# Patient Record
Sex: FEMALE | Race: WHITE | Employment: OTHER | URBAN - METROPOLITAN AREA
[De-identification: names, ages, dates, MRNs, and addresses within clinical notes are randomized per-mention and may not be internally consistent; named-entity substitution may affect disease eponyms.]

---

## 2017-03-18 ENCOUNTER — HOSPITAL ENCOUNTER (OUTPATIENT)
Age: 72
Setting detail: OBSERVATION
Discharge: HOME | End: 2017-03-20
Attending: EMERGENCY MEDICINE | Admitting: INTERNAL MEDICINE
Payer: MEDICARE

## 2017-03-18 ENCOUNTER — APPOINTMENT (OUTPATIENT)
Dept: CT IMAGING | Age: 72
End: 2017-03-18
Payer: MEDICARE

## 2017-03-18 DIAGNOSIS — I95.9 HYPOTENSION, UNSPECIFIED HYPOTENSION TYPE: Primary | ICD-10-CM

## 2017-03-18 LAB
BASOPHILS ABSOLUTE: 0.1 K/UL (ref 0–0.2)
BASOPHILS RELATIVE PERCENT: 0.6 %
EOSINOPHILS ABSOLUTE: 0.4 K/UL (ref 0–0.7)
EOSINOPHILS RELATIVE PERCENT: 3.1 %
HCT VFR BLD CALC: 42.5 % (ref 37–47)
HEMOGLOBIN: 14 G/DL (ref 12–16)
LYMPHOCYTES ABSOLUTE: 3.2 K/UL (ref 1–4.8)
LYMPHOCYTES RELATIVE PERCENT: 27.1 %
MCH RBC QN AUTO: 30.8 PG (ref 27–31.3)
MCHC RBC AUTO-ENTMCNC: 33 % (ref 33–37)
MCV RBC AUTO: 93.5 FL (ref 82–100)
MONOCYTES ABSOLUTE: 1 K/UL (ref 0.2–0.8)
MONOCYTES RELATIVE PERCENT: 8.4 %
NEUTROPHILS ABSOLUTE: 7.1 K/UL (ref 1.4–6.5)
NEUTROPHILS RELATIVE PERCENT: 60.8 %
PDW BLD-RTO: 14.6 % (ref 11.5–14.5)
PLATELET # BLD: 221 K/UL (ref 130–400)
RBC # BLD: 4.55 M/UL (ref 4.2–5.4)
WBC # BLD: 11.7 K/UL (ref 4.8–10.8)

## 2017-03-18 PROCEDURE — 93005 ELECTROCARDIOGRAM TRACING: CPT

## 2017-03-18 PROCEDURE — 80053 COMPREHEN METABOLIC PANEL: CPT

## 2017-03-18 PROCEDURE — 82550 ASSAY OF CK (CPK): CPT

## 2017-03-18 PROCEDURE — 70450 CT HEAD/BRAIN W/O DYE: CPT

## 2017-03-18 PROCEDURE — 99285 EMERGENCY DEPT VISIT HI MDM: CPT

## 2017-03-18 PROCEDURE — 87086 URINE CULTURE/COLONY COUNT: CPT

## 2017-03-18 PROCEDURE — 83735 ASSAY OF MAGNESIUM: CPT

## 2017-03-18 PROCEDURE — 84484 ASSAY OF TROPONIN QUANT: CPT

## 2017-03-18 PROCEDURE — 85025 COMPLETE CBC W/AUTO DIFF WBC: CPT

## 2017-03-18 PROCEDURE — 36415 COLL VENOUS BLD VENIPUNCTURE: CPT

## 2017-03-18 RX ORDER — 0.9 % SODIUM CHLORIDE 0.9 %
500 INTRAVENOUS SOLUTION INTRAVENOUS ONCE
Status: COMPLETED | OUTPATIENT
Start: 2017-03-18 | End: 2017-03-19

## 2017-03-18 ASSESSMENT — PAIN SCALES - GENERAL: PAINLEVEL_OUTOF10: 7

## 2017-03-18 ASSESSMENT — PAIN DESCRIPTION - PAIN TYPE: TYPE: CHRONIC PAIN

## 2017-03-18 ASSESSMENT — PAIN DESCRIPTION - LOCATION: LOCATION: BACK

## 2017-03-19 ENCOUNTER — APPOINTMENT (OUTPATIENT)
Dept: GENERAL RADIOLOGY | Age: 72
End: 2017-03-19
Payer: MEDICARE

## 2017-03-19 PROBLEM — I25.10 CORONARY ARTERY DISEASE INVOLVING NATIVE CORONARY ARTERY OF NATIVE HEART WITHOUT ANGINA PECTORIS: Status: ACTIVE | Noted: 2017-03-19

## 2017-03-19 LAB
ALBUMIN SERPL-MCNC: 3.9 G/DL (ref 3.9–4.9)
ALP BLD-CCNC: 110 U/L (ref 40–130)
ALT SERPL-CCNC: 6 U/L (ref 0–33)
AMORPHOUS: ABNORMAL
ANION GAP SERPL CALCULATED.3IONS-SCNC: 12 MEQ/L (ref 7–13)
ANION GAP SERPL CALCULATED.3IONS-SCNC: 16 MEQ/L (ref 7–13)
AST SERPL-CCNC: 19 U/L (ref 0–35)
BACTERIA: ABNORMAL /HPF
BASOPHILS ABSOLUTE: 0.1 K/UL (ref 0–0.2)
BASOPHILS RELATIVE PERCENT: 0.6 %
BILIRUB SERPL-MCNC: 0.3 MG/DL (ref 0–1.2)
BILIRUBIN URINE: NEGATIVE
BLOOD, URINE: NEGATIVE
BUN BLDV-MCNC: 21 MG/DL (ref 8–23)
BUN BLDV-MCNC: 22 MG/DL (ref 8–23)
CALCIUM SERPL-MCNC: 8.3 MG/DL (ref 8.6–10.2)
CALCIUM SERPL-MCNC: 9.2 MG/DL (ref 8.6–10.2)
CASTS: ABNORMAL /LPF
CHLORIDE BLD-SCNC: 101 MEQ/L (ref 98–107)
CHLORIDE BLD-SCNC: 94 MEQ/L (ref 98–107)
CLARITY: CLEAR
CO2: 23 MEQ/L (ref 22–29)
CO2: 24 MEQ/L (ref 22–29)
COLOR: YELLOW
CREAT SERPL-MCNC: 1.4 MG/DL (ref 0.5–0.9)
CREAT SERPL-MCNC: 1.81 MG/DL (ref 0.5–0.9)
EOSINOPHILS ABSOLUTE: 0.4 K/UL (ref 0–0.7)
EOSINOPHILS RELATIVE PERCENT: 4.2 %
EPITHELIAL CELLS, UA: ABNORMAL /HPF
GFR AFRICAN AMERICAN: 33.3
GFR AFRICAN AMERICAN: 44.7
GFR NON-AFRICAN AMERICAN: 27.5
GFR NON-AFRICAN AMERICAN: 37
GLOBULIN: 2.7 G/DL (ref 2.3–3.5)
GLUCOSE BLD-MCNC: 100 MG/DL (ref 74–109)
GLUCOSE BLD-MCNC: 127 MG/DL (ref 74–109)
GLUCOSE URINE: NEGATIVE MG/DL
HCT VFR BLD CALC: 39.2 % (ref 37–47)
HEMOGLOBIN: 13.1 G/DL (ref 12–16)
KETONES, URINE: NEGATIVE MG/DL
LEUKOCYTE ESTERASE, URINE: ABNORMAL
LYMPHOCYTES ABSOLUTE: 2.6 K/UL (ref 1–4.8)
LYMPHOCYTES RELATIVE PERCENT: 30.6 %
MAGNESIUM: 1.9 MG/DL (ref 1.7–2.3)
MCH RBC QN AUTO: 31.4 PG (ref 27–31.3)
MCHC RBC AUTO-ENTMCNC: 33.4 % (ref 33–37)
MCV RBC AUTO: 93.9 FL (ref 82–100)
MONOCYTES ABSOLUTE: 0.7 K/UL (ref 0.2–0.8)
MONOCYTES RELATIVE PERCENT: 7.7 %
NEUTROPHILS ABSOLUTE: 4.9 K/UL (ref 1.4–6.5)
NEUTROPHILS RELATIVE PERCENT: 56.9 %
NITRITE, URINE: NEGATIVE
PDW BLD-RTO: 14.6 % (ref 11.5–14.5)
PH UA: 7.5 (ref 5–9)
PLATELET # BLD: 188 K/UL (ref 130–400)
POTASSIUM SERPL-SCNC: 4.1 MEQ/L (ref 3.5–5.1)
POTASSIUM SERPL-SCNC: 4.4 MEQ/L (ref 3.5–5.1)
PROTEIN UA: NEGATIVE MG/DL
RBC # BLD: 4.17 M/UL (ref 4.2–5.4)
RBC UA: ABNORMAL /HPF (ref 0–2)
SODIUM BLD-SCNC: 134 MEQ/L (ref 132–144)
SODIUM BLD-SCNC: 136 MEQ/L (ref 132–144)
SPECIFIC GRAVITY UA: 1.01 (ref 1–1.03)
TOTAL CK: 33 U/L (ref 0–170)
TOTAL PROTEIN: 6.6 G/DL (ref 6.4–8.1)
TROPONIN: <0.01 NG/ML (ref 0–0.01)
TROPONIN: <0.01 NG/ML (ref 0–0.01)
URINE REFLEX TO CULTURE: YES
UROBILINOGEN, URINE: 1 E.U./DL
WBC # BLD: 8.5 K/UL (ref 4.8–10.8)
WBC UA: ABNORMAL /HPF (ref 0–5)

## 2017-03-19 PROCEDURE — 2580000003 HC RX 258: Performed by: INTERNAL MEDICINE

## 2017-03-19 PROCEDURE — 84484 ASSAY OF TROPONIN QUANT: CPT

## 2017-03-19 PROCEDURE — 81001 URINALYSIS AUTO W/SCOPE: CPT

## 2017-03-19 PROCEDURE — 71010 XR CHEST PORTABLE: CPT

## 2017-03-19 PROCEDURE — 36415 COLL VENOUS BLD VENIPUNCTURE: CPT

## 2017-03-19 PROCEDURE — G0378 HOSPITAL OBSERVATION PER HR: HCPCS

## 2017-03-19 PROCEDURE — 85025 COMPLETE CBC W/AUTO DIFF WBC: CPT

## 2017-03-19 PROCEDURE — 99222 1ST HOSP IP/OBS MODERATE 55: CPT | Performed by: INTERNAL MEDICINE

## 2017-03-19 PROCEDURE — 6370000000 HC RX 637 (ALT 250 FOR IP): Performed by: INTERNAL MEDICINE

## 2017-03-19 PROCEDURE — 80048 BASIC METABOLIC PNL TOTAL CA: CPT

## 2017-03-19 PROCEDURE — 2580000003 HC RX 258: Performed by: PHYSICIAN ASSISTANT

## 2017-03-19 RX ORDER — SODIUM CHLORIDE 9 MG/ML
INJECTION, SOLUTION INTRAVENOUS CONTINUOUS
Status: DISCONTINUED | OUTPATIENT
Start: 2017-03-19 | End: 2017-03-20

## 2017-03-19 RX ORDER — ASPIRIN 81 MG/1
81 TABLET, CHEWABLE ORAL DAILY
Status: DISCONTINUED | OUTPATIENT
Start: 2017-03-19 | End: 2017-03-20 | Stop reason: HOSPADM

## 2017-03-19 RX ORDER — PRAVASTATIN SODIUM 40 MG
40 TABLET ORAL DAILY
Status: DISCONTINUED | OUTPATIENT
Start: 2017-03-19 | End: 2017-03-20 | Stop reason: HOSPADM

## 2017-03-19 RX ADMIN — PRAVASTATIN SODIUM 40 MG: 40 TABLET ORAL at 20:42

## 2017-03-19 RX ADMIN — ASPIRIN 81 MG: 81 TABLET, CHEWABLE ORAL at 10:04

## 2017-03-19 RX ADMIN — SODIUM CHLORIDE 500 ML: 9 INJECTION, SOLUTION INTRAVENOUS at 00:00

## 2017-03-19 RX ADMIN — RIVAROXABAN 20 MG: 20 TABLET, FILM COATED ORAL at 10:03

## 2017-03-19 RX ADMIN — SODIUM CHLORIDE: 9 INJECTION, SOLUTION INTRAVENOUS at 05:45

## 2017-03-19 RX ADMIN — SODIUM CHLORIDE: 9 INJECTION, SOLUTION INTRAVENOUS at 15:25

## 2017-03-19 ASSESSMENT — ENCOUNTER SYMPTOMS
CONSTIPATION: 0
EYE DISCHARGE: 0
ORTHOPNEA: 0
BACK PAIN: 0
HEMOPTYSIS: 0
WHEEZING: 0
SHORTNESS OF BREATH: 0
DIARRHEA: 0
GASTROINTESTINAL NEGATIVE: 1
APNEA: 0
NAUSEA: 0
ABDOMINAL DISTENTION: 0
VOICE CHANGE: 0
NAUSEA: 1
VOMITING: 1
EYES NEGATIVE: 1
ANAL BLEEDING: 0
RESPIRATORY NEGATIVE: 1
VOMITING: 0
BLOOD IN STOOL: 0
PHOTOPHOBIA: 0
COUGH: 0
ABDOMINAL PAIN: 0

## 2017-03-20 VITALS
HEIGHT: 59 IN | BODY MASS INDEX: 26.81 KG/M2 | DIASTOLIC BLOOD PRESSURE: 77 MMHG | OXYGEN SATURATION: 95 % | HEART RATE: 77 BPM | TEMPERATURE: 97.7 F | SYSTOLIC BLOOD PRESSURE: 140 MMHG | WEIGHT: 133 LBS | RESPIRATION RATE: 18 BRPM

## 2017-03-20 LAB
LV EF: 60 %
LVEF MODALITY: NORMAL
URINE CULTURE, ROUTINE: NORMAL

## 2017-03-20 PROCEDURE — 6370000000 HC RX 637 (ALT 250 FOR IP): Performed by: INTERNAL MEDICINE

## 2017-03-20 PROCEDURE — 2580000003 HC RX 258: Performed by: INTERNAL MEDICINE

## 2017-03-20 PROCEDURE — 93306 TTE W/DOPPLER COMPLETE: CPT

## 2017-03-20 PROCEDURE — 6370000000 HC RX 637 (ALT 250 FOR IP): Performed by: NURSE PRACTITIONER

## 2017-03-20 PROCEDURE — 99217 PR OBSERVATION CARE DISCHARGE MANAGEMENT: CPT | Performed by: NURSE PRACTITIONER

## 2017-03-20 PROCEDURE — G0378 HOSPITAL OBSERVATION PER HR: HCPCS

## 2017-03-20 RX ORDER — BENZONATATE 200 MG/1
200 CAPSULE ORAL 3 TIMES DAILY
Qty: 21 CAPSULE | Refills: 0 | Status: SHIPPED | OUTPATIENT
Start: 2017-03-20 | End: 2017-03-27

## 2017-03-20 RX ORDER — BENZONATATE 100 MG/1
200 CAPSULE ORAL 3 TIMES DAILY
Status: DISCONTINUED | OUTPATIENT
Start: 2017-03-20 | End: 2017-03-20 | Stop reason: HOSPADM

## 2017-03-20 RX ORDER — CARVEDILOL 25 MG/1
12.5 TABLET ORAL 2 TIMES DAILY WITH MEALS
Qty: 60 TABLET | Refills: 3 | Status: ON HOLD | OUTPATIENT
Start: 2017-03-20 | End: 2019-07-30

## 2017-03-20 RX ORDER — ACETAMINOPHEN 325 MG/1
650 TABLET ORAL EVERY 6 HOURS PRN
Status: DISCONTINUED | OUTPATIENT
Start: 2017-03-20 | End: 2017-03-20 | Stop reason: HOSPADM

## 2017-03-20 RX ADMIN — SODIUM CHLORIDE: 9 INJECTION, SOLUTION INTRAVENOUS at 01:02

## 2017-03-20 RX ADMIN — RIVAROXABAN 20 MG: 20 TABLET, FILM COATED ORAL at 08:26

## 2017-03-20 RX ADMIN — ASPIRIN 81 MG: 81 TABLET, CHEWABLE ORAL at 08:26

## 2017-03-20 RX ADMIN — ACETAMINOPHEN 650 MG: 325 TABLET ORAL at 02:00

## 2017-03-20 RX ADMIN — BENZONATATE 200 MG: 100 CAPSULE ORAL at 11:59

## 2017-03-20 ASSESSMENT — PAIN SCALES - GENERAL: PAINLEVEL_OUTOF10: 8

## 2017-03-21 LAB
EKG ATRIAL RATE: 71 BPM
EKG P AXIS: 76 DEGREES
EKG P-R INTERVAL: 146 MS
EKG Q-T INTERVAL: 408 MS
EKG QRS DURATION: 70 MS
EKG QTC CALCULATION (BAZETT): 443 MS
EKG R AXIS: 56 DEGREES
EKG T AXIS: 62 DEGREES
EKG VENTRICULAR RATE: 71 BPM

## 2018-02-22 ENCOUNTER — HOSPITAL ENCOUNTER (OUTPATIENT)
Dept: GENERAL RADIOLOGY | Age: 73
Discharge: HOME | End: 2018-02-24
Payer: MEDICARE

## 2018-02-22 ENCOUNTER — HOSPITAL ENCOUNTER (OUTPATIENT)
Dept: WOMENS IMAGING | Age: 73
Discharge: HOME | End: 2018-02-24
Payer: MEDICARE

## 2018-02-22 DIAGNOSIS — M54.6 ACUTE BILATERAL THORACIC BACK PAIN: ICD-10-CM

## 2018-02-22 DIAGNOSIS — M54.6 PAIN IN THORACIC SPINE: ICD-10-CM

## 2018-02-22 DIAGNOSIS — M17.9 GONARTHROSIS: ICD-10-CM

## 2018-02-22 PROCEDURE — 72110 X-RAY EXAM L-2 SPINE 4/>VWS: CPT

## 2018-02-22 PROCEDURE — 77080 DXA BONE DENSITY AXIAL: CPT

## 2018-02-22 PROCEDURE — 72072 X-RAY EXAM THORAC SPINE 3VWS: CPT

## 2018-03-06 ENCOUNTER — TELEPHONE (OUTPATIENT)
Dept: CASE MANAGEMENT | Age: 73
End: 2018-03-06

## 2018-03-06 NOTE — LETTER
3/6/2018      [facility]    Dear Colleen Opitz:       Your healthcare provider has referred you to the [facility].  Your appointment is Tuesday, March 13 at 1:00pm at [facility].    I am enclosing some information that you may find helpful. Please keep in mind that lung cancer screening does not take the place of quitting smoking. The best way to not get lung cancer is to not smoke. If you would like help with this, please feel free to contact me. Also find enclosed a Statement of Understanding. Please review this document and call me with any questions. You can bring this with you to your appointment as will be asked to sign a copy of this document at the time of your scan. Your provider will contact you when the results of your CT lung screening are available. If you have any questions before then, please do not hesitate to contact me. Kindest regards,        Vida Moise RN, BSN  Lung Nodule Navigator  [facility]  [phone]  [email or web address]

## 2018-03-13 ENCOUNTER — HOSPITAL ENCOUNTER (OUTPATIENT)
Dept: CT IMAGING | Age: 73
Discharge: HOME | End: 2018-03-15
Payer: MEDICARE

## 2018-03-13 DIAGNOSIS — Z72.0 TOBACCO ABUSE DISORDER: ICD-10-CM

## 2018-03-13 DIAGNOSIS — F17.200 TOBACCO DEPENDENCE SYNDROME: ICD-10-CM

## 2018-03-13 PROCEDURE — G0297 LDCT FOR LUNG CA SCREEN: HCPCS

## 2018-04-23 ENCOUNTER — HOSPITAL ENCOUNTER (OUTPATIENT)
Dept: CT IMAGING | Age: 73
Discharge: HOME | End: 2018-04-25
Payer: MEDICARE

## 2018-04-23 VITALS
HEIGHT: 59 IN | WEIGHT: 139 LBS | DIASTOLIC BLOOD PRESSURE: 79 MMHG | BODY MASS INDEX: 28.02 KG/M2 | HEART RATE: 63 BPM | SYSTOLIC BLOOD PRESSURE: 143 MMHG | RESPIRATION RATE: 16 BRPM

## 2018-04-23 DIAGNOSIS — Q45.3 PANCREATIC DUCTAL ABNORMALITY: ICD-10-CM

## 2018-04-23 PROCEDURE — 74150 CT ABDOMEN W/O CONTRAST: CPT

## 2018-05-11 ENCOUNTER — OFFICE VISIT (OUTPATIENT)
Dept: GASTROENTEROLOGY | Age: 73
End: 2018-05-11
Payer: MEDICARE

## 2018-05-11 VITALS
BODY MASS INDEX: 30.64 KG/M2 | TEMPERATURE: 98.7 F | WEIGHT: 146 LBS | SYSTOLIC BLOOD PRESSURE: 140 MMHG | HEIGHT: 58 IN | OXYGEN SATURATION: 94 % | DIASTOLIC BLOOD PRESSURE: 62 MMHG | HEART RATE: 63 BPM

## 2018-05-11 DIAGNOSIS — K86.1 IDIOPATHIC CHRONIC PANCREATITIS (HCC): Primary | ICD-10-CM

## 2018-05-11 DIAGNOSIS — R19.5 POSITIVE FIT (FECAL IMMUNOCHEMICAL TEST): ICD-10-CM

## 2018-05-11 DIAGNOSIS — K21.9 GASTROESOPHAGEAL REFLUX DISEASE WITHOUT ESOPHAGITIS: ICD-10-CM

## 2018-05-11 PROCEDURE — 1123F ACP DISCUSS/DSCN MKR DOCD: CPT | Performed by: INTERNAL MEDICINE

## 2018-05-11 PROCEDURE — 1090F PRES/ABSN URINE INCON ASSESS: CPT | Performed by: INTERNAL MEDICINE

## 2018-05-11 PROCEDURE — 4004F PT TOBACCO SCREEN RCVD TLK: CPT | Performed by: INTERNAL MEDICINE

## 2018-05-11 PROCEDURE — G8419 CALC BMI OUT NRM PARAM NOF/U: HCPCS | Performed by: INTERNAL MEDICINE

## 2018-05-11 PROCEDURE — 4040F PNEUMOC VAC/ADMIN/RCVD: CPT | Performed by: INTERNAL MEDICINE

## 2018-05-11 PROCEDURE — 3017F COLORECTAL CA SCREEN DOC REV: CPT | Performed by: INTERNAL MEDICINE

## 2018-05-11 PROCEDURE — G8399 PT W/DXA RESULTS DOCUMENT: HCPCS | Performed by: INTERNAL MEDICINE

## 2018-05-11 PROCEDURE — 99204 OFFICE O/P NEW MOD 45 MIN: CPT | Performed by: INTERNAL MEDICINE

## 2018-05-11 PROCEDURE — G8598 ASA/ANTIPLAT THER USED: HCPCS | Performed by: INTERNAL MEDICINE

## 2018-05-11 PROCEDURE — G8427 DOCREV CUR MEDS BY ELIG CLIN: HCPCS | Performed by: INTERNAL MEDICINE

## 2018-05-11 RX ORDER — GABAPENTIN 100 MG/1
100 CAPSULE ORAL 3 TIMES DAILY
Status: ON HOLD | COMMUNITY
End: 2019-08-21 | Stop reason: HOSPADM

## 2018-05-11 RX ORDER — AMLODIPINE BESYLATE 2.5 MG/1
2.5 TABLET ORAL DAILY
Refills: 0 | Status: ON HOLD | COMMUNITY
Start: 2018-03-15 | End: 2019-07-30

## 2018-05-11 RX ORDER — SODIUM, POTASSIUM,MAG SULFATES 17.5-3.13G
SOLUTION, RECONSTITUTED, ORAL ORAL
Qty: 1 BOTTLE | Refills: 0 | Status: SHIPPED | OUTPATIENT
Start: 2018-05-11 | End: 2019-09-11

## 2018-05-11 RX ORDER — METHOCARBAMOL 500 MG/1
100 TABLET, FILM COATED ORAL 4 TIMES DAILY
Status: ON HOLD | COMMUNITY
End: 2019-07-30

## 2018-05-11 RX ORDER — ALBUTEROL SULFATE 90 UG/1
2 AEROSOL, METERED RESPIRATORY (INHALATION) EVERY 6 HOURS PRN
COMMUNITY
End: 2020-05-27

## 2018-05-14 ENCOUNTER — OFFICE VISIT (OUTPATIENT)
Dept: PULMONOLOGY | Age: 73
End: 2018-05-14
Payer: MEDICARE

## 2018-05-14 VITALS
HEART RATE: 61 BPM | BODY MASS INDEX: 30.02 KG/M2 | OXYGEN SATURATION: 93 % | WEIGHT: 143 LBS | SYSTOLIC BLOOD PRESSURE: 126 MMHG | DIASTOLIC BLOOD PRESSURE: 64 MMHG | HEIGHT: 58 IN | RESPIRATION RATE: 16 BRPM | TEMPERATURE: 98 F

## 2018-05-14 DIAGNOSIS — F17.200 SMOKING: ICD-10-CM

## 2018-05-14 DIAGNOSIS — R06.02 SOB (SHORTNESS OF BREATH) ON EXERTION: ICD-10-CM

## 2018-05-14 DIAGNOSIS — J43.2 CENTRILOBULAR EMPHYSEMA (HCC): ICD-10-CM

## 2018-05-14 DIAGNOSIS — K86.1 IDIOPATHIC CHRONIC PANCREATITIS (HCC): ICD-10-CM

## 2018-05-14 DIAGNOSIS — R91.8 MULTIPLE LUNG NODULES: Primary | ICD-10-CM

## 2018-05-14 PROBLEM — R91.1 LUNG NODULE: Status: ACTIVE | Noted: 2018-05-14

## 2018-05-14 PROCEDURE — 1090F PRES/ABSN URINE INCON ASSESS: CPT | Performed by: INTERNAL MEDICINE

## 2018-05-14 PROCEDURE — 3017F COLORECTAL CA SCREEN DOC REV: CPT | Performed by: INTERNAL MEDICINE

## 2018-05-14 PROCEDURE — 4040F PNEUMOC VAC/ADMIN/RCVD: CPT | Performed by: INTERNAL MEDICINE

## 2018-05-14 PROCEDURE — G8598 ASA/ANTIPLAT THER USED: HCPCS | Performed by: INTERNAL MEDICINE

## 2018-05-14 PROCEDURE — 1123F ACP DISCUSS/DSCN MKR DOCD: CPT | Performed by: INTERNAL MEDICINE

## 2018-05-14 PROCEDURE — G8926 SPIRO NO PERF OR DOC: HCPCS | Performed by: INTERNAL MEDICINE

## 2018-05-14 PROCEDURE — 99204 OFFICE O/P NEW MOD 45 MIN: CPT | Performed by: INTERNAL MEDICINE

## 2018-05-14 PROCEDURE — G8427 DOCREV CUR MEDS BY ELIG CLIN: HCPCS | Performed by: INTERNAL MEDICINE

## 2018-05-14 PROCEDURE — 4004F PT TOBACCO SCREEN RCVD TLK: CPT | Performed by: INTERNAL MEDICINE

## 2018-05-14 PROCEDURE — G8399 PT W/DXA RESULTS DOCUMENT: HCPCS | Performed by: INTERNAL MEDICINE

## 2018-05-14 PROCEDURE — 3023F SPIROM DOC REV: CPT | Performed by: INTERNAL MEDICINE

## 2018-05-14 PROCEDURE — G8417 CALC BMI ABV UP PARAM F/U: HCPCS | Performed by: INTERNAL MEDICINE

## 2018-05-14 RX ORDER — CALCIUM CARBONATE/VITAMIN D3 500-3.125
TABLET ORAL
Refills: 5 | Status: ON HOLD | COMMUNITY
Start: 2018-03-15 | End: 2019-07-30

## 2018-05-17 LAB — PANCREATIC ELASTASE, FECAL: 470 UG/G

## 2018-06-12 ENCOUNTER — APPOINTMENT (OUTPATIENT)
Dept: CT IMAGING | Age: 73
End: 2018-06-12
Payer: MEDICARE

## 2018-06-12 ENCOUNTER — HOSPITAL ENCOUNTER (EMERGENCY)
Age: 73
Discharge: HOME | End: 2018-06-12
Attending: EMERGENCY MEDICINE
Payer: MEDICARE

## 2018-06-12 ENCOUNTER — APPOINTMENT (OUTPATIENT)
Dept: GENERAL RADIOLOGY | Age: 73
End: 2018-06-12
Payer: MEDICARE

## 2018-06-12 ENCOUNTER — TELEPHONE (OUTPATIENT)
Dept: GASTROENTEROLOGY | Age: 73
End: 2018-06-12

## 2018-06-12 VITALS
HEIGHT: 58 IN | HEART RATE: 61 BPM | DIASTOLIC BLOOD PRESSURE: 67 MMHG | TEMPERATURE: 98.4 F | SYSTOLIC BLOOD PRESSURE: 147 MMHG | RESPIRATION RATE: 18 BRPM | OXYGEN SATURATION: 95 % | WEIGHT: 143 LBS | BODY MASS INDEX: 30.02 KG/M2

## 2018-06-12 DIAGNOSIS — R42 DIZZINESS: ICD-10-CM

## 2018-06-12 DIAGNOSIS — S09.90XA MINOR HEAD INJURY, INITIAL ENCOUNTER: Primary | ICD-10-CM

## 2018-06-12 LAB
ALBUMIN SERPL-MCNC: 4.3 G/DL (ref 3.9–4.9)
ALP BLD-CCNC: 127 U/L (ref 40–130)
ALT SERPL-CCNC: 9 U/L (ref 0–33)
ANION GAP SERPL CALCULATED.3IONS-SCNC: 17 MEQ/L (ref 7–13)
AST SERPL-CCNC: 18 U/L (ref 0–35)
BACTERIA: NORMAL /HPF
BASOPHILS ABSOLUTE: 0 K/UL (ref 0–0.2)
BASOPHILS RELATIVE PERCENT: 0.4 %
BILIRUB SERPL-MCNC: 0.8 MG/DL (ref 0–1.2)
BILIRUBIN URINE: ABNORMAL
BLOOD, URINE: NEGATIVE
BUN BLDV-MCNC: 18 MG/DL (ref 8–23)
CALCIUM SERPL-MCNC: 9.8 MG/DL (ref 8.6–10.2)
CHLORIDE BLD-SCNC: 100 MEQ/L (ref 98–107)
CLARITY: CLEAR
CO2: 24 MEQ/L (ref 22–29)
COLOR: YELLOW
CREAT SERPL-MCNC: 1.14 MG/DL (ref 0.5–0.9)
EKG ATRIAL RATE: 58 BPM
EKG P AXIS: 60 DEGREES
EKG P-R INTERVAL: 132 MS
EKG Q-T INTERVAL: 406 MS
EKG QRS DURATION: 66 MS
EKG QTC CALCULATION (BAZETT): 398 MS
EKG R AXIS: 38 DEGREES
EKG T AXIS: 54 DEGREES
EKG VENTRICULAR RATE: 58 BPM
EOSINOPHILS ABSOLUTE: 0.1 K/UL (ref 0–0.7)
EOSINOPHILS RELATIVE PERCENT: 1.2 %
EPITHELIAL CELLS, UA: NORMAL /HPF
GFR AFRICAN AMERICAN: 56.5
GFR NON-AFRICAN AMERICAN: 46.7
GLOBULIN: 3.7 G/DL (ref 2.3–3.5)
GLUCOSE BLD-MCNC: 117 MG/DL (ref 74–109)
GLUCOSE URINE: NEGATIVE MG/DL
HCT VFR BLD CALC: 44.4 % (ref 37–47)
HEMOGLOBIN: 15.1 G/DL (ref 12–16)
INR BLD: 1
KETONES, URINE: NEGATIVE MG/DL
LEUKOCYTE ESTERASE, URINE: NEGATIVE
LYMPHOCYTES ABSOLUTE: 1.1 K/UL (ref 1–4.8)
LYMPHOCYTES RELATIVE PERCENT: 20.3 %
MCH RBC QN AUTO: 31.1 PG (ref 27–31.3)
MCHC RBC AUTO-ENTMCNC: 34 % (ref 33–37)
MCV RBC AUTO: 91.8 FL (ref 82–100)
MONOCYTES ABSOLUTE: 0.4 K/UL (ref 0.2–0.8)
MONOCYTES RELATIVE PERCENT: 7.3 %
NEUTROPHILS ABSOLUTE: 3.7 K/UL (ref 1.4–6.5)
NEUTROPHILS RELATIVE PERCENT: 70.8 %
NITRITE, URINE: NEGATIVE
PDW BLD-RTO: 14.6 % (ref 11.5–14.5)
PH UA: 6 (ref 5–9)
PLATELET # BLD: 226 K/UL (ref 130–400)
POTASSIUM SERPL-SCNC: 4.1 MEQ/L (ref 3.5–5.1)
PROTEIN UA: 30 MG/DL
PROTHROMBIN TIME: 10.2 SEC (ref 9.6–12.3)
RBC # BLD: 4.84 M/UL (ref 4.2–5.4)
RBC UA: NORMAL /HPF (ref 0–2)
SODIUM BLD-SCNC: 141 MEQ/L (ref 132–144)
SPECIFIC GRAVITY UA: 1.02 (ref 1–1.03)
TOTAL PROTEIN: 8 G/DL (ref 6.4–8.1)
TROPONIN: <0.01 NG/ML (ref 0–0.01)
URINE REFLEX TO CULTURE: YES
UROBILINOGEN, URINE: 0.2 E.U./DL
WBC # BLD: 5.2 K/UL (ref 4.8–10.8)
WBC UA: NORMAL /HPF (ref 0–5)

## 2018-06-12 PROCEDURE — 85610 PROTHROMBIN TIME: CPT

## 2018-06-12 PROCEDURE — 73562 X-RAY EXAM OF KNEE 3: CPT

## 2018-06-12 PROCEDURE — 81001 URINALYSIS AUTO W/SCOPE: CPT

## 2018-06-12 PROCEDURE — 87086 URINE CULTURE/COLONY COUNT: CPT

## 2018-06-12 PROCEDURE — 80053 COMPREHEN METABOLIC PANEL: CPT

## 2018-06-12 PROCEDURE — 70450 CT HEAD/BRAIN W/O DYE: CPT

## 2018-06-12 PROCEDURE — 93005 ELECTROCARDIOGRAM TRACING: CPT

## 2018-06-12 PROCEDURE — 36415 COLL VENOUS BLD VENIPUNCTURE: CPT

## 2018-06-12 PROCEDURE — 99284 EMERGENCY DEPT VISIT MOD MDM: CPT

## 2018-06-12 PROCEDURE — 85025 COMPLETE CBC W/AUTO DIFF WBC: CPT

## 2018-06-12 PROCEDURE — 84484 ASSAY OF TROPONIN QUANT: CPT

## 2018-06-12 RX ORDER — TIZANIDINE 4 MG/1
4 TABLET ORAL EVERY 8 HOURS PRN
COMMUNITY

## 2018-06-12 RX ORDER — HYDROCODONE BITARTRATE AND ACETAMINOPHEN 7.5; 325 MG/1; MG/1
1 TABLET ORAL 2 TIMES DAILY
Status: ON HOLD | COMMUNITY
End: 2019-07-30

## 2018-06-12 ASSESSMENT — ENCOUNTER SYMPTOMS
VOICE CHANGE: 0
ANAL BLEEDING: 0
COUGH: 0
SHORTNESS OF BREATH: 0
SORE THROAT: 0
ABDOMINAL DISTENTION: 0
PHOTOPHOBIA: 0
CHOKING: 0
CONSTIPATION: 0
WHEEZING: 0
BACK PAIN: 0
FACIAL SWELLING: 0
NAUSEA: 0
EYE REDNESS: 0
BLOOD IN STOOL: 0
TROUBLE SWALLOWING: 0
SINUS PRESSURE: 0
RHINORRHEA: 0
EYE DISCHARGE: 0
EYE ITCHING: 0
VOMITING: 0
DIARRHEA: 0
ABDOMINAL PAIN: 0
CHEST TIGHTNESS: 0
COLOR CHANGE: 0
EYE PAIN: 0
STRIDOR: 0

## 2018-06-12 ASSESSMENT — PAIN SCALES - GENERAL
PAINLEVEL_OUTOF10: 5

## 2018-06-12 ASSESSMENT — PAIN DESCRIPTION - LOCATION
LOCATION: BACK;KNEE
LOCATION: BACK;KNEE
LOCATION: BACK

## 2018-06-12 ASSESSMENT — PAIN DESCRIPTION - PAIN TYPE
TYPE: CHRONIC PAIN
TYPE: CHRONIC PAIN;ACUTE PAIN
TYPE: ACUTE PAIN;CHRONIC PAIN

## 2018-06-12 ASSESSMENT — PAIN DESCRIPTION - PROGRESSION
CLINICAL_PROGRESSION: NOT CHANGED
CLINICAL_PROGRESSION: NOT CHANGED

## 2018-06-12 ASSESSMENT — PAIN DESCRIPTION - ORIENTATION
ORIENTATION: RIGHT
ORIENTATION: RIGHT

## 2018-06-12 ASSESSMENT — PAIN DESCRIPTION - ONSET: ONSET: SUDDEN

## 2018-06-12 ASSESSMENT — PAIN DESCRIPTION - DESCRIPTORS: DESCRIPTORS: SORE

## 2018-06-14 ENCOUNTER — HOSPITAL ENCOUNTER (OUTPATIENT)
Dept: PULMONOLOGY | Age: 73
Discharge: HOME | End: 2018-06-14
Payer: MEDICARE

## 2018-06-14 DIAGNOSIS — R06.02 SOB (SHORTNESS OF BREATH) ON EXERTION: ICD-10-CM

## 2018-06-14 LAB — URINE CULTURE, ROUTINE: NORMAL

## 2018-06-14 PROCEDURE — 94729 DIFFUSING CAPACITY: CPT

## 2018-06-14 PROCEDURE — 6360000002 HC RX W HCPCS: Performed by: INTERNAL MEDICINE

## 2018-06-14 PROCEDURE — 94726 PLETHYSMOGRAPHY LUNG VOLUMES: CPT | Performed by: INTERNAL MEDICINE

## 2018-06-14 PROCEDURE — 94060 EVALUATION OF WHEEZING: CPT

## 2018-06-14 PROCEDURE — 94729 DIFFUSING CAPACITY: CPT | Performed by: INTERNAL MEDICINE

## 2018-06-14 PROCEDURE — 94726 PLETHYSMOGRAPHY LUNG VOLUMES: CPT

## 2018-06-14 PROCEDURE — 94060 EVALUATION OF WHEEZING: CPT | Performed by: INTERNAL MEDICINE

## 2018-06-14 RX ORDER — ALBUTEROL SULFATE 2.5 MG/3ML
2.5 SOLUTION RESPIRATORY (INHALATION) ONCE
Status: COMPLETED | OUTPATIENT
Start: 2018-06-14 | End: 2018-06-14

## 2018-06-14 RX ADMIN — ALBUTEROL SULFATE 2.5 MG: 2.5 SOLUTION RESPIRATORY (INHALATION) at 13:55

## 2018-07-10 ENCOUNTER — HOSPITAL ENCOUNTER (OUTPATIENT)
Dept: GENERAL RADIOLOGY | Age: 73
Discharge: HOME | End: 2018-07-12
Payer: MEDICARE

## 2018-07-10 ENCOUNTER — HOSPITAL ENCOUNTER (OUTPATIENT)
Age: 73
Discharge: HOME | End: 2018-07-12
Payer: MEDICARE

## 2018-07-10 DIAGNOSIS — G89.29 CHRONIC THORACIC BACK PAIN, UNSPECIFIED BACK PAIN LATERALITY: ICD-10-CM

## 2018-07-10 DIAGNOSIS — M54.5 CHRONIC LOW BACK PAIN, UNSPECIFIED BACK PAIN LATERALITY, WITH SCIATICA PRESENCE UNSPECIFIED: ICD-10-CM

## 2018-07-10 DIAGNOSIS — G89.29 CHRONIC LOW BACK PAIN, UNSPECIFIED BACK PAIN LATERALITY, WITH SCIATICA PRESENCE UNSPECIFIED: ICD-10-CM

## 2018-07-10 DIAGNOSIS — M54.6 CHRONIC THORACIC BACK PAIN, UNSPECIFIED BACK PAIN LATERALITY: ICD-10-CM

## 2018-07-10 PROCEDURE — 72070 X-RAY EXAM THORAC SPINE 2VWS: CPT

## 2018-07-10 PROCEDURE — 72110 X-RAY EXAM L-2 SPINE 4/>VWS: CPT

## 2018-10-25 DIAGNOSIS — R06.02 SOB (SHORTNESS OF BREATH) ON EXERTION: ICD-10-CM

## 2018-10-25 DIAGNOSIS — J43.2 CENTRILOBULAR EMPHYSEMA (HCC): ICD-10-CM

## 2018-10-30 RX ORDER — UMECLIDINIUM BROMIDE AND VILANTEROL TRIFENATATE 62.5; 25 UG/1; UG/1
1 POWDER RESPIRATORY (INHALATION) DAILY
Qty: 1 EACH | Refills: 2 | Status: SHIPPED | OUTPATIENT
Start: 2018-10-30 | End: 2019-04-24 | Stop reason: ALTCHOICE

## 2018-11-23 ENCOUNTER — HOSPITAL ENCOUNTER (OUTPATIENT)
Dept: MRI IMAGING | Age: 73
Discharge: HOME | End: 2018-11-25
Payer: MEDICARE

## 2018-11-23 DIAGNOSIS — S22.000S CLOSED COMPRESSION FRACTURE OF THORACIC VERTEBRA, SEQUELA: ICD-10-CM

## 2018-11-23 DIAGNOSIS — R91.8 MULTIPLE LUNG NODULES: ICD-10-CM

## 2018-11-23 DIAGNOSIS — S32.010S CLOSED COMPRESSION FRACTURE OF FIRST LUMBAR VERTEBRA, SEQUELA: ICD-10-CM

## 2018-11-29 ENCOUNTER — HOSPITAL ENCOUNTER (OUTPATIENT)
Dept: MRI IMAGING | Age: 73
Discharge: HOME | End: 2018-12-01
Payer: MEDICARE

## 2018-11-29 VITALS
HEART RATE: 88 BPM | DIASTOLIC BLOOD PRESSURE: 70 MMHG | OXYGEN SATURATION: 91 % | SYSTOLIC BLOOD PRESSURE: 136 MMHG | RESPIRATION RATE: 16 BRPM

## 2018-11-29 PROCEDURE — 72146 MRI CHEST SPINE W/O DYE: CPT

## 2018-11-29 PROCEDURE — 72148 MRI LUMBAR SPINE W/O DYE: CPT

## 2018-12-03 ENCOUNTER — HOSPITAL ENCOUNTER (OUTPATIENT)
Dept: MRI IMAGING | Age: 73
Discharge: HOME | End: 2018-12-05
Payer: MEDICARE

## 2018-12-03 VITALS
OXYGEN SATURATION: 96 % | RESPIRATION RATE: 18 BRPM | HEART RATE: 81 BPM | SYSTOLIC BLOOD PRESSURE: 120 MMHG | DIASTOLIC BLOOD PRESSURE: 75 MMHG

## 2018-12-03 DIAGNOSIS — R52 PAIN: ICD-10-CM

## 2018-12-03 DIAGNOSIS — R79.89 ABNORMAL LIVER FUNCTION TESTS: ICD-10-CM

## 2018-12-03 LAB
GFR AFRICAN AMERICAN: 53
GFR NON-AFRICAN AMERICAN: 44
PERFORMED ON: ABNORMAL
POC CREATININE: 1.2 MG/DL (ref 0.6–1.2)
POC SAMPLE TYPE: ABNORMAL

## 2018-12-03 PROCEDURE — A9577 INJ MULTIHANCE: HCPCS | Performed by: NURSE PRACTITIONER

## 2018-12-03 PROCEDURE — 74183 MRI ABD W/O CNTR FLWD CNTR: CPT

## 2018-12-03 PROCEDURE — 6360000004 HC RX CONTRAST MEDICATION: Performed by: NURSE PRACTITIONER

## 2018-12-03 RX ORDER — 0.9 % SODIUM CHLORIDE 0.9 %
40 VIAL (ML) INJECTION ONCE
Status: DISCONTINUED | OUTPATIENT
Start: 2018-12-03 | End: 2018-12-06 | Stop reason: HOSPADM

## 2018-12-03 RX ADMIN — GADOBENATE DIMEGLUMINE 20 ML: 529 INJECTION, SOLUTION INTRAVENOUS at 09:48

## 2018-12-05 ENCOUNTER — HOSPITAL ENCOUNTER (OUTPATIENT)
Dept: ULTRASOUND IMAGING | Age: 73
Discharge: HOME | End: 2018-12-07
Payer: MEDICARE

## 2018-12-05 DIAGNOSIS — N18.30 CHRONIC KIDNEY DISEASE, STAGE 3 (HCC): ICD-10-CM

## 2018-12-05 DIAGNOSIS — I73.9 PAD (PERIPHERAL ARTERY DISEASE) (HCC): ICD-10-CM

## 2018-12-05 DIAGNOSIS — I65.23 BILATERAL CAROTID ARTERY STENOSIS: ICD-10-CM

## 2018-12-05 PROCEDURE — 93924 LWR XTR VASC STDY BILAT: CPT | Performed by: INTERNAL MEDICINE

## 2018-12-05 PROCEDURE — 93924 LWR XTR VASC STDY BILAT: CPT

## 2018-12-05 PROCEDURE — 93880 EXTRACRANIAL BILAT STUDY: CPT

## 2019-01-18 ENCOUNTER — HOSPITAL ENCOUNTER (OUTPATIENT)
Dept: ULTRASOUND IMAGING | Age: 74
Discharge: HOME | End: 2019-01-20
Payer: MEDICARE

## 2019-01-18 DIAGNOSIS — I10 HYPERTENSION, UNSPECIFIED TYPE: ICD-10-CM

## 2019-01-18 PROCEDURE — 93975 VASCULAR STUDY: CPT

## 2019-01-18 PROCEDURE — 76775 US EXAM ABDO BACK WALL LIM: CPT

## 2019-04-24 ENCOUNTER — OFFICE VISIT (OUTPATIENT)
Dept: PULMONOLOGY | Age: 74
End: 2019-04-24
Payer: MEDICARE

## 2019-04-24 VITALS
TEMPERATURE: 98 F | RESPIRATION RATE: 16 BRPM | HEIGHT: 58 IN | WEIGHT: 146 LBS | OXYGEN SATURATION: 95 % | BODY MASS INDEX: 30.64 KG/M2 | SYSTOLIC BLOOD PRESSURE: 118 MMHG | HEART RATE: 70 BPM | DIASTOLIC BLOOD PRESSURE: 64 MMHG

## 2019-04-24 DIAGNOSIS — R91.8 MULTIPLE LUNG NODULES: ICD-10-CM

## 2019-04-24 DIAGNOSIS — F17.200 SMOKING: ICD-10-CM

## 2019-04-24 DIAGNOSIS — E66.09 CLASS 1 OBESITY DUE TO EXCESS CALORIES WITHOUT SERIOUS COMORBIDITY WITH BODY MASS INDEX (BMI) OF 30.0 TO 30.9 IN ADULT: ICD-10-CM

## 2019-04-24 DIAGNOSIS — J44.9 CHRONIC OBSTRUCTIVE PULMONARY DISEASE, UNSPECIFIED COPD TYPE (HCC): Primary | ICD-10-CM

## 2019-04-24 PROCEDURE — 4040F PNEUMOC VAC/ADMIN/RCVD: CPT | Performed by: INTERNAL MEDICINE

## 2019-04-24 PROCEDURE — G8926 SPIRO NO PERF OR DOC: HCPCS | Performed by: INTERNAL MEDICINE

## 2019-04-24 PROCEDURE — G8417 CALC BMI ABV UP PARAM F/U: HCPCS | Performed by: INTERNAL MEDICINE

## 2019-04-24 PROCEDURE — 4004F PT TOBACCO SCREEN RCVD TLK: CPT | Performed by: INTERNAL MEDICINE

## 2019-04-24 PROCEDURE — 3023F SPIROM DOC REV: CPT | Performed by: INTERNAL MEDICINE

## 2019-04-24 PROCEDURE — G8399 PT W/DXA RESULTS DOCUMENT: HCPCS | Performed by: INTERNAL MEDICINE

## 2019-04-24 PROCEDURE — 1090F PRES/ABSN URINE INCON ASSESS: CPT | Performed by: INTERNAL MEDICINE

## 2019-04-24 PROCEDURE — 1123F ACP DISCUSS/DSCN MKR DOCD: CPT | Performed by: INTERNAL MEDICINE

## 2019-04-24 PROCEDURE — G8598 ASA/ANTIPLAT THER USED: HCPCS | Performed by: INTERNAL MEDICINE

## 2019-04-24 PROCEDURE — 99214 OFFICE O/P EST MOD 30 MIN: CPT | Performed by: INTERNAL MEDICINE

## 2019-04-24 PROCEDURE — 3017F COLORECTAL CA SCREEN DOC REV: CPT | Performed by: INTERNAL MEDICINE

## 2019-04-24 PROCEDURE — G8427 DOCREV CUR MEDS BY ELIG CLIN: HCPCS | Performed by: INTERNAL MEDICINE

## 2019-04-24 RX ORDER — ZOLEDRONIC ACID 5 MG/100ML
5 INJECTION, SOLUTION INTRAVENOUS
COMMUNITY
Start: 2018-11-15 | End: 2020-05-27

## 2019-04-24 RX ORDER — ALBUTEROL SULFATE 90 UG/1
2 AEROSOL, METERED RESPIRATORY (INHALATION)
COMMUNITY
Start: 2019-02-07 | End: 2019-12-12

## 2019-04-24 RX ORDER — OXYCODONE HYDROCHLORIDE AND ACETAMINOPHEN 5; 325 MG/1; MG/1
1 TABLET ORAL
COMMUNITY
End: 2020-05-27

## 2019-04-24 RX ORDER — OMEPRAZOLE 20 MG/1
20 CAPSULE, DELAYED RELEASE ORAL
COMMUNITY
Start: 2019-02-07

## 2019-04-24 RX ORDER — ROSUVASTATIN CALCIUM 20 MG/1
20 TABLET, COATED ORAL
Status: ON HOLD | COMMUNITY
Start: 2019-02-07 | End: 2019-07-30

## 2019-04-24 NOTE — PROGRESS NOTES
Subjective:     Rica Stone is a 76 y.o. female whocomplains today of:     Chief Complaint   Patient presents with    Follow-up     Follow up       HPI  Patient presents for  COPD follow up     Patient reports SOB on exertion, no chest pain, able to walk 1/2 a block but then have to stop , however also has   back pain issues that limits her activity  Mild cough mainly in the morning , no phlegm   No fever   No GERD   No nasal congestion or post nasal drip   + mild snoring , no witnessed apnea     Cut down on smoking , now smokes 1/2 PPD   On Anoro and uses daily   Uses rescue inhaler 3 times a week   Declined pulm rehab     Allergies:  Patient has no known allergies. Past Medical History:   Diagnosis Date    Back pain, chronic     Pt  is with pain management    Chronic kidney disease     COPD [facility] obstructive pulmonary disease) [facility] [address]     Coronary artery disease involving native coronary artery of native heart without angina pectoris 3/19/2017    Dyslipidemia 11/20/2016    Essential hypertension 11/20/2016    Factor V deficiency ([facility] [address]     H/O blood clots     Headache     Hypertension     Syncope 11/20/2016    Tobacco abuse 11/20/2016     Past Surgical History:   Procedure Laterality Date    BREAST BIOPSY Right     CORONARY ANGIOPLASTY WITH STENT PLACEMENT       History reviewed. No pertinent family history. Social History     Socioeconomic History    Marital status:       Spouse name: Not on file    Number of children: Not on file    Years of education: Not on file    Highest education level: Not on file   Occupational History    Not on file   Social Needs    Financial resource strain: Not on file    Food insecurity:     Worry: Not on file     Inability: Not on file    Transportation needs:     Medical: Not on file     Non-medical: Not on file   Tobacco Use    Smoking status: Heavy Tobacco Smoker     Packs/day: 0.50     Years: 57.00     Pack years: 28.50     Types: Cigarettes     Start date: 1/1/1960    Smokeless tobacco: Never Used    Tobacco comment: 3/6/18 has cut back   Substance and Sexual Activity    Alcohol use: Yes     Comment: occ    Drug use: No    Sexual activity: Never   Lifestyle    Physical activity:     Days per week: Not on file     Minutes per session: Not on file    Stress: Not on file   Relationships    Social connections:     Talks on phone: Not on file     Gets together: Not on file     Attends Christianity service: Not on file     Active member of club or organization: Not on file     Attends meetings of clubs or organizations: Not on file     Relationship status: Not on file    Intimate partner violence:     Fear of current or ex partner: Not on file     Emotionally abused: Not on file     Physically abused: Not on file     Forced sexual activity: Not on file   Other Topics Concern    Not on file   Social History Narrative    Not on file         Review of Systems      ROS: 10 organs review of system is done including general, psychological, ENT, hematological, endocrine, respiratory, cardiovascular, gastrointestinal,musculoskeletal, neurological,  allergy and Immunology is done and is otherwise negative.     Current Outpatient Medications   Medication Sig Dispense Refill    vitamin D (CHOLECALCIFEROL) 5000 units CAPS capsule Take 5,000 Units by mouth      omeprazole (PRILOSEC) 20 MG delayed release capsule Take 20 mg by mouth      rosuvastatin (CRESTOR) 20 MG tablet Take 20 mg by mouth      zoledronic acid (RECLAST) 5 MG/100ML SOLN Infuse 5 mg intravenously      albuterol sulfate HFA (PROVENTIL HFA) 108 (90 Base) MCG/ACT inhaler Inhale 2 puffs into the lungs      ANORO ELLIPTA 62.5-25 MCG/INH AEPB inhaler Inhale 1 puff into the lungs daily 1 each 2    tiZANidine (ZANAFLEX) 4 MG tablet Take 4 mg by mouth every 8 hours as needed      OS-BALAJI CALCIUM + D3 500-200 MG-UNIT per tablet TAKE 1 TABLET BY MOUTH TWICE DAILY  5    amLODIPine (NORVASC) 2.5 MG tablet Take 2.5 mg by mouth daily  0    gabapentin (NEURONTIN) 100 MG capsule Take 100 mg by mouth 3 times daily. Orville Arnold rivaroxaban (XARELTO) 20 MG TABS tablet Take 20 mg by mouth      albuterol sulfate  (90 Base) MCG/ACT inhaler Inhale 2 puffs into the lungs every 6 hours as needed for Wheezing      aspirin 81 MG chewable tablet Take 1 tablet by mouth daily 30 tablet 3    isosorbide mononitrate (IMDUR) 30 MG extended release tablet Take 1 tablet by mouth daily 30 tablet 3    oxyCODONE-acetaminophen (PERCOCET) 5-325 MG per tablet Take 1 tablet by mouth.  HYDROcodone-acetaminophen (NORCO) 7.5-325 MG per tablet Take 1 tablet by mouth 2 times daily. Orville Arnold methocarbamol (ROBAXIN) 500 MG tablet Take 100 mg by mouth 4 times daily      Na Sulfate-K Sulfate-Mg Sulf 17.5-3.13-1.6 GM/180ML SOLN As directed 1 Bottle 0    carvedilol (COREG) 25 MG tablet Take 0.5 tablets by mouth 2 times daily (with meals) 60 tablet 3    pravastatin (PRAVACHOL) 40 MG tablet Take 40 mg by mouth daily       No current facility-administered medications for this visit. Objective:     Vitals:    04/24/19 1436   BP: 118/64   Site: Left Upper Arm   Position: Sitting   Cuff Size: Medium Adult   Pulse: 70   Resp: 16   Temp: 98 °F (36.7 °C)   TempSrc: Tympanic   SpO2: 95%   Weight: 146 lb (66.2 kg)   Height: 4' 10\" (1.473 m)         Physical Exam   Constitutional: She is oriented to person, place, and time. She appears well-developed and well-nourished. No distress. HENT:   Head: Normocephalic and atraumatic. Eyes: Pupils are equal, round, and reactive to light. Conjunctivae are normal.   Neck: Normal range of motion. Neck supple. Cardiovascular: Normal rate and regular rhythm. Exam reveals no gallop and no friction rub. No murmur heard. Pulmonary/Chest: Effort normal and breath sounds normal. No respiratory distress. She has no wheezes. She has no rales. She exhibits no tenderness. Abdominal: Soft.  She exhibits no distension. There is no tenderness. There is no rebound. Musculoskeletal: She exhibits edema (trace). She exhibits no tenderness. Lymphadenopathy:     She has no cervical adenopathy. Neurological: She is alert and oriented to person, place, and time. Skin: Skin is warm and dry. She is not diaphoretic. No erythema. Psychiatric: She has a normal mood and affect. Judgment normal.     Imaging studies reviewed by me  multiple small pulmonary nodules   Lab results reviewed in chart  PFT FEV1 71%  ECHO: EF 60%         Assessment and Plan       Diagnosis Orders   1. Chronic obstructive pulmonary disease, unspecified COPD type ([facility] [address]  Fluticasone-Umeclidin-Vilant 100-62.5-25 MCG/INH AEPB    Pulse oximetry, overnight   2. Smoking     3. Multiple lung nodules  CT CHEST LOW DOSE   4. Class 1 obesity due to excess calories without serious comorbidity with body mass index (BMI) of 30.0 to 30.9 in adult       · Moderate COPD, symptoms are not controlled, will change inhalers to trelegy , will obtain nocturnal pulse oximetry, yearly flu shots, patient declined pulmonary rehabilitation.   · Smoking cessation counseling 5-10 minutes done  · CT chest for follow-up disorder, initial CT scan in November was not done for unclear reason  · Weight loss is recommended    Orders Placed This Encounter   Procedures    CT CHEST LOW DOSE     Standing Status:   Future     Standing Expiration Date:   10/24/2020     Order Specific Question:   Reason for exam:     Answer:   lung nodule    Pulse oximetry, overnight     Standing Status:   Future     Standing Expiration Date:   4/24/2020     Orders Placed This Encounter   Medications    Fluticasone-Umeclidin-Vilant 100-62.5-25 MCG/INH AEPB     Sig: Inhale 1 puff into the lungs daily     Dispense:  1 each     Refill:  5       Discussed with patient the importance of exercise and weight control and  overall health and well-being.     Reviewed with the patient: current clinical status, medications, activities and diet.      Side effects, adverse effects of the medication prescribed today, as well as treatment plan and result expectations have been discussed with the patient who expresses understanding and desires to proceed.     Patient is advised to go to swish and spit after each use of inhaled corticosteroid    Return in about 6 weeks (around 6/4/2019).       Raeann Hair MD

## 2019-05-02 ENCOUNTER — TELEPHONE (OUTPATIENT)
Dept: CASE MANAGEMENT | Age: 74
End: 2019-05-02

## 2019-05-02 NOTE — TELEPHONE ENCOUNTER
Patient is a current smoker with a 57 pack year history per physician documentation. She has recently gone from 1ppd down to 1/2 ppd. She had a CT Lung Screening exam on 3/13/2018 which was read as a Lung Rad 3 and recommended a 6mo follow-up CT scan which was never done. Mariel will be having a CT Chest Diagnostic Low Dose to evaluate the lung nodule previously found in 2018.       Jennifer Vu left a voicemail requesting a return call if Mariel had any questions regarding her up coming exam.

## 2019-05-06 ENCOUNTER — HOSPITAL ENCOUNTER (OUTPATIENT)
Dept: CT IMAGING | Age: 74
Discharge: HOME | End: 2019-05-08
Payer: MEDICARE

## 2019-05-06 DIAGNOSIS — R91.8 MULTIPLE LUNG NODULES: ICD-10-CM

## 2019-05-06 PROCEDURE — 71250 CT THORAX DX C-: CPT

## 2019-05-22 DIAGNOSIS — J44.9 CHRONIC OBSTRUCTIVE PULMONARY DISEASE, UNSPECIFIED COPD TYPE (HCC): Primary | ICD-10-CM

## 2019-07-17 ENCOUNTER — OFFICE VISIT (OUTPATIENT)
Dept: PULMONOLOGY | Age: 74
End: 2019-07-17
Payer: MEDICARE

## 2019-07-17 VITALS
DIASTOLIC BLOOD PRESSURE: 68 MMHG | BODY MASS INDEX: 30.14 KG/M2 | HEART RATE: 82 BPM | OXYGEN SATURATION: 96 % | WEIGHT: 143.6 LBS | TEMPERATURE: 98.3 F | SYSTOLIC BLOOD PRESSURE: 110 MMHG | HEIGHT: 58 IN

## 2019-07-17 DIAGNOSIS — I27.20 PULMONARY HYPERTENSION (HCC): ICD-10-CM

## 2019-07-17 DIAGNOSIS — E66.09 CLASS 1 OBESITY DUE TO EXCESS CALORIES WITHOUT SERIOUS COMORBIDITY WITH BODY MASS INDEX (BMI) OF 30.0 TO 30.9 IN ADULT: ICD-10-CM

## 2019-07-17 DIAGNOSIS — J43.2 CENTRILOBULAR EMPHYSEMA (HCC): ICD-10-CM

## 2019-07-17 DIAGNOSIS — F17.200 SMOKING: ICD-10-CM

## 2019-07-17 DIAGNOSIS — J44.9 CHRONIC OBSTRUCTIVE PULMONARY DISEASE, UNSPECIFIED COPD TYPE (HCC): Primary | ICD-10-CM

## 2019-07-17 PROCEDURE — 4040F PNEUMOC VAC/ADMIN/RCVD: CPT | Performed by: INTERNAL MEDICINE

## 2019-07-17 PROCEDURE — G8598 ASA/ANTIPLAT THER USED: HCPCS | Performed by: INTERNAL MEDICINE

## 2019-07-17 PROCEDURE — G8399 PT W/DXA RESULTS DOCUMENT: HCPCS | Performed by: INTERNAL MEDICINE

## 2019-07-17 PROCEDURE — 99214 OFFICE O/P EST MOD 30 MIN: CPT | Performed by: INTERNAL MEDICINE

## 2019-07-17 PROCEDURE — G8926 SPIRO NO PERF OR DOC: HCPCS | Performed by: INTERNAL MEDICINE

## 2019-07-17 PROCEDURE — 1090F PRES/ABSN URINE INCON ASSESS: CPT | Performed by: INTERNAL MEDICINE

## 2019-07-17 PROCEDURE — 4004F PT TOBACCO SCREEN RCVD TLK: CPT | Performed by: INTERNAL MEDICINE

## 2019-07-17 PROCEDURE — 3017F COLORECTAL CA SCREEN DOC REV: CPT | Performed by: INTERNAL MEDICINE

## 2019-07-17 PROCEDURE — G8417 CALC BMI ABV UP PARAM F/U: HCPCS | Performed by: INTERNAL MEDICINE

## 2019-07-17 PROCEDURE — G8427 DOCREV CUR MEDS BY ELIG CLIN: HCPCS | Performed by: INTERNAL MEDICINE

## 2019-07-17 PROCEDURE — 1123F ACP DISCUSS/DSCN MKR DOCD: CPT | Performed by: INTERNAL MEDICINE

## 2019-07-17 PROCEDURE — 3023F SPIROM DOC REV: CPT | Performed by: INTERNAL MEDICINE

## 2019-07-17 NOTE — PROGRESS NOTES
Years: 57.00     Pack years: 57.00     Types: Cigarettes     Start date: 1/1/1960    Smokeless tobacco: Never Used    Tobacco comment: 4/24/19 now smoking 1/2ppd   Substance and Sexual Activity    Alcohol use: Yes     Comment: occ    Drug use: No    Sexual activity: Never   Lifestyle    Physical activity:     Days per week: Not on file     Minutes per session: Not on file    Stress: Not on file   Relationships    Social connections:     Talks on phone: Not on file     Gets together: Not on file     Attends Voodoo service: Not on file     Active member of club or organization: Not on file     Attends meetings of clubs or organizations: Not on file     Relationship status: Not on file    Intimate partner violence:     Fear of current or ex partner: Not on file     Emotionally abused: Not on file     Physically abused: Not on file     Forced sexual activity: Not on file   Other Topics Concern    Not on file   Social History Narrative    Not on file         Review of Systems      ROS: 10 organs review of system is done including general, psychological, ENT, hematological, endocrine, respiratory, cardiovascular, gastrointestinal,musculoskeletal, neurological,  allergy and Immunology is done and is otherwise negative. Current Outpatient Medications   Medication Sig Dispense Refill    OXYGEN Nocturnal Oxygen on 2L 1 Units 0    vitamin D (CHOLECALCIFEROL) 5000 units CAPS capsule Take 5,000 Units by mouth      omeprazole (PRILOSEC) 20 MG delayed release capsule Take 20 mg by mouth      oxyCODONE-acetaminophen (PERCOCET) 5-325 MG per tablet Take 1 tablet by mouth.       rosuvastatin (CRESTOR) 20 MG tablet Take 20 mg by mouth      zoledronic acid (RECLAST) 5 MG/100ML SOLN Infuse 5 mg intravenously      albuterol sulfate HFA (PROVENTIL HFA) 108 (90 Base) MCG/ACT inhaler Inhale 2 puffs into the lungs      Fluticasone-Umeclidin-Vilant 100-62.5-25 MCG/INH AEPB Inhale 1 puff into the lungs daily 1 each 5    wheezes. She has no rales. She exhibits no tenderness. Abdominal: Soft. She exhibits no distension. There is no tenderness. There is no rebound. Musculoskeletal: She exhibits edema (1+ edema more in RLE ). She exhibits no tenderness. Lymphadenopathy:     She has no cervical adenopathy. Neurological: She is alert and oriented to person, place, and time. Skin: Skin is warm and dry. She is not diaphoretic. No erythema. Psychiatric: She has a normal mood and affect. Judgment normal.       Imaging studies reviewed by me CT chest lung cancer screening, shows no significant nodule, no mass, has centrilobular emphysema  Lab results reviewed in chart  PFT FEV1 71%, FEV1 to FVC ratio 63%, DLCO 47%  ECHO: EF 60%, RVSP 36    Assessment and Plan       Diagnosis Orders   1. Chronic obstructive pulmonary disease, unspecified COPD type (HCC)  umeclidinium-vilanterol (ANORO ELLIPTA) 62.5-25 MCG/INH AEPB inhaler   2. Smoking     3. Class 1 obesity due to excess calories without serious comorbidity with body mass index (BMI) of 30.0 to 30.9 in adult     4. Centrilobular emphysema ([address]     5. Pulmonary hypertension ([address]       · Patient declined trilogy she does not want to use inhaled corticosteroid  · Will restart Anoro  · She declined pulmonary rehab  · She is not using oxygen while asleep, concentrators to no wheezing, we did walk test in the clinic she did not qualify for portable oxygen however she does need oxygen while asleep and I did discuss that with patient and her daughter  · Smoking cessation counseling discussed again however she is not considering that  · Lose weight  · Yearly flu shot  · Pulmonary hypertension likely group 3 owing to COPD and treatment is mainly to treat underlying disease  · Will need CT lung cancer screening next year  · Continue Xarelto      No orders of the defined types were placed in this encounter.     Orders Placed This Encounter   Medications    umeclidinium-vilanterol [facility]

## 2019-07-30 ENCOUNTER — APPOINTMENT (OUTPATIENT)
Dept: GENERAL RADIOLOGY | Age: 74
End: 2019-07-30
Payer: MEDICARE

## 2019-07-30 ENCOUNTER — HOSPITAL ENCOUNTER (INPATIENT)
Age: 74
LOS: 12 days | Discharge: NURSING, REHAB OR LONG-TERM CARE | DRG: 025 | End: 2019-08-11
Attending: INTERNAL MEDICINE | Admitting: INTERNAL MEDICINE
Payer: MEDICARE

## 2019-07-30 ENCOUNTER — APPOINTMENT (OUTPATIENT)
Dept: CT IMAGING | Age: 74
End: 2019-07-30
Payer: MEDICARE

## 2019-07-30 ENCOUNTER — HOSPITAL ENCOUNTER (EMERGENCY)
Age: 74
Discharge: ANOTHER HOSPITAL | End: 2019-07-30
Attending: EMERGENCY MEDICINE
Payer: MEDICARE

## 2019-07-30 VITALS
WEIGHT: 140 LBS | SYSTOLIC BLOOD PRESSURE: 149 MMHG | OXYGEN SATURATION: 95 % | HEART RATE: 86 BPM | HEIGHT: 59 IN | BODY MASS INDEX: 28.22 KG/M2 | DIASTOLIC BLOOD PRESSURE: 69 MMHG | RESPIRATION RATE: 20 BRPM | TEMPERATURE: 98.4 F

## 2019-07-30 DIAGNOSIS — R26.81 GAIT INSTABILITY: Primary | ICD-10-CM

## 2019-07-30 DIAGNOSIS — G93.89 BRAIN MASS: ICD-10-CM

## 2019-07-30 LAB
ALBUMIN SERPL-MCNC: 4.1 G/DL (ref 3.5–4.6)
ALP BLD-CCNC: 141 U/L (ref 40–130)
ALT SERPL-CCNC: 7 U/L (ref 0–33)
ANION GAP SERPL CALCULATED.3IONS-SCNC: 18 MEQ/L (ref 9–15)
APTT: 33.3 SEC (ref 24.4–36.8)
AST SERPL-CCNC: 22 U/L (ref 0–35)
BACTERIA: NORMAL /HPF
BASOPHILS ABSOLUTE: 0.1 K/UL (ref 0–0.2)
BASOPHILS RELATIVE PERCENT: 1.1 %
BILIRUB SERPL-MCNC: 0.4 MG/DL (ref 0.2–0.7)
BILIRUBIN URINE: ABNORMAL
BLOOD, URINE: NEGATIVE
BUN BLDV-MCNC: 17 MG/DL (ref 8–23)
CALCIUM SERPL-MCNC: 9.9 MG/DL (ref 8.5–9.9)
CHLORIDE BLD-SCNC: 101 MEQ/L (ref 95–107)
CLARITY: CLEAR
CO2: 22 MEQ/L (ref 20–31)
COLOR: YELLOW
CREAT SERPL-MCNC: 1.2 MG/DL (ref 0.5–0.9)
EKG ATRIAL RATE: 80 BPM
EKG P AXIS: 74 DEGREES
EKG P-R INTERVAL: 150 MS
EKG Q-T INTERVAL: 380 MS
EKG QRS DURATION: 60 MS
EKG QTC CALCULATION (BAZETT): 438 MS
EKG R AXIS: 39 DEGREES
EKG T AXIS: 58 DEGREES
EKG VENTRICULAR RATE: 80 BPM
EOSINOPHILS ABSOLUTE: 0.1 K/UL (ref 0–0.7)
EOSINOPHILS RELATIVE PERCENT: 1.6 %
EPITHELIAL CELLS, UA: NORMAL /HPF
GFR AFRICAN AMERICAN: 53.1
GFR NON-AFRICAN AMERICAN: 43.9
GLOBULIN: 4.2 G/DL (ref 2.3–3.5)
GLUCOSE BLD-MCNC: 99 MG/DL (ref 70–99)
GLUCOSE URINE: NEGATIVE MG/DL
HCT VFR BLD CALC: 45.1 % (ref 37–47)
HEMOGLOBIN: 15 G/DL (ref 12–16)
INR BLD: 1.2
KETONES, URINE: NEGATIVE MG/DL
LEUKOCYTE ESTERASE, URINE: NEGATIVE
LYMPHOCYTES ABSOLUTE: 1.1 K/UL (ref 1–4.8)
LYMPHOCYTES RELATIVE PERCENT: 16.9 %
MCH RBC QN AUTO: 30.3 PG (ref 27–31.3)
MCHC RBC AUTO-ENTMCNC: 33.2 % (ref 33–37)
MCV RBC AUTO: 91.3 FL (ref 82–100)
MONOCYTES ABSOLUTE: 0.5 K/UL (ref 0.2–0.8)
MONOCYTES RELATIVE PERCENT: 7.5 %
NEUTROPHILS ABSOLUTE: 4.7 K/UL (ref 1.4–6.5)
NEUTROPHILS RELATIVE PERCENT: 72.9 %
NITRITE, URINE: NEGATIVE
PDW BLD-RTO: 16.5 % (ref 11.5–14.5)
PH UA: 7 (ref 5–9)
PLATELET # BLD: 214 K/UL (ref 130–400)
POTASSIUM SERPL-SCNC: 4.3 MEQ/L (ref 3.4–4.9)
PROTEIN UA: 100 MG/DL
PROTHROMBIN TIME: 15.4 SEC (ref 12.3–14.9)
RBC # BLD: 4.94 M/UL (ref 4.2–5.4)
RBC UA: NORMAL /HPF (ref 0–2)
SODIUM BLD-SCNC: 141 MEQ/L (ref 135–144)
SPECIFIC GRAVITY UA: 1.02 (ref 1–1.03)
TOTAL PROTEIN: 8.3 G/DL (ref 6.3–8)
TROPONIN: <0.01 NG/ML (ref 0–0.01)
URINE REFLEX TO CULTURE: YES
UROBILINOGEN, URINE: 1 E.U./DL
WBC # BLD: 6.4 K/UL (ref 4.8–10.8)
WBC UA: NORMAL /HPF (ref 0–5)

## 2019-07-30 PROCEDURE — 85730 THROMBOPLASTIN TIME PARTIAL: CPT

## 2019-07-30 PROCEDURE — 36415 COLL VENOUS BLD VENIPUNCTURE: CPT

## 2019-07-30 PROCEDURE — 6360000004 HC RX CONTRAST MEDICATION: Performed by: EMERGENCY MEDICINE

## 2019-07-30 PROCEDURE — 96374 THER/PROPH/DIAG INJ IV PUSH: CPT

## 2019-07-30 PROCEDURE — 2580000003 HC RX 258: Performed by: NURSE PRACTITIONER

## 2019-07-30 PROCEDURE — 93005 ELECTROCARDIOGRAM TRACING: CPT

## 2019-07-30 PROCEDURE — 85025 COMPLETE CBC W/AUTO DIFF WBC: CPT

## 2019-07-30 PROCEDURE — 99285 EMERGENCY DEPT VISIT HI MDM: CPT

## 2019-07-30 PROCEDURE — 94664 DEMO&/EVAL PT USE INHALER: CPT

## 2019-07-30 PROCEDURE — 70498 CT ANGIOGRAPHY NECK: CPT

## 2019-07-30 PROCEDURE — 2580000003 HC RX 258: Performed by: EMERGENCY MEDICINE

## 2019-07-30 PROCEDURE — 85610 PROTHROMBIN TIME: CPT

## 2019-07-30 PROCEDURE — 6360000002 HC RX W HCPCS: Performed by: NURSE PRACTITIONER

## 2019-07-30 PROCEDURE — 80053 COMPREHEN METABOLIC PANEL: CPT

## 2019-07-30 PROCEDURE — 81001 URINALYSIS AUTO W/SCOPE: CPT

## 2019-07-30 PROCEDURE — 6370000000 HC RX 637 (ALT 250 FOR IP): Performed by: INTERNAL MEDICINE

## 2019-07-30 PROCEDURE — 84484 ASSAY OF TROPONIN QUANT: CPT

## 2019-07-30 PROCEDURE — 6360000002 HC RX W HCPCS: Performed by: EMERGENCY MEDICINE

## 2019-07-30 PROCEDURE — 87086 URINE CULTURE/COLONY COUNT: CPT

## 2019-07-30 PROCEDURE — 70496 CT ANGIOGRAPHY HEAD: CPT

## 2019-07-30 PROCEDURE — 1210000000 HC MED SURG R&B

## 2019-07-30 PROCEDURE — 71045 X-RAY EXAM CHEST 1 VIEW: CPT

## 2019-07-30 RX ORDER — TIZANIDINE 4 MG/1
4 TABLET ORAL EVERY 8 HOURS PRN
Status: DISCONTINUED | OUTPATIENT
Start: 2019-07-30 | End: 2019-08-11 | Stop reason: HOSPADM

## 2019-07-30 RX ORDER — ACETAMINOPHEN 325 MG/1
650 TABLET ORAL EVERY 4 HOURS PRN
Status: DISCONTINUED | OUTPATIENT
Start: 2019-07-30 | End: 2019-08-11 | Stop reason: HOSPADM

## 2019-07-30 RX ORDER — ALBUTEROL SULFATE 90 UG/1
2 AEROSOL, METERED RESPIRATORY (INHALATION) EVERY 4 HOURS PRN
Status: DISCONTINUED | OUTPATIENT
Start: 2019-07-30 | End: 2019-08-11 | Stop reason: HOSPADM

## 2019-07-30 RX ORDER — SODIUM CHLORIDE 0.9 % (FLUSH) 0.9 %
10 SYRINGE (ML) INJECTION PRN
Status: DISCONTINUED | OUTPATIENT
Start: 2019-07-30 | End: 2019-08-11 | Stop reason: HOSPADM

## 2019-07-30 RX ORDER — 0.9 % SODIUM CHLORIDE 0.9 %
500 INTRAVENOUS SOLUTION INTRAVENOUS ONCE
Status: COMPLETED | OUTPATIENT
Start: 2019-07-30 | End: 2019-07-30

## 2019-07-30 RX ORDER — GABAPENTIN 100 MG/1
100 CAPSULE ORAL 3 TIMES DAILY
Status: DISCONTINUED | OUTPATIENT
Start: 2019-07-30 | End: 2019-08-01

## 2019-07-30 RX ORDER — SODIUM CHLORIDE 0.9 % (FLUSH) 0.9 %
10 SYRINGE (ML) INJECTION EVERY 12 HOURS SCHEDULED
Status: DISCONTINUED | OUTPATIENT
Start: 2019-07-30 | End: 2019-08-11 | Stop reason: HOSPADM

## 2019-07-30 RX ORDER — NICOTINE 21 MG/24HR
1 PATCH, TRANSDERMAL 24 HOURS TRANSDERMAL DAILY
Status: DISCONTINUED | OUTPATIENT
Start: 2019-07-31 | End: 2019-08-11 | Stop reason: HOSPADM

## 2019-07-30 RX ORDER — ONDANSETRON 2 MG/ML
4 INJECTION INTRAMUSCULAR; INTRAVENOUS EVERY 6 HOURS PRN
Status: DISCONTINUED | OUTPATIENT
Start: 2019-07-30 | End: 2019-08-11 | Stop reason: HOSPADM

## 2019-07-30 RX ORDER — OXYCODONE HYDROCHLORIDE AND ACETAMINOPHEN 5; 325 MG/1; MG/1
1 TABLET ORAL EVERY 4 HOURS PRN
Status: DISCONTINUED | OUTPATIENT
Start: 2019-07-30 | End: 2019-08-11 | Stop reason: HOSPADM

## 2019-07-30 RX ORDER — DEXAMETHASONE SODIUM PHOSPHATE 4 MG/ML
4 INJECTION, SOLUTION INTRA-ARTICULAR; INTRALESIONAL; INTRAMUSCULAR; INTRAVENOUS; SOFT TISSUE EVERY 6 HOURS
Status: DISCONTINUED | OUTPATIENT
Start: 2019-07-30 | End: 2019-07-31

## 2019-07-30 RX ORDER — ONDANSETRON 2 MG/ML
4 INJECTION INTRAMUSCULAR; INTRAVENOUS ONCE
Status: COMPLETED | OUTPATIENT
Start: 2019-07-30 | End: 2019-07-30

## 2019-07-30 RX ORDER — ALBUTEROL SULFATE 90 UG/1
2 AEROSOL, METERED RESPIRATORY (INHALATION) EVERY 6 HOURS PRN
Status: DISCONTINUED | OUTPATIENT
Start: 2019-07-30 | End: 2019-07-30

## 2019-07-30 RX ADMIN — SODIUM CHLORIDE 500 ML: 9 INJECTION, SOLUTION INTRAVENOUS at 14:58

## 2019-07-30 RX ADMIN — SODIUM CHLORIDE 500 ML: 9 INJECTION, SOLUTION INTRAVENOUS at 16:07

## 2019-07-30 RX ADMIN — IOPAMIDOL 100 ML: 755 INJECTION, SOLUTION INTRAVENOUS at 15:41

## 2019-07-30 RX ADMIN — ENOXAPARIN SODIUM 40 MG: 40 INJECTION SUBCUTANEOUS at 20:15

## 2019-07-30 RX ADMIN — GABAPENTIN 100 MG: 100 CAPSULE ORAL at 21:33

## 2019-07-30 RX ADMIN — Medication 10 ML: at 20:19

## 2019-07-30 RX ADMIN — OXYCODONE HYDROCHLORIDE AND ACETAMINOPHEN 1 TABLET: 5; 325 TABLET ORAL at 21:33

## 2019-07-30 RX ADMIN — ONDANSETRON 4 MG: 2 INJECTION INTRAMUSCULAR; INTRAVENOUS at 14:58

## 2019-07-30 RX ADMIN — DEXAMETHASONE SODIUM PHOSPHATE 4 MG: 4 INJECTION, SOLUTION INTRAMUSCULAR; INTRAVENOUS at 20:14

## 2019-07-30 ASSESSMENT — ENCOUNTER SYMPTOMS
ALLERGIC/IMMUNOLOGIC NEGATIVE: 1
RHINORRHEA: 0
RESPIRATORY NEGATIVE: 1
VOMITING: 1
NAUSEA: 1
EYES NEGATIVE: 1
SHORTNESS OF BREATH: 0
WHEEZING: 0
EYES NEGATIVE: 1
TROUBLE SWALLOWING: 0
ABDOMINAL PAIN: 0
ALLERGIC/IMMUNOLOGIC NEGATIVE: 1
NAUSEA: 1

## 2019-07-30 ASSESSMENT — PAIN DESCRIPTION - PAIN TYPE
TYPE: ACUTE PAIN
TYPE: CHRONIC PAIN

## 2019-07-30 ASSESSMENT — PAIN DESCRIPTION - DESCRIPTORS: DESCRIPTORS: ACHING

## 2019-07-30 ASSESSMENT — PAIN DESCRIPTION - LOCATION
LOCATION: SHOULDER
LOCATION: EAR

## 2019-07-30 ASSESSMENT — PAIN DESCRIPTION - ORIENTATION
ORIENTATION: RIGHT;LEFT
ORIENTATION: LEFT

## 2019-07-30 ASSESSMENT — PAIN SCALES - GENERAL
PAINLEVEL_OUTOF10: 2
PAINLEVEL_OUTOF10: 5

## 2019-07-30 ASSESSMENT — PAIN DESCRIPTION - FREQUENCY: FREQUENCY: CONTINUOUS

## 2019-07-30 NOTE — ED PROVIDER NOTES
2000 \[facility]\"" ED  eMERGENCY dEPARTMENT eNCOUnter      Pt Name: Hanna Chavez  MRN: [id number]  [place] 1945  Date of evaluation: 7/30/2019  Provider: Marsha Jean MD     [address] [place] [address]       Chief Complaint   Patient presents with    Nausea     started 3 days ago    Dizziness         HISTORY OF PRESENT ILLNESS   (Location/Symptom, Timing/Onset,Context/Setting, Quality, Duration, Modifying Factors, Severity)  Note limiting factors. Hanna Chavez is a 76 y.o. female who presents to the emergency department plaint of gait instability and nausea. She notes that the gait instability feels as though when she is up and trying to walk she feels off balance and will yani easily from side to side or even want to fall. Patient denies a sense that the room is spinning. Patient admits history of coronary disease and circulatory problems. Patient denies fevers or chills. Patient admits she did have a mild cephalgia previous days it is subsequent resolved. Patient also admits a feeling of fullness in her left ear but thinks it may be that her ear just needs to be irrigated. Patient denies change in stool, or urine. Patient denies fevers or chills. Patient denies chest pain or shortness of breath. Patient admits active vomiting prior to arrival.  Patient also complains that when she sneezes or coughs she feels a sharp tingling electric feeling throughout her body. HPI    NursingNotes were reviewed. REVIEW OF SYSTEMS    (2-9 systems for level 4, 10 or more for level 5)     Review of Systems   Constitutional: Positive for activity change and appetite change. Negative for chills and fever. HENT: Negative for congestion, ear pain, rhinorrhea and trouble swallowing. Eyes: Negative. Respiratory: Negative for shortness of breath and wheezing. Cardiovascular: Negative for chest pain and leg swelling. Gastrointestinal: Positive for nausea and vomiting. Negative for abdominal pain. RELEASE TABLET    Take 1 tablet by mouth daily    METHOCARBAMOL (ROBAXIN) 500 MG TABLET    Take 100 mg by mouth 4 times daily    NA SULFATE-K SULFATE-MG SULF 17.5-3.13-1.6 GM/180ML SOLN    As directed    OMEPRAZOLE (PRILOSEC) 20 MG DELAYED RELEASE CAPSULE    Take 20 mg by mouth    OS-BALAJI CALCIUM + D3 500-200 MG-UNIT PER TABLET    TAKE 1 TABLET BY MOUTH TWICE DAILY    OXYCODONE-ACETAMINOPHEN (PERCOCET) 5-325 MG PER TABLET    Take 1 tablet by mouth. OXYGEN    Nocturnal Oxygen on 2L    PRAVASTATIN (PRAVACHOL) 40 MG TABLET    Take 40 mg by mouth daily    RIVAROXABAN (XARELTO) 20 MG TABS TABLET    Take 20 mg by mouth    ROSUVASTATIN (CRESTOR) 20 MG TABLET    Take 20 mg by mouth    TIZANIDINE (ZANAFLEX) 4 MG TABLET    Take 4 mg by mouth every 8 hours as needed    UMECLIDINIUM-VILANTEROL (ANORO ELLIPTA) 62.5-25 MCG/INH AEPB INHALER    Inhale 1 puff into the lungs daily    VITAMIN D (CHOLECALCIFEROL) 5000 UNITS CAPS CAPSULE    Take 5,000 Units by mouth    ZOLEDRONIC ACID (RECLAST) 5 MG/100ML SOLN    Infuse 5 mg intravenously       ALLERGIES     Patient has no known allergies. FAMILY HISTORY     History reviewed. No pertinent family history. SOCIAL HISTORY       Social History     Socioeconomic History    Marital status:       Spouse name: None    Number of children: None    Years of education: None    Highest education level: None   Occupational History    None   Social Needs    Financial resource strain: None    Food insecurity:     Worry: None     Inability: None    Transportation needs:     Medical: None     Non-medical: None   Tobacco Use    Smoking status: Heavy Tobacco Smoker     Packs/day: 1.00     Years: 57.00     Pack years: 57.00     Types: Cigarettes     Start date: 1/1/1960    Smokeless tobacco: Never Used    Tobacco comment: 4/24/19 now smoking 1/2ppd   Substance and Sexual Activity    Alcohol use: Yes     Comment: occ    Drug use: No    Sexual activity: Never   Lifestyle    Physical activity:     Days per week: None     Minutes per session: None    Stress: None   Relationships    Social connections:     Talks on phone: None     Gets together: None     Attends Zoroastrianism service: None     Active member of club or organization: None     Attends meetings of clubs or organizations: None     Relationship status: None    Intimate partner violence:     Fear of current or ex partner: None     Emotionally abused: None     Physically abused: None     Forced sexual activity: None   Other Topics Concern    None   Social History Narrative    None       SCREENINGS   NIH Stroke Scale  Interval: Baseline  Level of Consciousness (1a. ): Alert  LOC Questions (1b. ): Answers both correctly  LOC Commands (1c. ): Obeys both correctly  Best Gaze (2. ): Normal  Visual (3. ): No visual loss  Facial Palsy (4. ): Normal  Motor Arm, Left (5a. ): No drift  Motor Arm, Right (5b. ): No drift  Motor Leg, Left (6a. ): No drift(weak)  Motor Leg, Right (6b. ): No drift(weak)  Limb Ataxia (7. ): Absent  Sensory (8. ): Normal  Best Language (9. ): No aphasia  Dysarthria (10. ): Normal  Extinction and Inattention (11): No neglect  Total: 0         PHYSICAL EXAM    (up to 7 for level 4, 8 or more for level 5)     ED Triage Vitals [07/30/19 1324]   BP Temp Temp Source Pulse Resp SpO2 Height Weight   132/73 98.4 °F (36.9 °C) Oral 97 22 96 % 4' 10.75\" (1.492 m) 140 lb (63.5 kg)       Physical Exam   Constitutional: She is oriented to person, place, and time. She appears well-developed and well-nourished. No distress. HENT:   Head: Normocephalic and atraumatic. Right Ear: External ear normal.   Left Ear: External ear normal.   Eyes: Pupils are equal, round, and reactive to light. Conjunctivae and EOM are normal. Right eye exhibits no discharge. Left eye exhibits no discharge. Neck: Trachea normal, normal range of motion and full passive range of motion without pain. Neck supple. No thyromegaly present.

## 2019-07-30 NOTE — H&P
Not on file   Relationships    Social connections:     Talks on phone: Not on file     Gets together: Not on file     Attends Yazidism service: Not on file     Active member of club or organization: Not on file     Attends meetings of clubs or organizations: Not on file     Relationship status: Not on file    Intimate partner violence:     Fear of current or ex partner: Not on file     Emotionally abused: Not on file     Physically abused: Not on file     Forced sexual activity: Not on file   Other Topics Concern    Not on file   Social History Narrative    Not on file        Infusion Medications:   Scheduled Medications:    sodium chloride flush  10 mL Intravenous 2 times per day    enoxaparin  40 mg Subcutaneous Daily    dexamethasone  4 mg Intravenous Q6H     PRN Meds: sodium chloride flush, magnesium hydroxide, ondansetron, acetaminophen    Labs:   Recent Labs     07/30/19  1504   WBC 6.4   HGB 15.0   HCT 45.1        Recent Labs     07/30/19  1504      K 4.3      CO2 22   BUN 17   CREATININE 1.20*   CALCIUM 9.9     Recent Labs     07/30/19  1504   AST 22   ALT 7   BILITOT 0.4   ALKPHOS 141*     Recent Labs     07/30/19  1504   INR 1.2     Recent Labs     07/30/19  1504   TROPONINI <0.010       Urinalysis:   Lab Results   Component Value Date    NITRU Negative 07/30/2019    WBCUA 0-2 07/30/2019    BACTERIA Rare 07/30/2019    RBCUA 0-2 07/30/2019    BLOODU Negative 07/30/2019    SPECGRAV 1.020 07/30/2019    GLUCOSEU Negative 07/30/2019       Radiology:   Most recent    Chest CT      WITH CONTRAST:No results found for this or any previous visit. WITHOUT CONTRAST: No results found for this or any previous visit. CXR      2-view: No results found for this or any previous visit.      Portable:   Results for orders placed during the hospital encounter of 07/30/19   XR CHEST PORTABLE    Narrative EXAMINATION: CHEST PORTABLE VIEW     CLINICAL HISTORY: Gait instability    COMPARISONS: agree with assessment and plan that we come up together  Electronically signed by Yunier Warner MD on 7/30/19 at 8:46 PM      Electronically signed by JULIANNA Shah CNP on 7/30/2019 at 7:40 PM

## 2019-07-30 NOTE — ED NOTES
16:44 - Dr. Dick James accepted this patient; TC will call back with a room assignment and to set up transportation.      Yasemin Barajas  07/30/19 [address] [place] [address]  07/30/19 1099

## 2019-07-30 NOTE — ED NOTES
Patient and family updated to plan and report called to ARIANNA GILLESPIE RN at [facility]  07/30/19 6402

## 2019-07-31 ENCOUNTER — APPOINTMENT (OUTPATIENT)
Dept: CT IMAGING | Age: 74
DRG: 025 | End: 2019-07-31
Attending: INTERNAL MEDICINE
Payer: MEDICARE

## 2019-07-31 ENCOUNTER — APPOINTMENT (OUTPATIENT)
Dept: MRI IMAGING | Age: 74
DRG: 025 | End: 2019-07-31
Attending: INTERNAL MEDICINE
Payer: MEDICARE

## 2019-07-31 PROBLEM — J44.9 COPD (CHRONIC OBSTRUCTIVE PULMONARY DISEASE) (HCC): Status: ACTIVE | Noted: 2019-07-31

## 2019-07-31 LAB
ANION GAP SERPL CALCULATED.3IONS-SCNC: 11 MEQ/L (ref 9–15)
BASOPHILS ABSOLUTE: 0 K/UL (ref 0–0.2)
BASOPHILS RELATIVE PERCENT: 0.6 %
BUN BLDV-MCNC: 17 MG/DL (ref 8–23)
CALCIUM SERPL-MCNC: 8.8 MG/DL (ref 8.5–9.9)
CHLORIDE BLD-SCNC: 109 MEQ/L (ref 95–107)
CO2: 22 MEQ/L (ref 20–31)
CREAT SERPL-MCNC: 1.29 MG/DL (ref 0.5–0.9)
EOSINOPHILS ABSOLUTE: 0 K/UL (ref 0–0.7)
EOSINOPHILS RELATIVE PERCENT: 0 %
GFR AFRICAN AMERICAN: 48.8
GFR NON-AFRICAN AMERICAN: 40.4
GLUCOSE BLD-MCNC: 141 MG/DL (ref 70–99)
HCT VFR BLD CALC: 39.6 % (ref 37–47)
HEMOGLOBIN: 13.6 G/DL (ref 12–16)
LYMPHOCYTES ABSOLUTE: 0.6 K/UL (ref 1–4.8)
LYMPHOCYTES RELATIVE PERCENT: 11.7 %
MCH RBC QN AUTO: 31.4 PG (ref 27–31.3)
MCHC RBC AUTO-ENTMCNC: 34.3 % (ref 33–37)
MCV RBC AUTO: 91.4 FL (ref 82–100)
MONOCYTES ABSOLUTE: 0 K/UL (ref 0.2–0.8)
MONOCYTES RELATIVE PERCENT: 0.8 %
NEUTROPHILS ABSOLUTE: 4.3 K/UL (ref 1.4–6.5)
NEUTROPHILS RELATIVE PERCENT: 86.9 %
PDW BLD-RTO: 16.6 % (ref 11.5–14.5)
PLATELET # BLD: 173 K/UL (ref 130–400)
POTASSIUM REFLEX MAGNESIUM: 4.6 MEQ/L (ref 3.4–4.9)
RBC # BLD: 4.33 M/UL (ref 4.2–5.4)
SODIUM BLD-SCNC: 142 MEQ/L (ref 135–144)
WBC # BLD: 5 K/UL (ref 4.8–10.8)

## 2019-07-31 PROCEDURE — 97161 PT EVAL LOW COMPLEX 20 MIN: CPT

## 2019-07-31 PROCEDURE — 2580000003 HC RX 258: Performed by: NEUROLOGICAL SURGERY

## 2019-07-31 PROCEDURE — 80048 BASIC METABOLIC PNL TOTAL CA: CPT

## 2019-07-31 PROCEDURE — 6370000000 HC RX 637 (ALT 250 FOR IP): Performed by: INTERNAL MEDICINE

## 2019-07-31 PROCEDURE — 36415 COLL VENOUS BLD VENIPUNCTURE: CPT

## 2019-07-31 PROCEDURE — 6360000004 HC RX CONTRAST MEDICATION: Performed by: NEUROLOGICAL SURGERY

## 2019-07-31 PROCEDURE — 85025 COMPLETE CBC W/AUTO DIFF WBC: CPT

## 2019-07-31 PROCEDURE — 2580000003 HC RX 258: Performed by: NURSE PRACTITIONER

## 2019-07-31 PROCEDURE — 1210000000 HC MED SURG R&B

## 2019-07-31 PROCEDURE — 2700000000 HC OXYGEN THERAPY PER DAY

## 2019-07-31 PROCEDURE — 6360000002 HC RX W HCPCS: Performed by: NURSE PRACTITIONER

## 2019-07-31 PROCEDURE — A9577 INJ MULTIHANCE: HCPCS | Performed by: NEUROLOGICAL SURGERY

## 2019-07-31 PROCEDURE — 6370000000 HC RX 637 (ALT 250 FOR IP): Performed by: NURSE PRACTITIONER

## 2019-07-31 PROCEDURE — 97166 OT EVAL MOD COMPLEX 45 MIN: CPT

## 2019-07-31 PROCEDURE — 6370000000 HC RX 637 (ALT 250 FOR IP): Performed by: FAMILY MEDICINE

## 2019-07-31 PROCEDURE — 74177 CT ABD & PELVIS W/CONTRAST: CPT

## 2019-07-31 PROCEDURE — 70553 MRI BRAIN STEM W/O & W/DYE: CPT

## 2019-07-31 PROCEDURE — 6360000002 HC RX W HCPCS: Performed by: NEUROLOGICAL SURGERY

## 2019-07-31 PROCEDURE — 71250 CT THORAX DX C-: CPT

## 2019-07-31 RX ORDER — ISOSORBIDE MONONITRATE 30 MG/1
30 TABLET, EXTENDED RELEASE ORAL DAILY
Status: DISCONTINUED | OUTPATIENT
Start: 2019-07-31 | End: 2019-08-08

## 2019-07-31 RX ORDER — DEXAMETHASONE SODIUM PHOSPHATE 10 MG/ML
10 INJECTION INTRAMUSCULAR; INTRAVENOUS EVERY 6 HOURS
Status: DISCONTINUED | OUTPATIENT
Start: 2019-07-31 | End: 2019-08-07

## 2019-07-31 RX ORDER — 0.9 % SODIUM CHLORIDE 0.9 %
10 VIAL (ML) INJECTION ONCE
Status: COMPLETED | OUTPATIENT
Start: 2019-07-31 | End: 2019-07-31

## 2019-07-31 RX ORDER — PANTOPRAZOLE SODIUM 40 MG/1
40 TABLET, DELAYED RELEASE ORAL
Status: DISCONTINUED | OUTPATIENT
Start: 2019-08-01 | End: 2019-08-11 | Stop reason: HOSPADM

## 2019-07-31 RX ORDER — PRAVASTATIN SODIUM 40 MG
40 TABLET ORAL DAILY
Status: DISCONTINUED | OUTPATIENT
Start: 2019-07-31 | End: 2019-08-11 | Stop reason: HOSPADM

## 2019-07-31 RX ADMIN — ISOSORBIDE MONONITRATE 30 MG: 30 TABLET, EXTENDED RELEASE ORAL at 16:27

## 2019-07-31 RX ADMIN — GADOBENATE DIMEGLUMINE 15 ML: 529 INJECTION, SOLUTION INTRAVENOUS at 14:10

## 2019-07-31 RX ADMIN — Medication 10 ML: at 08:46

## 2019-07-31 RX ADMIN — GABAPENTIN 100 MG: 100 CAPSULE ORAL at 20:28

## 2019-07-31 RX ADMIN — IOPAMIDOL 100 ML: 612 INJECTION, SOLUTION INTRAVENOUS at 11:49

## 2019-07-31 RX ADMIN — ACETAMINOPHEN 650 MG: 325 TABLET ORAL at 20:32

## 2019-07-31 RX ADMIN — Medication 10 ML: at 00:40

## 2019-07-31 RX ADMIN — GABAPENTIN 100 MG: 100 CAPSULE ORAL at 08:44

## 2019-07-31 RX ADMIN — DEXAMETHASONE SODIUM PHOSPHATE 4 MG: 4 INJECTION, SOLUTION INTRAMUSCULAR; INTRAVENOUS at 00:39

## 2019-07-31 RX ADMIN — DEXAMETHASONE SODIUM PHOSPHATE 4 MG: 4 INJECTION, SOLUTION INTRAMUSCULAR; INTRAVENOUS at 06:34

## 2019-07-31 RX ADMIN — GABAPENTIN 100 MG: 100 CAPSULE ORAL at 16:27

## 2019-07-31 RX ADMIN — Medication 10 ML: at 16:38

## 2019-07-31 RX ADMIN — Medication 10 ML: at 20:29

## 2019-07-31 RX ADMIN — DEXAMETHASONE SODIUM PHOSPHATE 10 MG: 10 INJECTION INTRAMUSCULAR; INTRAVENOUS at 20:28

## 2019-07-31 RX ADMIN — DEXAMETHASONE SODIUM PHOSPHATE 4 MG: 4 INJECTION, SOLUTION INTRAMUSCULAR; INTRAVENOUS at 16:27

## 2019-07-31 RX ADMIN — OXYCODONE HYDROCHLORIDE AND ACETAMINOPHEN 1 TABLET: 5; 325 TABLET ORAL at 22:40

## 2019-07-31 RX ADMIN — PRAVASTATIN SODIUM 40 MG: 40 TABLET ORAL at 20:28

## 2019-07-31 ASSESSMENT — PAIN SCALES - GENERAL
PAINLEVEL_OUTOF10: 0
PAINLEVEL_OUTOF10: 4
PAINLEVEL_OUTOF10: 5
PAINLEVEL_OUTOF10: 6

## 2019-07-31 ASSESSMENT — PAIN DESCRIPTION - ORIENTATION: ORIENTATION: RIGHT;LEFT

## 2019-07-31 ASSESSMENT — PAIN DESCRIPTION - DESCRIPTORS: DESCRIPTORS: HEADACHE

## 2019-07-31 ASSESSMENT — PAIN DESCRIPTION - PAIN TYPE
TYPE: CHRONIC PAIN
TYPE: ACUTE PAIN

## 2019-07-31 ASSESSMENT — PAIN DESCRIPTION - LOCATION
LOCATION: SHOULDER
LOCATION: HEAD

## 2019-07-31 NOTE — CONSULTS
Consults     Patient Name: Dandre Jaquez  Admit Date: 2019  6:15 PM  MR #: [id number]  : 1945    Attending Physician: Christy Cotter MD  Reason for consult: Brain tumor  History of Presenting Illness and Review of Murel Hearing is a 76 y.o. female on hospital day 1 . History Obtained From:  patient  Patient is a 27-year-old right-handed female who I was asked to see with brain tumor. She was transferred from [facility] after CAT scan finding of brain lesion. She reportedly had about a month to 2 months history of headache dull ache in the forehead without any nausea or vomiting up until few days ago. There is associated unsteadiness feel like falling to the point where she has been relying on a cane. She denies any weakness numbness of both upper lower extremities no bowel bladder incontinence or seizure-like activities. No visual problems no hearing difficulties no memory loss. Last 4 to 5 days has been significantly worse to the point where she came to the ER and again after CAT scan findings patient is transferred here for further  management plan. Patient denies any history of cancer but does smoke with hypertension COPD CKD and CAD. Has been on Xarelto. History:      Past Medical History:   Diagnosis Date    Back pain, chronic     Pt  is with pain management    Chronic kidney disease     COPD (chronic obstructive pulmonary disease) ([address]     Coronary artery disease involving native coronary artery of native heart without angina pectoris 3/19/2017    Dyslipidemia 2016    Essential hypertension 2016    [facility] deficiency [facility]     H/O blood clots     Headache     Hypertension     Syncope 2016    Tobacco abuse 2016     Past Surgical History:   Procedure Laterality Date    BREAST BIOPSY Right     CORONARY ANGIOPLASTY WITH STENT PLACEMENT         Family History  No family history on file.   [] Unable to obtain due to ventilated and/ albuterol sulfate HFA, oxyCODONE-acetaminophen, tiZANidine  . Allergies:     No Known Allergies     Physical Exam     Clinically appears to be somewhat older than her stated age. In mild discomfort. No nausea or vomiting at this point. Some complaints of mild dull headache forehead. Normocephalic atraumatic. Awake alert oriented x3. Cranial nerves II through XII nonfocal pupils 3 mm equal reactive to light EOMIs intact no nystagmus is noted upgaze is intact. Visual field and acuity grossly intact. Facial muscle symmetric tongue protrusion midline. Neck is supple good range of motion no nuchal rigidity. Stable motor sensory findings in the upper and lower extremities. Positive Babinski's bilaterally. Significant truncal ataxia.   Finger-to-nose is intact  Objective Findings:     Vitals:   Vitals:    07/31/19 1347 07/31/19 1352 07/31/19 1357 07/31/19 1402   BP: (!) 147/55 (!) 141/60 (!) 141/58 (!) 135/59   Pulse: 50 50 50 50   Resp: 16 16 16 16   Temp:       TempSrc:       SpO2: 95% 94% 94% 94%          Laboratory, Microbiology, Pathology, Radiology, Cardiology, Medications and Transcriptions reviewed  Scheduled Meds:   [START ON 8/1/2019] pantoprazole  40 mg Oral QAM AC    pravastatin  40 mg Oral Daily    isosorbide mononitrate  30 mg Oral Daily    sodium chloride flush  10 mL Intravenous 2 times per day    enoxaparin  40 mg Subcutaneous Daily    dexamethasone  4 mg Intravenous Q6H    gabapentin  100 mg Oral TID    nicotine  1 patch Transdermal Daily     Continuous Infusions:    Recent Labs     07/30/19  1504 07/31/19  0514   WBC 6.4 5.0   HGB 15.0 13.6   HCT 45.1 39.6   MCV 91.3 91.4    173     Recent Labs     07/30/19  1504 07/31/19  0515    142   K 4.3 4.6    109*   CO2 22 22   BUN 17 17   CREATININE 1.20* 1.29*     Recent Labs     07/30/19  1504   AST 22   ALT 7   BILITOT 0.4   ALKPHOS 141*     No results for input(s): LIPASE, AMYLASE in the last 72

## 2019-07-31 NOTE — PROGRESS NOTES
Physical Therapy Med Surg Initial Assessment  Facility/Department: [facility]  Room: N223/N223-01       NAME: Williams Mortimer  : 1945 (2 y.o.)  MRN: [id number]  CODE STATUS: Full Code    Date of Service: 2019    Patient Diagnosis(es): Brain mass [G93.9]   No chief complaint on file.     Patient Active Problem List    Diagnosis Date Noted    Coronary artery disease involving native coronary artery of native heart without angina pectoris 2017     Priority: High    Syncope 2016     Priority: High    Essential hypertension 2016     Priority: High    Dyslipidemia 2016     Priority: High    Tobacco abuse 2016     Priority: High    COPD (chronic obstructive [facility] disease) [facility] 2019    Brain mass 2019    SOB (shortness of breath) on exertion     Lung nodule 2018    Hypotension         Past Medical History:   Diagnosis Date    Back pain, chronic     Pt  is with pain management    Chronic kidney disease     COPD (chronic obstructive pulmonary disease) [facility]     Coronary artery disease involving native coronary artery of native heart without angina pectoris 3/19/2017    Dyslipidemia 2016    Essential hypertension 2016    Factor V deficiency ([facility]     H/O blood clots     Headache     Hypertension     Syncope 2016    Tobacco abuse 2016     Past Surgical History:   Procedure Laterality Date    BREAST BIOPSY Right     CORONARY ANGIOPLASTY WITH STENT PLACEMENT         Chart Reviewed: Yes  Patient assessed for rehabilitation services?: Yes  Family / Caregiver Present: No  General Comment  Comments: Pt awake in bed, agreeable to PT eval    Restrictions:  Restrictions/Precautions: Fall Risk     SUBJECTIVE: Subjective: Pt reporting her balance has been declining over past 2-3 months  Pre Treatment Pain Screening  Pain at present: 0    Post Treatment Pain Screening:   Pain Screening  Patient Currently in Pain: Denies  Pain

## 2019-07-31 NOTE — PROGRESS NOTES
[facility] Respiratory Therapy Evaluation   Current Order:  Albuterol MDI Q4 PRN      Home Regimen: MDI PRN      Ordering Physician: Ludy Smith  Re-evaluation Date:  N/A     Diagnosis: Brain mass      Patient Status: Stable / Unstable + Physician notified    The following MDI Criteria must be met in order to convert aerosol to MDI with spacer. If unable to meet, MDI will be converted to aerosol:  []  Patient able to demonstrate the ability to use MDI effectively  []  Patient alert and cooperative  []  Patient able to take deep breath with 5-10 second hold  []  Medication(s) available in this delivery method   []  Peak flow greater than or equal to 200 ml/min            Current Order Substituted To  (same drug, same frequency)   Aerosol to MDI [] Albuterol Sulfate 0.083% unit dose by aerosol Albuterol Sulfate MDI 2 puffs by inhalation with spacer    [] Levalbuterol 1.25 mg unit dose by aerosol Levalbuterol MDI 2 puffs by inhalation with spacer    [] Levalbuterol 0.63 mg unit dose by aerosol Levalbuterol MDI 2 puffs by inhalation with spacer    [] Ipratropium Bromide 0.02% unit dose by aerosol Ipratropium Bromide MDI 2 puffs by inhalation with spacer    [] Duoneb (Ipratropium + Albuterol) unit dose by aerosol Ipratropium MDI + Albuterol MDI 2 puffs by inhalation w/spacer   MDI to Aerosol [] Albuterol Sulfate MDI Albuterol Sulfate 0.083% unit dose by aerosol    [] Levalbuterol MDI 2 puffs by inhalation Levalbuterol 1.25 mg unit dose by aerosol    [] Ipratropium Bromide MDI by inhalation Ipratropium Bromide 0.02% unit dose by aerosol    [] Combivent (Ipratropium + Albuterol) MDI by inhalation Duoneb (Ipratropium + Albuterol) unit dose by aerosol   Treatment Assessment [Frequency/Schedule]:  Change frequency to: _____No change_____________________________________________per Protocol, P&T, MEC      Points 0 1 2 3 4   Pulmonary Status  Non-Smoker  []   Smoking history   < 20 pack years  []   Smoking history  ?  20 pack

## 2019-07-31 NOTE — PROGRESS NOTES
[facility] OCCUPATIONAL THERAPY EVALUATION - ACUTE     Date: 2019  Patient Name: Fernando Siemens        MRN: [id number]  Account: [de-identified]   : 1945  (76 y.o.)  Room: [place] [address]    Chart Review:  Diagnosis:  There were no encounter diagnoses. Past Medical History:   Diagnosis Date    Back pain, chronic     Pt  is with pain management    Chronic kidney disease     [facility]) [facility]     Coronary artery disease involving native coronary artery of native heart without angina pectoris 3/19/2017    Dyslipidemia 2016    Essential hypertension 2016    [facility]     H/O blood clots     Headache     Hypertension     Syncope 2016    Tobacco abuse 2016     Past Surgical History:   Procedure Laterality Date    BREAST BIOPSY Right     CORONARY ANGIOPLASTY WITH STENT PLACEMENT         Restrictions  Restrictions/Precautions: Fall Risk     Safety Devices: Safety Devices  Safety Devices in place: Yes  Type of devices:  All fall risk precautions in place    Subjective       Pain Reassessment:   Pain Assessment  Patient Currently in Pain: No       Prior Level of Function:  Social/Functional History  Lives With: Family(2 dtrs)  Type of Home: House  Home Layout: One level  Home Access: Stairs to enter without rails  Entrance Stairs - Number of Steps: 4  Bathroom Shower/Tub: Walk-in shower  Bathroom Equipment: Shower chair  Home Equipment: Rolling walker, 4 wheeled walker, Quad cane  Receives Help From: Family  ADL Assistance: Independent  Ambulation Assistance: Independent(quad cane most of the time)  Transfer Assistance: Independent  Active : No  Occupation: Retired    OBJECTIVE:     Orientation Status:  Orientation  Overall Orientation Status: Within Functional Limits    Observation:  Observation/Palpation  Observation: alert, cooperative    Cognition Status:  Cognition  Overall Cognitive Status: Exceptions  Arousal/Alertness: assistance    Bed Mobility  Bed mobility  Supine to Sit: Modified independent  Sit to Supine: Modified independent    Seated and Standing Balance:  Balance  Sitting Balance: Independent  Standing Balance: Contact guard assistance    Functional Endurance:  Activity Tolerance  Activity Tolerance: Patient Tolerated treatment well    D/C Recommendations:  therapy    Equipment Recommendations: TBD      OT Follow Up:  OT D/C RECOMMENDATIONS  REQUIRES OT FOLLOW UP: Yes       Assessment/Discharge Disposition:  Assessment: Pt is a 75 y/o F admitted for weakness and decreased balance. pt was Ind PTA and used a cane for amb. Pt has above defciits ad would benefit from OT  Performance deficits / Impairments: Decreased functional mobility , Decreased safe awareness, Decreased endurance, Decreased balance, Decreased high-level IADLs, Decreased ADL status  Prognosis: Good  Discharge Recommendations: Continue to assess pending progress  Decision Making: Medium Complexity  History: multi comorb  Exam: 6 defcits  Assistance / Modification:  Mod A    Six Click Score   How much help for putting on and taking off regular lower body clothing?: A Lot  How much help for Bathing?: A Lot  How much help for Toileting?: A Little  How much help for putting on and taking off regular upper body clothing?: A Little  How much help for taking care of personal grooming?: A Little  How much help for eating meals?: A Little  AM-[facility] Inpatient Daily Activity Raw Score: 16  AM-PAC Inpatient ADL T-Scale Score : 35.96  ADL Inpatient CMS 0-100% Score: 53.32    Plan:  Plan  Times per week: 1-3x  Plan weeks: acute LOS  Current Treatment Recommendations: Strengthening, Balance Training, Endurance Training, Neuromuscular Re-education, Patient/Caregiver Education & Training, Safety Education & Training, Equipment Evaluation, Education, & procurement, Self-Care / ADL    Goals:   Patient will:    - Improve functional endurance to tolerate/complete 30 mins of

## 2019-08-01 LAB
SEDIMENTATION RATE, ERYTHROCYTE: 39 MM (ref 0–30)
URINE CULTURE, ROUTINE: NORMAL

## 2019-08-01 PROCEDURE — 85652 RBC SED RATE AUTOMATED: CPT

## 2019-08-01 PROCEDURE — 36415 COLL VENOUS BLD VENIPUNCTURE: CPT

## 2019-08-01 PROCEDURE — 6370000000 HC RX 637 (ALT 250 FOR IP): Performed by: INTERNAL MEDICINE

## 2019-08-01 PROCEDURE — 97535 SELF CARE MNGMENT TRAINING: CPT

## 2019-08-01 PROCEDURE — 97530 THERAPEUTIC ACTIVITIES: CPT

## 2019-08-01 PROCEDURE — 99222 1ST HOSP IP/OBS MODERATE 55: CPT | Performed by: INTERNAL MEDICINE

## 2019-08-01 PROCEDURE — 2700000000 HC OXYGEN THERAPY PER DAY

## 2019-08-01 PROCEDURE — 6360000002 HC RX W HCPCS: Performed by: NEUROLOGICAL SURGERY

## 2019-08-01 PROCEDURE — 2580000003 HC RX 258: Performed by: NURSE PRACTITIONER

## 2019-08-01 PROCEDURE — 1210000000 HC MED SURG R&B

## 2019-08-01 PROCEDURE — 97116 GAIT TRAINING THERAPY: CPT

## 2019-08-01 RX ORDER — GABAPENTIN 300 MG/1
600 CAPSULE ORAL 3 TIMES DAILY
Status: DISCONTINUED | OUTPATIENT
Start: 2019-08-01 | End: 2019-08-01

## 2019-08-01 RX ORDER — GABAPENTIN 300 MG/1
300 CAPSULE ORAL 3 TIMES DAILY
Status: DISCONTINUED | OUTPATIENT
Start: 2019-08-01 | End: 2019-08-11 | Stop reason: HOSPADM

## 2019-08-01 RX ADMIN — DEXAMETHASONE SODIUM PHOSPHATE 10 MG: 10 INJECTION INTRAMUSCULAR; INTRAVENOUS at 08:21

## 2019-08-01 RX ADMIN — ISOSORBIDE MONONITRATE 30 MG: 30 TABLET, EXTENDED RELEASE ORAL at 08:21

## 2019-08-01 RX ADMIN — Medication 10 ML: at 20:52

## 2019-08-01 RX ADMIN — DEXAMETHASONE SODIUM PHOSPHATE 10 MG: 10 INJECTION INTRAMUSCULAR; INTRAVENOUS at 14:25

## 2019-08-01 RX ADMIN — DEXAMETHASONE SODIUM PHOSPHATE 10 MG: 10 INJECTION INTRAMUSCULAR; INTRAVENOUS at 20:52

## 2019-08-01 RX ADMIN — METOPROLOL TARTRATE 25 MG: 25 TABLET ORAL at 20:52

## 2019-08-01 RX ADMIN — GABAPENTIN 100 MG: 100 CAPSULE ORAL at 08:21

## 2019-08-01 RX ADMIN — TIZANIDINE 4 MG: 4 TABLET ORAL at 08:21

## 2019-08-01 RX ADMIN — DEXAMETHASONE SODIUM PHOSPHATE 10 MG: 10 INJECTION INTRAMUSCULAR; INTRAVENOUS at 02:55

## 2019-08-01 RX ADMIN — OXYCODONE HYDROCHLORIDE AND ACETAMINOPHEN 1 TABLET: 5; 325 TABLET ORAL at 14:25

## 2019-08-01 RX ADMIN — METOPROLOL TARTRATE 25 MG: 25 TABLET ORAL at 10:18

## 2019-08-01 RX ADMIN — PANTOPRAZOLE SODIUM 40 MG: 40 TABLET, DELAYED RELEASE ORAL at 06:47

## 2019-08-01 RX ADMIN — GABAPENTIN 300 MG: 300 CAPSULE ORAL at 20:51

## 2019-08-01 RX ADMIN — Medication 10 ML: at 08:22

## 2019-08-01 RX ADMIN — PRAVASTATIN SODIUM 40 MG: 40 TABLET ORAL at 20:52

## 2019-08-01 ASSESSMENT — ENCOUNTER SYMPTOMS
SHORTNESS OF BREATH: 1
CHEST TIGHTNESS: 0
COUGH: 0
NAUSEA: 0
EYES NEGATIVE: 1
WHEEZING: 0
BLOOD IN STOOL: 0
GASTROINTESTINAL NEGATIVE: 1
STRIDOR: 0

## 2019-08-01 ASSESSMENT — PAIN DESCRIPTION - PAIN TYPE: TYPE: CHRONIC PAIN

## 2019-08-01 ASSESSMENT — PAIN SCALES - GENERAL
PAINLEVEL_OUTOF10: 3
PAINLEVEL_OUTOF10: 7
PAINLEVEL_OUTOF10: 2
PAINLEVEL_OUTOF10: 0
PAINLEVEL_OUTOF10: 3

## 2019-08-01 ASSESSMENT — PAIN DESCRIPTION - ORIENTATION: ORIENTATION: LOWER

## 2019-08-01 ASSESSMENT — PAIN DESCRIPTION - LOCATION: LOCATION: BACK

## 2019-08-01 NOTE — CONSULTS
Consults    Patient Name: August Kennedy  Admit Date: 2019  6:15 PM  MR #: [id number]  : 1945    Attending Physician: Diogenes Grant MD  Reason for consult: preop evaluation    History of Presenting Illness:      August Kennedy is a 76 y.o. female on hospital day 2 with a history of . History Obtained From:  patient, electronic medical record    Pt admitted with Brain mass. Symptoms are ataxia , nausea and headaches for several weeks. She has been active. Lives independently. Able to take stairs to her home. Denies CP. She sibley sget short winded and continues to smoke. She has PPM ( MR compatible) for SSS  CAD w RCA stent ~ 10 years ago. ECG SR no imjury    Most recent LVEF 60% with no significant valve pathology    She has Factor V Leiden def w recurrent DVT. She has been on Xarelto but since stopped. ASA also stopped due to findings of possible hematoma on Brain MR     History:      EKG:  Past Medical History:   Diagnosis Date    Back pain, chronic     Pt  is with pain management    Chronic kidney disease     [facility]     Coronary artery disease involving native coronary artery of native heart without angina pectoris 3/19/2017    Dyslipidemia 2016    Essential hypertension 2016    [facility] deficiency [facility]     H/O blood clots     Headache     Hypertension     Syncope 2016    Tobacco abuse 2016     Past Surgical History:   Procedure Laterality Date    BREAST BIOPSY Right     CORONARY ANGIOPLASTY WITH STENT PLACEMENT         Family History  No family history on file. [] Unable to obtain due to ventilated and/ or neurologic status    Social History     Socioeconomic History    Marital status:       Spouse name: Not on file    Number of children: Not on file    Years of education: Not on file    Highest education level: Not on file   Occupational History    Not on file   Social Needs    Financial 39.6   MCV 91.3 91.4    173     Recent Labs     07/30/19  1504 07/31/19  0515    142   K 4.3 4.6    109*   CO2 22 22   BUN 17 17   CREATININE 1.20* 1.29*     Recent Labs     07/30/19  1504   AST 22   ALT 7   BILITOT 0.4   ALKPHOS 141*     No results for input(s): LIPASE, AMYLASE in the last 72 hours. Recent Labs     07/30/19  1504   PROT 8.3*   INR 1.2     Cta Head W Wo Contrast    Result Date: 7/30/2019  CTA HEAD AND CTA NECK 1. There is no large vessel occlusion or significant stenosis of the bilateral middle cerebral arteries, terminal internal carotid arteries, basilar artery, or cervical internal carotid arteries. 2.  2.7 cm ring-enhancing lesion in the posterior fossa abutting the vermis and extending to the left. There is some central lower density that is not clearly cystic on these images. There is some surrounding low density consistent with edema and probable mass effect. The fourth ventricle is normal in appearance. There is no hydrocephalus. On these images there are no additional parenchymal brain lesions. Primary differential would include solitary metastatic lesion most commonly breast or lung. The possibility of an hemangioblastoma is also mentioned. These findings were discussed with Dr. Mar Schaumann in the emergency room. EXAMINATION: CTA HEAD W WO CONTRAST, CTA NECK W WO CONTRAST CTA HEAD DATE AND TIME:7/30/2019 2:15 PM CLINICAL HISTORY: Stroke  gait instability  COMPARISON: None TECHNIQUE:Helical CTA of the head and neck was performed from the vertex to the aortic arch following the uneventful intravenous administration of 100 cc of nonionic contrast without incident. 2-D images were reconstructed in the sagittal and coronal planes. Three Dimensional Maximum Intensity Projection (3D-MIP) images were created. All images were reviewed and primarily archived to PACS workstation.  All CT scans at this facility use dose modulation, iterative reconstruction, and/or weight based dosing

## 2019-08-01 NOTE — PROGRESS NOTES
Physical Therapy Med Surg Daily Treatment Note  Facility/Department: [facility]  Room: N223/N223-01       NAME: August Kennedy  : 1945 (89 y.o.)  MRN: [id number]  CODE STATUS: Full Code    Date of Service: 2019    Patient Diagnosis(es): Brain mass [G93.9]   No chief complaint on file.     Patient Active Problem List    Diagnosis Date Noted    Coronary artery disease involving native coronary artery of native heart without angina pectoris 2017     Priority: High    Syncope 2016     Priority: High    Essential hypertension 2016     Priority: High    Dyslipidemia 2016     Priority: High    Tobacco abuse 2016     Priority: High    COPD (chronic [facility] disease) [facility] 2019    Brain mass 2019    SOB (shortness of breath) on exertion     Lung nodule 2018    Hypotension         Past Medical History:   Diagnosis Date    Back pain, chronic     Pt  is with pain management    Chronic kidney disease     COPD (chronic obstructive pulmonary disease) ([facility]     Coronary artery disease involving native coronary artery of native heart without angina pectoris 3/19/2017    Dyslipidemia 2016    Essential hypertension 2016    Factor V deficiency ([facility]     H/O blood clots     Headache     Hypertension     Syncope 2016    Tobacco abuse 2016     Past Surgical History:   Procedure Laterality Date    BREAST BIOPSY Right     CORONARY ANGIOPLASTY WITH STENT PLACEMENT       Restrictions  Restrictions/Precautions: Fall Risk    Subjective   General  Chart Reviewed: Yes  Family / Caregiver Present: No  Pain Screening  Patient Currently in Pain: Yes  Pain Reassessment: 2/10  Pain Assessment  Pain Assessment: 0-10  Pain Level: 2  Pain Type: Chronic pain  Pain Location: Back  Pain Orientation: Lower    Objective   Bed mobility  Supine to Sit: Modified independent  Sit to Supine: Modified independent    Transfers  Sit to Stand: Supervision  Stand to sit: Supervision  Bed to Chair: Supervision    Ambulation  Ambulation?: Yes  Ambulation 1  Surface: level tile  Device: Rolling Walker  Assistance: Stand by assistance  Quality of Gait: mild postural  sway, no LOB   Distance: 150'x2     Assessment   Body structures, Functions, Activity limitations: Decreased balance;Decreased functional mobility   Assessment: patient states she feels her balance has improved since hospital admission. Continues to utilize ww for safety during ambulation. Patient states she has ww and rollator at home. Also [facility]/C for her.    Prognosis: Good  REQUIRES PT FOLLOW UP: Yes     Discharge Recommendations:  Continue to assess pending progress, Patient would benefit from continued therapy after discharge    Goals  Long term goals  Long term goal 1: indep with transfers  Long term goal 2: ambulate >150 ft LRAD modified indep  Long term goal 3: pt to navigate 4 steps with supervision assist Orpha Spry term goal 4: pt to demo >good standing balance  Patient Goals   Patient goals : to go [facility] Friday    Plan    Plan  Times per week: 3-6  Current Treatment Recommendations: Strengthening, Functional Mobility Training, Neuromuscular Re-education, Home Exercise Program, Equipment Evaluation, Education, & procurement, Transfer Training, Gait Training, Safety Education & Training, Balance Training, Endurance Training, Stair training, Patient/Caregiver Education & Training  Safety Devices  Type of devices: Call light within reach, Bed alarm in place     [facility] (6 CLICK) [address] Mobility Raw Score : 21     Therapy Time   Individual   Time In 0948   Time Out 1005   Minutes 17     Timed Code Treatment Minutes: 17 Minutes    gt 10  Tr 7   Alexandria Vega PTA, 08/01/19 at 10:29 AM

## 2019-08-01 NOTE — PROGRESS NOTES
patch Transdermal Daily Oscar Tyson MD   Stopped at 08/01/19 8016         OBJECTIVE:  Vital Signs:  Vitals:    08/01/19 0819   BP: (!) 133/59   Pulse: 81   Resp:    Temp: 97.2 °F (36.2 °C)   SpO2: 93%       Focal exam:      General Exam (except as mentioned above):  CONSTITUTIONAL: Awake, alert, no apparent distress  EYES:  PERRL, conjunctiva normal  ENT:  Normocephalic, atraumatic  NECK:  Supple  BACK:  Symmetric  LUNGS:  CTAB except bilateral basilar crackles. CARDIOVASCULAR:  S1S2 present  ABDOMEN:  soft, non-distended, non-tender  MUSCULOSKELETAL:  There is no redness, warmth, or swelling of the joints. NEUROLOGIC:  Alert, awake, oriented x 3. No FND  EXTREMITIES: Warm and well perfused. LABS  Recent Labs     07/30/19  1504 07/31/19  0514   WBC 6.4 5.0   RBC 4.94 4.33   HGB 15.0 13.6   HCT 45.1 39.6   MCV 91.3 91.4   MCH 30.3 31.4*   MCHC 33.2 34.3   RDW 16.5* 16.6*    173       Recent Labs     07/30/19  1504 07/31/19  0515    142   K 4.3 4.6    109*   CO2 22 22   BUN 17 17   CREATININE 1.20* 1.29*   GLUCOSE 99 141*   CALCIUM 9.9 8.8       No results for input(s): MG in the last 72 hours. ASSESSMENT AND PLAN    Active Hospital Problems    Diagnosis Date Noted    Coronary artery disease involving native coronary artery of native heart without angina pectoris [I25.10] 03/19/2017     Priority: High    Essential hypertension [I10] 11/20/2016     Priority: High    Tobacco abuse [Z72.0] 11/20/2016     Priority: High    [facility] [J44.9] 07/31/2019    Brain mass [G93.9] 07/30/2019     Dr Gina Cali had extensive discussion with patient and her 3 daughters regarding the diagnosis , risks of not having surgery and and potential complications.  Patient and her family has decided that she would not be attending her granddaughters wedding tomorrow and instead would stay in hospital. Cardiac clearance for surgery done    Cerebellar cystic lesion with

## 2019-08-02 LAB
ANION GAP SERPL CALCULATED.3IONS-SCNC: 14 MEQ/L (ref 9–15)
BUN BLDV-MCNC: 30 MG/DL (ref 8–23)
CALCIUM SERPL-MCNC: 9.4 MG/DL (ref 8.5–9.9)
CHLORIDE BLD-SCNC: 108 MEQ/L (ref 95–107)
CO2: 22 MEQ/L (ref 20–31)
CREAT SERPL-MCNC: 1.17 MG/DL (ref 0.5–0.9)
GFR AFRICAN AMERICAN: 54.7
GFR NON-AFRICAN AMERICAN: 45.2
GLUCOSE BLD-MCNC: 127 MG/DL (ref 70–99)
POTASSIUM SERPL-SCNC: 4.4 MEQ/L (ref 3.4–4.9)
SODIUM BLD-SCNC: 144 MEQ/L (ref 135–144)

## 2019-08-02 PROCEDURE — 6360000002 HC RX W HCPCS: Performed by: NEUROLOGICAL SURGERY

## 2019-08-02 PROCEDURE — 6370000000 HC RX 637 (ALT 250 FOR IP): Performed by: INTERNAL MEDICINE

## 2019-08-02 PROCEDURE — 97116 GAIT TRAINING THERAPY: CPT

## 2019-08-02 PROCEDURE — 36415 COLL VENOUS BLD VENIPUNCTURE: CPT

## 2019-08-02 PROCEDURE — 80048 BASIC METABOLIC PNL TOTAL CA: CPT

## 2019-08-02 PROCEDURE — 97110 THERAPEUTIC EXERCISES: CPT

## 2019-08-02 PROCEDURE — APPNB30 APP NON BILLABLE TIME 0-30 MINS: Performed by: PHYSICIAN ASSISTANT

## 2019-08-02 PROCEDURE — 97530 THERAPEUTIC ACTIVITIES: CPT

## 2019-08-02 PROCEDURE — 6370000000 HC RX 637 (ALT 250 FOR IP): Performed by: FAMILY MEDICINE

## 2019-08-02 PROCEDURE — 2580000003 HC RX 258: Performed by: NURSE PRACTITIONER

## 2019-08-02 PROCEDURE — 1210000000 HC MED SURG R&B

## 2019-08-02 PROCEDURE — 99232 SBSQ HOSP IP/OBS MODERATE 35: CPT | Performed by: INTERNAL MEDICINE

## 2019-08-02 RX ADMIN — GABAPENTIN 300 MG: 300 CAPSULE ORAL at 21:09

## 2019-08-02 RX ADMIN — DEXAMETHASONE SODIUM PHOSPHATE 10 MG: 10 INJECTION INTRAMUSCULAR; INTRAVENOUS at 21:09

## 2019-08-02 RX ADMIN — DEXAMETHASONE SODIUM PHOSPHATE 10 MG: 10 INJECTION INTRAMUSCULAR; INTRAVENOUS at 15:04

## 2019-08-02 RX ADMIN — METOPROLOL TARTRATE 25 MG: 25 TABLET ORAL at 09:58

## 2019-08-02 RX ADMIN — GABAPENTIN 300 MG: 300 CAPSULE ORAL at 15:04

## 2019-08-02 RX ADMIN — Medication 10 ML: at 21:09

## 2019-08-02 RX ADMIN — PANTOPRAZOLE SODIUM 40 MG: 40 TABLET, DELAYED RELEASE ORAL at 06:32

## 2019-08-02 RX ADMIN — OXYCODONE HYDROCHLORIDE AND ACETAMINOPHEN 1 TABLET: 5; 325 TABLET ORAL at 15:11

## 2019-08-02 RX ADMIN — Medication 10 ML: at 09:58

## 2019-08-02 RX ADMIN — GABAPENTIN 300 MG: 300 CAPSULE ORAL at 09:58

## 2019-08-02 RX ADMIN — PRAVASTATIN SODIUM 40 MG: 40 TABLET ORAL at 21:09

## 2019-08-02 RX ADMIN — DEXAMETHASONE SODIUM PHOSPHATE 10 MG: 10 INJECTION INTRAMUSCULAR; INTRAVENOUS at 02:54

## 2019-08-02 RX ADMIN — DEXAMETHASONE SODIUM PHOSPHATE 10 MG: 10 INJECTION INTRAMUSCULAR; INTRAVENOUS at 09:58

## 2019-08-02 RX ADMIN — ISOSORBIDE MONONITRATE 30 MG: 30 TABLET, EXTENDED RELEASE ORAL at 09:58

## 2019-08-02 ASSESSMENT — ENCOUNTER SYMPTOMS
WHEEZING: 0
VOMITING: 0
TROUBLE SWALLOWING: 0
COLOR CHANGE: 0
CHEST TIGHTNESS: 0
SHORTNESS OF BREATH: 0
NAUSEA: 0
COUGH: 0

## 2019-08-02 ASSESSMENT — PAIN SCALES - GENERAL: PAINLEVEL_OUTOF10: 6

## 2019-08-02 NOTE — PROGRESS NOTES
Neurology Daily Progress Note  Name: Robel Guardian  Age: 76 y.o. Gender: female  CodeStatus: Full Code  Allergies: No Known Allergies    Chief Complaint:No chief complaint on file. Primary Care Provider: JULIANNA Hernandez CNP  InpatientTreatment Team: Treatment Team: Attending Provider: Taty Kirkpatrick MD; Consulting Physician: Bard Mika MD; Consulting Physician: Missy Platt DO; Consulting Physician: Vern Guy MD; Consulting Physician: Juan A Leahy MD; : Miesha Haley RN; Registered Nurse: Emma Wells RN; Patient Care Tech: Max Matute  Admission Date: 7/30/2019      HPI   Pt seen and examined for neuro follow up for brain tumor with mass effect. A&O x3, NAD, pleasant and cooperative. Dizziness and n/v resolved. Gait improved. Pt on IV decadron. No focal neuro deficits currently. No seizure activity. Denies Headache, double vision, blurry vision, difficulty with speech, difficulty with swallowing, weakness, numbness, pain, nausea, vomiting, choking, neck pain, dizziness. Vitals:    08/02/19 0716   BP: 138/65   Pulse: 59   Resp: 17   Temp: 98.1 °F (36.7 °C)   SpO2: 98%      Review of Systems   Constitutional: Negative for appetite change, chills, fatigue and fever. HENT: Negative for hearing loss and trouble swallowing. Eyes: Negative for visual disturbance. Respiratory: Negative for cough, chest tightness, shortness of breath and wheezing. Cardiovascular: Negative for chest pain, palpitations and leg swelling. Gastrointestinal: Negative for nausea and vomiting. Genitourinary: Negative for difficulty urinating. Musculoskeletal: Positive for gait problem. Skin: Negative for color change and rash. Neurological: Negative for dizziness, tremors, seizures, syncope, facial asymmetry, speech difficulty, weakness, light-headedness, numbness and headaches. Psychiatric/Behavioral: Negative for agitation, confusion and hallucinations.  The patient is not downgoing          Medications:  Reviewed    Infusion Medications:   Scheduled Medications:    metoprolol tartrate  25 mg Oral BID    gabapentin  300 mg Oral TID    pantoprazole  40 mg Oral QAM AC    pravastatin  40 mg Oral Daily    isosorbide mononitrate  30 mg Oral Daily    dexamethasone  10 mg Intravenous Q6H    sodium chloride flush  10 mL Intravenous 2 times per day    nicotine  1 patch Transdermal Daily     PRN Meds: sodium chloride flush, magnesium hydroxide, ondansetron, acetaminophen, albuterol sulfate HFA, oxyCODONE-acetaminophen, tiZANidine    Labs:   Recent Labs     07/30/19  1504 07/31/19  0514   WBC 6.4 5.0   HGB 15.0 13.6   HCT 45.1 39.6    173     Recent Labs     07/30/19  1504 07/31/19  0515 08/02/19  0552    142 144   K 4.3 4.6 4.4    109* 108*   CO2 22 22 22   BUN 17 17 30*   CREATININE 1.20* 1.29* 1.17*   CALCIUM 9.9 8.8 9.4     Recent Labs     07/30/19  1504   AST 22   ALT 7   BILITOT 0.4   ALKPHOS 141*     Recent Labs     07/30/19  1504   INR 1.2     Recent Labs     07/30/19  1504   TROPONINI <0.010       Urinalysis:   Lab Results   Component Value Date    NITRU Negative 07/30/2019    WBCUA 0-2 07/30/2019    BACTERIA Rare 07/30/2019    RBCUA 0-2 07/30/2019    BLOODU Negative 07/30/2019    SPECGRAV 1.020 07/30/2019    GLUCOSEU Negative 07/30/2019       Radiology:   Most recent    EEG No procedure found. MRI of Brain   Results for orders placed during the hospital encounter of 07/30/19   MRI BRAIN W WO CONTRAST    Narrative EXAMINATION:  MRI BRAIN WITHOUT AND WITH IV CONTRAST    CLINICAL HISTORY:  HEADACHE, DIZZINESS, NAUSEA, DISEQUILIBRIUM WITH UNSTEADY GAIT AND WEAKNESS IN BOTH LOWER EXTREMITIES, RECENT ABNORMAL CT BRAIN    COMPARISON:  CTA HEAD FROM 7/30/2019    TECHNIQUE:  Multisequence/multiplane MRI brain obtained without and with IV injection of 15 mL of MultiHance.      FINDINGS:  The MRI brain without and with IV contrast demonstrates the Neck: No results found for this or any previous visit. No results found for this or any previous visit. No results found for this or any previous visit. CT of the Head:   Results for orders placed during the hospital encounter of 06/12/18   CT Head WO Contrast    Narrative HISTORY: Estefani Foster is a Female of 68 years age with history of head injury and blurred vision. COMPARISON: November 20, 2016; March 19, 2017    TECHNIQUE:  Spiral CT examination of the brain was performed without intravenous contrast.  Images were obtained from the base of the skull up to the convexities in axial slices, and then examined in the blood, brain parenchymal and bony windows. Sagittal and coronal reformatted images were also obtained. FINDINGS:  No acute changes are noted. There is no evidence for mass, mass effect or midline shift. No abnormal extra-axial fluid collections are appreciated. Age-appropriate cortical volume loss is seen. There there are areas of mild periventricular   white matter changes most consistent with micro-ischemic changes. .  There are no acute parenchymal alterations, blood byproducts or abnormal extracerebral fluid. The calvarium is grossly intact. The visualized paranasal sinuses                                      show mild mucoperiosteal thickening in the ethmoid air cells and mild opacification of the posterior left mastoid sinus which is unchanged. Impression 1. Age-appropriate cortical atrophy and periventricular microangiopathy. 2.  No acute hemorrhage or extra-axial fluid collection is seen                    . All CT scans at this facility use dose modulation, iterative reconstruction, and/or weight based dosing when appropriate to reduce radiation dose to as low as reasonably achievable. No results found for this or any previous visit. No results found for this or any previous visit.     Carotid duplex: No results found for this or any previous visit. No results found for this or any previous visit. Results for orders placed during the hospital encounter of 12/05/18   US CAROTID ARTERY BILATERAL    Narrative EXAMINATION: CAROTID ULTRASOUND    CLINICAL HISTORY: 60-year-old with hypertension and hyperlipidemia. Positive smoking history. FINDINGS: Duplex and color Doppler ultrasound were performed of the bilateral extracranial carotid systems. Velocity criteria and internal carotid artery stenoses are extrapolated from data as defined by the Society of Radiologist in [place] [address] Radiology 2003; 779;104-054. Comparison made with a previous carotid ultrasound from 11/20/2016     RIGHT CAROTID SYSTEM: There is a small amount of heterogeneous plaque in the bulb. There are no elevations of peak systolic velocities or ICA/CCA velocity ratios. Velocities in the ICA range from 74 cm/s proximal aspect, 102 cm/s mid aspect to 83 cm/s   distal aspect. ICA/CCA velocity ratio is 1.4. By ultrasound criteria there is less than 50 percent diameter reduction of the right ICA. Peak systolic velocities in the proximal ECA and mid CCA are 125 and 70cm/s. There is antegrade flow in the right   vertebral artery. LEFT CAROTID SYSTEM: There is a small amount of heterogeneous plaque in the mid common carotid artery and bulb extending into the proximal ICA. There are no elevations of peak systolic velocities or ICA/CCA velocity ratios. Velocities in the ICA range   from 79 cm/s proximal aspect, 96 cm/s mid aspect to 79 cm/s distal aspect. ICA/CCA velocity ratio is 1.1. By ultrasound criteria there is less than 50 percent diameter reduction of the left ICA. Peak systolic velocities in the proximal ECA and mid CCA   are 138 and 85cm/s. There is antegrade flow in the left vertebral artery. Impression NO HEMODYNAMICALLY SIGNIFICANT INTERNAL CAROTID ARTERY STENOSES. ANTEGRADE VERTEBRAL FLOW.        Echo No results found for this or any previous

## 2019-08-02 NOTE — PROGRESS NOTES
115 07/12/2019    PROT 8.3 07/30/2019    LABALBU 4.1 07/30/2019    LABALBU 23.6 07/12/2019    CALCIUM 9.4 08/02/2019    BILITOT 0.4 07/30/2019    ALKPHOS 141 07/30/2019    AST 22 07/30/2019    ALT 7 07/30/2019     BMP:    Lab Results   Component Value Date     08/02/2019    K 4.4 08/02/2019    K 4.6 07/31/2019     08/02/2019    CO2 22 08/02/2019    BUN 30 08/02/2019    LABALBU 4.1 07/30/2019    LABALBU 23.6 07/12/2019    CREATININE 1.17 08/02/2019    CALCIUM 9.4 08/02/2019    GFRAA 54.7 08/02/2019    LABGLOM 45.2 08/02/2019    GLUCOSE 127 08/02/2019    GLUCOSE 115 07/12/2019     Magnesium:    Lab Results   Component Value Date    MG 1.9 03/18/2017     Troponin:    Lab Results   Component Value Date    TROPONINI <0.010 07/30/2019       Radiology:  Ct Chest Wo Contrast    Result Date: 7/31/2019  EXAMINATION:  CT SCAN CHEST CLINICAL HISTORY:  Recent diagnosis brain mass. COMPARISON:  May 6, 2019. TECHNIQUE:  Multiple serial axial images from the base neck through the upper abdomen with both sagittal coronal reconstruction was performed without intravenous or oral administration of contrast. The lack of IV contrast limits full evaluation. FINDINGS:    There are biapical areas of scarring fibrosis. There is moderate emphysematous disease in the upper one third of the lung parenchyma. There is a pleural-based soft tissue density at the base medial aspect of the right lower lobe. It measures 2.7 x 2.9 x 1.0 cm in the transverse and AP and cephalocaudad dimension. Best seen on the coronal reconstructions. No other focal parenchymal abnormalities. No pleural effusions. No pneumothoraces. No significant periarticular. There is pretracheal adenopathy. No significant perihilar or subcarinal adenopathy. PLEURAL-BASED SOFT TISSUE MASS OVERLYING THE MEDIAL ASPECT OF THE RIGHT LOWER LOBE, RIGHT HEMIDIAPHRAGM. THIS IS NEW SINCE THE PRIOR STUDY OF MAY 6, 2019. THE LACK OF IV CONTRAST LIMITS FULL EVALUATION.  THIS IS atherosclerotic disease without aneurysm with mild ectasia of the infrarenal abdominal aorta measuring 2.5 cm. . Pelvis: No mass, ascites or fluid collection. Uterus unremarkable. Decompressed bladder with trabeculated wall. Bones/Soft Tissues: No acute osseous findings. No destructive osseous lesions. Osteopenia. Scoliosis and degenerative changes throughout the spine with superior endplate sclerosis at F5-U8 and L3-L4 . Small amount of heterogeneous gas right anterior abdominal wall, likely from injection. Lower thorax: Soft tissue nodule along the right hemidiaphragm, appearing pleural-based, measures 3.2 x 3.1 x 1.9 cm (series 2 image 7). Right basilar atelectasis. Emphysematous changes in the lungs. No suspicious lung nodules. Small hiatal hernia with an apparent area of outpouching. Partially visualized pacemaker leads. No evidence for metastatic disease within the abdomen/pelvis. Soft tissue nodule right hemidiaphragm, likely metastatic. Recommend dedicated CT of the lungs for further evaluation of underlying malignancy. Small hiatal hernia with possible area of esophageal outpouching. ==========     Mri Brain W Wo Contrast    Result Date: 7/31/2019  EXAMINATION:  MRI BRAIN WITHOUT AND WITH IV CONTRAST CLINICAL HISTORY:  HEADACHE, DIZZINESS, NAUSEA, DISEQUILIBRIUM WITH UNSTEADY GAIT AND WEAKNESS IN BOTH LOWER EXTREMITIES, RECENT ABNORMAL CT BRAIN COMPARISON:  CTA HEAD FROM 7/30/2019 TECHNIQUE:  Multisequence/multiplane MRI brain obtained without and with IV injection of 15 mL of MultiHance. FINDINGS:  The MRI brain without and with IV contrast demonstrates the following: Posterior fossa: There is an approximately 2.5 x 2 x 2 cm inhomogeneously enhancing midline cerebellar mass found posterior to the fourth ventricle with associated cerebral edema. There appears to be a cystic component to this cerebellar tumor.  Also, there is some paramagnetic signal within the tumor suggesting a hemorrhagic component to for surgery, ok to hold antiplatelets, will follow with you. Yoni Aponte MD [facility] Director of Cardiology Services and Cardiac Catheterization Laboratory  [facility], [place]

## 2019-08-02 NOTE — PROGRESS NOTES
MG/24HR 1 patch  1 patch Transdermal Daily Shannan Kennedy MD   Stopped at 08/01/19 0045         OBJECTIVE:  Vital Signs:  Vitals:    08/02/19 0716   BP: 138/65   Pulse: 59   Resp: 17   Temp: 98.1 °F (36.7 °C)   SpO2: 98%       Focal exam:      General Exam (except as mentioned above):  CONSTITUTIONAL: Awake, alert, no apparent distress  EYES:  PERRL, conjunctiva normal  ENT:  Normocephalic, atraumatic  NECK:  Supple  BACK:  Symmetric  LUNGS:  CTAB except bilateral basilar crackles. CARDIOVASCULAR:  S1S2 present  ABDOMEN:  soft, non-distended, non-tender  MUSCULOSKELETAL:  There is no redness, warmth, or swelling of the joints. NEUROLOGIC:  Alert, awake, oriented x 3. No FND  EXTREMITIES: Warm and well perfused. LABS  Recent Labs     07/31/19  0514   WBC 5.0   RBC 4.33   HGB 13.6   HCT 39.6   MCV 91.4   MCH 31.4*   MCHC 34.3   RDW 16.6*          Recent Labs     07/31/19  0515 08/02/19  0552    144   K 4.6 4.4   * 108*   CO2 22 22   BUN 17 30*   CREATININE 1.29* 1.17*   GLUCOSE 141* 127*   CALCIUM 8.8 9.4       No results for input(s): MG in the last 72 hours. ASSESSMENT AND PLAN    Active Hospital Problems    Diagnosis Date Noted    Coronary artery disease involving native coronary artery of native heart without angina pectoris [I25.10] 03/19/2017     Priority: High    Essential hypertension [I10] 11/20/2016     Priority: High    Tobacco abuse [Z72.0] 11/20/2016     Priority: High    COPD (chronic obstructive pulmonary disease) ([facility] [address] [J44.9] 07/31/2019    Brain mass [G93.9] 07/30/2019       Cerebellar cystic lesion with hemorrhagic component: For surgery next Tuesday. Continue IV decadron 10 g every 6 hours    Cerebral edema: due to due to posterior fossa mass. On decadron     CT chest shows small lung nodule.  Pulmonary consulted     Hypertension: Controlled on current regimen        DVT prophylaxis: SCD     NO ASpirin or any other blood thinners  Flaco Rodriguez MD  Pager :

## 2019-08-02 NOTE — PROGRESS NOTES
Patient: Thierno Atkins  Unit/Bed: D934/V244-64  YOB: 1945  MRN: [id number] Acct: [de-identified]   Admitting Diagnosis: Brain mass [G93.9]  Admit Date:  7/30/2019  Hospital Day: 2    Current Medications:    Scheduled Meds:   metoprolol tartrate  25 mg Oral BID    gabapentin  300 mg Oral TID    pantoprazole  40 mg Oral QAM AC    pravastatin  40 mg Oral Daily    isosorbide mononitrate  30 mg Oral Daily    dexamethasone  10 mg Intravenous Q6H    sodium chloride flush  10 mL Intravenous 2 times per day    nicotine  1 patch Transdermal Daily     Continuous Infusions:  PRN Meds:.sodium chloride flush, magnesium hydroxide, ondansetron, acetaminophen, albuterol sulfate HFA, oxyCODONE-acetaminophen, tiZANidine  . Recent Labs     07/30/19  1504 07/31/19  0514   WBC 6.4 5.0   HGB 15.0 13.6   HCT 45.1 39.6   MCV 91.3 91.4    173     Recent Labs     07/30/19  1504 07/31/19  0515    142   K 4.3 4.6    109*   CO2 22 22   BUN 17 17   CREATININE 1.20* 1.29*     Recent Labs     07/30/19  1504   AST 22   ALT 7   BILITOT 0.4   ALKPHOS 141*     No results for input(s): LIPASE, AMYLASE in the last 72 hours. Recent Labs     07/30/19  1504   PROT 8.3*   INR 1.2       Imaging Results:    Cta Head W Wo Contrast    Result Date: 7/30/2019  CTA HEAD AND CTA NECK 1. There is no large vessel occlusion or significant stenosis of the bilateral middle cerebral arteries, terminal internal carotid arteries, basilar artery, or cervical internal carotid arteries. 2.  2.7 cm ring-enhancing lesion in the posterior fossa abutting the vermis and extending to the left. There is some central lower density that is not clearly cystic on these images. There is some surrounding low density consistent with edema and probable mass effect. The fourth ventricle is normal in appearance. There is no hydrocephalus. On these images there are no additional parenchymal brain lesions.  Primary differential would include solitary focal stenosis aneurysm or dissection. Vertebral arteries are codominant. Cta Neck W Wo Contrast    Result Date: 7/30/2019  CTA HEAD AND CTA NECK 1. There is no large vessel occlusion or significant stenosis of the bilateral middle cerebral arteries, terminal internal carotid arteries, basilar artery, or cervical internal carotid arteries. 2.  2.7 cm ring-enhancing lesion in the posterior fossa abutting the vermis and extending to the left. There is some central lower density that is not clearly cystic on these images. There is some surrounding low density consistent with edema and probable mass effect. The fourth ventricle is normal in appearance. There is no hydrocephalus. On these images there are no additional parenchymal brain lesions. Primary differential would include solitary metastatic lesion most commonly breast or lung. The possibility of an hemangioblastoma is also mentioned. These findings were discussed with Dr. Malika March in the emergency room. EXAMINATION: CTA HEAD W WO CONTRAST, CTA NECK W WO CONTRAST CTA HEAD DATE AND TIME:7/30/2019 2:15 PM CLINICAL HISTORY: Stroke  gait instability  COMPARISON: None TECHNIQUE:Helical CTA of the head and neck was performed from the vertex to the aortic arch following the uneventful intravenous administration of 100 cc of nonionic contrast without incident. 2-D images were reconstructed in the sagittal and coronal planes. Three Dimensional Maximum Intensity Projection (3D-MIP) images were created. All images were reviewed and primarily archived to PACS workstation. All CT scans at this facility use dose modulation, iterative reconstruction, and/or weight based dosing when appropriate to reduce radiation dose to as low as reasonably achievable.  NASCET Criteria were utilized FINDINGS CTA Head     Intracranial ICAs: Flow is visualized within the precavernous, cavernous, clinoid and supraclinoid segments of the internal carotid arteries bilaterally     Anterior

## 2019-08-02 NOTE — PROGRESS NOTES
[facility] SURG TREATMENT NOTE     Date: 2019  Patient Name: Joshua Zhu        MRN: [id number]  Account: [de-identified]   : 1945  (76 y.o.)  Room: [place] [address]    Chart Review:  Diagnosis:  There were no encounter diagnoses. Restrictions:  Fall   Activity Orders: up with assist     Subjective:  Patient states:  \"I have to finish paying these bills before noon. \"   Pain: 0/10  Description: n/a   Location:  N/a     Objective:    ZECHARIAH Hose ordered/donned:  []  Yes  [x]  No    If no - why    N/A    Pt supervised with HOB elevated with increased time supine to sit. Pt seated EOB to complete exercise with no LOB. Pt afterward stood supervised with w/w and ambulated to chair. No LOB. Exercises: addended 1 lb weight Electronically signed by SHELL Blood on 2019 at 2:25 PM    10 reps each shoulder/elbow/wrist flexion extension, horizontal ab/adduction, andsupination/prontaion. Treatment consisted of:   [] ADL Training  [x] Strengthening   [] Transfer Training    [] DME Education  [] HEP   [] Manual Therapy   [] Patient Education  [] Other:    Assessment: Pt tolerated treatment well.     Performance deficits / Impairments: Decreased functional mobility , Decreased safe awareness, Decreased endurance, Decreased balance, Decreased high-level IADLs, Decreased ADL status  REQUIRES OT FOLLOW UP: Yes  Discharge Recommendations: Continue to assess pending progress    6-Click  How much help for putting on and taking off regular lower body clothing?: A Lot  How much help for Bathing?: A Lot  How much help for Toileting?: A Little  How much help for putting on and taking off regular upper body clothing?: A Little  How much help for taking care of personal grooming?: A Little  How much help for eating meals?: A Little  AM-PAC Inpatient Daily Activity Raw Score: 16  AM-PAC Inpatient ADL T-Scale Score : 35.96  ADL Inpatient CMS 0-100% Score: 53.32    Plan:  Plan  Times per

## 2019-08-03 PROCEDURE — 2580000003 HC RX 258: Performed by: NURSE PRACTITIONER

## 2019-08-03 PROCEDURE — 6360000002 HC RX W HCPCS: Performed by: NEUROLOGICAL SURGERY

## 2019-08-03 PROCEDURE — 99223 1ST HOSP IP/OBS HIGH 75: CPT | Performed by: INTERNAL MEDICINE

## 2019-08-03 PROCEDURE — 6370000000 HC RX 637 (ALT 250 FOR IP): Performed by: INTERNAL MEDICINE

## 2019-08-03 PROCEDURE — 1210000000 HC MED SURG R&B

## 2019-08-03 RX ADMIN — DEXAMETHASONE SODIUM PHOSPHATE 10 MG: 10 INJECTION INTRAMUSCULAR; INTRAVENOUS at 03:13

## 2019-08-03 RX ADMIN — METOPROLOL TARTRATE 25 MG: 25 TABLET ORAL at 09:09

## 2019-08-03 RX ADMIN — GABAPENTIN 300 MG: 300 CAPSULE ORAL at 19:59

## 2019-08-03 RX ADMIN — TIZANIDINE 4 MG: 4 TABLET ORAL at 09:09

## 2019-08-03 RX ADMIN — PANTOPRAZOLE SODIUM 40 MG: 40 TABLET, DELAYED RELEASE ORAL at 06:03

## 2019-08-03 RX ADMIN — METOPROLOL TARTRATE 25 MG: 25 TABLET ORAL at 19:59

## 2019-08-03 RX ADMIN — GABAPENTIN 300 MG: 300 CAPSULE ORAL at 15:04

## 2019-08-03 RX ADMIN — ISOSORBIDE MONONITRATE 30 MG: 30 TABLET, EXTENDED RELEASE ORAL at 09:09

## 2019-08-03 RX ADMIN — DEXAMETHASONE SODIUM PHOSPHATE 10 MG: 10 INJECTION INTRAMUSCULAR; INTRAVENOUS at 20:00

## 2019-08-03 RX ADMIN — PRAVASTATIN SODIUM 40 MG: 40 TABLET ORAL at 19:59

## 2019-08-03 RX ADMIN — OXYCODONE HYDROCHLORIDE AND ACETAMINOPHEN 1 TABLET: 5; 325 TABLET ORAL at 11:38

## 2019-08-03 RX ADMIN — GABAPENTIN 300 MG: 300 CAPSULE ORAL at 09:09

## 2019-08-03 RX ADMIN — DEXAMETHASONE SODIUM PHOSPHATE 10 MG: 10 INJECTION INTRAMUSCULAR; INTRAVENOUS at 15:04

## 2019-08-03 RX ADMIN — DEXAMETHASONE SODIUM PHOSPHATE 10 MG: 10 INJECTION INTRAMUSCULAR; INTRAVENOUS at 09:10

## 2019-08-03 RX ADMIN — Medication 10 ML: at 09:10

## 2019-08-03 RX ADMIN — Medication 10 ML: at 20:00

## 2019-08-03 ASSESSMENT — PAIN SCALES - GENERAL
PAINLEVEL_OUTOF10: 3
PAINLEVEL_OUTOF10: 0
PAINLEVEL_OUTOF10: 0

## 2019-08-03 NOTE — CONSULTS
Inpatient consult to Pulmonology  Consult performed by: Liane Yanes MD  Consult ordered by: Norma Ramon MD        Pulmonary Medicine  Consult Note      Reason for consultation: Abnormal CT chest      HISTORY OF PRESENT ILLNESS:    Patient was seen yesterday, dictation done today    This is 40-year-old female , who  was recently seen by Dr. Socrates Rose regarding COPD on 7/17/2019. Isamar Loose She has a history of smoking 1 pack/day. She had a CT done on May 2019 showing benign appearance or behavior continue annual screening in 12 months. She is admitted with gait instability, nausea, headache. She had a CT brain shows enhancing lesion in posterior fossa and edema noted. She is seen by neurosurgeon and going for surgery Tuesday. She had CT chest done report of pleural-based soft tissue mass overlying medial aspect of the right lower lobe, right hemidiaphragm this is new from previous study in May 6, 2019. Moderate emphysematous disease. Past Medical History:        Diagnosis Date    Back pain, chronic     Pt  is with pain management    Chronic kidney disease     [facility] [facility]     Coronary artery disease involving native coronary artery of native heart without angina pectoris 3/19/2017    Dyslipidemia 11/20/2016    Essential hypertension 11/20/2016    Factor V deficiency ([address]     H/O blood clots     Headache     Hypertension     Syncope 11/20/2016    Tobacco abuse 11/20/2016       Past Surgical History:        Procedure Laterality Date    BREAST BIOPSY Right     CORONARY ANGIOPLASTY WITH STENT PLACEMENT         Social History:     reports that she has been smoking cigarettes. She started smoking about 59 years ago. She has a 57.00 pack-year smoking history. She has never used smokeless tobacco. She reports that she drinks alcohol. She reports that she does not use drugs. Family History:   No family history on file.     Allergies:  Patient has no known allergies. MEDICATIONS during current hospitalization:    Continuous Infusions:    Scheduled Meds:   metoprolol tartrate  25 mg Oral BID    gabapentin  300 mg Oral TID    pantoprazole  40 mg Oral QAM AC    pravastatin  40 mg Oral Daily    isosorbide mononitrate  30 mg Oral Daily    dexamethasone  10 mg Intravenous Q6H    sodium chloride flush  10 mL Intravenous 2 times per day    nicotine  1 patch Transdermal Daily       PRN Meds:sodium chloride flush, magnesium hydroxide, ondansetron, acetaminophen, albuterol sulfate HFA, oxyCODONE-acetaminophen, tiZANidine    REVIEW OF SYSTEMS:  As in history of present illness  Other 14 point review of system is negative. PHYSICAL EXAM:    Vitals:  BP (!) 145/64   Pulse 59   Temp 97.9 °F (36.6 °C)   Resp 18   LMP  (LMP Unknown)   SpO2 94%   General: Alert, awake, Oriented x3  .comfortable in bed, No distress. Head: Atraumatic , Normocephalic   Eyes: PERRL. No sclera icterus. No conjunctival injection. No discharge   ENT: No nasal  discharge. Pharynx clear. Neck:  Trachea midline. No thyromegaly, no JVD, No cervical adenopathy. Chest : Bilaterally symmetrical ,Normal effort,  No accessory muscle use  Lung : Diminished BS bilateraly. No Rales. No wheezing. No rhonchi. Heart[de-identified] Normal  rate. Regular rhythm. No mumur ,  Rub or gallop  ABD: Non-tender. Non-distended. No masses. No organmegaly. Normal bowel sounds. No hernia. Musculoskeleton: normal range of motion in all extremites, strength and tone   Extremities: No pitting in both lower leg , No Cyanosis ,No clubbing  Neuro: no cranial nerve abnormality, normal reflex and sensation, no focal weakness   Skin: Warm and dry. No erythema rash on exposed extremities. Data Review  No results for input(s): WBC, HGB, HCT, PLT in the last 72 hours. Recent Labs     08/02/19  0552      K 4.4   *   CO2 22   BUN 30*   CREATININE 1.17*   GLUCOSE 127*       MV Settings:           ABGs: No results precavernous, cavernous, clinoid and supraclinoid segments of the internal carotid arteries bilaterally     Anterior cerebral arteries:Flow is visualized within the bilateral anterior cerebral arteries with no large vessel occlusion or significant stenosis. The left A1 segment is relatively hypoplastic. Middle cerebral arteries:Flow visualized throughout the middle cerebral arteries bilaterally with no large vessel occlusion or significant stenosis. Posterior Circulation: Bilateral posterior cerebral arteries are patent. Basilar artery:Flow is visualized in the basilar artery without significant stenosis. Aneurysm No aneurysm or dissection in the anterior or posterior circulations. .     Neurocranium there is a 2.7 cm ring-enhancing posterior fossa lesion abutting the vermis and extending to the left. There is a central lower density that is not clearly cystic on these images. There is some surrounding low density consistent with edema. The fourth ventricle is normal in appearance. There is no hydrocephalus. On these images there are no additional parenchymal brain lesions. Primary differential would include solitary metastatic lesion most commonly breast or lung. The possibility  of hemangioblastoma is also mentioned. Dural sinus: As visualized the opacified dural venous sinuses are unremarkable. [address] [place] [address]. CTA NECK DATE AND TIME:7/30/2019 2:15 PM CLINICAL HISTORY: Stroke symptoms   gait instability  TECHNIQUE: TECHNIQUE:Helical CTA of the head and neck was performed from the vertex to the aortic arch following the uneventful intravenous administration of 100 cc of nonionic contrast without incident. 2-D images were reconstructed in the sagittal and  coronal planes. Three Dimensional Maximum Intensity Projection (3D-MIP) images were created. All images were reviewed and primarily archived to PACS workstation.  All CT scans at this facility use dose modulation, iterative COMPARISON: CT 4/23/2018; MRI 12/3/2018. RESULT: Liver: Subcentimeter low-attenuation lesions throughout the liver, including 0.8 x 0.9 cm low-attenuation lesion in the right inferior hepatic lobe (series 2 image 27). These are similar to MRI 12/3/2018 and appear to be benign cysts. Biliary: No bile duct dilation. Gallbladder is collapsed. Pancreas: Diffuse pancreatic calcifications. No discrete pancreatic mass or pancreatic ductal dilation. Spleen: No mass. No splenomegaly. Adrenals: No mass. 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 No hydronephrosis or nephrolithiasis. Subcentimeter lesion(s) that are too small to characterize but likely benign. GI tract: No dilation or wall thickening. Mild diverticulosis without diverticulitis. Lymph nodes: No abdominal or pelvic lymphadenopathy. Mesentery/Peritoneum: No ascites or mass. Retroperitoneum: No mass. Vasculature: The celiac axis and SMA are patent. The portal vein and branches, splenic vein, SMV, and hepatic veins are patent. Severe arterial atherosclerotic disease without aneurysm with mild ectasia of the infrarenal abdominal aorta measuring 2.5 cm. . Pelvis: No mass, ascites or fluid collection. Uterus unremarkable. Decompressed bladder with trabeculated wall. Bones/Soft Tissues: No acute osseous findings. No destructive osseous lesions. Osteopenia. Scoliosis and degenerative changes throughout the spine with superior endplate sclerosis at Q7-N0 and L3-L4 . Small amount of heterogeneous gas right anterior abdominal wall, likely from injection. Lower thorax: Soft tissue nodule along the right hemidiaphragm, appearing pleural-based, measures 3.2 x 3.1 x 1.9 cm (series 2 image 7). Right basilar atelectasis. Emphysematous changes in the lungs. No suspicious lung nodules. Small hiatal hernia with an apparent area of outpouching. Partially visualized pacemaker leads. No evidence for metastatic disease within the abdomen/pelvis.  Soft tissue nodule Jerad Ricardo and I will discuss with him . Thank you for this consult.       Electronically signed by Essie Pride MD, FCCP on 8/3/2019 at 11:18 AM

## 2019-08-03 NOTE — PROGRESS NOTES
Department of Internal Medicine  Progress Note      SUBJECTIVE: Improved nausea, vomiting . Decreased unsteadiness in gait. No acute events overnight.        ROS:  All 12 systems reviewed and negative except mentioned in HPI and Assessment and plan    MEDICATIONS:  Current Facility-Administered Medications   Medication Dose Route Frequency Provider Last Rate Last Dose    metoprolol tartrate (LOPRESSOR) tablet 25 mg  25 mg Oral BID Phillips Osgood, MD   25 mg at 08/03/19 0521    gabapentin (NEURONTIN) capsule 300 mg  300 mg Oral TID Delisa Nunez MD   300 mg at 08/03/19 0909    pantoprazole (PROTONIX) tablet 40 mg  40 mg Oral QAM AC Delisa Nunez MD   40 mg at 08/03/19 0603    pravastatin (PRAVACHOL) tablet 40 mg  40 mg Oral Daily Delisa Nunez MD   40 mg at 08/02/19 2109    isosorbide mononitrate (IMDUR) extended release tablet 30 mg  30 mg Oral Daily Delisa Nunez MD   30 mg at 08/03/19 0909    dexamethasone (DECADRON) injection 10 mg  10 mg Intravenous Q6H Anselmo Morelos MD   10 mg at 08/03/19 0910    sodium chloride flush 0.9 % injection 10 mL  10 mL Intravenous 2 times per day Earlis Sharad, APRN - CNP   10 mL at 08/03/19 0910    sodium chloride flush 0.9 % injection 10 mL  10 mL Intravenous PRN Earlis Sharad, APRN - CNP   10 mL at 07/31/19 0040    magnesium hydroxide (MILK OF MAGNESIA) 400 MG/5ML suspension 30 mL  30 mL Oral Daily PRN Earlis Sharad, APRN - CNP        ondansetron (ZOFRAN) injection 4 mg  4 mg Intravenous Q6H PRN Earlis Sharad, APRN - CNP        acetaminophen (TYLENOL) tablet 650 mg  650 mg Oral Q4H PRN Earlis Sharad, APRN - CNP   650 mg at 07/31/19 2032    albuterol sulfate  (90 Base) MCG/ACT inhaler 2 puff  2 puff Inhalation Q4H PRN Diana Richmond MD        oxyCODONE-acetaminophen (PERCOCET) 5-325 MG per tablet 1 tablet  1 tablet Oral Q4H PRN Diana Richmond MD   1 tablet at 08/03/19 1138    tiZANidine (ZANAFLEX) tablet 4 mg  4 mg Oral Q8H PRN Diana Richmond MD   4 mg at 08/03/19 9617

## 2019-08-04 PROCEDURE — 1210000000 HC MED SURG R&B

## 2019-08-04 PROCEDURE — 6370000000 HC RX 637 (ALT 250 FOR IP): Performed by: INTERNAL MEDICINE

## 2019-08-04 PROCEDURE — 2580000003 HC RX 258: Performed by: NURSE PRACTITIONER

## 2019-08-04 PROCEDURE — 6360000002 HC RX W HCPCS: Performed by: NEUROLOGICAL SURGERY

## 2019-08-04 RX ADMIN — OXYCODONE HYDROCHLORIDE AND ACETAMINOPHEN 1 TABLET: 5; 325 TABLET ORAL at 20:50

## 2019-08-04 RX ADMIN — DEXAMETHASONE SODIUM PHOSPHATE 10 MG: 10 INJECTION INTRAMUSCULAR; INTRAVENOUS at 10:38

## 2019-08-04 RX ADMIN — TIZANIDINE 4 MG: 4 TABLET ORAL at 20:50

## 2019-08-04 RX ADMIN — Medication 10 ML: at 20:51

## 2019-08-04 RX ADMIN — GABAPENTIN 300 MG: 300 CAPSULE ORAL at 20:50

## 2019-08-04 RX ADMIN — METOPROLOL TARTRATE 25 MG: 25 TABLET ORAL at 20:50

## 2019-08-04 RX ADMIN — DEXAMETHASONE SODIUM PHOSPHATE 10 MG: 10 INJECTION INTRAMUSCULAR; INTRAVENOUS at 15:45

## 2019-08-04 RX ADMIN — PRAVASTATIN SODIUM 40 MG: 40 TABLET ORAL at 20:53

## 2019-08-04 RX ADMIN — DEXAMETHASONE SODIUM PHOSPHATE 10 MG: 10 INJECTION INTRAMUSCULAR; INTRAVENOUS at 03:51

## 2019-08-04 RX ADMIN — ISOSORBIDE MONONITRATE 30 MG: 30 TABLET, EXTENDED RELEASE ORAL at 10:35

## 2019-08-04 RX ADMIN — GABAPENTIN 300 MG: 300 CAPSULE ORAL at 10:35

## 2019-08-04 RX ADMIN — PANTOPRAZOLE SODIUM 40 MG: 40 TABLET, DELAYED RELEASE ORAL at 05:37

## 2019-08-04 RX ADMIN — Medication 10 ML: at 10:42

## 2019-08-04 RX ADMIN — TIZANIDINE 4 MG: 4 TABLET ORAL at 11:58

## 2019-08-04 RX ADMIN — OXYCODONE HYDROCHLORIDE AND ACETAMINOPHEN 1 TABLET: 5; 325 TABLET ORAL at 10:37

## 2019-08-04 RX ADMIN — METOPROLOL TARTRATE 25 MG: 25 TABLET ORAL at 10:34

## 2019-08-04 RX ADMIN — GABAPENTIN 300 MG: 300 CAPSULE ORAL at 15:45

## 2019-08-04 RX ADMIN — DEXAMETHASONE SODIUM PHOSPHATE 10 MG: 10 INJECTION INTRAMUSCULAR; INTRAVENOUS at 20:50

## 2019-08-04 ASSESSMENT — PAIN SCALES - GENERAL
PAINLEVEL_OUTOF10: 5
PAINLEVEL_OUTOF10: 0

## 2019-08-04 NOTE — PROGRESS NOTES
MG/24HR 1 patch  1 patch Transdermal Daily Julián Longoria MD   Stopped at 08/01/19 2068         OBJECTIVE:  Vital Signs:  Vitals:    08/04/19 0714   BP: (!) 152/86   Pulse: 60   Resp:    Temp: 98.1 °F (36.7 °C)   SpO2: 97%       Focal exam:      General Exam (except as mentioned above):  CONSTITUTIONAL: Awake, alert, no apparent distress  EYES:  PERRL, conjunctiva normal  ENT:  Normocephalic, atraumatic  NECK:  Supple  BACK:  Symmetric  LUNGS:  CTAB except bilateral basilar crackles. CARDIOVASCULAR:  S1S2 present  ABDOMEN:  soft, non-distended, non-tender  MUSCULOSKELETAL:  There is no redness, warmth, or swelling of the joints. NEUROLOGIC:  Alert, awake, oriented x 3. No FND  EXTREMITIES: Warm and well perfused. LABS  No results for input(s): WBC, RBC, HGB, HCT, MCV, MCH, MCHC, RDW, PLT, MPV in the last 72 hours. Recent Labs     08/02/19  0552      K 4.4   *   CO2 22   BUN 30*   CREATININE 1.17*   GLUCOSE 127*   CALCIUM 9.4       No results for input(s): MG in the last 72 hours. ASSESSMENT AND PLAN    Active Hospital Problems    Diagnosis Date Noted    Coronary artery disease involving native coronary artery of native heart without angina pectoris [I25.10] 03/19/2017     Priority: High    Essential hypertension [I10] 11/20/2016     Priority: High    Tobacco abuse [Z72.0] 11/20/2016     Priority: High    [facility] [J44.9] 07/31/2019    Brain mass [G93.9] 07/30/2019     Cerebellar cystic lesion with hemorrhagic component: For surgery next Tuesday. Continue IV decadron 10 g every 6 hours     Cerebral edema: due to due to posterior fossa mass. On decadron     CT chest shows small lung nodule.  Pulmonary consulted     Hypertension: Controlled on current regimen     DVT prophylaxis: SCD     NO ASpirin or any other blood thinners    DVT prophylaxis: scd  Claudia Brownlee MD  Pager : [phone]

## 2019-08-05 ENCOUNTER — ANESTHESIA EVENT (OUTPATIENT)
Dept: OPERATING ROOM | Age: 74
DRG: 025 | End: 2019-08-05
Payer: MEDICARE

## 2019-08-05 PROCEDURE — 97112 NEUROMUSCULAR REEDUCATION: CPT

## 2019-08-05 PROCEDURE — 6370000000 HC RX 637 (ALT 250 FOR IP): Performed by: INTERNAL MEDICINE

## 2019-08-05 PROCEDURE — 97116 GAIT TRAINING THERAPY: CPT

## 2019-08-05 PROCEDURE — 2580000003 HC RX 258: Performed by: NURSE PRACTITIONER

## 2019-08-05 PROCEDURE — 99232 SBSQ HOSP IP/OBS MODERATE 35: CPT | Performed by: INTERNAL MEDICINE

## 2019-08-05 PROCEDURE — 6360000002 HC RX W HCPCS: Performed by: NEUROLOGICAL SURGERY

## 2019-08-05 PROCEDURE — 1210000000 HC MED SURG R&B

## 2019-08-05 RX ADMIN — Medication 10 ML: at 21:18

## 2019-08-05 RX ADMIN — METOPROLOL TARTRATE 25 MG: 25 TABLET ORAL at 07:45

## 2019-08-05 RX ADMIN — METOPROLOL TARTRATE 25 MG: 25 TABLET ORAL at 21:18

## 2019-08-05 RX ADMIN — OXYCODONE HYDROCHLORIDE AND ACETAMINOPHEN 1 TABLET: 5; 325 TABLET ORAL at 03:45

## 2019-08-05 RX ADMIN — DEXAMETHASONE SODIUM PHOSPHATE 10 MG: 10 INJECTION INTRAMUSCULAR; INTRAVENOUS at 09:50

## 2019-08-05 RX ADMIN — PANTOPRAZOLE SODIUM 40 MG: 40 TABLET, DELAYED RELEASE ORAL at 06:35

## 2019-08-05 RX ADMIN — PRAVASTATIN SODIUM 40 MG: 40 TABLET ORAL at 21:18

## 2019-08-05 RX ADMIN — DEXAMETHASONE SODIUM PHOSPHATE 10 MG: 10 INJECTION INTRAMUSCULAR; INTRAVENOUS at 03:36

## 2019-08-05 RX ADMIN — DEXAMETHASONE SODIUM PHOSPHATE 10 MG: 10 INJECTION INTRAMUSCULAR; INTRAVENOUS at 21:18

## 2019-08-05 RX ADMIN — ISOSORBIDE MONONITRATE 30 MG: 30 TABLET, EXTENDED RELEASE ORAL at 07:45

## 2019-08-05 RX ADMIN — TIZANIDINE 4 MG: 4 TABLET ORAL at 07:45

## 2019-08-05 RX ADMIN — Medication 10 ML: at 09:50

## 2019-08-05 RX ADMIN — GABAPENTIN 300 MG: 300 CAPSULE ORAL at 21:18

## 2019-08-05 RX ADMIN — GABAPENTIN 300 MG: 300 CAPSULE ORAL at 07:45

## 2019-08-05 RX ADMIN — GABAPENTIN 300 MG: 300 CAPSULE ORAL at 15:03

## 2019-08-05 RX ADMIN — DEXAMETHASONE SODIUM PHOSPHATE 10 MG: 10 INJECTION INTRAMUSCULAR; INTRAVENOUS at 15:03

## 2019-08-05 ASSESSMENT — ENCOUNTER SYMPTOMS
VOMITING: 0
SHORTNESS OF BREATH: 0
TROUBLE SWALLOWING: 0
COUGH: 0
WHEEZING: 0
CONSTIPATION: 0
COLOR CHANGE: 0
NAUSEA: 0
CHEST TIGHTNESS: 0
ABDOMINAL PAIN: 0
ABDOMINAL DISTENTION: 0
DIARRHEA: 0

## 2019-08-05 ASSESSMENT — PAIN SCALES - GENERAL
PAINLEVEL_OUTOF10: 3
PAINLEVEL_OUTOF10: 0

## 2019-08-05 NOTE — FLOWSHEET NOTE
Spoke with Dr Remy Mccormick this morning, he plans to do surgery tomorrow on this patient. Telemetry monitor ordered and placed on patient.

## 2019-08-05 NOTE — PROGRESS NOTES
CC; Pre op clearance. Hx of CAD    SUBJECTIVE:  Pt denies chest pain, dyspnea, dyspnea on exertion, change in exercise capacity, fatigue,  nausea, vomiting, diarrhea, constipation, motor weakness, insomnia, weight loss, syncope, dizziness, lightheadedness, palpitations, PND, orthopnea, or claudication. Going for brain surgery tomorrow. Hx of remote PCI.      Past Medical History:   Diagnosis Date    Back pain, chronic     Pt  is with pain management    Chronic kidney disease     [facility]     Coronary artery disease involving native coronary artery of native heart without angina pectoris 3/19/2017    Dyslipidemia 11/20/2016    Essential hypertension 11/20/2016    [facility] deficiency ([facility]     H/O blood clots     Headache     Hypertension     Syncope 11/20/2016    Tobacco abuse 11/20/2016     Patient Active Problem List   Diagnosis    Syncope    Essential hypertension    Dyslipidemia    Tobacco abuse    Coronary artery disease involving native coronary artery of native heart without angina pectoris    Hypotension    Lung nodule    SOB (shortness of breath) on exertion    Brain mass    COPD (chronic obstructive pulmonary disease) ([facility])       Current Facility-Administered Medications   Medication Dose Route Frequency Provider Last Rate Last Dose    metoprolol tartrate (LOPRESSOR) tablet 25 mg  25 mg Oral BID Latasha Paez MD   25 mg at 08/05/19 0745    gabapentin (NEURONTIN) capsule 300 mg  300 mg Oral TID Delisa Ramos MD   300 mg at 08/05/19 0745    pantoprazole (PROTONIX) tablet 40 mg  40 mg Oral QAM AC Delisa Goss MD   40 mg at 08/05/19 0635    pravastatin (PRAVACHOL) tablet 40 mg  40 mg Oral Daily Delisa Goss MD   40 mg at 08/04/19 2053    isosorbide mononitrate (IMDUR) extended release tablet 30 mg  30 mg Oral Daily Delisa Ramos MD   30 mg at 08/05/19 0745    dexamethasone (DECADRON) injection 10 mg  10 mg Intravenous Q6H Bryan Yao MD   10

## 2019-08-05 NOTE — PROGRESS NOTES
Physical Therapy Med Surg Daily Treatment Note  [facility]Department[facility]  Room: N223/N223-01       NAME: Brenda Tim  : 1945 (87 y.o.)  MRN: [id number]  CODE STATUS: Full Code    Date of Service: 2019    Patient Diagnosis(es): Brain mass [G93.9]   No chief complaint on file. Patient Active Problem List    Diagnosis Date Noted    Coronary artery disease involving native coronary artery of native heart without angina pectoris 2017     Priority: High    Syncope 2016     Priority: High    Essential hypertension 2016     Priority: High    Dyslipidemia 2016     Priority: High    Tobacco abuse 2016     Priority: High    COPD (chronic obstructive pulmonary disease) ([facility] [address] 2019    Brain mass 2019    SOB (shortness of breath) on exertion     Lung nodule 2018    Hypotension         Past Medical History:   Diagnosis Date    Back pain, chronic     Pt  is with pain management    Chronic kidney disease     COPD (chronic obstructive pulmonary disease) ([facility] [facility]     Coronary artery disease involving native coronary artery of native heart without angina pectoris 3/19/2017    Dyslipidemia 2016    Essential hypertension 2016    Factor V deficiency ([facility] [address] [facility]     H/O blood clots     Headache     Hypertension     Syncope 2016    Tobacco abuse 2016     Past Surgical History:   Procedure Laterality Date    BREAST BIOPSY Right     CORONARY ANGIOPLASTY WITH STENT PLACEMENT            Restrictions:  Restrictions/Precautions: Fall Risk    SUBJECTIVE:  Subjective  Subjective: I've a good night sleep and I'm quite comfortable right now.      Pre Pain Assessment:  Pre Treatment Pain Screening  Pain at present: 0  Intervention List: Patient able to continue with treatment          Post Pain Assessment:   Pain Assessment  Pain Level: 0       OBJECTIVE:  Orientation  Overall Orientation Status: Within Functional Limits      Bed

## 2019-08-05 NOTE — PROGRESS NOTES
Neurology Daily Progress Note  Name: Radha Erickson  Age: 76 y.o. Gender: female  CodeStatus: Full Code  Allergies: No Known Allergies    Chief Complaint:No chief complaint on file. Primary Care Provider: JULIANNA Oliveira CNP  InpatientTreatment Team: Treatment Team: Attending Provider: Christopher Wren MD; Consulting Physician: Niki Wynn MD; Consulting Physician: Froilan Rico MD; Consulting Physician: Concepcion Oakley MD; Consulting Physician: Marguerite Medrano MD; LPN: Iain Sharp LPN; Registered Nurse: Ray Maldonado RN; : Laila Steven RN  Admission Date: 7/30/2019      HPI Pt seen and examined for neuro follow up for brain tumor with mass effect. A&O x3, NAD, pleasant and cooperative. Dizziness and n/v resolved. Gait improved. Pt on IV decadron. No focal neuro deficits currently. No seizure activity. Denies Headache, double vision, blurry vision, difficulty with speech, difficulty with swallowing, weakness, numbness, pain, nausea, vomiting, choking, neck pain, dizziness. Vitals:    08/05/19 0732   BP: (!) 165/80   Pulse: 59   Resp: 16   Temp: 97.5 °F (36.4 °C)   SpO2: 99%      Review of Systems   Constitutional: Negative for appetite change, chills, fatigue and fever. HENT: Negative for hearing loss and trouble swallowing. Eyes: Negative for visual disturbance. Respiratory: Negative for cough, chest tightness, shortness of breath and wheezing. Cardiovascular: Negative for chest pain, palpitations and leg swelling. Gastrointestinal: Negative for abdominal distention, abdominal pain, constipation, diarrhea, nausea and vomiting. Genitourinary: Negative for difficulty urinating. Skin: Negative for color change and rash. Neurological: Negative for dizziness, tremors, seizures, syncope, facial asymmetry, speech difficulty, weakness, light-headedness, numbness and headaches. Psychiatric/Behavioral: Negative for agitation, confusion and hallucinations. Plantar response:                Right:  downgoing               Left:  downgoing           Medications:  Reviewed    Infusion Medications:   Scheduled Medications:    metoprolol tartrate  25 mg Oral BID    gabapentin  300 mg Oral TID    pantoprazole  40 mg Oral QAM AC    pravastatin  40 mg Oral Daily    isosorbide mononitrate  30 mg Oral Daily    dexamethasone  10 mg Intravenous Q6H    sodium chloride flush  10 mL Intravenous 2 times per day    nicotine  1 patch Transdermal Daily     PRN Meds: sodium chloride flush, magnesium hydroxide, ondansetron, acetaminophen, albuterol sulfate HFA, oxyCODONE-acetaminophen, tiZANidine    Labs:   No results for input(s): WBC, HGB, HCT, PLT in the last 72 hours. No results for input(s): NA, K, CL, CO2, BUN, CREATININE, CALCIUM, PHOS in the last 72 hours. Invalid input(s): MAGNES  No results for input(s): AST, ALT, BILIDIR, BILITOT, ALKPHOS in the last 72 hours. No results for input(s): INR in the last 72 hours. No results for input(s): Miller Jest in the last 72 hours. Urinalysis:   Lab Results   Component Value Date    NITRU Negative 07/30/2019    WBCUA 0-2 07/30/2019    BACTERIA Rare 07/30/2019    RBCUA 0-2 07/30/2019    BLOODU Negative 07/30/2019    SPECGRAV 1.020 07/30/2019    GLUCOSEU Negative 07/30/2019       Radiology:   Most recent    EEG No procedure found. MRI of Brain   Results for orders placed during the hospital encounter of 07/30/19   MRI BRAIN W WO CONTRAST    Narrative EXAMINATION:  MRI BRAIN WITHOUT AND WITH IV CONTRAST    CLINICAL HISTORY:  HEADACHE, DIZZINESS, NAUSEA, DISEQUILIBRIUM WITH UNSTEADY GAIT AND WEAKNESS IN BOTH LOWER EXTREMITIES, RECENT ABNORMAL CT BRAIN    COMPARISON:  CTA HEAD FROM 7/30/2019    TECHNIQUE:  Multisequence/multiplane MRI brain obtained without and with IV injection of 15 mL of MultiHance. FINDINGS:  The MRI brain without and with IV contrast demonstrates the following:    Posterior fossa:  There is an approximately 2.5 x 2 x 2 cm inhomogeneously enhancing midline cerebellar mass found posterior to the fourth ventricle with associated cerebral edema. There appears to be a cystic component to this cerebellar tumor. Also,   there is some paramagnetic signal within the tumor suggesting a hemorrhagic component to the tumor mass. The MRI findings are compatible with an intra-axial posterior fossa tumor and the differential considerations are primarily between a metastasis or   less likely a hemangioblastoma. The tumor is not causing significant extrinsic \"mass effect \" on the fourth ventricle or aqueductal stenosis and there is no hydrocephalus. There is no other enhancing lesion or tumor in the posterior fossa or in the   supratentorial brain. Supratentorial brain: There are punctate and patchy T2 hyperintense foci in the white matter of both cerebral hemispheres which are likely a manifestation of small vessel disease. There is no restricted diffusion or MRI evidence of an acute or subacute   ischemic focus in the brain. There is a mild degree of cerebral atrophy which is appropriate for the patient's age. The remainder of the supratentorial brain appears unremarkable. Impression 1. APPROXIMATELY 2.5 X 2 X 2 CM INHOMOGENEOUSLY ENHANCING MIDLINE CEREBELLAR MASS WHICH APPEARS TO HAVE A CYSTIC COMPONENT AND A HEMORRHAGIC COMPONENT. MRI FINDINGS ARE COMPATIBLE WITH AN INTRA-AXIAL POSTERIOR FOSSA TUMOR AND DIFFERENTIAL CONSIDERATIONS   ARE PRIMARILY BETWEEN A METASTASIS OR LESS LIKELY A HEMANGIOBLASTOMA. 2. NO OTHER ENHANCING LESION IN THE POSTERIOR FOSSA OR IN THE SUPRATENTORIAL BRAIN. THERE IS A MILD DEGREE OF CEREBRAL ATROPHY AND WHITE MATTER T2 HYPERINTENSE FOCI WHICH ARE LIKELY REPRESENT A MANIFESTATION OF SMALL VESSEL DISEASE. NO ACUTE ISCHEMIC   FOCUS IN THE BRAIN. No results found for this or any previous visit.                           MRA of the Head and Neck: No results found for this or any previous visit. No results found for this or any previous visit. No results found for this or any previous visit. CT of the Head:   Results for orders placed during the hospital encounter of 06/12/18   CT Head WO Contrast    Narrative HISTORY: Liane Briscoe is a Female of 68 years age with history of head injury and blurred vision. COMPARISON: November 20, 2016; March 19, 2017    TECHNIQUE:  Spiral CT examination of the brain was performed without intravenous contrast.  Images were obtained from the base of the skull up to the convexities in axial slices, and then examined in the blood, brain parenchymal and bony windows. Sagittal and coronal reformatted images were also obtained. FINDINGS:  No acute changes are noted. There is no evidence for mass, mass effect or midline shift. No abnormal extra-axial fluid collections are appreciated. Age-appropriate cortical volume loss is seen. There there are areas of mild periventricular   white matter changes most consistent with micro-ischemic changes. .  There are no acute parenchymal alterations, blood byproducts or abnormal extracerebral fluid. The calvarium is grossly intact. The visualized paranasal sinuses                                      show mild mucoperiosteal thickening in the ethmoid air cells and mild opacification of the posterior left mastoid sinus which is unchanged. Impression 1. Age-appropriate cortical atrophy and periventricular microangiopathy. 2.  No acute hemorrhage or extra-axial fluid collection is seen                    . All CT scans at this facility use dose modulation, iterative reconstruction, and/or weight based dosing when appropriate to reduce radiation dose to as low as reasonably achievable. No results found for this or any previous visit. No results found for this or any previous visit. Carotid duplex: No results found for this or any previous visit. No results found for this or

## 2019-08-06 ENCOUNTER — APPOINTMENT (OUTPATIENT)
Dept: CT IMAGING | Age: 74
DRG: 025 | End: 2019-08-06
Attending: INTERNAL MEDICINE
Payer: MEDICARE

## 2019-08-06 ENCOUNTER — ANESTHESIA (OUTPATIENT)
Dept: OPERATING ROOM | Age: 74
DRG: 025 | End: 2019-08-06
Payer: MEDICARE

## 2019-08-06 VITALS — SYSTOLIC BLOOD PRESSURE: 137 MMHG | TEMPERATURE: 96.6 F | OXYGEN SATURATION: 100 % | DIASTOLIC BLOOD PRESSURE: 65 MMHG

## 2019-08-06 LAB
ABO/RH: NORMAL
ANION GAP SERPL CALCULATED.3IONS-SCNC: 12 MEQ/L (ref 9–15)
ANISOCYTOSIS: ABNORMAL
ANTIBODY IDENTIFICATION: NORMAL
ANTIBODY IDENTIFICATION: NORMAL
ANTIBODY SCREEN: NORMAL
BANDED NEUTROPHILS RELATIVE PERCENT: 1 % (ref 5–11)
BASOPHILS ABSOLUTE: 0 K/UL (ref 0–0.2)
BASOPHILS RELATIVE PERCENT: 0.1 %
BUN BLDV-MCNC: 41 MG/DL (ref 8–23)
CALCIUM SERPL-MCNC: 8.5 MG/DL (ref 8.5–9.9)
CHLORIDE BLD-SCNC: 108 MEQ/L (ref 95–107)
CO2: 22 MEQ/L (ref 20–31)
CREAT SERPL-MCNC: 1.13 MG/DL (ref 0.5–0.9)
EOSINOPHILS ABSOLUTE: 0 K/UL (ref 0–0.7)
EOSINOPHILS RELATIVE PERCENT: 0 %
GFR AFRICAN AMERICAN: 56.9
GFR NON-AFRICAN AMERICAN: 47
GLUCOSE BLD-MCNC: 121 MG/DL (ref 70–99)
HCT VFR BLD CALC: 44.7 % (ref 37–47)
HEMOGLOBIN: 15.5 G/DL (ref 12–16)
INR BLD: 1.1
LYMPHOCYTES ABSOLUTE: 0.2 K/UL (ref 1–4.8)
LYMPHOCYTES RELATIVE PERCENT: 2 %
MCH RBC QN AUTO: 31.1 PG (ref 27–31.3)
MCHC RBC AUTO-ENTMCNC: 34.6 % (ref 33–37)
MCV RBC AUTO: 89.8 FL (ref 82–100)
MONOCYTES ABSOLUTE: 0.1 K/UL (ref 0.2–0.8)
MONOCYTES RELATIVE PERCENT: 1 %
NEUTROPHILS ABSOLUTE: 10.9 K/UL (ref 1.4–6.5)
NEUTROPHILS RELATIVE PERCENT: 96 %
PDW BLD-RTO: 16.2 % (ref 11.5–14.5)
PLATELET # BLD: 207 K/UL (ref 130–400)
PLATELET SLIDE REVIEW: NORMAL
POTASSIUM SERPL-SCNC: 4.9 MEQ/L (ref 3.4–4.9)
PROTHROMBIN TIME: 14.3 SEC (ref 12.3–14.9)
RBC # BLD: 4.98 M/UL (ref 4.2–5.4)
SODIUM BLD-SCNC: 142 MEQ/L (ref 135–144)
WBC # BLD: 11.2 K/UL (ref 4.8–10.8)

## 2019-08-06 PROCEDURE — 85025 COMPLETE CBC W/AUTO DIFF WBC: CPT

## 2019-08-06 PROCEDURE — 86900 BLOOD TYPING SEROLOGIC ABO: CPT

## 2019-08-06 PROCEDURE — 6370000000 HC RX 637 (ALT 250 FOR IP): Performed by: NURSE PRACTITIONER

## 2019-08-06 PROCEDURE — 2780000010 HC IMPLANT OTHER: Performed by: NEUROLOGICAL SURGERY

## 2019-08-06 PROCEDURE — 80048 BASIC METABOLIC PNL TOTAL CA: CPT

## 2019-08-06 PROCEDURE — 36415 COLL VENOUS BLD VENIPUNCTURE: CPT

## 2019-08-06 PROCEDURE — 70470 CT HEAD/BRAIN W/O & W/DYE: CPT

## 2019-08-06 PROCEDURE — 6370000000 HC RX 637 (ALT 250 FOR IP): Performed by: NEUROLOGICAL SURGERY

## 2019-08-06 PROCEDURE — 6360000002 HC RX W HCPCS: Performed by: NEUROLOGICAL SURGERY

## 2019-08-06 PROCEDURE — 88112 CYTOPATH CELL ENHANCE TECH: CPT

## 2019-08-06 PROCEDURE — 3700000001 HC ADD 15 MINUTES (ANESTHESIA): Performed by: NEUROLOGICAL SURGERY

## 2019-08-06 PROCEDURE — 86922 COMPATIBILITY TEST ANTIGLOB: CPT

## 2019-08-06 PROCEDURE — 88313 SPECIAL STAINS GROUP 2: CPT

## 2019-08-06 PROCEDURE — 88305 TISSUE EXAM BY PATHOLOGIST: CPT

## 2019-08-06 PROCEDURE — 88329 PATH CONSLTJ DRG SURG: CPT

## 2019-08-06 PROCEDURE — C1729 CATH, DRAINAGE: HCPCS | Performed by: NEUROLOGICAL SURGERY

## 2019-08-06 PROCEDURE — 70450 CT HEAD/BRAIN W/O DYE: CPT

## 2019-08-06 PROCEDURE — 86870 RBC ANTIBODY IDENTIFICATION: CPT

## 2019-08-06 PROCEDURE — C1713 ANCHOR/SCREW BN/BN,TIS/BN: HCPCS | Performed by: NEUROLOGICAL SURGERY

## 2019-08-06 PROCEDURE — 00BC0ZZ EXCISION OF CEREBELLUM, OPEN APPROACH: ICD-10-PCS | Performed by: NEUROLOGICAL SURGERY

## 2019-08-06 PROCEDURE — 009600Z DRAINAGE OF CEREBRAL VENTRICLE WITH DRAINAGE DEVICE, OPEN APPROACH: ICD-10-PCS | Performed by: NEUROLOGICAL SURGERY

## 2019-08-06 PROCEDURE — 2709999900 HC NON-CHARGEABLE SUPPLY: Performed by: NEUROLOGICAL SURGERY

## 2019-08-06 PROCEDURE — 2580000003 HC RX 258: Performed by: NURSE PRACTITIONER

## 2019-08-06 PROCEDURE — 88341 IMHCHEM/IMCYTCHM EA ADD ANTB: CPT

## 2019-08-06 PROCEDURE — 6370000000 HC RX 637 (ALT 250 FOR IP): Performed by: INTERNAL MEDICINE

## 2019-08-06 PROCEDURE — 85610 PROTHROMBIN TIME: CPT

## 2019-08-06 PROCEDURE — 3700000000 HC ANESTHESIA ATTENDED CARE: Performed by: NEUROLOGICAL SURGERY

## 2019-08-06 PROCEDURE — 6360000002 HC RX W HCPCS: Performed by: NURSE ANESTHETIST, CERTIFIED REGISTERED

## 2019-08-06 PROCEDURE — 2500000003 HC RX 250 WO HCPCS: Performed by: NEUROLOGICAL SURGERY

## 2019-08-06 PROCEDURE — 6360000004 HC RX CONTRAST MEDICATION: Performed by: NEUROLOGICAL SURGERY

## 2019-08-06 PROCEDURE — 3600000014 HC SURGERY LEVEL 4 ADDTL 15MIN: Performed by: NEUROLOGICAL SURGERY

## 2019-08-06 PROCEDURE — 88307 TISSUE EXAM BY PATHOLOGIST: CPT

## 2019-08-06 PROCEDURE — 97116 GAIT TRAINING THERAPY: CPT

## 2019-08-06 PROCEDURE — 2000000000 HC ICU R&B

## 2019-08-06 PROCEDURE — 88342 IMHCHEM/IMCYTCHM 1ST ANTB: CPT

## 2019-08-06 PROCEDURE — 2580000003 HC RX 258: Performed by: NEUROLOGICAL SURGERY

## 2019-08-06 PROCEDURE — 86901 BLOOD TYPING SEROLOGIC RH(D): CPT

## 2019-08-06 PROCEDURE — 6360000002 HC RX W HCPCS

## 2019-08-06 PROCEDURE — 2500000003 HC RX 250 WO HCPCS: Performed by: NURSE ANESTHETIST, CERTIFIED REGISTERED

## 2019-08-06 PROCEDURE — 88331 PATH CONSLTJ SURG 1 BLK 1SPC: CPT

## 2019-08-06 PROCEDURE — 2720000010 HC SURG SUPPLY STERILE: Performed by: NEUROLOGICAL SURGERY

## 2019-08-06 PROCEDURE — 3600000004 HC SURGERY LEVEL 4 BASE: Performed by: NEUROLOGICAL SURGERY

## 2019-08-06 PROCEDURE — 2580000003 HC RX 258

## 2019-08-06 PROCEDURE — 6370000000 HC RX 637 (ALT 250 FOR IP)

## 2019-08-06 PROCEDURE — 86850 RBC ANTIBODY SCREEN: CPT

## 2019-08-06 DEVICE — SEALANT SURG 13 YR DURA AUTOSPRAY ADHERUS NUS109] SURGICAL ONE]: Type: IMPLANTABLE DEVICE | Site: CRANIAL | Status: FUNCTIONAL

## 2019-08-06 RX ORDER — LABETALOL 20 MG/4 ML (5 MG/ML) INTRAVENOUS SYRINGE
PRN
Status: DISCONTINUED | OUTPATIENT
Start: 2019-08-06 | End: 2019-08-06 | Stop reason: SDUPTHER

## 2019-08-06 RX ORDER — FENTANYL CITRATE 50 UG/ML
INJECTION, SOLUTION INTRAMUSCULAR; INTRAVENOUS PRN
Status: DISCONTINUED | OUTPATIENT
Start: 2019-08-06 | End: 2019-08-06 | Stop reason: SDUPTHER

## 2019-08-06 RX ORDER — ONDANSETRON 2 MG/ML
INJECTION INTRAMUSCULAR; INTRAVENOUS PRN
Status: DISCONTINUED | OUTPATIENT
Start: 2019-08-06 | End: 2019-08-06 | Stop reason: SDUPTHER

## 2019-08-06 RX ORDER — HYDRALAZINE HYDROCHLORIDE 20 MG/ML
10 INJECTION INTRAMUSCULAR; INTRAVENOUS
Status: DISCONTINUED | OUTPATIENT
Start: 2019-08-06 | End: 2019-08-11 | Stop reason: HOSPADM

## 2019-08-06 RX ORDER — DEXAMETHASONE SODIUM PHOSPHATE 10 MG/ML
INJECTION INTRAMUSCULAR; INTRAVENOUS PRN
Status: DISCONTINUED | OUTPATIENT
Start: 2019-08-06 | End: 2019-08-06 | Stop reason: SDUPTHER

## 2019-08-06 RX ORDER — HYDRALAZINE HYDROCHLORIDE 20 MG/ML
INJECTION INTRAMUSCULAR; INTRAVENOUS
Status: COMPLETED
Start: 2019-08-06 | End: 2019-08-06

## 2019-08-06 RX ORDER — ROCURONIUM BROMIDE 10 MG/ML
INJECTION, SOLUTION INTRAVENOUS PRN
Status: DISCONTINUED | OUTPATIENT
Start: 2019-08-06 | End: 2019-08-06 | Stop reason: SDUPTHER

## 2019-08-06 RX ORDER — MAGNESIUM HYDROXIDE 1200 MG/15ML
LIQUID ORAL CONTINUOUS PRN
Status: COMPLETED | OUTPATIENT
Start: 2019-08-06 | End: 2019-08-06

## 2019-08-06 RX ORDER — LABETALOL 20 MG/4 ML (5 MG/ML) INTRAVENOUS SYRINGE
10
Status: DISCONTINUED | OUTPATIENT
Start: 2019-08-06 | End: 2019-08-10

## 2019-08-06 RX ORDER — GINSENG 100 MG
CAPSULE ORAL PRN
Status: DISCONTINUED | OUTPATIENT
Start: 2019-08-06 | End: 2019-08-06 | Stop reason: HOSPADM

## 2019-08-06 RX ORDER — SODIUM CHLORIDE, SODIUM LACTATE, POTASSIUM CHLORIDE, CALCIUM CHLORIDE 600; 310; 30; 20 MG/100ML; MG/100ML; MG/100ML; MG/100ML
INJECTION, SOLUTION INTRAVENOUS
Status: COMPLETED
Start: 2019-08-06 | End: 2019-08-06

## 2019-08-06 RX ORDER — CEFAZOLIN SODIUM 1 G/3ML
INJECTION, POWDER, FOR SOLUTION INTRAMUSCULAR; INTRAVENOUS PRN
Status: DISCONTINUED | OUTPATIENT
Start: 2019-08-06 | End: 2019-08-06 | Stop reason: SDUPTHER

## 2019-08-06 RX ORDER — SODIUM CHLORIDE 9 MG/ML
500 INJECTION, SOLUTION INTRAVENOUS CONTINUOUS
Status: DISCONTINUED | OUTPATIENT
Start: 2019-08-06 | End: 2019-08-06

## 2019-08-06 RX ORDER — SODIUM CHLORIDE, SODIUM LACTATE, POTASSIUM CHLORIDE, CALCIUM CHLORIDE 600; 310; 30; 20 MG/100ML; MG/100ML; MG/100ML; MG/100ML
INJECTION, SOLUTION INTRAVENOUS CONTINUOUS
Status: DISCONTINUED | OUTPATIENT
Start: 2019-08-06 | End: 2019-08-06

## 2019-08-06 RX ORDER — LIDOCAINE HYDROCHLORIDE 20 MG/ML
INJECTION, SOLUTION INTRAVENOUS PRN
Status: DISCONTINUED | OUTPATIENT
Start: 2019-08-06 | End: 2019-08-06 | Stop reason: SDUPTHER

## 2019-08-06 RX ORDER — MIDAZOLAM HYDROCHLORIDE 1 MG/ML
INJECTION INTRAMUSCULAR; INTRAVENOUS PRN
Status: DISCONTINUED | OUTPATIENT
Start: 2019-08-06 | End: 2019-08-06 | Stop reason: SDUPTHER

## 2019-08-06 RX ORDER — EPHEDRINE SULFATE/0.9% NACL/PF 50 MG/5 ML
SYRINGE (ML) INTRAVENOUS PRN
Status: DISCONTINUED | OUTPATIENT
Start: 2019-08-06 | End: 2019-08-06 | Stop reason: SDUPTHER

## 2019-08-06 RX ORDER — SODIUM CHLORIDE 9 MG/ML
INJECTION, SOLUTION INTRAVENOUS
Status: COMPLETED
Start: 2019-08-06 | End: 2019-08-06

## 2019-08-06 RX ORDER — MORPHINE SULFATE 4 MG/ML
4 INJECTION, SOLUTION INTRAMUSCULAR; INTRAVENOUS EVERY 4 HOURS PRN
Status: COMPLETED | OUTPATIENT
Start: 2019-08-06 | End: 2019-08-07

## 2019-08-06 RX ORDER — PROPOFOL 10 MG/ML
INJECTION, EMULSION INTRAVENOUS PRN
Status: DISCONTINUED | OUTPATIENT
Start: 2019-08-06 | End: 2019-08-06 | Stop reason: SDUPTHER

## 2019-08-06 RX ADMIN — FENTANYL CITRATE 25 MCG: 50 INJECTION, SOLUTION INTRAMUSCULAR; INTRAVENOUS at 17:10

## 2019-08-06 RX ADMIN — FENTANYL CITRATE 25 MCG: 50 INJECTION, SOLUTION INTRAMUSCULAR; INTRAVENOUS at 16:23

## 2019-08-06 RX ADMIN — IOPAMIDOL 50 ML: 612 INJECTION, SOLUTION INTRAVENOUS at 08:29

## 2019-08-06 RX ADMIN — FENTANYL CITRATE 25 MCG: 50 INJECTION, SOLUTION INTRAMUSCULAR; INTRAVENOUS at 13:10

## 2019-08-06 RX ADMIN — SODIUM CHLORIDE, POTASSIUM CHLORIDE, SODIUM LACTATE AND CALCIUM CHLORIDE 1000 ML: 600; 310; 30; 20 INJECTION, SOLUTION INTRAVENOUS at 12:41

## 2019-08-06 RX ADMIN — PROPOFOL 20 MG: 10 INJECTION, EMULSION INTRAVENOUS at 13:31

## 2019-08-06 RX ADMIN — METOPROLOL TARTRATE 25 MG: 25 TABLET ORAL at 10:22

## 2019-08-06 RX ADMIN — SODIUM CHLORIDE, POTASSIUM CHLORIDE, SODIUM LACTATE AND CALCIUM CHLORIDE: 600; 310; 30; 20 INJECTION, SOLUTION INTRAVENOUS at 14:23

## 2019-08-06 RX ADMIN — Medication 5 MG: at 14:25

## 2019-08-06 RX ADMIN — DEXAMETHASONE SODIUM PHOSPHATE 10 MG: 10 INJECTION INTRAMUSCULAR; INTRAVENOUS at 10:23

## 2019-08-06 RX ADMIN — ONDANSETRON 4 MG: 2 INJECTION INTRAMUSCULAR; INTRAVENOUS at 16:14

## 2019-08-06 RX ADMIN — FENTANYL CITRATE 25 MCG: 50 INJECTION, SOLUTION INTRAMUSCULAR; INTRAVENOUS at 14:18

## 2019-08-06 RX ADMIN — SODIUM CHLORIDE, POTASSIUM CHLORIDE, SODIUM LACTATE AND CALCIUM CHLORIDE: 600; 310; 30; 20 INJECTION, SOLUTION INTRAVENOUS at 13:06

## 2019-08-06 RX ADMIN — PHENYLEPHRINE HYDROCHLORIDE 50 MCG: 10 INJECTION INTRAVENOUS at 14:27

## 2019-08-06 RX ADMIN — DEXAMETHASONE SODIUM PHOSPHATE 10 MG: 10 INJECTION INTRAMUSCULAR; INTRAVENOUS at 02:19

## 2019-08-06 RX ADMIN — FENTANYL CITRATE 25 MCG: 50 INJECTION, SOLUTION INTRAMUSCULAR; INTRAVENOUS at 13:59

## 2019-08-06 RX ADMIN — PROPOFOL 30 MG: 10 INJECTION, EMULSION INTRAVENOUS at 13:22

## 2019-08-06 RX ADMIN — LABETALOL 20 MG/4 ML (5 MG/ML) INTRAVENOUS SYRINGE 5 MG: at 16:46

## 2019-08-06 RX ADMIN — TIZANIDINE 4 MG: 4 TABLET ORAL at 20:41

## 2019-08-06 RX ADMIN — DEXAMETHASONE SODIUM PHOSPHATE 10 MG: 10 INJECTION INTRAMUSCULAR; INTRAVENOUS at 20:41

## 2019-08-06 RX ADMIN — ROCURONIUM BROMIDE 50 MG: 10 INJECTION INTRAVENOUS at 13:10

## 2019-08-06 RX ADMIN — FENTANYL CITRATE 25 MCG: 50 INJECTION, SOLUTION INTRAMUSCULAR; INTRAVENOUS at 17:05

## 2019-08-06 RX ADMIN — PHENYLEPHRINE HYDROCHLORIDE 50 MCG: 10 INJECTION INTRAVENOUS at 14:34

## 2019-08-06 RX ADMIN — MORPHINE SULFATE 4 MG: 4 INJECTION INTRAVENOUS at 22:07

## 2019-08-06 RX ADMIN — LABETALOL 20 MG/4 ML (5 MG/ML) INTRAVENOUS SYRINGE 10 MG: at 17:20

## 2019-08-06 RX ADMIN — FENTANYL CITRATE 25 MCG: 50 INJECTION, SOLUTION INTRAMUSCULAR; INTRAVENOUS at 17:00

## 2019-08-06 RX ADMIN — DEXAMETHASONE SODIUM PHOSPHATE 10 MG: 10 INJECTION INTRAMUSCULAR; INTRAVENOUS at 15:15

## 2019-08-06 RX ADMIN — Medication 5 MG: at 14:09

## 2019-08-06 RX ADMIN — LABETALOL 20 MG/4 ML (5 MG/ML) INTRAVENOUS SYRINGE 5 MG: at 17:03

## 2019-08-06 RX ADMIN — FENTANYL CITRATE 25 MCG: 50 INJECTION, SOLUTION INTRAMUSCULAR; INTRAVENOUS at 13:52

## 2019-08-06 RX ADMIN — CEFAZOLIN 2000 MG: 330 INJECTION, POWDER, FOR SOLUTION INTRAMUSCULAR; INTRAVENOUS at 13:17

## 2019-08-06 RX ADMIN — PROPOFOL 30 MG: 10 INJECTION, EMULSION INTRAVENOUS at 13:59

## 2019-08-06 RX ADMIN — ROCURONIUM BROMIDE 10 MG: 10 INJECTION INTRAVENOUS at 14:48

## 2019-08-06 RX ADMIN — Medication 10 ML: at 10:23

## 2019-08-06 RX ADMIN — ROCURONIUM BROMIDE 10 MG: 10 INJECTION INTRAVENOUS at 14:21

## 2019-08-06 RX ADMIN — FENTANYL CITRATE 25 MCG: 50 INJECTION, SOLUTION INTRAMUSCULAR; INTRAVENOUS at 17:15

## 2019-08-06 RX ADMIN — HYDRALAZINE HYDROCHLORIDE 10 MG: 20 INJECTION INTRAMUSCULAR; INTRAVENOUS at 18:46

## 2019-08-06 RX ADMIN — FENTANYL CITRATE 25 MCG: 50 INJECTION, SOLUTION INTRAMUSCULAR; INTRAVENOUS at 13:19

## 2019-08-06 RX ADMIN — MIDAZOLAM HYDROCHLORIDE 2 MG: 1 INJECTION, SOLUTION INTRAMUSCULAR; INTRAVENOUS at 13:06

## 2019-08-06 RX ADMIN — SUGAMMADEX 200 MG: 100 INJECTION, SOLUTION INTRAVENOUS at 16:53

## 2019-08-06 RX ADMIN — DEXAMETHASONE SODIUM PHOSPHATE 10 MG: 10 INJECTION INTRAMUSCULAR; INTRAVENOUS at 13:52

## 2019-08-06 RX ADMIN — HYDRALAZINE HYDROCHLORIDE 10 MG: 20 INJECTION INTRAMUSCULAR; INTRAVENOUS at 17:30

## 2019-08-06 RX ADMIN — ROCURONIUM BROMIDE 10 MG: 10 INJECTION INTRAVENOUS at 15:17

## 2019-08-06 RX ADMIN — Medication 5 MG: at 14:06

## 2019-08-06 RX ADMIN — PHENYLEPHRINE HYDROCHLORIDE 50 MCG: 10 INJECTION INTRAVENOUS at 14:43

## 2019-08-06 RX ADMIN — PROPOFOL 20 MG: 10 INJECTION, EMULSION INTRAVENOUS at 14:18

## 2019-08-06 RX ADMIN — LABETALOL 20 MG/4 ML (5 MG/ML) INTRAVENOUS SYRINGE 10 MG: at 16:54

## 2019-08-06 RX ADMIN — HYDRALAZINE HYDROCHLORIDE 10 MG: 20 INJECTION INTRAMUSCULAR; INTRAVENOUS at 17:43

## 2019-08-06 RX ADMIN — DEXTROSE MONOHYDRATE 1 G: 5 INJECTION INTRAVENOUS at 19:57

## 2019-08-06 RX ADMIN — ROCURONIUM BROMIDE 10 MG: 10 INJECTION INTRAVENOUS at 15:56

## 2019-08-06 RX ADMIN — PROPOFOL 40 MG: 10 INJECTION, EMULSION INTRAVENOUS at 16:46

## 2019-08-06 RX ADMIN — MORPHINE SULFATE 4 MG: 4 INJECTION INTRAVENOUS at 17:41

## 2019-08-06 RX ADMIN — PROPOFOL 40 MG: 10 INJECTION, EMULSION INTRAVENOUS at 16:28

## 2019-08-06 RX ADMIN — PHENYLEPHRINE HYDROCHLORIDE 50 MCG: 10 INJECTION INTRAVENOUS at 15:26

## 2019-08-06 RX ADMIN — ROCURONIUM BROMIDE 20 MG: 10 INJECTION INTRAVENOUS at 14:01

## 2019-08-06 RX ADMIN — SODIUM CHLORIDE: 9 INJECTION, SOLUTION INTRAVENOUS at 13:17

## 2019-08-06 RX ADMIN — FENTANYL CITRATE 25 MCG: 50 INJECTION, SOLUTION INTRAMUSCULAR; INTRAVENOUS at 15:17

## 2019-08-06 RX ADMIN — SODIUM CHLORIDE, POTASSIUM CHLORIDE, SODIUM LACTATE AND CALCIUM CHLORIDE: 600; 310; 30; 20 INJECTION, SOLUTION INTRAVENOUS at 17:28

## 2019-08-06 RX ADMIN — PROPOFOL 100 MG: 10 INJECTION, EMULSION INTRAVENOUS at 13:10

## 2019-08-06 RX ADMIN — ACETAMINOPHEN 650 MG: 325 TABLET ORAL at 10:23

## 2019-08-06 RX ADMIN — FENTANYL CITRATE 25 MCG: 50 INJECTION, SOLUTION INTRAMUSCULAR; INTRAVENOUS at 13:22

## 2019-08-06 RX ADMIN — GABAPENTIN 300 MG: 300 CAPSULE ORAL at 20:41

## 2019-08-06 RX ADMIN — LIDOCAINE HYDROCHLORIDE 50 MG: 20 INJECTION, SOLUTION INTRAVENOUS at 13:10

## 2019-08-06 ASSESSMENT — PULMONARY FUNCTION TESTS
PIF_VALUE: 24
PIF_VALUE: 21
PIF_VALUE: 20
PIF_VALUE: 23
PIF_VALUE: 20
PIF_VALUE: 26
PIF_VALUE: 24
PIF_VALUE: 19
PIF_VALUE: 11
PIF_VALUE: 0
PIF_VALUE: 19
PIF_VALUE: 24
PIF_VALUE: 19
PIF_VALUE: 19
PIF_VALUE: 23
PIF_VALUE: 19
PIF_VALUE: 21
PIF_VALUE: 24
PIF_VALUE: 19
PIF_VALUE: 20
PIF_VALUE: 18
PIF_VALUE: 18
PIF_VALUE: 20
PIF_VALUE: 20
PIF_VALUE: 0
PIF_VALUE: 0
PIF_VALUE: 19
PIF_VALUE: 0
PIF_VALUE: 21
PIF_VALUE: 21
PIF_VALUE: 19
PIF_VALUE: 19
PIF_VALUE: 0
PIF_VALUE: 19
PIF_VALUE: 19
PIF_VALUE: 20
PIF_VALUE: 18
PIF_VALUE: 12
PIF_VALUE: 0
PIF_VALUE: 18
PIF_VALUE: 19
PIF_VALUE: 21
PIF_VALUE: 19
PIF_VALUE: 19
PIF_VALUE: 23
PIF_VALUE: 18
PIF_VALUE: 21
PIF_VALUE: 19
PIF_VALUE: 20
PIF_VALUE: 23
PIF_VALUE: 19
PIF_VALUE: 0
PIF_VALUE: 12
PIF_VALUE: 19
PIF_VALUE: 23
PIF_VALUE: 0
PIF_VALUE: 19
PIF_VALUE: 13
PIF_VALUE: 2
PIF_VALUE: 20
PIF_VALUE: 19
PIF_VALUE: 21
PIF_VALUE: 0
PIF_VALUE: 1
PIF_VALUE: 19
PIF_VALUE: 12
PIF_VALUE: 19
PIF_VALUE: 0
PIF_VALUE: 21
PIF_VALUE: 19
PIF_VALUE: 20
PIF_VALUE: 20
PIF_VALUE: 15
PIF_VALUE: 0
PIF_VALUE: 20
PIF_VALUE: 12
PIF_VALUE: 18
PIF_VALUE: 2
PIF_VALUE: 23
PIF_VALUE: 19
PIF_VALUE: 23
PIF_VALUE: 22
PIF_VALUE: 19
PIF_VALUE: 19
PIF_VALUE: 11
PIF_VALUE: 20
PIF_VALUE: 19
PIF_VALUE: 19
PIF_VALUE: 17
PIF_VALUE: 0
PIF_VALUE: 0
PIF_VALUE: 23
PIF_VALUE: 0
PIF_VALUE: 23
PIF_VALUE: 21
PIF_VALUE: 23
PIF_VALUE: 23
PIF_VALUE: 20
PIF_VALUE: 18
PIF_VALUE: 19
PIF_VALUE: 25
PIF_VALUE: 19
PIF_VALUE: 21
PIF_VALUE: 20
PIF_VALUE: 18
PIF_VALUE: 19
PIF_VALUE: 20
PIF_VALUE: 0
PIF_VALUE: 20
PIF_VALUE: 0
PIF_VALUE: 19
PIF_VALUE: 19
PIF_VALUE: 22
PIF_VALUE: 18
PIF_VALUE: 11
PIF_VALUE: 19
PIF_VALUE: 18
PIF_VALUE: 2
PIF_VALUE: 19
PIF_VALUE: 1
PIF_VALUE: 20
PIF_VALUE: 23
PIF_VALUE: 0
PIF_VALUE: 18
PIF_VALUE: 19
PIF_VALUE: 20
PIF_VALUE: 19
PIF_VALUE: 17
PIF_VALUE: 19
PIF_VALUE: 0
PIF_VALUE: 23
PIF_VALUE: 20
PIF_VALUE: 16
PIF_VALUE: 19
PIF_VALUE: 20
PIF_VALUE: 20
PIF_VALUE: 15
PIF_VALUE: 19
PIF_VALUE: 18
PIF_VALUE: 14
PIF_VALUE: 20
PIF_VALUE: 20
PIF_VALUE: 1
PIF_VALUE: 32
PIF_VALUE: 24
PIF_VALUE: 23
PIF_VALUE: 20
PIF_VALUE: 21
PIF_VALUE: 5
PIF_VALUE: 19
PIF_VALUE: 20
PIF_VALUE: 19
PIF_VALUE: 21
PIF_VALUE: 20
PIF_VALUE: 24
PIF_VALUE: 22
PIF_VALUE: 22
PIF_VALUE: 13
PIF_VALUE: 20
PIF_VALUE: 11
PIF_VALUE: 19
PIF_VALUE: 19
PIF_VALUE: 1
PIF_VALUE: 2
PIF_VALUE: 19
PIF_VALUE: 20
PIF_VALUE: 20
PIF_VALUE: 15
PIF_VALUE: 19
PIF_VALUE: 18
PIF_VALUE: 19
PIF_VALUE: 21
PIF_VALUE: 20
PIF_VALUE: 23
PIF_VALUE: 24
PIF_VALUE: 19
PIF_VALUE: 19
PIF_VALUE: 23
PIF_VALUE: 18
PIF_VALUE: 20
PIF_VALUE: 17
PIF_VALUE: 21
PIF_VALUE: 19
PIF_VALUE: 19
PIF_VALUE: 12
PIF_VALUE: 24
PIF_VALUE: 19
PIF_VALUE: 11
PIF_VALUE: 23
PIF_VALUE: 20
PIF_VALUE: 19
PIF_VALUE: 13
PIF_VALUE: 17
PIF_VALUE: 0
PIF_VALUE: 19
PIF_VALUE: 19
PIF_VALUE: 18
PIF_VALUE: 19
PIF_VALUE: 19
PIF_VALUE: 20
PIF_VALUE: 21
PIF_VALUE: 19
PIF_VALUE: 2
PIF_VALUE: 20
PIF_VALUE: 23
PIF_VALUE: 20
PIF_VALUE: 21
PIF_VALUE: 23
PIF_VALUE: 19
PIF_VALUE: 20
PIF_VALUE: 22
PIF_VALUE: 23
PIF_VALUE: 12
PIF_VALUE: 20
PIF_VALUE: 11
PIF_VALUE: 21
PIF_VALUE: 24
PIF_VALUE: 18
PIF_VALUE: 19
PIF_VALUE: 17
PIF_VALUE: 22
PIF_VALUE: 23
PIF_VALUE: 19
PIF_VALUE: 21
PIF_VALUE: 19
PIF_VALUE: 19
PIF_VALUE: 20
PIF_VALUE: 24
PIF_VALUE: 22
PIF_VALUE: 21
PIF_VALUE: 0
PIF_VALUE: 21
PIF_VALUE: 23
PIF_VALUE: 19
PIF_VALUE: 12
PIF_VALUE: 0
PIF_VALUE: 7
PIF_VALUE: 19
PIF_VALUE: 1
PIF_VALUE: 19
PIF_VALUE: 20
PIF_VALUE: 32
PIF_VALUE: 20
PIF_VALUE: 19
PIF_VALUE: 12
PIF_VALUE: 19
PIF_VALUE: 13
PIF_VALUE: 2
PIF_VALUE: 23

## 2019-08-06 ASSESSMENT — PAIN SCALES - GENERAL
PAINLEVEL_OUTOF10: 5
PAINLEVEL_OUTOF10: 5
PAINLEVEL_OUTOF10: 2
PAINLEVEL_OUTOF10: 0
PAINLEVEL_OUTOF10: 0
PAINLEVEL_OUTOF10: 5
PAINLEVEL_OUTOF10: 9
PAINLEVEL_OUTOF10: 0

## 2019-08-06 ASSESSMENT — PAIN DESCRIPTION - PAIN TYPE
TYPE: SURGICAL PAIN
TYPE: SURGICAL PAIN
TYPE: CHRONIC PAIN

## 2019-08-06 ASSESSMENT — ENCOUNTER SYMPTOMS
WHEEZING: 0
COLOR CHANGE: 0
SHORTNESS OF BREATH: 0
COUGH: 0
NAUSEA: 0
SHORTNESS OF BREATH: 1
CHEST TIGHTNESS: 0
TROUBLE SWALLOWING: 0
VOMITING: 0

## 2019-08-06 ASSESSMENT — PAIN DESCRIPTION - LOCATION
LOCATION: NECK
LOCATION: NECK
LOCATION: BACK

## 2019-08-06 ASSESSMENT — PAIN DESCRIPTION - ORIENTATION
ORIENTATION: LOWER

## 2019-08-06 ASSESSMENT — PAIN DESCRIPTION - DESCRIPTORS
DESCRIPTORS: ACHING

## 2019-08-06 ASSESSMENT — PAIN DESCRIPTION - FREQUENCY: FREQUENCY: CONTINUOUS

## 2019-08-06 NOTE — PROGRESS NOTES
mobility  Rolling to Left: Modified independent  Rolling to Right: Modified independent  Supine to Sit: Modified independent  Sit to Supine: Modified independent  Comment: bed flat with use of hand rails    Transfers  Sit to Stand: Supervision  Stand to sit: Supervision  Comment: vc's for technique; good follow through    Ambulation 1  Surface: level tile  Device: Rolling Walker  Assistance: Supervision  Quality of Gait: Reciprocal gait with downward gaze  Distance: 100'  Comments: SAO2 - 95% throughtout tx. on RA. Ambulation 2  Surface - 2: level tile  Device 2: No device  Assistance 2: Stand by assistance  Quality of Gait 2: WBOS, increased lateral sway, slow alex, slightly antalgic toward end of ambulation, downward gaze  Gait Deviations: Deviated path; Increased MARY ANN; Slow Alex;Decreased step length  Distance: 250'                                ASSESSMENT:  Body structures, Functions, Activity limitations: Decreased balance;Decreased functional mobility   Prognosis: Good  REQUIRES PT FOLLOW UP: Yes    Assessment: Pt able to maintain O2 levels on RA with activity. Ambulated without WW with mild balance issues.      Activity Tolerance  Activity Tolerance: Patient Tolerated treatment well       Discharge Recommendations:  Continue to assess pending progress, Patient would benefit from continued therapy after discharge    Goals:  Long term goals  Long term goal 1: indep with transfers  Long term goal 2: ambulate >150 ft LRAD modified indep  Long term goal 3: pt to navigate 4 steps with supervision assist Sinyd Meng term goal 4: pt to demo >good standing balance  Patient Goals   Patient goals : to go to my [facility]'s wedding Friday    PLAN:   Plan  Times per week: 3-6  Current Treatment Recommendations: Strengthening, Functional Mobility Training, Neuromuscular Re-education, Home Exercise Program, Equipment Evaluation, Education, & procurement, Transfer Training, Gait Training, Safety Education & Training, Balance Training, Endurance Training, Stair training, Patient/Caregiver Education & Training  Plan Comment: Cont. POC  Safety Devices  Type of devices:  All fall risk precautions in place, Call light within reach, Bed alarm in place, Left in bed     AMPA (6 CLICK) BASIC MOBILITY  AM-PAC Inpatient Mobility Raw Score : 20      Therapy Time   Individual   Time In 0915   Time Out 0935   Minutes 20      gait - 15 mins  Bm/Trsf - 5 mins       Ovidio Liu PTA, 08/06/19 at 9:41 AM

## 2019-08-06 NOTE — PROGRESS NOTES
OTHER ENHANCING LESION IN THE POSTERIOR FOSSA OR IN THE SUPRATENTORIAL BRAIN. THERE IS A MILD DEGREE OF CEREBRAL ATROPHY AND WHITE MATTER T2 HYPERINTENSE FOCI WHICH ARE LIKELY REPRESENT A MANIFESTATION OF SMALL VESSEL DISEASE. NO ACUTE ISCHEMIC    FOCUS IN THE BRAIN. CT ABDOMEN PELVIS W IV CONTRAST Additional Contrast? Radiologist Recommendation   Final Result      No evidence for metastatic disease within the abdomen/pelvis. Soft tissue nodule right hemidiaphragm, likely metastatic. Recommend dedicated CT of the lungs for further evaluation of underlying malignancy. Small hiatal hernia with possible area of esophageal outpouching.         ==========           Assessment/Plan: For cerebellar mass resection tomorrow. Hpl: on statin    Htn: monitor BP, adjust meds as appropriate. Anticoag: on xarelto for vte hx, this has been held by neurosurgery for pending surgery today. 35 minutes total care time, >1/2 in unit/floor time and care coordination   Late entry reflects 8/5 encounter.      Electronically signed by Hazel Johnston MD

## 2019-08-06 NOTE — PROGRESS NOTES
Hospitalist Progress Note      Date of Admission: 7/30/2019  Chief Complaint:    No chief complaint on file. Subjective:  No new complaints. No nausea, vomiting, chest pain, or headache      Medications:    Infusion Medications    sodium chloride      lactated ringers 1,000 mL (08/06/19 1241)    sodium chloride       Scheduled Medications    metoprolol tartrate  25 mg Oral BID    gabapentin  300 mg Oral TID    pantoprazole  40 mg Oral QAM AC    pravastatin  40 mg Oral Daily    isosorbide mononitrate  30 mg Oral Daily    dexamethasone  10 mg Intravenous Q6H    sodium chloride flush  10 mL Intravenous 2 times per day    nicotine  1 patch Transdermal Daily     PRN Meds: sodium chloride flush, magnesium hydroxide, ondansetron, acetaminophen, albuterol sulfate HFA, oxyCODONE-acetaminophen, tiZANidine    Intake/Output Summary (Last 24 hours) at 8/6/2019 1304  Last data filed at 8/6/2019 0003  Gross per 24 hour   Intake 480 ml   Output --   Net 480 ml     Exam:  BP (!) 142/73   Pulse 60   Temp 97.9 °F (36.6 °C) (Oral)   Resp 20   Ht 4' 10.75\" (1.492 m) Comment: from another encounter  Wt 140 lb (63.5 kg) Comment: stated 7-30  LMP  (LMP Unknown)   SpO2 96%   BMI 28.52 kg/m²   Head: Normocephalic, atraumatic  Sclera clear  Neck supple, nontender  Lungs: scattered crackles    Labs:   Recent Labs     08/06/19  0837   WBC 11.2*   HGB 15.5   HCT 44.7        Recent Labs     08/06/19  0837      K 4.9   *   CO2 22   BUN 41*   CREATININE 1.13*   CALCIUM 8.5     Recent Labs     08/06/19  0837   INR 1.1     No results for input(s): CKTOTAL, TROPONINI in the last 72 hours. Radiology:  CT HEAD W WO CONTRAST   Final Result      THERE IS A MASS WITH HETEROGENEOUS ENHANCEMENT IN THE LEFT CEREBELLAR HEMISPHERE. THERE IS ALSO INCREASED PERIPHERAL CIRCUMFERENTIAL ATTENUATION WHICH MAY REPRESENT EFFECTS OF RESIDUAL PRIOR CONTRAST FROM CT AND/OR MRI      NO ACUTE EXTRA-AXIAL FINDINGS.       All CT scans

## 2019-08-06 NOTE — FLOWSHEET NOTE
3321-- assessment complete, pt alert and oriented x 4, denies pain, denies n/v, denies sob & cp, call light in reach, bed in lowest position, alarm engaged, will monitor.    1201--Patient left unit for surgery  1715-- report given to Jacquie from ICU

## 2019-08-06 NOTE — ANESTHESIA PRE PROCEDURE
0745    nicotine (NICODERM CQ) 21 MG/24HR 1 patch  1 patch Transdermal Daily Claire Fry MD   Stopped at 08/01/19 4085       Allergies:  No Known Allergies    Problem List:    Patient Active Problem List   Diagnosis Code    Syncope R55    Essential hypertension I10    Dyslipidemia E78.5    Tobacco abuse Z72.0    Coronary artery disease involving native coronary artery of native heart without angina pectoris I25.10    Hypotension I95.9    Lung nodule R91.1    SOB (shortness of breath) on exertion R06.02    Brain mass G93.9    COPD (chronic obstructive pulmonary disease) ([facility] [address] J44.9       Past Medical History:        Diagnosis Date    Back pain, chronic     Pt  is with pain management    Chronic kidney disease     COPD (chronic obstructive pulmonary disease) ([facility] [address]     Coronary artery disease involving native coronary artery of native heart without angina pectoris 3/19/2017    Dyslipidemia 11/20/2016    Essential hypertension 11/20/2016    Factor V deficiency ([facility] [address]     H/O blood clots     Headache     Hypertension     Syncope 11/20/2016    Tobacco abuse 11/20/2016       Past Surgical History:        Procedure Laterality Date    BREAST BIOPSY Right     CORONARY ANGIOPLASTY WITH STENT PLACEMENT         Social History:    Social History     Tobacco Use    Smoking status: Heavy Tobacco Smoker     Packs/day: 1.00     Years: 57.00     Pack years: 57.00     Types: Cigarettes     Start date: 1/1/1960    Smokeless tobacco: Never Used    Tobacco comment: 4/24/19 now smoking 1/2ppd   Substance Use Topics    Alcohol use: Yes     Comment: occ                                Ready to quit: Not Answered  Counseling given: Not Answered  Comment: 4/24/19 now smoking 1/2ppd      Vital Signs (Current):   Vitals:    08/05/19 0732 08/05/19 1453 08/05/19 2118 08/06/19 0044   BP: (!) 165/80 (!) 125/98 (!) 144/71 (!) 146/87   Pulse: 59 65 59 59   Resp: 16 18 18 18   Temp: 97.5 °F (36.4 °C) 98.1 °F (36.7 °C) 97.9 °F (36.6 °C) 97.9 °F (36.6 °C)   TempSrc: Oral Oral     SpO2: 99% 95% 100% 98%                                              BP Readings from Last 3 Encounters:   08/06/19 (!) 146/87   07/30/19 (!) 149/69   07/17/19 110/68       NPO Status: Time of last liquid consumption: 0000                        Time of last solid consumption: 0000                        Date of last liquid consumption: 08/06/19                        Date of last solid food consumption: 08/06/19    BMI:   Wt Readings from Last 3 Encounters:   07/30/19 140 lb (63.5 kg)   07/17/19 143 lb 9.6 oz (65.1 kg)   04/24/19 146 lb (66.2 kg)     There is no height or weight on file to calculate BMI.    CBC:   Lab Results   Component Value Date    WBC 5.0 07/31/2019    RBC 4.33 07/31/2019    HGB 13.6 07/31/2019    HCT 39.6 07/31/2019    MCV 91.4 07/31/2019    RDW 16.6 07/31/2019     07/31/2019       CMP:   Lab Results   Component Value Date     08/02/2019    K 4.4 08/02/2019    K 4.6 07/31/2019     08/02/2019    CO2 22 08/02/2019    BUN 30 08/02/2019    CREATININE 1.17 08/02/2019    GFRAA 54.7 08/02/2019    LABGLOM 45.2 08/02/2019    GLUCOSE 127 08/02/2019    GLUCOSE 115 07/12/2019    PROT 8.3 07/30/2019    CALCIUM 9.4 08/02/2019    BILITOT 0.4 07/30/2019    ALKPHOS 141 07/30/2019    AST 22 07/30/2019    ALT 7 07/30/2019       POC Tests: No results for input(s): POCGLU, POCNA, POCK, POCCL, POCBUN, POCHEMO, POCHCT in the last 72 hours.     Coags:   Lab Results   Component Value Date    PROTIME 15.4 07/30/2019    INR 1.2 07/30/2019    APTT 33.3 07/30/2019       HCG (If Applicable): No results found for: PREGTESTUR, PREGSERUM, HCG, HCGQUANT     ABGs: No results found for: PHART, PO2ART, MMW8MAJ, LLV1XLG, BEART, [id number]     Type & Screen (If Applicable):  No results found for: AVIS [facility]    Anesthesia Evaluation  Patient summary reviewed and Nursing notes reviewed  Airway: Mallampati: II  TM distance: >3 FB   Neck ROM: full  Mouth

## 2019-08-06 NOTE — PROGRESS NOTES
Neurology Daily Progress Note  Name: Chris Farooq  Age: 76 y.o. Gender: female  CodeStatus: Full Code  Allergies: No Known Allergies    Chief Complaint:No chief complaint on file. Primary Care Provider: JULIANNA Bauer CNP  InpatientTreatment Team: Treatment Team: Attending Provider: Emilee Ji MD; Consulting Physician: Gill Kidd MD; Consulting Physician: Nimo Solares MD; Consulting Physician: Luke Hernandes MD; Surgeon: Gill Kidd MD; Registered Nurse: Alex Fuchs RN; Consulting Physician: Red Jimenez DO; : Марина Martinez RN  Admission Date: 7/30/2019      HPI Pt seen and examined for neuro follow up for brain tumor with mass effect. A&O x3, NAD, pleasant and cooperative. Dizziness and n/v resolved. Gait improved. Pt on IV decadron. No focal neuro deficits currently. No seizure activity. Denies Headache, double vision, blurry vision, difficulty with speech, difficulty with swallowing, weakness, numbness, pain, nausea, vomiting, choking, neck pain, dizziness.   Craniotomy scheduled for today  Vitals:    08/06/19 0808   BP: (!) 151/72   Pulse: 59   Resp: 18   Temp: 97.9 °F (36.6 °C)   SpO2: 98%      Review of Systems   Constitutional: Negative for appetite change, chills, fatigue and fever. HENT: Negative for hearing loss and trouble swallowing. Eyes: Negative for visual disturbance. Respiratory: Negative for cough, chest tightness, shortness of breath and wheezing. Cardiovascular: Negative for chest pain, palpitations and leg swelling. Gastrointestinal: Negative for nausea and vomiting. Genitourinary: Negative for difficulty urinating. Musculoskeletal: Negative for gait problem. Skin: Negative for color change and rash. Neurological: Negative for dizziness, tremors, seizures, syncope, facial asymmetry, speech difficulty, weakness, light-headedness, numbness and headaches.    Psychiatric/Behavioral: Negative for agitation, confusion and hallucinations. The patient is not nervous/anxious. Physical Exam   Constitutional: She is oriented to person, place, and time. She appears well-nourished. No distress. HENT:   Head: Normocephalic and atraumatic. Eyes: Pupils are equal, round, and reactive to light. EOM are normal.   Neck: Neck supple. No JVD present. Cardiovascular: Normal rate and regular rhythm. Pulmonary/Chest: Effort normal and breath sounds normal. No respiratory distress. Abdominal: Soft. Bowel sounds are normal. She exhibits no distension. There is no tenderness. Musculoskeletal: She exhibits no edema. Neurological: She is alert and oriented to person, place, and time. Skin: Skin is warm and dry. She is not diaphoretic. Nursing note and vitals reviewed.      Mental Status Exam:             Level of Alertness:   awake            Orientation:   person, place, time            Memory:   normal            Attention/Concentration:  normal            Language:  normal     Cranial Nerves          Cranial nerve III           Pupils:  equal, round, reactive to light      Cranial nerves III, IV, VI           Extraocular Movements: intact      Cranial nerve V           Facial sensation:  intact      Cranial nerve VII           Facial strength: intact      Cranial nerve VIII           Hearing:  intact      Cranial nerve IX           Palate:  intact      Cranial nerve XI         Shoulder shrug:  intact      Cranial nerve XII          Tongue movement:  normal     Motor:    Drift:  absent  Motor exam is symmetrical 5 out of 5 all extremities bilaterally  Tone:  normal  Abnormal Movements:  absent    Medications:  Reviewed    Infusion Medications:   Scheduled Medications:    metoprolol tartrate  25 mg Oral BID    gabapentin  300 mg Oral TID    pantoprazole  40 mg Oral QAM AC    pravastatin  40 mg Oral Daily    isosorbide mononitrate  30 mg Oral Daily    dexamethasone  10 mg Intravenous Q6H    sodium chloride flush  10 mL by JULIANNA Bentley CNP on 8/6/2019 at 8:34 AM

## 2019-08-06 NOTE — OP NOTE
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 Item Serial No.  Lot No. LRB No. Used   ZACHERY-GRAFT DURAGEN SUTURABLE 2X2IN Bone/Graft/Tissue/Human/Synth ZACHERY-GRAFT DURAGEN SUTURABLE 2X2IN  [employer] [id number] N/A 1   pericardium 4cm x 40mm    [id number] MUSCULOSKELETAL TRANSPLANT FND  N/A 1   SEALANT DURAL ADHERUS ET AUTOSPRAY Spine SEALANT DURAL ADHERUS ET [address] [facility] [id number] N/A 1         Drains:   Ventriculostomy (Active)       Urethral Catheter Temperature probe 16 fr (Active)       Procedure:  Patient is a 29-year-old female with recent history of significant headache with nausea and truncal ataxia with a history of smoking and newly found a diaphragmatic lesion for which patient was taken to the OR for biopsy resection of large cerebellar mass consistent of hemorrhagic cyst and solid mass. Patient was preoperatively observed while in the hospital due to again significant brain edema and mass-effect along the fourth ventricle due to her intake of Xarelto along with steroid.     She was given preoperative antibiotics and Decadron intraoperatively

## 2019-08-07 ENCOUNTER — APPOINTMENT (OUTPATIENT)
Dept: CT IMAGING | Age: 74
DRG: 025 | End: 2019-08-07
Attending: INTERNAL MEDICINE
Payer: MEDICARE

## 2019-08-07 LAB
ANION GAP SERPL CALCULATED.3IONS-SCNC: 16 MEQ/L (ref 9–15)
BASOPHILS ABSOLUTE: 0.1 K/UL (ref 0–0.2)
BASOPHILS RELATIVE PERCENT: 0.3 %
BUN BLDV-MCNC: 41 MG/DL (ref 8–23)
CALCIUM SERPL-MCNC: 7.9 MG/DL (ref 8.5–9.9)
CHLORIDE BLD-SCNC: 107 MEQ/L (ref 95–107)
CO2: 19 MEQ/L (ref 20–31)
CREAT SERPL-MCNC: 1.14 MG/DL (ref 0.5–0.9)
EOSINOPHILS ABSOLUTE: 0 K/UL (ref 0–0.7)
EOSINOPHILS RELATIVE PERCENT: 0 %
GFR AFRICAN AMERICAN: 56.3
GFR NON-AFRICAN AMERICAN: 46.5
GLUCOSE BLD-MCNC: 146 MG/DL (ref 70–99)
HCT VFR BLD CALC: 44.1 % (ref 37–47)
HEMOGLOBIN: 14.9 G/DL (ref 12–16)
LYMPHOCYTES ABSOLUTE: 0.2 K/UL (ref 1–4.8)
LYMPHOCYTES RELATIVE PERCENT: 1 %
MCH RBC QN AUTO: 30.8 PG (ref 27–31.3)
MCHC RBC AUTO-ENTMCNC: 33.9 % (ref 33–37)
MCV RBC AUTO: 91.1 FL (ref 82–100)
MONOCYTES ABSOLUTE: 0.8 K/UL (ref 0.2–0.8)
MONOCYTES RELATIVE PERCENT: 4.1 %
NEUTROPHILS ABSOLUTE: 19.4 K/UL (ref 1.4–6.5)
NEUTROPHILS RELATIVE PERCENT: 94.6 %
PDW BLD-RTO: 16.1 % (ref 11.5–14.5)
PLATELET # BLD: 170 K/UL (ref 130–400)
POTASSIUM REFLEX MAGNESIUM: 4.6 MEQ/L (ref 3.4–4.9)
RBC # BLD: 4.84 M/UL (ref 4.2–5.4)
SODIUM BLD-SCNC: 142 MEQ/L (ref 135–144)
WBC # BLD: 20.5 K/UL (ref 4.8–10.8)

## 2019-08-07 PROCEDURE — 6370000000 HC RX 637 (ALT 250 FOR IP): Performed by: NEUROLOGICAL SURGERY

## 2019-08-07 PROCEDURE — 97166 OT EVAL MOD COMPLEX 45 MIN: CPT

## 2019-08-07 PROCEDURE — 36415 COLL VENOUS BLD VENIPUNCTURE: CPT

## 2019-08-07 PROCEDURE — 2580000003 HC RX 258: Performed by: NEUROLOGICAL SURGERY

## 2019-08-07 PROCEDURE — 2000000000 HC ICU R&B

## 2019-08-07 PROCEDURE — 80048 BASIC METABOLIC PNL TOTAL CA: CPT

## 2019-08-07 PROCEDURE — 99232 SBSQ HOSP IP/OBS MODERATE 35: CPT | Performed by: INTERNAL MEDICINE

## 2019-08-07 PROCEDURE — 6360000002 HC RX W HCPCS: Performed by: NEUROLOGICAL SURGERY

## 2019-08-07 PROCEDURE — 70450 CT HEAD/BRAIN W/O DYE: CPT

## 2019-08-07 PROCEDURE — 85025 COMPLETE CBC W/AUTO DIFF WBC: CPT

## 2019-08-07 RX ORDER — SODIUM CHLORIDE 9 MG/ML
INJECTION, SOLUTION INTRAVENOUS CONTINUOUS
Status: DISCONTINUED | OUTPATIENT
Start: 2019-08-07 | End: 2019-08-10

## 2019-08-07 RX ORDER — DEXAMETHASONE SODIUM PHOSPHATE 4 MG/ML
4 INJECTION, SOLUTION INTRA-ARTICULAR; INTRALESIONAL; INTRAMUSCULAR; INTRAVENOUS; SOFT TISSUE EVERY 6 HOURS
Status: DISCONTINUED | OUTPATIENT
Start: 2019-08-07 | End: 2019-08-09

## 2019-08-07 RX ADMIN — SODIUM CHLORIDE: 9 INJECTION, SOLUTION INTRAVENOUS at 09:15

## 2019-08-07 RX ADMIN — ISOSORBIDE MONONITRATE 30 MG: 30 TABLET, EXTENDED RELEASE ORAL at 08:12

## 2019-08-07 RX ADMIN — DEXTROSE MONOHYDRATE 1 G: 5 INJECTION INTRAVENOUS at 10:10

## 2019-08-07 RX ADMIN — OXYCODONE HYDROCHLORIDE AND ACETAMINOPHEN 1 TABLET: 5; 325 TABLET ORAL at 22:44

## 2019-08-07 RX ADMIN — GABAPENTIN 300 MG: 300 CAPSULE ORAL at 08:12

## 2019-08-07 RX ADMIN — PANTOPRAZOLE SODIUM 40 MG: 40 TABLET, DELAYED RELEASE ORAL at 08:12

## 2019-08-07 RX ADMIN — OXYCODONE HYDROCHLORIDE AND ACETAMINOPHEN 1 TABLET: 5; 325 TABLET ORAL at 13:56

## 2019-08-07 RX ADMIN — DEXAMETHASONE SODIUM PHOSPHATE 10 MG: 10 INJECTION INTRAMUSCULAR; INTRAVENOUS at 02:33

## 2019-08-07 RX ADMIN — MORPHINE SULFATE 4 MG: 4 INJECTION INTRAVENOUS at 02:40

## 2019-08-07 RX ADMIN — METOPROLOL TARTRATE 25 MG: 25 TABLET ORAL at 21:11

## 2019-08-07 RX ADMIN — MORPHINE SULFATE 4 MG: 4 INJECTION INTRAVENOUS at 10:09

## 2019-08-07 RX ADMIN — SODIUM CHLORIDE: 9 INJECTION, SOLUTION INTRAVENOUS at 23:41

## 2019-08-07 RX ADMIN — Medication 10 ML: at 21:15

## 2019-08-07 RX ADMIN — TIZANIDINE 4 MG: 4 TABLET ORAL at 21:26

## 2019-08-07 RX ADMIN — PRAVASTATIN SODIUM 40 MG: 40 TABLET ORAL at 21:11

## 2019-08-07 RX ADMIN — OXYCODONE HYDROCHLORIDE AND ACETAMINOPHEN 1 TABLET: 5; 325 TABLET ORAL at 17:59

## 2019-08-07 RX ADMIN — DEXAMETHASONE SODIUM PHOSPHATE 4 MG: 4 INJECTION, SOLUTION INTRAMUSCULAR; INTRAVENOUS at 21:11

## 2019-08-07 RX ADMIN — DEXTROSE MONOHYDRATE 1 G: 5 INJECTION INTRAVENOUS at 17:40

## 2019-08-07 RX ADMIN — METOPROLOL TARTRATE 25 MG: 25 TABLET ORAL at 08:11

## 2019-08-07 RX ADMIN — DEXTROSE MONOHYDRATE 1 G: 5 INJECTION INTRAVENOUS at 02:30

## 2019-08-07 RX ADMIN — GABAPENTIN 300 MG: 300 CAPSULE ORAL at 21:11

## 2019-08-07 RX ADMIN — OXYCODONE HYDROCHLORIDE AND ACETAMINOPHEN 1 TABLET: 5; 325 TABLET ORAL at 08:10

## 2019-08-07 RX ADMIN — DEXAMETHASONE SODIUM PHOSPHATE 4 MG: 4 INJECTION, SOLUTION INTRAMUSCULAR; INTRAVENOUS at 14:14

## 2019-08-07 RX ADMIN — TIZANIDINE 4 MG: 4 TABLET ORAL at 10:09

## 2019-08-07 RX ADMIN — GABAPENTIN 300 MG: 300 CAPSULE ORAL at 14:14

## 2019-08-07 RX ADMIN — DEXAMETHASONE SODIUM PHOSPHATE 4 MG: 4 INJECTION, SOLUTION INTRAMUSCULAR; INTRAVENOUS at 08:13

## 2019-08-07 ASSESSMENT — PAIN SCALES - GENERAL
PAINLEVEL_OUTOF10: 5
PAINLEVEL_OUTOF10: 4
PAINLEVEL_OUTOF10: 7
PAINLEVEL_OUTOF10: 5
PAINLEVEL_OUTOF10: 0
PAINLEVEL_OUTOF10: 4
PAINLEVEL_OUTOF10: 0

## 2019-08-07 ASSESSMENT — PAIN DESCRIPTION - DESCRIPTORS: DESCRIPTORS: DISCOMFORT

## 2019-08-07 ASSESSMENT — PAIN DESCRIPTION - PAIN TYPE
TYPE: ACUTE PAIN
TYPE: SURGICAL PAIN

## 2019-08-07 ASSESSMENT — PAIN DESCRIPTION - LOCATION
LOCATION: NECK
LOCATION: BACK

## 2019-08-07 NOTE — CONSULTS
arteries, terminal internal carotid arteries, basilar artery, or cervical internal carotid arteries. 2.  2.7 cm ring-enhancing lesion in the posterior fossa abutting the vermis and extending to the left. There is some central lower density that is not clearly cystic on these images. There is some surrounding low density consistent with edema and probable mass effect. The fourth ventricle is normal in appearance. There is no hydrocephalus. On these images there are no additional parenchymal brain lesions. Primary differential would include solitary metastatic lesion most commonly breast or lung. The possibility of an hemangioblastoma is also mentioned. These findings were discussed with Dr. Gina Joy in the emergency room. EXAMINATION: CTA HEAD W WO CONTRAST, CTA NECK W WO CONTRAST CTA HEAD DATE AND TIME:7/30/2019 2:15 PM CLINICAL HISTORY: Stroke  gait instability  COMPARISON: None TECHNIQUE:Helical CTA of the head and neck was performed from the vertex to the aortic arch following the uneventful intravenous administration of 100 cc of nonionic contrast without incident. 2-D images were reconstructed in the sagittal and coronal planes. Three Dimensional Maximum Intensity Projection (3D-MIP) images were created. All images were reviewed and primarily archived to PACS workstation. All CT scans at this facility use dose modulation, iterative reconstruction, and/or weight based dosing when appropriate to reduce radiation dose to as low as reasonably achievable. NASCET Criteria were utilized FINDINGS CTA Head     Intracranial ICAs: Flow is visualized within the precavernous, cavernous, clinoid and supraclinoid segments of the internal carotid arteries bilaterally     Anterior cerebral arteries:Flow is visualized within the bilateral anterior cerebral arteries with no large vessel occlusion or significant stenosis. The left A1 segment is relatively hypoplastic.      Middle cerebral arteries:Flow visualized throughout the middle cerebral arteries bilaterally with no large vessel occlusion or significant stenosis. Posterior Circulation: Bilateral posterior cerebral arteries are patent. Basilar artery:Flow is visualized in the basilar artery without significant stenosis. Aneurysm No aneurysm or dissection in the anterior or posterior circulations. .     Neurocranium there is a 2.7 cm ring-enhancing posterior fossa lesion abutting the vermis and extending to the left. There is a central lower density that is not clearly cystic on these images. There is some surrounding low density consistent with edema. The fourth ventricle is normal in appearance. There is no hydrocephalus. On these images there are no additional parenchymal brain lesions. Primary differential would include solitary metastatic lesion most commonly breast or lung. The possibility  of hemangioblastoma is also mentioned. Dural sinus: As visualized the opacified dural venous sinuses are unremarkable. [address] [place] [address]. CTA NECK DATE AND TIME:7/30/2019 2:15 PM CLINICAL HISTORY: Stroke symptoms   gait instability  TECHNIQUE: TECHNIQUE:Helical CTA of the head and neck was performed from the vertex to the aortic arch following the uneventful intravenous administration of 100 cc of nonionic contrast without incident. 2-D images were reconstructed in the sagittal and  coronal planes. Three Dimensional Maximum Intensity Projection (3D-MIP) images were created. All images were reviewed and primarily archived to PACS workstation. All CT scans at this facility use dose modulation, iterative reconstruction, and/or weight  based dosing when appropriate to reduce radiation dose to as low as reasonably achievable. NASCET Criteria were utilized Result / Findings: CTA NECK: Aortic Arch: The visualized portions of the aortic arch and proximal arch vessels demonstrates no focal stenosis aneurysm or dissection.   Carotid:   Right  Carotid Stenosis (% by NASCET Craniotomy. Tumor resection Comparison:  August 6, 2019, 0826 hours. Findings: In the interval, a right frontal craniotomy has been performed with insertion of an intraventricular drainage catheter, the tip found near the base at the level of the lateral ventricles, at or just proximal to the cephalad portion of the third ventricle. An additional, midline craniotomy at the skull base is also evident. The previously visualized mass found centrally within the cerebellum is no longer evident. A focal 9 x 9 mm area of increased attenuation is found in the region of the midline cerebellar resection. No extra-axial fluid collection, midline shift, or mass effect. There is identified along the anterior margin of the ambient cistern bilaterally as well as within the suprasellar cistern. Ventricles normal in size. Facial sinuses patent. Mastoid air cells well pneumatized. Impression: Interval craniotomy, posterior midline skull base or resection of cerebellar mass and interval insertion of an intraventricular shunt catheter via right frontal craniotomy. Postsurgical changes discussed. All CT scans at this facility use dose modulation, iterative reconstruction, and/or weight based dosing when appropriate to reduce radiation dose to as low as reasonably achievable. Ct Head W Wo Contrast    Result Date: 8/6/2019  CT HEAD W WO CONTRAST CLINICAL HISTORY:  MASS COMPARISON: MRI July 31, 2019. TECHNIQUE: Multiple unenhanced and enhanced serial axial images of the brain from the vertex of the skull to the base of the skull were performed following the intravenous administration of 50 mL of Isovue-300. FINDINGS: The ventricles are dilated. This is compensatory to the surrounding generalized parenchymal volume loss. Unchanged. Carla Fannie No midline shift. The cisterns are patent. There are white matter and periventricular changes most likely consistent with chronic small vessel disease.  There is a mass within the posterior inferior additional parenchymal brain lesions. Primary differential would include solitary metastatic lesion most commonly breast or lung. The possibility  of hemangioblastoma is also mentioned. Dural sinus: As visualized the opacified dural venous sinuses are unremarkable. [address] [place] [address]. CTA NECK DATE AND TIME:7/30/2019 2:15 PM CLINICAL HISTORY: Stroke symptoms   gait instability  TECHNIQUE: TECHNIQUE:Helical CTA of the head and neck was performed from the vertex to the aortic arch following the uneventful intravenous administration of 100 cc of nonionic contrast without incident. 2-D images were reconstructed in the sagittal and  coronal planes. Three Dimensional Maximum Intensity Projection (3D-MIP) images were created. All images were reviewed and primarily archived to PACS workstation. All CT scans at this facility use dose modulation, iterative reconstruction, and/or weight  based dosing when appropriate to reduce radiation dose to as low as reasonably achievable. NASCET Criteria were utilized Result / Findings: CTA NECK: Aortic Arch: The visualized portions of the aortic arch and proximal arch vessels demonstrates no focal stenosis aneurysm or dissection. Carotid:   Right  Carotid Stenosis (% by NASCET Criteria): There is no (than 20%) hemodynamically significant stenosis, vascular dissection or aneurysm of the right common or internal carotid arteries. Left  Carotid Stenosis (% by NASCET Criteria): There is some calcified plaque in the left carotid bulb. There is no (less than 20%) hemodynamically significant stenosis, vascular dissection or aneurysm in the left common or internal carotid arteries. Cervical Vertebral Arteries:    Patency: The vertebral arteries are well visualized to the level of the basilar artery. There is no focal stenosis aneurysm or dissection. Vertebral arteries are codominant.      Ct Chest Wo Contrast    Result Date: 7/31/2019  EXAMINATION:  CT SCAN CHEST CLINICAL HISTORY:  Recent diagnosis brain mass. COMPARISON:  May 6, 2019. TECHNIQUE:  Multiple serial axial images from the base neck through the upper abdomen with both sagittal coronal reconstruction was performed without intravenous or oral administration of contrast. The lack of IV contrast limits full evaluation. FINDINGS:    There are biapical areas of scarring fibrosis. There is moderate emphysematous disease in the upper one third of the lung parenchyma. There is a pleural-based soft tissue density at the base medial aspect of the right lower lobe. It measures 2.7 x 2.9 x 1.0 cm in the transverse and AP and cephalocaudad dimension. Best seen on the coronal reconstructions. No other focal parenchymal abnormalities. No pleural effusions. No pneumothoraces. No significant periarticular. There is pretracheal adenopathy. No significant perihilar or subcarinal adenopathy. PLEURAL-BASED SOFT TISSUE MASS OVERLYING THE MEDIAL ASPECT OF THE RIGHT LOWER LOBE, RIGHT HEMIDIAPHRAGM. THIS IS NEW SINCE THE PRIOR STUDY OF MAY 6, 2019. THE LACK OF IV CONTRAST LIMITS FULL EVALUATION. THIS IS SUPERIMPOSED UPON MODERATE EMPHYSEMATOUS DISEASE ONE PARENCHYMA AS DESCRIBED ABOVE. All CT scans at this facility use dose modulation, iterative reconstruction, and/or weight based dosing when appropriate to reduce radiation dose to as low as reasonably achievable. Ct Abdomen Pelvis W Iv Contrast Additional Contrast? Radiologist Recommendation    Result Date: 7/31/2019  CT ABDOMEN PELVIS W IV CONTRAST HISTORY:   met smith . Brain lesion, possibly metastatic. Concern for malignancy. TECHNIQUE: CT of the abdomen and pelvis was performed using standard technique, scanning from just above the dome of the diaphragm to the symphysis pubis. Including delayed images through the kidneys. Contrast: IV: 100 ml Isovue 300 Oral:  None.  All CT scans at this facility use dose modulation, iterative reconstruction, and/or weight based dosing when appropriate to reduce radiation dose to as low as reasonably achievable. COMPARISON: CT 4/23/2018; MRI 12/3/2018. RESULT: Liver: Subcentimeter low-attenuation lesions throughout the liver, including 0.8 x 0.9 cm low-attenuation lesion in the right inferior hepatic lobe (series 2 image 27). These are similar to MRI 12/3/2018 and appear to be benign cysts. Biliary: No bile duct dilation. Gallbladder is collapsed. Pancreas: Diffuse pancreatic calcifications. No discrete pancreatic mass or pancreatic ductal dilation. Spleen: No mass. No splenomegaly. Adrenals: No mass. 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 No hydronephrosis or nephrolithiasis. Subcentimeter lesion(s) that are too small to characterize but likely benign. GI tract: No dilation or wall thickening. Mild diverticulosis without diverticulitis. Lymph nodes: No abdominal or pelvic lymphadenopathy. Mesentery/Peritoneum: No ascites or mass. Retroperitoneum: No mass. Vasculature: The celiac axis and SMA are patent. The portal vein and branches, splenic vein, SMV, and hepatic veins are patent. Severe arterial atherosclerotic disease without aneurysm with mild ectasia of the infrarenal abdominal aorta measuring 2.5 cm. . Pelvis: No mass, ascites or fluid collection. Uterus unremarkable. Decompressed bladder with trabeculated wall. Bones/Soft Tissues: No acute osseous findings. No destructive osseous lesions. Osteopenia. Scoliosis and degenerative changes throughout the spine with superior endplate sclerosis at Q9-C9 and L3-L4 . Small amount of heterogeneous gas right anterior abdominal wall, likely from injection. Lower thorax: Soft tissue nodule along the right hemidiaphragm, appearing pleural-based, measures 3.2 x 3.1 x 1.9 cm (series 2 image 7). Right basilar atelectasis. Emphysematous changes in the lungs. No suspicious lung nodules. Small hiatal hernia with an apparent area of outpouching. Partially visualized pacemaker leads.      No significant extrinsic \"mass effect \" on the fourth ventricle or aqueductal stenosis and there is no hydrocephalus. There is no other enhancing lesion or tumor in the posterior fossa or in the supratentorial brain. Supratentorial brain: There are punctate and patchy T2 hyperintense foci in the white matter of both cerebral hemispheres which are likely a manifestation of small vessel disease. There is no restricted diffusion or MRI evidence of an acute or subacute ischemic focus in the brain. There is a mild degree of cerebral atrophy which is appropriate for the patient's age. The remainder of the supratentorial brain appears unremarkable. 1. APPROXIMATELY 2.5 X 2 X 2 CM INHOMOGENEOUSLY ENHANCING MIDLINE CEREBELLAR MASS WHICH APPEARS TO HAVE A CYSTIC COMPONENT AND A HEMORRHAGIC COMPONENT. MRI FINDINGS ARE COMPATIBLE WITH AN INTRA-AXIAL POSTERIOR FOSSA TUMOR AND DIFFERENTIAL CONSIDERATIONS ARE PRIMARILY BETWEEN A METASTASIS OR LESS LIKELY A HEMANGIOBLASTOMA. 2. NO OTHER ENHANCING LESION IN THE POSTERIOR FOSSA OR IN THE SUPRATENTORIAL BRAIN. THERE IS A MILD DEGREE OF CEREBRAL ATROPHY AND WHITE MATTER T2 HYPERINTENSE FOCI WHICH ARE LIKELY REPRESENT A MANIFESTATION OF SMALL VESSEL DISEASE. NO ACUTE ISCHEMIC FOCUS IN THE BRAIN. Moris Andrews ASSESSMENT AND PLAN  1. Cerebellar mass, consider metastatic versus primary Brain tumor, s/p craniotomy. Await pathology. Recommendation will depend on pathology.     Electronically signed by Anni Potts MD on 8/7/2019 at 11:40 AM

## 2019-08-07 NOTE — DISCHARGE INSTR - COC
with patient):  Wojciech    RN SIGNATURE:  Electronically signed by Jose C López RN on 8/11/19 at 2:28 PM    CASE MANAGEMENT/SOCIAL WORK SECTION    Inpatient Status Date: ***    Readmission Risk Assessment Score:  Readmission Risk              Risk of Unplanned Readmission:        19           Discharging to Facility/ Agency   · Name: [facility]  · Address:  · Phone:  · Fax:    Dialysis Facility (if applicable)   · Name:  · Address:  · Dialysis Schedule:  · Phone:  · Fax:    / signature: {Esignature:[id number]}. Electronically signed by SAV Veronica on 8/7/2019 at 3:29 PM    PHYSICIAN SECTION    Prognosis: {Prognosis:4435267446}    Condition at Discharge: [facility] Edna Howell Patient Condition:[id number]}    Rehab Potential (if transferring to Rehab): {Prognosis:6203629578}    Recommended Labs or Other Treatments After Discharge: ***    Physician Certification: I certify the above information and transfer of Lior Goel  is necessary for the continuing treatment of the diagnosis listed and that she requires {Admit to Appropriate Level of Care:24750} for {GREATER/LESS:088891339} 30 days.      Update Admission H&P: {CHP DME Changes in RTBFR:766033970}    PHYSICIAN SIGNATURE:  Electronically signed by Kurt Hernandez MD on 8/11/19 at 7:52 AM

## 2019-08-07 NOTE — PROGRESS NOTES
 isosorbide mononitrate  30 mg Oral Daily    dexamethasone  10 mg Intravenous Q6H    sodium chloride flush  10 mL Intravenous 2 times per day    nicotine  1 patch Transdermal Daily     PRN Meds: sodium chloride flush, magnesium hydroxide, ondansetron, acetaminophen, albuterol sulfate HFA, oxyCODONE-acetaminophen, tiZANidine    Labs:   No results for input(s): WBC, HGB, HCT, PLT in the last 72 hours. No results for input(s): NA, K, CL, CO2, BUN, CREATININE, CALCIUM, PHOS in the last 72 hours. Invalid input(s): MAGNES  No results for input(s): AST, ALT, BILIDIR, BILITOT, ALKPHOS in the last 72 hours. No results for input(s): INR in the last 72 hours. No results for input(s): Coralyn Honour in the last 72 hours. Urinalysis:   Lab Results   Component Value Date    NITRU Negative 07/30/2019    WBCUA 0-2 07/30/2019    BACTERIA Rare 07/30/2019    RBCUA 0-2 07/30/2019    BLOODU Negative 07/30/2019    SPECGRAV 1.020 07/30/2019    GLUCOSEU Negative 07/30/2019       Radiology:   Most recent    EEG No procedure found. MRI of Brain   Results for orders placed during the hospital encounter of 07/30/19   MRI BRAIN W WO CONTRAST    Narrative EXAMINATION:  MRI BRAIN WITHOUT AND WITH IV CONTRAST    CLINICAL HISTORY:  HEADACHE, DIZZINESS, NAUSEA, DISEQUILIBRIUM WITH UNSTEADY GAIT AND WEAKNESS IN BOTH LOWER EXTREMITIES, RECENT ABNORMAL CT BRAIN    COMPARISON:  CTA HEAD FROM 7/30/2019    TECHNIQUE:  Multisequence/multiplane MRI brain obtained without and with IV injection of 15 mL of MultiHance. FINDINGS:  The MRI brain without and with IV contrast demonstrates the following:    Posterior fossa: There is an approximately 2.5 x 2 x 2 cm inhomogeneously enhancing midline cerebellar mass found posterior to the fourth ventricle with associated cerebral edema. There appears to be a cystic component to this cerebellar tumor.  Also,   there is some paramagnetic signal within the tumor suggesting a hemorrhagic component to the tumor mass. The MRI findings are compatible with an intra-axial posterior fossa tumor and the differential considerations are primarily between a metastasis or   less likely a hemangioblastoma. The tumor is not causing significant extrinsic \"mass effect \" on the fourth ventricle or aqueductal stenosis and there is no hydrocephalus. There is no other enhancing lesion or tumor in the posterior fossa or in the   supratentorial brain. Supratentorial brain: There are punctate and patchy T2 hyperintense foci in the white matter of both cerebral hemispheres which are likely a manifestation of small vessel disease. There is no restricted diffusion or MRI evidence of an acute or subacute   ischemic focus in the brain. There is a mild degree of cerebral atrophy which is appropriate for the patient's age. The remainder of the supratentorial brain appears unremarkable. Impression 1. APPROXIMATELY 2.5 X 2 X 2 CM INHOMOGENEOUSLY ENHANCING MIDLINE CEREBELLAR MASS WHICH APPEARS TO HAVE A CYSTIC COMPONENT AND A HEMORRHAGIC COMPONENT. MRI FINDINGS ARE COMPATIBLE WITH AN INTRA-AXIAL POSTERIOR FOSSA TUMOR AND DIFFERENTIAL CONSIDERATIONS   ARE PRIMARILY BETWEEN A METASTASIS OR LESS LIKELY A HEMANGIOBLASTOMA. 2. NO OTHER ENHANCING LESION IN THE POSTERIOR FOSSA OR IN THE SUPRATENTORIAL BRAIN. THERE IS A MILD DEGREE OF CEREBRAL ATROPHY AND WHITE MATTER T2 HYPERINTENSE FOCI WHICH ARE LIKELY REPRESENT A MANIFESTATION OF SMALL VESSEL DISEASE. NO ACUTE ISCHEMIC   FOCUS IN THE BRAIN. No results found for this or any previous visit. MRA of the Head and Neck: No results found for this or any previous visit. No results found for this or any previous visit. No results found for this or any previous visit.                           CT of the Head:   Results for orders placed during the hospital encounter of 06/12/18   CT Head WO Contrast    Narrative HISTORY: Ita Murphy is a Female of 68 years age were performed of the bilateral extracranial carotid systems. Velocity criteria and internal carotid artery stenoses are extrapolated from data as defined by the Society of Radiologist in [place] [address] Radiology 2003; 600;[id number]. Comparison made with a previous carotid ultrasound from 11/20/2016     RIGHT CAROTID SYSTEM: There is a small amount of heterogeneous plaque in the bulb. There are no elevations of peak systolic velocities or ICA/CCA velocity ratios. Velocities in the ICA range from 74 cm/s proximal aspect, 102 cm/s mid aspect to 83 cm/s   distal aspect. ICA/CCA velocity ratio is 1.4. By ultrasound criteria there is less than 50 percent diameter reduction of the right ICA. Peak systolic velocities in the proximal ECA and mid CCA are 125 and 70cm/s. There is antegrade flow in the right   vertebral artery. LEFT CAROTID SYSTEM: There is a small amount of heterogeneous plaque in the mid common carotid artery and bulb extending into the proximal ICA. There are no elevations of peak systolic velocities or ICA/CCA velocity ratios. Velocities in the ICA range   from 79 cm/s proximal aspect, 96 cm/s mid aspect to 79 cm/s distal aspect. ICA/CCA velocity ratio is 1.1. By ultrasound criteria there is less than 50 percent diameter reduction of the left ICA. Peak systolic velocities in the proximal ECA and mid CCA   are 138 and 85cm/s. There is antegrade flow in the left vertebral artery. Impression NO HEMODYNAMICALLY SIGNIFICANT INTERNAL CAROTID ARTERY STENOSES. ANTEGRADE VERTEBRAL FLOW. Echo No results found for this or any previous visit. Assessment/Plan:  Cerebellar Tumor with mass effect  S/p craniotomy, doing better  No appetite but no dysphagia  RLL lung mass  Gait ataxia, improved  NSGY following  Decadron, sx improved  Resection biopsy     Gabriele Kerr MD, Caleb Meek, American Board of Psychiatry & Neurology  Board Certified in Vascular Neurology  Board

## 2019-08-07 NOTE — PROGRESS NOTES
COMPARISON: None TECHNIQUE:Helical CTA of the head and neck was performed from the vertex to the aortic arch following the uneventful intravenous administration of 100 cc of nonionic contrast without incident. 2-D images were reconstructed in the sagittal and coronal planes. Three Dimensional Maximum Intensity Projection (3D-MIP) images were created. All images were reviewed and primarily archived to PACS workstation. All CT scans at this facility use dose modulation, iterative reconstruction, and/or weight based dosing when appropriate to reduce radiation dose to as low as reasonably achievable. NASCET Criteria were utilized FINDINGS CTA Head     Intracranial ICAs: Flow is visualized within the precavernous, cavernous, clinoid and supraclinoid segments of the internal carotid arteries bilaterally     Anterior cerebral arteries:Flow is visualized within the bilateral anterior cerebral arteries with no large vessel occlusion or significant stenosis. The left A1 segment is relatively hypoplastic. Middle cerebral arteries:Flow visualized throughout the middle cerebral arteries bilaterally with no large vessel occlusion or significant stenosis. Posterior Circulation: Bilateral posterior cerebral arteries are patent. Basilar artery:Flow is visualized in the basilar artery without significant stenosis. Aneurysm No aneurysm or dissection in the anterior or posterior circulations. .     Neurocranium there is a 2.7 cm ring-enhancing posterior fossa lesion abutting the vermis and extending to the left. There is a central lower density that is not clearly cystic on these images. There is some surrounding low density consistent with edema. The fourth ventricle is normal in appearance. There is no hydrocephalus. On these images there are no additional parenchymal brain lesions. Primary differential would include solitary metastatic lesion most commonly breast or lung.  The possibility  of hemangioblastoma is also mentioned. Dural sinus: As visualized the opacified dural venous sinuses are unremarkable. [address] CTA NECK DATE AND TIME:7/30/2019 2:15 PM CLINICAL HISTORY: Stroke symptoms   gait instability  TECHNIQUE: TECHNIQUE:Helical CTA of the head and neck was performed from the vertex to the aortic arch following the uneventful intravenous administration of 100 cc of nonionic contrast without incident. 2-D images were reconstructed in the sagittal and  coronal planes. Three Dimensional Maximum Intensity Projection (3D-MIP) images were created. All images were reviewed and primarily archived to PACS workstation. All CT scans at this facility use dose modulation, iterative reconstruction, and/or weight  based dosing when appropriate to reduce radiation dose to as low as reasonably achievable. NASCET Criteria were utilized Result / Findings: CTA NECK: Aortic Arch: The visualized portions of the aortic arch and proximal arch vessels demonstrates no focal stenosis aneurysm or dissection. Carotid:   Right  Carotid Stenosis (% by NASCET Criteria): There is no (than 20%) hemodynamically significant stenosis, vascular dissection or aneurysm of the right common or internal carotid arteries. Left  Carotid Stenosis (% by NASCET Criteria): There is some calcified plaque in the left carotid bulb. There is no (less than 20%) hemodynamically significant stenosis, vascular dissection or aneurysm in the left common or internal carotid arteries. Cervical Vertebral Arteries:    Patency: The vertebral arteries are well visualized to the level of the basilar artery. There is no focal stenosis aneurysm or dissection. Vertebral arteries are codominant. Ct Head Wo Contrast    Result Date: 8/7/2019  EXAMINATION:  CT HEAD WO CONTRAST CLINICAL HISTORY:   Postop COMPARISONS:  August 6, 2019 TECHNIQUE:  Spiral axial images of the brain were obtained without contrast enhancement.  Multiplanar iterative reconstruction, and/or weight  based dosing when appropriate to reduce radiation dose to as low as reasonably achievable. NASCET Criteria were utilized Result / Findings: CTA NECK: Aortic Arch: The visualized portions of the aortic arch and proximal arch vessels demonstrates no focal stenosis aneurysm or dissection. Carotid:   Right  Carotid Stenosis (% by NASCET Criteria): There is no (than 20%) hemodynamically significant stenosis, vascular dissection or aneurysm of the right common or internal carotid arteries. Left  Carotid Stenosis (% by NASCET Criteria): There is some calcified plaque in the left carotid bulb. There is no (less than 20%) hemodynamically significant stenosis, vascular dissection or aneurysm in the left common or internal carotid arteries. Cervical Vertebral Arteries:    Patency: The vertebral arteries are well visualized to the level of the basilar artery. There is no focal stenosis aneurysm or dissection. Vertebral arteries are codominant. Ct Chest Wo Contrast    Result Date: 7/31/2019  EXAMINATION:  CT SCAN CHEST CLINICAL HISTORY:  Recent diagnosis brain mass. COMPARISON:  May 6, 2019. TECHNIQUE:  Multiple serial axial images from the base neck through the upper abdomen with both sagittal coronal reconstruction was performed without intravenous or oral administration of contrast. The lack of IV contrast limits full evaluation. FINDINGS:    There are biapical areas of scarring fibrosis. There is moderate emphysematous disease in the upper one third of the lung parenchyma. There is a pleural-based soft tissue density at the base medial aspect of the right lower lobe. It measures 2.7 x 2.9 x 1.0 cm in the transverse and AP and cephalocaudad dimension. Best seen on the coronal reconstructions. No other focal parenchymal abnormalities. No pleural effusions. No pneumothoraces. No significant periarticular. There is pretracheal adenopathy.  No significant perihilar or subcarinal adenopathy. PLEURAL-BASED SOFT TISSUE MASS OVERLYING THE MEDIAL ASPECT OF THE RIGHT LOWER LOBE, RIGHT HEMIDIAPHRAGM. THIS IS NEW SINCE THE PRIOR STUDY OF MAY 6, 2019. THE LACK OF IV CONTRAST LIMITS FULL EVALUATION. THIS IS SUPERIMPOSED UPON MODERATE EMPHYSEMATOUS DISEASE ONE PARENCHYMA AS DESCRIBED ABOVE. All CT scans at this facility use dose modulation, iterative reconstruction, and/or weight based dosing when appropriate to reduce radiation dose to as low as reasonably achievable. Ct Abdomen Pelvis W Iv Contrast Additional Contrast? Radiologist Recommendation    Result Date: 7/31/2019  CT ABDOMEN PELVIS W IV CONTRAST HISTORY:   met smith . Brain lesion, possibly metastatic. Concern for malignancy. TECHNIQUE: CT of the abdomen and pelvis was performed using standard technique, scanning from just above the dome of the diaphragm to the symphysis pubis. Including delayed images through the kidneys. Contrast: IV: 100 ml Isovue 300 Oral:  None. All CT scans at this facility use dose modulation, iterative reconstruction, and/or weight based dosing when appropriate to reduce radiation dose to as low as reasonably achievable. COMPARISON: CT 4/23/2018; MRI 12/3/2018. RESULT: Liver: Subcentimeter low-attenuation lesions throughout the liver, including 0.8 x 0.9 cm low-attenuation lesion in the right inferior hepatic lobe (series 2 image 27). These are similar to MRI 12/3/2018 and appear to be benign cysts. Biliary: No bile duct dilation. Gallbladder is collapsed. Pancreas: Diffuse pancreatic calcifications. No discrete pancreatic mass or pancreatic ductal dilation. Spleen: No mass. No splenomegaly. Adrenals: No mass. 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 No hydronephrosis or nephrolithiasis. Subcentimeter lesion(s) that are too small to characterize but likely benign. GI tract: No dilation or wall thickening. Mild diverticulosis without diverticulitis.  Lymph nodes: No abdominal or pelvic

## 2019-08-08 PROCEDURE — 2580000003 HC RX 258: Performed by: NEUROLOGICAL SURGERY

## 2019-08-08 PROCEDURE — 97535 SELF CARE MNGMENT TRAINING: CPT

## 2019-08-08 PROCEDURE — 6370000000 HC RX 637 (ALT 250 FOR IP): Performed by: NEUROLOGICAL SURGERY

## 2019-08-08 PROCEDURE — 99232 SBSQ HOSP IP/OBS MODERATE 35: CPT | Performed by: INTERNAL MEDICINE

## 2019-08-08 PROCEDURE — 97163 PT EVAL HIGH COMPLEX 45 MIN: CPT

## 2019-08-08 PROCEDURE — 6360000002 HC RX W HCPCS: Performed by: NEUROLOGICAL SURGERY

## 2019-08-08 PROCEDURE — 2000000000 HC ICU R&B

## 2019-08-08 RX ADMIN — Medication 10 ML: at 20:40

## 2019-08-08 RX ADMIN — DEXAMETHASONE SODIUM PHOSPHATE 4 MG: 4 INJECTION, SOLUTION INTRAMUSCULAR; INTRAVENOUS at 08:01

## 2019-08-08 RX ADMIN — METOPROLOL TARTRATE 25 MG: 25 TABLET ORAL at 20:40

## 2019-08-08 RX ADMIN — PRAVASTATIN SODIUM 40 MG: 40 TABLET ORAL at 20:40

## 2019-08-08 RX ADMIN — OXYCODONE HYDROCHLORIDE AND ACETAMINOPHEN 1 TABLET: 5; 325 TABLET ORAL at 09:19

## 2019-08-08 RX ADMIN — HYDRALAZINE HYDROCHLORIDE 10 MG: 20 INJECTION INTRAMUSCULAR; INTRAVENOUS at 23:12

## 2019-08-08 RX ADMIN — OXYCODONE HYDROCHLORIDE AND ACETAMINOPHEN 1 TABLET: 5; 325 TABLET ORAL at 05:03

## 2019-08-08 RX ADMIN — OXYCODONE HYDROCHLORIDE AND ACETAMINOPHEN 1 TABLET: 5; 325 TABLET ORAL at 13:49

## 2019-08-08 RX ADMIN — GABAPENTIN 300 MG: 300 CAPSULE ORAL at 20:40

## 2019-08-08 RX ADMIN — DEXTROSE MONOHYDRATE 1 G: 5 INJECTION INTRAVENOUS at 01:58

## 2019-08-08 RX ADMIN — METOPROLOL TARTRATE 25 MG: 25 TABLET ORAL at 08:01

## 2019-08-08 RX ADMIN — DEXTROSE MONOHYDRATE 1 G: 5 INJECTION INTRAVENOUS at 19:01

## 2019-08-08 RX ADMIN — DEXAMETHASONE SODIUM PHOSPHATE 4 MG: 4 INJECTION, SOLUTION INTRAMUSCULAR; INTRAVENOUS at 13:49

## 2019-08-08 RX ADMIN — GABAPENTIN 300 MG: 300 CAPSULE ORAL at 08:02

## 2019-08-08 RX ADMIN — PANTOPRAZOLE SODIUM 40 MG: 40 TABLET, DELAYED RELEASE ORAL at 05:03

## 2019-08-08 RX ADMIN — DEXAMETHASONE SODIUM PHOSPHATE 4 MG: 4 INJECTION, SOLUTION INTRAMUSCULAR; INTRAVENOUS at 20:40

## 2019-08-08 RX ADMIN — HYDRALAZINE HYDROCHLORIDE 10 MG: 20 INJECTION INTRAMUSCULAR; INTRAVENOUS at 05:03

## 2019-08-08 RX ADMIN — DEXTROSE MONOHYDRATE 1 G: 5 INJECTION INTRAVENOUS at 09:11

## 2019-08-08 RX ADMIN — GABAPENTIN 300 MG: 300 CAPSULE ORAL at 13:49

## 2019-08-08 RX ADMIN — OXYCODONE HYDROCHLORIDE AND ACETAMINOPHEN 1 TABLET: 5; 325 TABLET ORAL at 20:40

## 2019-08-08 RX ADMIN — Medication 10 ML: at 08:02

## 2019-08-08 RX ADMIN — SODIUM CHLORIDE: 9 INJECTION, SOLUTION INTRAVENOUS at 13:50

## 2019-08-08 RX ADMIN — DEXAMETHASONE SODIUM PHOSPHATE 4 MG: 4 INJECTION, SOLUTION INTRAMUSCULAR; INTRAVENOUS at 03:46

## 2019-08-08 RX ADMIN — ISOSORBIDE MONONITRATE 30 MG: 30 TABLET, EXTENDED RELEASE ORAL at 08:02

## 2019-08-08 ASSESSMENT — PAIN SCALES - GENERAL
PAINLEVEL_OUTOF10: 4
PAINLEVEL_OUTOF10: 3
PAINLEVEL_OUTOF10: 3
PAINLEVEL_OUTOF10: 7
PAINLEVEL_OUTOF10: 8
PAINLEVEL_OUTOF10: 5

## 2019-08-08 ASSESSMENT — PAIN DESCRIPTION - LOCATION
LOCATION: HEAD
LOCATION: BACK

## 2019-08-08 ASSESSMENT — PAIN DESCRIPTION - DESCRIPTORS: DESCRIPTORS: ACHING

## 2019-08-08 ASSESSMENT — PAIN DESCRIPTION - PAIN TYPE
TYPE: ACUTE PAIN
TYPE: CHRONIC PAIN

## 2019-08-08 NOTE — FLOWSHEET NOTE
1930 Report received from [facility].   Ventriculostomy Drain intact at 0 leveled to tragus clear serous fluid repositioned on left side for comfort  Dressing dry and intact to incision and drain alert and oriented follows all commands   2126 medicated with zanaflex for C/O  Neck stiffness  2245 med with percocet 1 tablet for pain  0400 sleeping well   Ventriculostomy drain  Intact  0505 BP elevated med with Apresoline 10 mg and medicated with percocet 1 tablet for neck discomfort

## 2019-08-08 NOTE — PROGRESS NOTES
were performed of the bilateral extracranial carotid systems. Velocity criteria and internal carotid artery stenoses are extrapolated from data as defined by the Society of Radiologist in [place] [address] Radiology 2003; 435;968-782. Comparison made with a previous carotid ultrasound from 11/20/2016     RIGHT CAROTID SYSTEM: There is a small amount of heterogeneous plaque in the bulb. There are no elevations of peak systolic velocities or ICA/CCA velocity ratios. Velocities in the ICA range from 74 cm/s proximal aspect, 102 cm/s mid aspect to 83 cm/s   distal aspect. ICA/CCA velocity ratio is 1.4. By ultrasound criteria there is less than 50 percent diameter reduction of the right ICA. Peak systolic velocities in the proximal ECA and mid CCA are 125 and 70cm/s. There is antegrade flow in the right   vertebral artery. LEFT CAROTID SYSTEM: There is a small amount of heterogeneous plaque in the mid common carotid artery and bulb extending into the proximal ICA. There are no elevations of peak systolic velocities or ICA/CCA velocity ratios. Velocities in the ICA range   from 79 cm/s proximal aspect, 96 cm/s mid aspect to 79 cm/s distal aspect. ICA/CCA velocity ratio is 1.1. By ultrasound criteria there is less than 50 percent diameter reduction of the left ICA. Peak systolic velocities in the proximal ECA and mid CCA   are 138 and 85cm/s. There is antegrade flow in the left vertebral artery. Impression NO HEMODYNAMICALLY SIGNIFICANT INTERNAL CAROTID ARTERY STENOSES. ANTEGRADE VERTEBRAL FLOW. Echo No results found for this or any previous visit. Assessment/Plan:  Cerebellar Tumor with mass effect  S/p craniotomy, doing better  Still ataxia, somewhat more pronounced after surgery as per PT  No appetite but no dysphagia  RLL lung mass/ not diagnostic therefore the brain sugeryGait ataxia, improved  NSGY following  Decadron, sx improved  Resection biopsy  done    Gabriele Armenta,

## 2019-08-08 NOTE — PROGRESS NOTES
CREATININE 1.13* 1.14*   GLUCOSE 121* 146*   CALCIUM 8.5 7.9*   LABGLOM 47.0* 46.5*   GFRAA 56.9* 56.3*       MV Settings:          No results for input(s): PHART, YAN4KZE, PO2ART, ADS6HBA, BEART, A9RZKKNI in the last 72 hours. O2 Device: None (Room air)  O2 Flow Rate (L/min): 2 L/min    DIET GENERAL;     MEDICATIONS during current hospitalization:    Continuous Infusions:   sodium chloride 75 mL/hr at 08/07/19 2341       Scheduled Meds:   dexamethasone  4 mg Intravenous Q6H    ceFAZolin  1 g Intravenous Q8H    metoprolol tartrate  25 mg Oral BID    gabapentin  300 mg Oral TID    pantoprazole  40 mg Oral QAM AC    pravastatin  40 mg Oral Daily    isosorbide mononitrate  30 mg Oral Daily    sodium chloride flush  10 mL Intravenous 2 times per day    nicotine  1 patch Transdermal Daily       PRN Meds:labetalol, hydrALAZINE, sodium chloride flush, magnesium hydroxide, ondansetron, acetaminophen, albuterol sulfate HFA, oxyCODONE-acetaminophen, tiZANidine    Radiology  Cta Head W Wo Contrast    Result Date: 7/30/2019  CTA HEAD AND CTA NECK 1. There is no large vessel occlusion or significant stenosis of the bilateral middle cerebral arteries, terminal internal carotid arteries, basilar artery, or cervical internal carotid arteries. 2.  2.7 cm ring-enhancing lesion in the posterior fossa abutting the vermis and extending to the left. There is some central lower density that is not clearly cystic on these images. There is some surrounding low density consistent with edema and probable mass effect. The fourth ventricle is normal in appearance. There is no hydrocephalus. On these images there are no additional parenchymal brain lesions. Primary differential would include solitary metastatic lesion most commonly breast or lung. The possibility of an hemangioblastoma is also mentioned. These findings were discussed with Dr. Geovanni Mccrary in the emergency room.  EXAMINATION: CTA HEAD W WO CONTRAST, CTA NECK W WO CONTRAST cistern. Ventricles normal in size. Facial sinuses patent. Mastoid air cells well pneumatized. Impression: Interval craniotomy, posterior midline skull base or resection of cerebellar mass and interval insertion of an intraventricular shunt catheter via right frontal craniotomy. Postsurgical changes discussed. All CT scans at this facility use dose modulation, iterative reconstruction, and/or weight based dosing when appropriate to reduce radiation dose to as low as reasonably achievable. Ct Head W Wo Contrast    Result Date: 8/6/2019  CT HEAD W WO CONTRAST CLINICAL HISTORY:  MASS COMPARISON: MRI July 31, 2019. TECHNIQUE: Multiple unenhanced and enhanced serial axial images of the brain from the vertex of the skull to the base of the skull were performed following the intravenous administration of 50 mL of Isovue-300. FINDINGS: The ventricles are dilated. This is compensatory to the surrounding generalized parenchymal volume loss. Unchanged. Betha Lute No midline shift. The cisterns are patent. There are white matter and periventricular changes most likely consistent with chronic small vessel disease. There is a mass within the posterior inferior aspect of the left cerebellar hemisphere. It shows circumferential increased attenuation which may resent persistence of contrast from prior CT/and/or MRI. It measures approximately 1.4 x 1.7 x 1.4 in the transverse AP and cephalocaudad dimension. Following the  intravenous administration of contrast there are no vascular abnormalities. The mass is again identified within the left cerebral hemisphere and shows heterogeneous enhancement. No additional masses are identified. No acute intra-axial or extra-axial findings. The visualized osseous structures are unremarkable. The visualized portion of the paranasal sinuses, and mastoids are unremarkable. THERE IS A MASS WITH HETEROGENEOUS ENHANCEMENT IN THE LEFT CEREBELLAR HEMISPHERE.  THERE IS ALSO INCREASED PERIPHERAL CIRCUMFERENTIAL ATTENUATION WHICH MAY REPRESENT EFFECTS OF RESIDUAL PRIOR CONTRAST FROM CT AND/OR MRI NO ACUTE EXTRA-AXIAL FINDINGS. All CT scans at this facility use dose modulation, iterative reconstruction, and/or weight based dosing when appropriate to reduce radiation dose to as low as reasonably achievable. Cta Neck W Wo Contrast    CTA HEAD AND CTA NECK 1. There is no large vessel occlusion or significant stenosis of the bilateral middle cerebral arteries, terminal internal carotid arteries, basilar artery, or cervical internal carotid arteries. 2.  2.7 cm ring-enhancing lesion in the posterior fossa abutting the vermis and extending to the left. There is some central lower density that is not clearly cystic on these images. There is some surrounding low density consistent with edema and probable mass effect. The fourth ventricle is normal in appearance. There is no hydrocephalus. On these images there are no additional parenchymal brain lesions. Primary differential would include solitary metastatic lesion most commonly breast or lung. The possibility of an hemangioblastoma is also mentioned. These findings were discussed with Dr. Agustín Rascon in the emergency room. EXAMINATION: CTA HEAD W WO CONTRAST, CTA NECK W WO CONTRAST CTA HEAD DATE AND TIME:7/30/2019 2:15 PM CLINICAL HISTORY: Stroke  gait instability  COMPARISON: None TECHNIQUE:Helical CTA of the head and neck was performed from the vertex to the aortic arch following the uneventful intravenous administration of 100 cc of nonionic contrast without incident. 2-D images were reconstructed in the sagittal and coronal planes. Three Dimensional Maximum Intensity Projection (3D-MIP) images were created. All images were reviewed and primarily archived to PACS workstation. All CT scans at this facility use dose modulation, iterative reconstruction, and/or weight based dosing when appropriate to reduce radiation dose to as low as reasonably achievable.  NASCET significant periarticular. There is pretracheal adenopathy. No significant perihilar or subcarinal adenopathy. PLEURAL-BASED SOFT TISSUE MASS OVERLYING THE MEDIAL ASPECT OF THE RIGHT LOWER LOBE, RIGHT HEMIDIAPHRAGM. THIS IS NEW SINCE THE PRIOR STUDY OF MAY 6, 2019. THE LACK OF IV CONTRAST LIMITS FULL EVALUATION. THIS IS SUPERIMPOSED UPON MODERATE EMPHYSEMATOUS DISEASE ONE PARENCHYMA AS DESCRIBED ABOVE. All CT scans at this facility use dose modulation, iterative reconstruction, and/or weight based dosing when appropriate to reduce radiation dose to as low as reasonably achievable. Ct Abdomen Pelvis W Iv Contrast Additional Contrast? Radiologist Recommendation    Result Date: 7/31/2019  CT ABDOMEN PELVIS W IV CONTRAST HISTORY:   met smith . Brain lesion, possibly metastatic. Concern for malignancy. TECHNIQUE: CT of the abdomen and pelvis was performed using standard technique, scanning from just above the dome of the diaphragm to the symphysis pubis. Including delayed images through the kidneys. Contrast: IV: 100 ml Isovue 300 Oral:  None. All CT scans at this facility use dose modulation, iterative reconstruction, and/or weight based dosing when appropriate to reduce radiation dose to as low as reasonably achievable. COMPARISON: CT 4/23/2018; MRI 12/3/2018. RESULT: Liver: Subcentimeter low-attenuation lesions throughout the liver, including 0.8 x 0.9 cm low-attenuation lesion in the right inferior hepatic lobe (series 2 image 27). These are similar to MRI 12/3/2018 and appear to be benign cysts. Biliary: No bile duct dilation. Gallbladder is collapsed. Pancreas: Diffuse pancreatic calcifications. No discrete pancreatic mass or pancreatic ductal dilation. Spleen: No mass. No splenomegaly. Adrenals: No mass. 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 No hydronephrosis or nephrolithiasis. Subcentimeter lesion(s) that are too small to characterize but likely benign.  GI tract: No dilation or wall HEMORRHAGIC COMPONENT. MRI FINDINGS ARE COMPATIBLE WITH AN INTRA-AXIAL POSTERIOR FOSSA TUMOR AND DIFFERENTIAL CONSIDERATIONS ARE PRIMARILY BETWEEN A METASTASIS OR LESS LIKELY A HEMANGIOBLASTOMA. 2. NO OTHER ENHANCING LESION IN THE POSTERIOR FOSSA OR IN THE SUPRATENTORIAL BRAIN. THERE IS A MILD DEGREE OF CEREBRAL ATROPHY AND WHITE MATTER T2 HYPERINTENSE FOCI WHICH ARE LIKELY REPRESENT A MANIFESTATION OF SMALL VESSEL DISEASE. NO ACUTE ISCHEMIC FOCUS IN THE BRAIN. IMPRESSION AND SUGGESTION:  1. Cerebellar mass status post resection, doing well postoperatively  2. Pleural-based right lower lobe supradiaphragmatic new soft tissue mass, likely primary source of malignancy, awaiting final brain mass pathology  3. COPD without significant exacerbation  4.  Tobacco abuse    Electronically signed by Crystal Hensley MD, [facility] on 8/8/2019 at 11:08 AM

## 2019-08-08 NOTE — PROGRESS NOTES
drain, pt must be clamped by RN prior to sitting and standing      SUBJECTIVE: Subjective: \"I do want out of this bed\"  Pre Treatment Pain Screening  Pain at present: 5  Intervention List: Patient able to continue with treatment    Post Treatment Pain Screening:   Pain Screening  Patient Currently in Pain: Yes  Pain Assessment  Pain Level: 5  Pain Type: Chronic pain  Pain Location: Back    Prior Level of Function:  Social/Functional History  Lives With: Family(2 dtrs)  Type of Home: House  Home Layout: One level  Home Access: Stairs to enter without rails  Entrance Stairs - Number of Steps: 4  Bathroom Shower/Tub: Walk-in shower  Bathroom Equipment: Shower chair  Home Equipment: Rolling walker, 4 wheeled walker, Quad cane  Receives Help From: Family  ADL Assistance: Independent  Ambulation Assistance: Independent(quad cane most of the time)  Transfer Assistance: Independent  Active : No  Occupation: Retired    OBJECTIVE:   Vision/Hearing:  Vision: Impaired  Vision Exceptions: Wears glasses for distance  Hearing: Within functional limits    Cognition:  Overall Orientation Status: Within Functional Limits  Follows Commands: Within Functional Limits    Observation/Palpation  Observation: CSF drain, 3L02    ROM:  RLE AROM: WFL  LLE AROM : WFL    Strength:  Strength RLE  Strength RLE: WFL  Strength LLE  Strength LLE: WFL    Neuro:  Balance  Sitting - Static: Good;-  Sitting - Dynamic: +  Standing - Static: Poor;+(assist to maintain static and dynamic standing balance)  Standing - Dynamic: Poor     Motor Control  Gross Motor?: Exceptions  Comments: ataxic in quality  Sensation  Overall Sensation Status: WFL    Bed mobility  Rolling to Right: Contact guard assistance  Supine to Sit: Contact guard assistance  Sit to Supine: Unable to assess(pt up to chair)    Transfers  Sit to Stand: Minimal Assistance  Stand to sit: Minimal Assistance  Bed to Chair: Minimal assistance(assist to obtain stand and for postural control during pivot)  Comment: increased time due to excessive lines/tubes    Ambulation  Ambulation?: Yes  Ambulation 1  Surface: level tile  Device: Hand-Held Assist  Other Apparatus: O2  Assistance: Minimal assistance  Quality of Gait: mod-severe postural sway, ataxic LEs, short steps  Distance: 8 ft     Activity Tolerance  Activity Tolerance: Patient Tolerated treatment well          ASSESSMENT:   Body structures, Functions, Activity limitations: Decreased balance;Decreased functional mobility ; Decreased strength;Decreased coordination;Decreased endurance  Decision Making: High Complexity  History: high  Exam: high  Clinical Presentation: high    Prognosis: Good  Patient Education: PT POC, DC planning, safety awareness    DISCHARGE RECOMMENDATIONS:  Discharge Recommendations: Patient would benefit from continued therapy after discharge    Assessment: Pt with exacerbated impairments from previous PT eval s/p craniotomy and prolonged time in bed. Pt did demo ataxia with gait and increased assist for all mobility as well as SOB with just short durations. Continue PT to progress mobility and return to PLOF. REQUIRES PT FOLLOW UP: Yes      PLAN OF CARE:  Plan  Times per week: 3-6  Current Treatment Recommendations: Strengthening, Functional Mobility Training, Neuromuscular Re-education, Home Exercise Program, Equipment Evaluation, Education, & procurement, Transfer Training, Gait Training, Safety Education & Training, Balance Training, Endurance Training, Stair training, Patient/Caregiver Education & Training  Plan Comment: Cont. POC  Safety Devices  Type of devices:  All fall risk precautions in place, Left in chair, Call light within reach, Nurse notified    Goals:  Patient goals : to eventually get out of here  Long term goals  Long term goal 1: indep with transfers  Long term goal 2: ambulate >150 ft LRAD supervision  Long term goal 3: pt to navigate 4 steps with supervision assist LRAD  Long term goal 4: pt to demo >good standing balance    AMPAC (6 CLICK) BASIC MOBILITY  AM-PAC Inpatient Mobility Raw Score : 12     Therapy Time:   Individual   Time In 1138   Time Out 1202   Minutes 24       Bed mob/transfers: 10 min    Juan Manuel Padron PT, 08/08/19 at 12:30 PM

## 2019-08-09 ENCOUNTER — APPOINTMENT (OUTPATIENT)
Dept: CT IMAGING | Age: 74
DRG: 025 | End: 2019-08-09
Attending: INTERNAL MEDICINE
Payer: MEDICARE

## 2019-08-09 PROBLEM — I73.9 PAD (PERIPHERAL ARTERY DISEASE) (HCC): Status: ACTIVE | Noted: 2018-11-15

## 2019-08-09 PROBLEM — I82.90 VTE (VENOUS THROMBOEMBOLISM): Status: ACTIVE | Noted: 2018-11-08

## 2019-08-09 PROBLEM — Z95.0 PRESENCE OF PERMANENT CARDIAC PACEMAKER: Status: ACTIVE | Noted: 2019-02-11

## 2019-08-09 PROBLEM — R74.8 ELEVATED ALKALINE PHOSPHATASE LEVEL: Status: ACTIVE | Noted: 2019-02-11

## 2019-08-09 PROBLEM — D47.2 MGUS (MONOCLONAL GAMMOPATHY OF UNKNOWN SIGNIFICANCE): Status: ACTIVE | Noted: 2018-11-08

## 2019-08-09 PROBLEM — K44.9 HIATAL HERNIA: Status: ACTIVE | Noted: 2019-01-08

## 2019-08-09 PROBLEM — M80.08XA FRACTURE OF VERTEBRA DUE TO OSTEOPOROSIS (HCC): Status: ACTIVE | Noted: 2019-02-11

## 2019-08-09 PROBLEM — G93.5 NEOPLASM OF BRAIN CAUSING MASS EFFECT ON ADJACENT STRUCTURES (HCC): Status: ACTIVE | Noted: 2019-08-01

## 2019-08-09 PROBLEM — D49.6 NEOPLASM OF BRAIN CAUSING MASS EFFECT ON ADJACENT STRUCTURES (HCC): Status: ACTIVE | Noted: 2019-08-01

## 2019-08-09 PROBLEM — N18.30 CKD (CHRONIC KIDNEY DISEASE), STAGE III (HCC): Status: ACTIVE | Noted: 2018-11-08

## 2019-08-09 PROBLEM — K86.1 IDIOPATHIC CHRONIC PANCREATITIS (HCC): Status: ACTIVE | Noted: 2019-01-08

## 2019-08-09 PROBLEM — M81.0 AGE-RELATED OSTEOPOROSIS WITHOUT CURRENT PATHOLOGICAL FRACTURE: Status: ACTIVE | Noted: 2019-02-11

## 2019-08-09 PROBLEM — I49.5 SSS (SICK SINUS SYNDROME) (HCC): Status: ACTIVE | Noted: 2019-02-11

## 2019-08-09 PROBLEM — I65.23 BILATERAL CAROTID ARTERY STENOSIS: Status: ACTIVE | Noted: 2018-11-15

## 2019-08-09 PROBLEM — Z79.01 CHRONIC ANTICOAGULATION: Status: ACTIVE | Noted: 2018-11-08

## 2019-08-09 LAB
BLOOD BANK DISPENSE STATUS: NORMAL
BLOOD BANK DISPENSE STATUS: NORMAL
BLOOD BANK PRODUCT CODE: NORMAL
BLOOD BANK PRODUCT CODE: NORMAL
BPU ID: NORMAL
BPU ID: NORMAL
CEA: 4.5 NG/ML (ref 0–5.5)
DESCRIPTION BLOOD BANK: NORMAL
DESCRIPTION BLOOD BANK: NORMAL

## 2019-08-09 PROCEDURE — 86300 IMMUNOASSAY TUMOR CA 15-3: CPT

## 2019-08-09 PROCEDURE — 82378 CARCINOEMBRYONIC ANTIGEN: CPT

## 2019-08-09 PROCEDURE — 97535 SELF CARE MNGMENT TRAINING: CPT

## 2019-08-09 PROCEDURE — 70450 CT HEAD/BRAIN W/O DYE: CPT

## 2019-08-09 PROCEDURE — 6370000000 HC RX 637 (ALT 250 FOR IP): Performed by: NEUROLOGICAL SURGERY

## 2019-08-09 PROCEDURE — 36415 COLL VENOUS BLD VENIPUNCTURE: CPT

## 2019-08-09 PROCEDURE — 2580000003 HC RX 258: Performed by: NEUROLOGICAL SURGERY

## 2019-08-09 PROCEDURE — 2580000003 HC RX 258: Performed by: INTERNAL MEDICINE

## 2019-08-09 PROCEDURE — 99232 SBSQ HOSP IP/OBS MODERATE 35: CPT | Performed by: INTERNAL MEDICINE

## 2019-08-09 PROCEDURE — 6360000002 HC RX W HCPCS: Performed by: NEUROLOGICAL SURGERY

## 2019-08-09 PROCEDURE — 86301 IMMUNOASSAY TUMOR CA 19-9: CPT

## 2019-08-09 PROCEDURE — 2000000000 HC ICU R&B

## 2019-08-09 PROCEDURE — 97110 THERAPEUTIC EXERCISES: CPT

## 2019-08-09 PROCEDURE — 2700000000 HC OXYGEN THERAPY PER DAY

## 2019-08-09 RX ORDER — DEXAMETHASONE 4 MG/1
4 TABLET ORAL EVERY 12 HOURS SCHEDULED
Status: DISCONTINUED | OUTPATIENT
Start: 2019-08-09 | End: 2019-08-10

## 2019-08-09 RX ORDER — 0.9 % SODIUM CHLORIDE 0.9 %
500 INTRAVENOUS SOLUTION INTRAVENOUS ONCE
Status: COMPLETED | OUTPATIENT
Start: 2019-08-09 | End: 2019-08-09

## 2019-08-09 RX ADMIN — DEXTROSE MONOHYDRATE 1 G: 5 INJECTION INTRAVENOUS at 17:24

## 2019-08-09 RX ADMIN — DEXTROSE MONOHYDRATE 1 G: 5 INJECTION INTRAVENOUS at 10:00

## 2019-08-09 RX ADMIN — METOPROLOL TARTRATE 25 MG: 25 TABLET ORAL at 21:23

## 2019-08-09 RX ADMIN — GABAPENTIN 300 MG: 300 CAPSULE ORAL at 08:31

## 2019-08-09 RX ADMIN — OXYCODONE HYDROCHLORIDE AND ACETAMINOPHEN 1 TABLET: 5; 325 TABLET ORAL at 02:11

## 2019-08-09 RX ADMIN — TIZANIDINE 4 MG: 4 TABLET ORAL at 21:27

## 2019-08-09 RX ADMIN — HYDRALAZINE HYDROCHLORIDE 10 MG: 20 INJECTION INTRAMUSCULAR; INTRAVENOUS at 06:10

## 2019-08-09 RX ADMIN — DEXTROSE MONOHYDRATE 1 G: 5 INJECTION INTRAVENOUS at 02:11

## 2019-08-09 RX ADMIN — DEXAMETHASONE SODIUM PHOSPHATE 4 MG: 4 INJECTION, SOLUTION INTRAMUSCULAR; INTRAVENOUS at 02:11

## 2019-08-09 RX ADMIN — SODIUM CHLORIDE 500 ML: 9 INJECTION, SOLUTION INTRAVENOUS at 12:13

## 2019-08-09 RX ADMIN — OXYCODONE HYDROCHLORIDE AND ACETAMINOPHEN 1 TABLET: 5; 325 TABLET ORAL at 10:42

## 2019-08-09 RX ADMIN — DEXAMETHASONE 4 MG: 4 TABLET ORAL at 08:31

## 2019-08-09 RX ADMIN — TIZANIDINE 4 MG: 4 TABLET ORAL at 08:36

## 2019-08-09 RX ADMIN — OXYCODONE HYDROCHLORIDE AND ACETAMINOPHEN 1 TABLET: 5; 325 TABLET ORAL at 21:27

## 2019-08-09 RX ADMIN — PANTOPRAZOLE SODIUM 40 MG: 40 TABLET, DELAYED RELEASE ORAL at 06:10

## 2019-08-09 RX ADMIN — METOPROLOL TARTRATE 25 MG: 25 TABLET ORAL at 08:31

## 2019-08-09 RX ADMIN — DEXAMETHASONE 4 MG: 4 TABLET ORAL at 21:23

## 2019-08-09 RX ADMIN — GABAPENTIN 300 MG: 300 CAPSULE ORAL at 14:11

## 2019-08-09 RX ADMIN — Medication 10 ML: at 08:31

## 2019-08-09 RX ADMIN — OXYCODONE HYDROCHLORIDE AND ACETAMINOPHEN 1 TABLET: 5; 325 TABLET ORAL at 06:10

## 2019-08-09 RX ADMIN — Medication 10 ML: at 21:24

## 2019-08-09 RX ADMIN — SODIUM CHLORIDE: 9 INJECTION, SOLUTION INTRAVENOUS at 21:31

## 2019-08-09 RX ADMIN — GABAPENTIN 300 MG: 300 CAPSULE ORAL at 21:23

## 2019-08-09 RX ADMIN — PRAVASTATIN SODIUM 40 MG: 40 TABLET ORAL at 21:23

## 2019-08-09 ASSESSMENT — PAIN SCALES - GENERAL
PAINLEVEL_OUTOF10: 0
PAINLEVEL_OUTOF10: 7
PAINLEVEL_OUTOF10: 0
PAINLEVEL_OUTOF10: 1
PAINLEVEL_OUTOF10: 7
PAINLEVEL_OUTOF10: 0
PAINLEVEL_OUTOF10: 7
PAINLEVEL_OUTOF10: 7
PAINLEVEL_OUTOF10: 0

## 2019-08-09 ASSESSMENT — PAIN DESCRIPTION - PAIN TYPE: TYPE: CHRONIC PAIN

## 2019-08-09 ASSESSMENT — PAIN DESCRIPTION - LOCATION: LOCATION: BACK

## 2019-08-09 ASSESSMENT — PAIN DESCRIPTION - ORIENTATION: ORIENTATION: LOWER

## 2019-08-09 ASSESSMENT — PAIN DESCRIPTION - DESCRIPTORS: DESCRIPTORS: ACHING

## 2019-08-09 ASSESSMENT — PAIN DESCRIPTION - FREQUENCY: FREQUENCY: CONTINUOUS

## 2019-08-09 NOTE — PROGRESS NOTES
CRITICAL CARE PROGRESS NOTES    PATIENT NAME: Dandre Jaquez  MRN: [id number]  SERVICE DATE:  August 9, 2019   SERVICE TIME:  9:55 AM      PRIMARY SERVICE: Critical care medicine    CHIEF COMPLAINTS: Back pain    INTERVAL HPI: Patient seen and examined at bedside, Interval Notes, orders reviewed. Discussed on multidisciplinary rounds  Patient remains stable today mainly complaining of discomfort in her back from having to stay in bed on her back but otherwise doing very well, still with no respiratory complaints or change from baseline      OBJECTIVE    Body mass index is 31.3 kg/m². PHYSICAL EXAM:  Vitals:  /74   Pulse 69   Temp 98.3 °F (36.8 °C)   Resp 16   Ht 4' 10.75\" (1.492 m) Comment: from another encounter  Wt 153 lb 10.6 oz (69.7 kg)   LMP  (LMP Unknown)   SpO2 97%   BMI 31.30 kg/m²   General: Patient is alert, awake . comfortable in bed, No distress. Head: Dressing and ventriculostomy drain remain in place  Eyes: PERRL. No icteric sclera. No conjunctival injection. No discharge   ENT: No nasal  discharge. Pharynx clear. Neck:  Trachea midline. No thyromegaly, no JVD, No cervical adenopathy. Chest : Adequate spontaneous resting effort, symmetric bilateral excursions  Lung : Diminished breath sounds bilaterally, clear otherwise  Heart[de-identified] Regular rhythm and rate. No mumur ,  Rub or gallop  ABD: Benign. Non-tender. Non-distended. No masses or organmegaly. Normal bowel sounds. EXT: No pitting edema both lower extremities , No Cyanosis No clubbing  Neuro: no focal weakness  Skin: Warm and dry. No erythema or rash on exposed extremities.       DATA:   Recent Labs     08/07/19  0830   WBC 20.5*   HGB 14.9   HCT 44.1   MCV 91.1        Recent Labs     08/07/19  0830      K 4.6      CO2 19*   BUN 41*   CREATININE 1.14*   GLUCOSE 146*   CALCIUM 7.9*   LABGLOM 46.5*   GFRAA 56.3*       MV Settings:          No results for input(s): PHART, JKM9DPB, PO2ART, DON4HMV, BEART, C6KGPHSX 2019. THE LACK OF IV CONTRAST LIMITS FULL EVALUATION. THIS IS SUPERIMPOSED UPON MODERATE EMPHYSEMATOUS DISEASE ONE PARENCHYMA AS DESCRIBED ABOVE. All CT scans at this facility use dose modulation, iterative reconstruction, and/or weight based dosing when appropriate to reduce radiation dose to as low as reasonably achievable. Ct Abdomen Pelvis W Iv Contrast Additional Contrast? Radiologist Recommendation    Result Date: 7/31/2019  CT ABDOMEN PELVIS W IV CONTRAST HISTORY:   met smith . Brain lesion, possibly metastatic. Concern for malignancy. TECHNIQUE: CT of the abdomen and pelvis was performed using standard technique, scanning from just above the dome of the diaphragm to the symphysis pubis. Including delayed images through the kidneys. Contrast: IV: 100 ml Isovue 300 Oral:  None. All CT scans at this facility use dose modulation, iterative reconstruction, and/or weight based dosing when appropriate to reduce radiation dose to as low as reasonably achievable. COMPARISON: CT 4/23/2018; MRI 12/3/2018. RESULT: Liver: Subcentimeter low-attenuation lesions throughout the liver, including 0.8 x 0.9 cm low-attenuation lesion in the right inferior hepatic lobe (series 2 image 27). These are similar to MRI 12/3/2018 and appear to be benign cysts. Biliary: No bile duct dilation. Gallbladder is collapsed. Pancreas: Diffuse pancreatic calcifications. No discrete pancreatic mass or pancreatic ductal dilation. Spleen: No mass. No splenomegaly. Adrenals: No mass. 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 No hydronephrosis or nephrolithiasis. Subcentimeter lesion(s) that are too small to characterize but likely benign. GI tract: No dilation or wall thickening. Mild diverticulosis without diverticulitis. Lymph nodes: No abdominal or pelvic lymphadenopathy. Mesentery/Peritoneum: No ascites or mass. Retroperitoneum: No mass. Vasculature: The celiac axis and SMA are patent.  The portal vein and branches, splenic vein,

## 2019-08-09 NOTE — PROGRESS NOTES
pancreatic calcifications. No discrete pancreatic mass or pancreatic ductal dilation. Spleen: No mass. No splenomegaly. Adrenals: No mass. 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 No hydronephrosis or nephrolithiasis. Subcentimeter lesion(s) that are too small to characterize but likely benign. GI tract: No dilation or wall thickening. Mild diverticulosis without diverticulitis. Lymph nodes: No abdominal or pelvic lymphadenopathy. Mesentery/Peritoneum: No ascites or mass. Retroperitoneum: No mass. Vasculature: The celiac axis and SMA are patent. The portal vein and branches, splenic vein, SMV, and hepatic veins are patent. Severe arterial atherosclerotic disease without aneurysm with mild ectasia of the infrarenal abdominal aorta measuring 2.5 cm. . Pelvis: No mass, ascites or fluid collection. Uterus unremarkable. Decompressed bladder with trabeculated wall. Bones/Soft Tissues: No acute osseous findings. No destructive osseous lesions. Osteopenia. Scoliosis and degenerative changes throughout the spine with superior endplate sclerosis at A6-E5 and L3-L4 . Small amount of heterogeneous gas right anterior abdominal wall, likely from injection. Lower thorax: Soft tissue nodule along the right hemidiaphragm, appearing pleural-based, measures 3.2 x 3.1 x 1.9 cm (series 2 image 7). Right basilar atelectasis. Emphysematous changes in the lungs. No suspicious lung nodules. Small hiatal hernia with an apparent area of outpouching. Partially visualized pacemaker leads. No evidence for metastatic disease within the abdomen/pelvis. Soft tissue nodule right hemidiaphragm, likely metastatic. Recommend dedicated CT of the lungs for further evaluation of underlying malignancy.  Small hiatal hernia with possible area of esophageal outpouching. ==========     Xr Chest Portable    Result Date: 7/30/2019  EXAMINATION: CHEST PORTABLE VIEW  CLINICAL HISTORY: Gait instability COMPARISONS: March 19, 2017  FINDINGS: Single acute or subacute ischemic focus in the brain. There is a mild degree of cerebral atrophy which is appropriate for the patient's age. The remainder of the supratentorial brain appears unremarkable. 1. APPROXIMATELY 2.5 X 2 X 2 CM INHOMOGENEOUSLY ENHANCING MIDLINE CEREBELLAR MASS WHICH APPEARS TO HAVE A CYSTIC COMPONENT AND A HEMORRHAGIC COMPONENT. MRI FINDINGS ARE COMPATIBLE WITH AN INTRA-AXIAL POSTERIOR FOSSA TUMOR AND DIFFERENTIAL CONSIDERATIONS ARE PRIMARILY BETWEEN A METASTASIS OR LESS LIKELY A HEMANGIOBLASTOMA. 2. NO OTHER ENHANCING LESION IN THE POSTERIOR FOSSA OR IN THE SUPRATENTORIAL BRAIN. THERE IS A MILD DEGREE OF CEREBRAL ATROPHY AND WHITE MATTER T2 HYPERINTENSE FOCI WHICH ARE LIKELY REPRESENT A MANIFESTATION OF SMALL VESSEL DISEASE. NO ACUTE ISCHEMIC FOCUS IN THE BRAIN.       BP (!) 168/72   Pulse 63   Temp 98.2 °F (36.8 °C) (Core)   Resp 18   Ht 4' 10.75\" (1.492 m) Comment: from another encounter  Wt 153 lb 10.6 oz (69.7 kg)   LMP  (LMP Unknown)   SpO2 97%   BMI 31.30 kg/m²     Postop day 1 status post suboccipital craniectomy for cerebellar tumor resection. Uneventful overnight. Afebrile vital signs stable. Awake alert oriented x3. Cranial nerves through 12 nonfocal upgaze is intact. Small drainage from ventriculostomy. Clear. Labs are within normal.    Status post suboccipital craniectomy for cerebellar tumor resection. Dressing is dry. Patient is to be out of bed sitting chair ambulate. Will continue with the ventriculostomy and drain 10 to 15 cc an hour until tomorrow to help with dural closure and lessen the chance for CSF leak. Continue with the Decadron and antibiotics.     This note was addended to be postop day 1 note dictated on 8 9

## 2019-08-09 NOTE — PROGRESS NOTES
was able to tolerate standing thereex without need for rest break and no c/o pain. Pt declined gait this session. Pt demonstrated good standing balance and no episodes of LOB. Activity Tolerance  Activity Tolerance: Patient Tolerated treatment well       Discharge Recommendations:  Patient would benefit from continued therapy after discharge    Goals:  Long term goals  Long term goal 1: indep with transfers  Long term goal 2: ambulate >150 ft LRAD supervision  Long term goal 3: pt to navigate 4 steps with supervision assist Slim Semen term goal 4: pt to demo >good standing balance  Patient Goals   Patient goals : to eventually get out of here    PLAN:   Plan  Times per week: 3-6  Current Treatment Recommendations: Strengthening, Functional Mobility Training, Neuromuscular Re-education, Home Exercise Program, Equipment Evaluation, Education, & procurement, Transfer Training, Gait Training, Safety Education & Training, Balance Training, Endurance Training, Stair training, Patient/Caregiver Education & Training  Plan Comment: Cont. POC  Safety Devices  Type of devices:  All fall risk precautions in place     AMPAC (6 CLICK) BASIC MOBILITY  AM-PAC Inpatient Mobility Raw Score : 13     Therapy Time   Individual   Time In 1330   Time Out 1347   Minutes 17         Thereex:17    Prince Narciso PTA, 08/09/19 at 1:52 PM

## 2019-08-09 NOTE — PROGRESS NOTES
Arteries:    Patency: The vertebral arteries are well visualized to the level of the basilar artery. There is no focal stenosis aneurysm or dissection. Vertebral arteries are codominant. Ct Head Wo Contrast    Result Date: 8/7/2019  EXAMINATION:  CT HEAD WO CONTRAST CLINICAL HISTORY:   Postop COMPARISONS:  August 6, 2019 TECHNIQUE:  Spiral axial images of the brain were obtained without contrast enhancement. Multiplanar two-dimensional reformatting was completed at CT console. FINDINGS:  Again noted are postoperative findings, including occipital craniotomy extending from the foramen magnum, posteriorly toward the internal occipital protuberance. The operative bed contains a small amount of residual blood, diminished since August 6, 2019. There is less pneumocephalus now. The right frontal ventriculostomy catheter is unchanged. There is no hydrocephalus. There is no midline shift. Extra-axial CSF spaces remain widely patent. There is no acute superimposed abnormality. NO UNEXPECTED POSTOPERATIVE FINDINGS, WITH DIMINISHING POSTOPERATIVE BLOOD AT THE OPERATIVE SITE AND DIMINISHING PNEUMOCEPHALUS. VENTRICULOSTOMY IS UNCHANGED. THERE IS NO VENTRICULOMEGALY. THERE IS NO EFFACEMENT OF EXTRA-AXIAL CSF SPACES All CT scans at this facility use dose modulation, iterative reconstruction, and/or weight based dosing when appropriate to reduce radiation dose to as low as reasonably achievable. Ct Head Wo Contrast    Result Date: 8/6/2019  CT Brain Contrast medium:  Not utilized. History:  Craniotomy. Tumor resection Comparison:  August 6, 2019, 0826 hours. Findings: In the interval, a right frontal craniotomy has been performed with insertion of an intraventricular drainage catheter, the tip found near the base at the level of the lateral ventricles, at or just proximal to the cephalad portion of the third ventricle. An additional, midline craniotomy at the skull base is also evident.  The previously visualized mass views of the chest is submitted. Left-sided ICD device leads overlying the cardiac silhouette. The cardiac silhouette is of normal size configuration. The mediastinum is unremarkable. Pulmonary vascular attenuated. Lungs are inflated. Right sided trachea. No focal infiltrates. No Pneumothoraces. NO ACUTE ACTIVE CARDIOPULMONARY PROCESS. RADIOGRAPHIC FINDINGS OF COPD    Mri Brain W Wo Contrast    Result Date: 7/31/2019  EXAMINATION:  MRI BRAIN WITHOUT AND WITH IV CONTRAST CLINICAL HISTORY:  HEADACHE, DIZZINESS, NAUSEA, DISEQUILIBRIUM WITH UNSTEADY GAIT AND WEAKNESS IN BOTH LOWER EXTREMITIES, RECENT ABNORMAL CT BRAIN COMPARISON:  CTA HEAD FROM 7/30/2019 TECHNIQUE:  Multisequence/multiplane MRI brain obtained without and with IV injection of 15 mL of MultiHance. FINDINGS:  The MRI brain without and with IV contrast demonstrates the following: Posterior fossa: There is an approximately 2.5 x 2 x 2 cm inhomogeneously enhancing midline cerebellar mass found posterior to the fourth ventricle with associated cerebral edema. There appears to be a cystic component to this cerebellar tumor. Also, there is some paramagnetic signal within the tumor suggesting a hemorrhagic component to the tumor mass. The MRI findings are compatible with an intra-axial posterior fossa tumor and the differential considerations are primarily between a metastasis or less likely a hemangioblastoma. The tumor is not causing significant extrinsic \"mass effect \" on the fourth ventricle or aqueductal stenosis and there is no hydrocephalus. There is no other enhancing lesion or tumor in the posterior fossa or in the supratentorial brain. Supratentorial brain: There are punctate and patchy T2 hyperintense foci in the white matter of both cerebral hemispheres which are likely a manifestation of small vessel disease.  There is no restricted diffusion or MRI evidence of an

## 2019-08-09 NOTE — PROGRESS NOTES
No events of note overnight for patient. Assessment per flow sheets and medications per mar. Dr. Mely Hernandez at bedside at 4am and dressing changed.

## 2019-08-09 NOTE — PROGRESS NOTES
coronal planes. Three Dimensional Maximum Intensity Projection (3D-MIP) images were created. All images were reviewed and primarily archived to PACS workstation. All CT scans at this facility use dose modulation, iterative reconstruction, and/or weight based dosing when appropriate to reduce radiation dose to as low as reasonably achievable. NASCET Criteria were utilized FINDINGS CTA Head     Intracranial ICAs: Flow is visualized within the precavernous, cavernous, clinoid and supraclinoid segments of the internal carotid arteries bilaterally     Anterior cerebral arteries:Flow is visualized within the bilateral anterior cerebral arteries with no large vessel occlusion or significant stenosis. The left A1 segment is relatively hypoplastic. Middle cerebral arteries:Flow visualized throughout the middle cerebral arteries bilaterally with no large vessel occlusion or significant stenosis. Posterior Circulation: Bilateral posterior cerebral arteries are patent. Basilar artery:Flow is visualized in the basilar artery without significant stenosis. Aneurysm No aneurysm or dissection in the anterior or posterior circulations. .     Neurocranium there is a 2.7 cm ring-enhancing posterior fossa lesion abutting the vermis and extending to the left. There is a central lower density that is not clearly cystic on these images. There is some surrounding low density consistent with edema. The fourth ventricle is normal in appearance. There is no hydrocephalus. On these images there are no additional parenchymal brain lesions. Primary differential would include solitary metastatic lesion most commonly breast or lung. The possibility  of hemangioblastoma is also mentioned. Dural sinus: As visualized the opacified dural venous sinuses are unremarkable. [address] [place] [address].  CTA NECK DATE AND TIME:7/30/2019 2:15 PM CLINICAL HISTORY: Stroke symptoms   gait instability  TECHNIQUE: TECHNIQUE:Helical CTA of the head and neck was performed from the vertex to the aortic arch following the uneventful intravenous administration of 100 cc of nonionic contrast without incident. 2-D images were reconstructed in the sagittal and  coronal planes. Three Dimensional Maximum Intensity Projection (3D-MIP) images were created. All images were reviewed and primarily archived to PACS workstation. All CT scans at this facility use dose modulation, iterative reconstruction, and/or weight  based dosing when appropriate to reduce radiation dose to as low as reasonably achievable. NASCET Criteria were utilized Result / Findings: CTA NECK: Aortic Arch: The visualized portions of the aortic arch and proximal arch vessels demonstrates no focal stenosis aneurysm or dissection. Carotid:   Right  Carotid Stenosis (% by NASCET Criteria): There is no (than 20%) hemodynamically significant stenosis, vascular dissection or aneurysm of the right common or internal carotid arteries. Left  Carotid Stenosis (% by NASCET Criteria): There is some calcified plaque in the left carotid bulb. There is no (less than 20%) hemodynamically significant stenosis, vascular dissection or aneurysm in the left common or internal carotid arteries. Cervical Vertebral Arteries:    Patency: The vertebral arteries are well visualized to the level of the basilar artery. There is no focal stenosis aneurysm or dissection. Vertebral arteries are codominant. Ct Chest Wo Contrast    Result Date: 7/31/2019  EXAMINATION:  CT SCAN CHEST CLINICAL HISTORY:  Recent diagnosis brain mass. COMPARISON:  May 6, 2019. TECHNIQUE:  Multiple serial axial images from the base neck through the upper abdomen with both sagittal coronal reconstruction was performed without intravenous or oral administration of contrast. The lack of IV contrast limits full evaluation. FINDINGS:    There are biapical areas of scarring fibrosis.  There is moderate emphysematous disease in the upper views of the chest is submitted. Left-sided ICD device leads overlying the cardiac silhouette. The cardiac silhouette is of normal size configuration. The mediastinum is unremarkable. Pulmonary vascular attenuated. Lungs are inflated. Right sided trachea. No focal infiltrates. No Pneumothoraces. NO ACUTE ACTIVE CARDIOPULMONARY PROCESS. RADIOGRAPHIC FINDINGS OF COPD    Mri Brain W Wo Contrast    Result Date: 7/31/2019  EXAMINATION:  MRI BRAIN WITHOUT AND WITH IV CONTRAST CLINICAL HISTORY:  HEADACHE, DIZZINESS, NAUSEA, DISEQUILIBRIUM WITH UNSTEADY GAIT AND WEAKNESS IN BOTH LOWER EXTREMITIES, RECENT ABNORMAL CT BRAIN COMPARISON:  CTA HEAD FROM 7/30/2019 TECHNIQUE:  Multisequence/multiplane MRI brain obtained without and with IV injection of 15 mL of MultiHance. FINDINGS:  The MRI brain without and with IV contrast demonstrates the following: Posterior fossa: There is an approximately 2.5 x 2 x 2 cm inhomogeneously enhancing midline cerebellar mass found posterior to the fourth ventricle with associated cerebral edema. There appears to be a cystic component to this cerebellar tumor. Also, there is some paramagnetic signal within the tumor suggesting a hemorrhagic component to the tumor mass. The MRI findings are compatible with an intra-axial posterior fossa tumor and the differential considerations are primarily between a metastasis or less likely a hemangioblastoma. The tumor is not causing significant extrinsic \"mass effect \" on the fourth ventricle or aqueductal stenosis and there is no hydrocephalus. There is no other enhancing lesion or tumor in the posterior fossa or in the supratentorial brain. Supratentorial brain: There are punctate and patchy T2 hyperintense foci in the white matter of both cerebral hemispheres which are likely a manifestation of small vessel disease.  There is no restricted diffusion or MRI evidence of an acute or subacute ischemic focus in the brain. There is a mild degree of cerebral atrophy which is appropriate for the patient's age. The remainder of the supratentorial brain appears unremarkable. 1. APPROXIMATELY 2.5 X 2 X 2 CM INHOMOGENEOUSLY ENHANCING MIDLINE CEREBELLAR MASS WHICH APPEARS TO HAVE A CYSTIC COMPONENT AND A HEMORRHAGIC COMPONENT. MRI FINDINGS ARE COMPATIBLE WITH AN INTRA-AXIAL POSTERIOR FOSSA TUMOR AND DIFFERENTIAL CONSIDERATIONS ARE PRIMARILY BETWEEN A METASTASIS OR LESS LIKELY A HEMANGIOBLASTOMA. 2. NO OTHER ENHANCING LESION IN THE POSTERIOR FOSSA OR IN THE SUPRATENTORIAL BRAIN. THERE IS A MILD DEGREE OF CEREBRAL ATROPHY AND WHITE MATTER T2 HYPERINTENSE FOCI WHICH ARE LIKELY REPRESENT A MANIFESTATION OF SMALL VESSEL DISEASE. NO ACUTE ISCHEMIC FOCUS IN THE BRAIN.       BP (!) 168/72   Pulse 63   Temp 98.2 °F (36.8 °C) (Core)   Resp 18   Ht 4' 10.75\" (1.492 m) Comment: from another encounter  Wt 153 lb 10.6 oz (69.7 kg)   LMP  (LMP Unknown)   SpO2 97%   BMI 31.30 kg/m²   Postop day 3. Doing well overnight with the ventriculostomy closed. No complaints of headache no nausea vomiting complains of incisional pain. Incision is clean and dry. Awake alert oriented x3. Cranial nerves through 12 nonfocal good strength motor and sensory. 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 Check CAT scan of the head tonight and if no evidence of hydrocephalus, EVD is to be removed. Tapering with Decadron and continue with Ancef until drain is out.     DC Wallace out of bed and ambulate

## 2019-08-09 NOTE — PROGRESS NOTES
CC; Pre op clearance. Hx of CAD    SUBJECTIVE: Status post brain surgery. Noted to have lung mass as well. Denies any chest pain or shortness of breath. Normal sinus rhythm on telemetry. Hx of remote PCI. Hemodynamically stable with blood pressure on the low side.     Past Medical History:   Diagnosis Date    Back pain, chronic     Pt  is with pain management    Chronic kidney disease     COPD (chronic obstructive pulmonary disease) ([address]     Coronary artery disease involving native coronary artery of native heart without angina pectoris 3/19/2017    Dyslipidemia 11/20/2016    Essential hypertension 11/20/2016    Factor V deficiency ([address]     H/O blood clots     Headache     Hypertension     Syncope 11/20/2016    Tobacco abuse 11/20/2016     Patient Active Problem List   Diagnosis    Syncope    Essential hypertension    Dyslipidemia    Tobacco abuse    Coronary artery disease involving native coronary artery of native heart without angina pectoris    Hypotension    Lung nodule    SOB (shortness of breath) on exertion    Brain mass    COPD (chronic obstructive pulmonary disease) (HCC)    Age-related osteoporosis without current pathological fracture    Bilateral carotid artery stenosis    Chronic anticoagulation    CKD (chronic kidney disease), stage III (HCC)    DVT (deep venous thrombosis) (HCC)    VTE (venous thromboembolism)    Elevated alkaline phosphatase level    Esophageal dysphagia    Facet arthropathy, lumbar    Factor V Leiden mutation ([address]    Fracture of vertebra due to osteoporosis ([address]    Hiatal hernia    Hypercoagulable state, primary ([address]    Idiopathic chronic pancreatitis ([address]    Lumbosacral spondylosis without myelopathy    MGUS (monoclonal gammopathy of unknown significance)    Presence of permanent cardiac pacemaker    SSS (sick sinus syndrome) ([address]    Tobacco dependence    Neoplasm of brain causing mass effect on adjacent structures (HCC)    PAD (peripheral artery disease) [facility]       Current Facility-Administered Medications   Medication Dose Route Frequency Provider Last Rate Last Dose    dexamethasone (DECADRON) tablet 4 mg  4 mg Oral 2 times per day Sofia Perea MD   4 mg at 08/09/19 0831    0.9 % sodium chloride infusion   Intravenous Continuous Sofia Perea MD 75 mL/hr at 08/09/19 1442      ceFAZolin (ANCEF) 1 g in dextrose 5 % 50 mL IVPB (mini-bag)  1 g Intravenous Q8H Sofia Perea MD   Stopped at 08/09/19 1030    labetalol (NORMODYNE;TRANDATE) injection syringe 10 mg  10 mg Intravenous Q1H PRN Sofia Perea MD        hydrALAZINE (APRESOLINE) injection 10 mg  10 mg Intravenous Q1H PRN Sofia Perea MD   10 mg at 08/09/19 0610    metoprolol tartrate (LOPRESSOR) tablet 25 mg  25 mg Oral BID Sofia Perea MD   25 mg at 08/09/19 0831    gabapentin (NEURONTIN) capsule 300 mg  300 mg Oral TID Sofia Perea MD   300 mg at 08/09/19 1411    pantoprazole (PROTONIX) tablet 40 mg  40 mg Oral QAM AC Sofia Perea MD   40 mg at 08/09/19 0610    pravastatin (PRAVACHOL) tablet 40 mg  40 mg Oral Daily Sofia Perea MD   40 mg at 08/08/19 2040    sodium chloride flush 0.9 % injection 10 mL  10 mL Intravenous 2 times per day Sofia Perea MD   10 mL at 08/09/19 0831    sodium chloride flush 0.9 % injection 10 mL  10 mL Intravenous PRN Sofia Perea MD   10 mL at 07/31/19 0040    magnesium hydroxide (MILK OF MAGNESIA) 400 MG/5ML suspension 30 mL  30 mL Oral Daily PRN Sofia Perea MD        ondansetron [facility]) injection 4 mg  4 mg Intravenous Q6H PRN Sofia Perea MD        acetaminophen (TYLENOL) tablet 650 mg  650 mg Oral Q4H PRN Sofia Perea MD   650 mg at 08/06/19 1023    albuterol sulfate  (90 Base) MCG/ACT inhaler 2 puff  2 puff Inhalation Q4H PRN Sofia Perea MD        oxyCODONE-acetaminophen (PERCOCET) 5-325 MG per tablet 1 tablet  1 tablet Oral Q4H PRN Sofia Perea MD   1 tablet at 08/09/19 1042    tiZANidine (ZANAFLEX) tablet 4 mg  4 mg Oral Q8H PRN Sofia Perea MD   4 mg at

## 2019-08-10 ENCOUNTER — APPOINTMENT (OUTPATIENT)
Dept: CT IMAGING | Age: 74
DRG: 025 | End: 2019-08-10
Attending: INTERNAL MEDICINE
Payer: MEDICARE

## 2019-08-10 PROBLEM — G11.9: Status: ACTIVE | Noted: 2019-08-10

## 2019-08-10 PROBLEM — R26.9 GAIT ABNORMALITY: Status: ACTIVE | Noted: 2019-08-10

## 2019-08-10 PROCEDURE — 1210000000 HC MED SURG R&B

## 2019-08-10 PROCEDURE — 6360000004 HC RX CONTRAST MEDICATION: Performed by: INTERNAL MEDICINE

## 2019-08-10 PROCEDURE — 6370000000 HC RX 637 (ALT 250 FOR IP): Performed by: NEUROLOGICAL SURGERY

## 2019-08-10 PROCEDURE — 2580000003 HC RX 258: Performed by: NEUROLOGICAL SURGERY

## 2019-08-10 PROCEDURE — 97116 GAIT TRAINING THERAPY: CPT

## 2019-08-10 PROCEDURE — 99221 1ST HOSP IP/OBS SF/LOW 40: CPT | Performed by: PHYSICAL MEDICINE & REHABILITATION

## 2019-08-10 PROCEDURE — 71260 CT THORAX DX C+: CPT

## 2019-08-10 PROCEDURE — 99232 SBSQ HOSP IP/OBS MODERATE 35: CPT | Performed by: INTERNAL MEDICINE

## 2019-08-10 PROCEDURE — 6370000000 HC RX 637 (ALT 250 FOR IP): Performed by: INTERNAL MEDICINE

## 2019-08-10 PROCEDURE — 6360000002 HC RX W HCPCS: Performed by: NEUROLOGICAL SURGERY

## 2019-08-10 PROCEDURE — 2700000000 HC OXYGEN THERAPY PER DAY

## 2019-08-10 RX ORDER — DEXAMETHASONE 4 MG/1
2 TABLET ORAL EVERY 12 HOURS SCHEDULED
Status: DISCONTINUED | OUTPATIENT
Start: 2019-08-10 | End: 2019-08-11 | Stop reason: HOSPADM

## 2019-08-10 RX ORDER — CEPHALEXIN 500 MG/1
500 CAPSULE ORAL EVERY 8 HOURS SCHEDULED
Status: DISCONTINUED | OUTPATIENT
Start: 2019-08-11 | End: 2019-08-11 | Stop reason: HOSPADM

## 2019-08-10 RX ORDER — DOCUSATE SODIUM 100 MG/1
100 CAPSULE, LIQUID FILLED ORAL 2 TIMES DAILY
Status: DISCONTINUED | OUTPATIENT
Start: 2019-08-10 | End: 2019-08-11 | Stop reason: HOSPADM

## 2019-08-10 RX ADMIN — PRAVASTATIN SODIUM 40 MG: 40 TABLET ORAL at 20:51

## 2019-08-10 RX ADMIN — GABAPENTIN 300 MG: 300 CAPSULE ORAL at 20:50

## 2019-08-10 RX ADMIN — DEXAMETHASONE 2 MG: 4 TABLET ORAL at 20:50

## 2019-08-10 RX ADMIN — GABAPENTIN 300 MG: 300 CAPSULE ORAL at 08:38

## 2019-08-10 RX ADMIN — Medication 10 ML: at 08:43

## 2019-08-10 RX ADMIN — MAGNESIUM HYDROXIDE 30 ML: 400 SUSPENSION ORAL at 20:50

## 2019-08-10 RX ADMIN — PANTOPRAZOLE SODIUM 40 MG: 40 TABLET, DELAYED RELEASE ORAL at 06:20

## 2019-08-10 RX ADMIN — HYDRALAZINE HYDROCHLORIDE 10 MG: 20 INJECTION INTRAMUSCULAR; INTRAVENOUS at 00:11

## 2019-08-10 RX ADMIN — DEXTROSE MONOHYDRATE 1 G: 5 INJECTION INTRAVENOUS at 08:39

## 2019-08-10 RX ADMIN — TIZANIDINE 4 MG: 4 TABLET ORAL at 20:51

## 2019-08-10 RX ADMIN — DOCUSATE SODIUM 100 MG: 100 CAPSULE, LIQUID FILLED ORAL at 20:50

## 2019-08-10 RX ADMIN — OXYCODONE HYDROCHLORIDE AND ACETAMINOPHEN 1 TABLET: 5; 325 TABLET ORAL at 01:48

## 2019-08-10 RX ADMIN — DEXTROSE MONOHYDRATE 1 G: 5 INJECTION INTRAVENOUS at 01:44

## 2019-08-10 RX ADMIN — Medication 10 ML: at 20:50

## 2019-08-10 RX ADMIN — OXYCODONE HYDROCHLORIDE AND ACETAMINOPHEN 1 TABLET: 5; 325 TABLET ORAL at 20:51

## 2019-08-10 RX ADMIN — DEXAMETHASONE 4 MG: 4 TABLET ORAL at 08:38

## 2019-08-10 RX ADMIN — GABAPENTIN 300 MG: 300 CAPSULE ORAL at 13:55

## 2019-08-10 RX ADMIN — METOPROLOL TARTRATE 25 MG: 25 TABLET ORAL at 20:50

## 2019-08-10 RX ADMIN — DOCUSATE SODIUM 100 MG: 100 CAPSULE, LIQUID FILLED ORAL at 13:55

## 2019-08-10 RX ADMIN — METOPROLOL TARTRATE 25 MG: 25 TABLET ORAL at 08:38

## 2019-08-10 RX ADMIN — IOPAMIDOL 75 ML: 612 INJECTION, SOLUTION INTRAVENOUS at 12:53

## 2019-08-10 ASSESSMENT — ENCOUNTER SYMPTOMS
EYE REDNESS: 0
SHORTNESS OF BREATH: 1
STRIDOR: 0
ABDOMINAL PAIN: 0
PHOTOPHOBIA: 0
BLOOD IN STOOL: 0
WHEEZING: 0
EYE PAIN: 0
CONSTIPATION: 1
COUGH: 0
NAUSEA: 0
SORE THROAT: 0
BACK PAIN: 1
DIARRHEA: 0
VOMITING: 0

## 2019-08-10 ASSESSMENT — PAIN SCALES - GENERAL
PAINLEVEL_OUTOF10: 2
PAINLEVEL_OUTOF10: 0
PAINLEVEL_OUTOF10: 6
PAINLEVEL_OUTOF10: 0
PAINLEVEL_OUTOF10: 7
PAINLEVEL_OUTOF10: 0

## 2019-08-10 ASSESSMENT — PAIN DESCRIPTION - DESCRIPTORS
DESCRIPTORS: HEADACHE;SORE
DESCRIPTORS: ACHING

## 2019-08-10 ASSESSMENT — PAIN DESCRIPTION - FREQUENCY: FREQUENCY: INTERMITTENT

## 2019-08-10 ASSESSMENT — PAIN DESCRIPTION - ONSET: ONSET: ON-GOING

## 2019-08-10 ASSESSMENT — PAIN DESCRIPTION - LOCATION
LOCATION: BACK
LOCATION: BACK;HEAD

## 2019-08-10 ASSESSMENT — PAIN DESCRIPTION - PROGRESSION: CLINICAL_PROGRESSION: NOT CHANGED

## 2019-08-10 ASSESSMENT — PAIN DESCRIPTION - ORIENTATION: ORIENTATION: LOWER

## 2019-08-10 ASSESSMENT — PAIN DESCRIPTION - PAIN TYPE
TYPE: ACUTE PAIN;SURGICAL PAIN
TYPE: CHRONIC PAIN

## 2019-08-10 NOTE — PROGRESS NOTES
contains a small amount of residual blood, diminished since August 6, 2019. There is less pneumocephalus now. The right frontal ventriculostomy catheter is unchanged. There is no hydrocephalus. There is no midline shift. Extra-axial CSF spaces remain widely patent. There is no acute superimposed abnormality. NO UNEXPECTED POSTOPERATIVE FINDINGS, WITH DIMINISHING POSTOPERATIVE BLOOD AT THE OPERATIVE SITE AND DIMINISHING PNEUMOCEPHALUS. VENTRICULOSTOMY IS UNCHANGED. THERE IS NO VENTRICULOMEGALY. THERE IS NO EFFACEMENT OF EXTRA-AXIAL CSF SPACES All CT scans at this facility use dose modulation, iterative reconstruction, and/or weight based dosing when appropriate to reduce radiation dose to as low as reasonably achievable. Ct Head Wo Contrast    Result Date: 8/6/2019  CT Brain Contrast medium:  Not utilized. History:  Craniotomy. Tumor resection Comparison:  August 6, 2019, 0826 hours. Findings: In the interval, a right frontal craniotomy has been performed with insertion of an intraventricular drainage catheter, the tip found near the base at the level of the lateral ventricles, at or just proximal to the cephalad portion of the third ventricle. An additional, midline craniotomy at the skull base is also evident. The previously visualized mass found centrally within the cerebellum is no longer evident. A focal 9 x 9 mm area of increased attenuation is found in the region of the midline cerebellar resection. No extra-axial fluid collection, midline shift, or mass effect. There is identified along the anterior margin of the ambient cistern bilaterally as well as within the suprasellar cistern. Ventricles normal in size. Facial sinuses patent. Mastoid air cells well pneumatized. Impression: Interval craniotomy, posterior midline skull base or resection of cerebellar mass and interval insertion of an intraventricular shunt catheter via right frontal craniotomy. Postsurgical changes discussed.  All CT scans at this facility use dose modulation, iterative reconstruction, and/or weight based dosing when appropriate to reduce radiation dose to as low as reasonably achievable. Ct Head W Wo Contrast    Result Date: 8/6/2019  CT HEAD W WO CONTRAST CLINICAL HISTORY:  MASS COMPARISON: MRI July 31, 2019. TECHNIQUE: Multiple unenhanced and enhanced serial axial images of the brain from the vertex of the skull to the base of the skull were performed following the intravenous administration of 50 mL of Isovue-300. FINDINGS: The ventricles are dilated. This is compensatory to the surrounding generalized parenchymal volume loss. Unchanged. Isamar Loose No midline shift. The cisterns are patent. There are white matter and periventricular changes most likely consistent with chronic small vessel disease. There is a mass within the posterior inferior aspect of the left cerebellar hemisphere. It shows circumferential increased attenuation which may resent persistence of contrast from prior CT/and/or MRI. It measures approximately 1.4 x 1.7 x 1.4 in the transverse AP and cephalocaudad dimension. Following the  intravenous administration of contrast there are no vascular abnormalities. The mass is again identified within the left cerebral hemisphere and shows heterogeneous enhancement. No additional masses are identified. No acute intra-axial or extra-axial findings. The visualized osseous structures are unremarkable. The visualized portion of the paranasal sinuses, and mastoids are unremarkable. THERE IS A MASS WITH HETEROGENEOUS ENHANCEMENT IN THE LEFT CEREBELLAR HEMISPHERE. THERE IS ALSO INCREASED PERIPHERAL CIRCUMFERENTIAL ATTENUATION WHICH MAY REPRESENT EFFECTS OF RESIDUAL PRIOR CONTRAST FROM CT AND/OR MRI NO ACUTE EXTRA-AXIAL FINDINGS. All CT scans at this facility use dose modulation, iterative reconstruction, and/or weight based dosing when appropriate to reduce radiation dose to as low as reasonably achievable.      [address] [facility] and proximal arch vessels demonstrates no focal stenosis aneurysm or dissection. Carotid:   Right  Carotid Stenosis (% by NASCET Criteria): There is no (than 20%) hemodynamically significant stenosis, vascular dissection or aneurysm of the right common or internal carotid arteries. Left  Carotid Stenosis (% by NASCET Criteria): There is some calcified plaque in the left carotid bulb. There is no (less than 20%) hemodynamically significant stenosis, vascular dissection or aneurysm in the left common or internal carotid arteries. Cervical Vertebral Arteries:    Patency: The vertebral arteries are well visualized to the level of the basilar artery. There is no focal stenosis aneurysm or dissection. Vertebral arteries are codominant. Ct Chest Wo Contrast    Result Date: 7/31/2019  EXAMINATION:  CT SCAN CHEST CLINICAL HISTORY:  Recent diagnosis brain mass. COMPARISON:  May 6, 2019. TECHNIQUE:  Multiple serial axial images from the base neck through the upper abdomen with both sagittal coronal reconstruction was performed without intravenous or oral administration of contrast. The lack of IV contrast limits full evaluation. FINDINGS:    There are biapical areas of scarring fibrosis. There is moderate emphysematous disease in the upper one third of the lung parenchyma. There is a pleural-based soft tissue density at the base medial aspect of the right lower lobe. It measures 2.7 x 2.9 x 1.0 cm in the transverse and AP and cephalocaudad dimension. Best seen on the coronal reconstructions. No other focal parenchymal abnormalities. No pleural effusions. No pneumothoraces. No significant periarticular. There is pretracheal adenopathy. No significant perihilar or subcarinal adenopathy. PLEURAL-BASED SOFT TISSUE MASS OVERLYING THE MEDIAL ASPECT OF THE RIGHT LOWER LOBE, RIGHT HEMIDIAPHRAGM. THIS IS NEW SINCE THE PRIOR STUDY OF MAY 6, 2019. THE LACK OF IV CONTRAST LIMITS FULL EVALUATION.  THIS IS SUPERIMPOSED FOCI WHICH ARE LIKELY REPRESENT A MANIFESTATION OF SMALL VESSEL DISEASE. NO ACUTE ISCHEMIC FOCUS IN THE BRAIN.              IMPRESSION AND SUGGESTION:  Patient is at risk due to   · COPD currently at baseline  · Pleural-based right lower lobe supra diaphragmatic mass new since last CT scan,  · Cerebellar mass metastatic carcinoma primary is not clear yet  · Smoking      Recommendation  · Start ICS/LABA   · Patient on steroid for brain issues which will help with her COPD control  · Watch volume status and avoid overload  · Incentive spirometry  · Follow-up final pathology report       Electronically signed by Oscar Torrez MD,  FCCP   on 8/10/2019 at 9:35 AM

## 2019-08-10 NOTE — PROGRESS NOTES
07/20/2011    Lumbosacral spondylosis without myelopathy 07/20/2011        Past Medical History:   Diagnosis Date    Back pain, chronic     Pt  is with pain management    Chronic kidney disease     [facility]     Coronary artery disease involving native coronary artery of native heart without angina pectoris 3/19/2017    Dyslipidemia 11/20/2016    Essential hypertension 11/20/2016    Factor V deficiency ([facility]     H/O blood clots     Headache     Hypertension     Syncope 11/20/2016    Tobacco abuse 11/20/2016     Past Surgical History:   Procedure Laterality Date    BREAST BIOPSY Right     CORONARY ANGIOPLASTY WITH STENT PLACEMENT      CRANIOTOMY N/A 8/6/2019    SUBOCCIPITAL CRANIOTOMY FOR TUMOR AND VENTRICULOSTOMY performed by West Dance, MD at [facility]          Restrictions:  Restrictions/Precautions: Fall Risk  Position Activity Restriction  Other position/activity restrictions: cranial drain, pt must be clamped by RN prior to sitting and standing     SUBJECTIVE:  General  Chart Reviewed: Yes  Family / Caregiver Present: No  Subjective  Subjective: I've been walking all the way down the hallway, I don't like staying in the bed. Pre Pain Assessment:  Pre Treatment Pain Screening  Pain at present: 0  Scale Used: Numeric Score  Intervention List: Patient able to continue with treatment  Pain Screening  Patient Currently in Pain: No       Post Pain Assessment:   Pain Assessment  Pain Assessment: 0-10  Pain Level: 0       OBJECTIVE:         Bed mobility  Supine to Sit: Stand by assistance  Sit to Supine: Stand by assistance    Transfers  Sit to Stand: Contact guard assistance  Stand to sit: Contact guard assistance  Comment: increased time to complete, however steady.     Ambulation  Ambulation?: Yes  Ambulation 1  Surface: level tile  Device: Rollator  Other Apparatus: O2(3L)  Assistance: Stand by assistance  Gait Deviations: Slow Connie;Decreased step length  Distance: 200'   Comments: SpO2 >93% throughout on RA. pt does not need rest break to complete, tolerates well. ASSESSMENT:  Body structures, Functions, Activity limitations: Decreased balance;Decreased functional mobility ; Decreased strength;Decreased coordination;Decreased endurance    Assessment: pt with good mobility, uses rollator well. Activity Tolerance  Activity Tolerance: Patient Tolerated treatment well       Discharge Recommendations:  Patient would benefit from continued therapy after discharge    Goals:  Long term goals  Long term goal 1: indep with transfers  Long term goal 2: ambulate >150 ft LRAD supervision  Long term goal 3: pt to navigate 4 steps with supervision assist Sindy Meng term goal 4: pt to demo >good standing balance  Patient Goals   Patient goals : to eventually get out of here    PLAN:   Plan  Times per week: 3-6  Current Treatment Recommendations: Strengthening, Functional Mobility Training, Neuromuscular Re-education, Home Exercise Program, Equipment Evaluation, Education, & procurement, Transfer Training, Gait Training, Safety Education & Training, Balance Training, Endurance Training, Stair training, Patient/Caregiver Education & Training  Plan Comment: Cont. POC  Safety Devices  Type of devices:  All fall risk precautions in place, Bed alarm in place, Call light within reach, Left in bed     AMPAC (6 CLICK) BASIC MOBILITY  AM-PAC Inpatient Mobility Raw Score : 18      Therapy Time   Individual   Time In 1317   Time Out 1332   Minutes 15      Gait: 12  BM/Trsf: 3       Jignesh Cordon PTA, 08/10/19 at 1:44 PM

## 2019-08-10 NOTE — PROGRESS NOTES
found posterior to the fourth ventricle with associated cerebral edema. There appears to be a cystic component to this cerebellar tumor. Also,   there is some paramagnetic signal within the tumor suggesting a hemorrhagic component to the tumor mass. The MRI findings are compatible with an intra-axial posterior fossa tumor and the differential considerations are primarily between a metastasis or   less likely a hemangioblastoma. The tumor is not causing significant extrinsic \"mass effect \" on the fourth ventricle or aqueductal stenosis and there is no hydrocephalus. There is no other enhancing lesion or tumor in the posterior fossa or in the   supratentorial brain. Supratentorial brain: There are punctate and patchy T2 hyperintense foci in the white matter of both cerebral hemispheres which are likely a manifestation of small vessel disease. There is no restricted diffusion or MRI evidence of an acute or subacute   ischemic focus in the brain. There is a mild degree of cerebral atrophy which is appropriate for the patient's age. The remainder of the supratentorial brain appears unremarkable. Impression 1. APPROXIMATELY 2.5 X 2 X 2 CM INHOMOGENEOUSLY ENHANCING MIDLINE CEREBELLAR MASS WHICH APPEARS TO HAVE A CYSTIC COMPONENT AND A HEMORRHAGIC COMPONENT. MRI FINDINGS ARE COMPATIBLE WITH AN INTRA-AXIAL POSTERIOR FOSSA TUMOR AND DIFFERENTIAL CONSIDERATIONS   ARE PRIMARILY BETWEEN A METASTASIS OR LESS LIKELY A HEMANGIOBLASTOMA. 2. NO OTHER ENHANCING LESION IN THE POSTERIOR FOSSA OR IN THE SUPRATENTORIAL BRAIN. THERE IS A MILD DEGREE OF CEREBRAL ATROPHY AND WHITE MATTER T2 HYPERINTENSE FOCI WHICH ARE LIKELY REPRESENT A MANIFESTATION OF SMALL VESSEL DISEASE. NO ACUTE ISCHEMIC   FOCUS IN THE BRAIN. No results found for this or any previous visit. MRA of the Head and Neck: No results found for this or any previous visit. No results found for this or any previous visit. No results found for

## 2019-08-10 NOTE — PROGRESS NOTES
Patient: Lior Goel  Unit/Bed: IC12/IC12-01  YOB: 1945  MRN: [id number] Acct: [de-identified]   Admitting Diagnosis: Brain mass [G93.9]  Admit Date:  7/30/2019  Hospital Day: 6    Current Medications:    Scheduled Meds:   glycopyrrolate-formoterol  2 puff Inhalation BID    dexamethasone  2 mg Oral 2 times per day    [START ON 8/11/2019] cephALEXin  500 mg Oral 3 times per day    metoprolol tartrate  25 mg Oral BID    gabapentin  300 mg Oral TID    pantoprazole  40 mg Oral QAM AC    pravastatin  40 mg Oral Daily    sodium chloride flush  10 mL Intravenous 2 times per day    nicotine  1 patch Transdermal Daily     Continuous Infusions:  PRN Meds:.hydrALAZINE, sodium chloride flush, magnesium hydroxide, ondansetron, acetaminophen, albuterol sulfate HFA, oxyCODONE-acetaminophen, tiZANidine  . No results for input(s): WBC, HGB, HCT, MCV, PLT in the last 72 hours. No results for input(s): NA, K, CL, CO2, PHOS, BUN, CREATININE in the last 72 hours. Invalid input(s): CA  No results for input(s): AST, ALT, ALB, BILIDIR, BILITOT, ALKPHOS in the last 72 hours. No results for input(s): LIPASE, AMYLASE in the last 72 hours. No results for input(s): PROT, INR in the last 72 hours. Imaging Results:    Cta Head W Wo Contrast    Result Date: 7/30/2019  CTA HEAD AND CTA NECK 1. There is no large vessel occlusion or significant stenosis of the bilateral middle cerebral arteries, terminal internal carotid arteries, basilar artery, or cervical internal carotid arteries. 2.  2.7 cm ring-enhancing lesion in the posterior fossa abutting the vermis and extending to the left. There is some central lower density that is not clearly cystic on these images. There is some surrounding low density consistent with edema and probable mass effect. The fourth ventricle is normal in appearance. There is no hydrocephalus. On these images there are no additional parenchymal brain lesions.  Primary differential would or mass. Retroperitoneum: No mass. Vasculature: The celiac axis and SMA are patent. The portal vein and branches, splenic vein, SMV, and hepatic veins are patent. Severe arterial atherosclerotic disease without aneurysm with mild ectasia of the infrarenal abdominal aorta measuring 2.5 cm. . Pelvis: No mass, ascites or fluid collection. Uterus unremarkable. Decompressed bladder with trabeculated wall. Bones/Soft Tissues: No acute osseous findings. No destructive osseous lesions. Osteopenia. Scoliosis and degenerative changes throughout the spine with superior endplate sclerosis at X6-T3 and L3-L4 . Small amount of heterogeneous gas right anterior abdominal wall, likely from injection. Lower thorax: Soft tissue nodule along the right hemidiaphragm, appearing pleural-based, measures 3.2 x 3.1 x 1.9 cm (series 2 image 7). Right basilar atelectasis. Emphysematous changes in the lungs. No suspicious lung nodules. Small hiatal hernia with an apparent area of outpouching. Partially visualized pacemaker leads. No evidence for metastatic disease within the abdomen/pelvis. Soft tissue nodule right hemidiaphragm, likely metastatic. Recommend dedicated CT of the lungs for further evaluation of underlying malignancy. Small hiatal hernia with possible area of esophageal outpouching. ==========     Xr Chest Portable    Result Date: 7/30/2019  EXAMINATION: CHEST PORTABLE VIEW  CLINICAL HISTORY: Gait instability COMPARISONS: March 19, 2017  FINDINGS: Single  views of the chest is submitted. Left-sided ICD device leads overlying the cardiac silhouette. The cardiac silhouette is of normal size configuration. The mediastinum is unremarkable. Pulmonary vascular attenuated. Lungs are inflated. Right sided trachea. No focal infiltrates. No Pneumothoraces. NO ACUTE ACTIVE CARDIOPULMONARY PROCESS.  RADIOGRAPHIC FINDINGS OF COPD    Mri Brain W Wo

## 2019-08-10 NOTE — PROGRESS NOTES
CONTRAST   Final Result   PLEURAL-BASED SOFT TISSUE MASS OVERLYING THE MEDIAL ASPECT OF THE RIGHT LOWER LOBE, RIGHT HEMIDIAPHRAGM. THIS IS NEW SINCE THE PRIOR STUDY OF MAY 6, 2019. THE LACK OF IV CONTRAST LIMITS FULL EVALUATION. THIS IS SUPERIMPOSED UPON MODERATE    EMPHYSEMATOUS DISEASE ONE PARENCHYMA AS DESCRIBED ABOVE. All CT scans at this facility use dose modulation, iterative reconstruction, and/or weight based dosing when appropriate to reduce radiation dose to as low as reasonably achievable. MRI BRAIN W WO CONTRAST   Final Result   1. APPROXIMATELY 2.5 X 2 X 2 CM INHOMOGENEOUSLY ENHANCING MIDLINE CEREBELLAR MASS WHICH APPEARS TO HAVE A CYSTIC COMPONENT AND A HEMORRHAGIC COMPONENT. MRI FINDINGS ARE COMPATIBLE WITH AN INTRA-AXIAL POSTERIOR FOSSA TUMOR AND DIFFERENTIAL CONSIDERATIONS    ARE PRIMARILY BETWEEN A METASTASIS OR LESS LIKELY A HEMANGIOBLASTOMA. 2. NO OTHER ENHANCING LESION IN THE POSTERIOR FOSSA OR IN THE SUPRATENTORIAL BRAIN. THERE IS A MILD DEGREE OF CEREBRAL ATROPHY AND WHITE MATTER T2 HYPERINTENSE FOCI WHICH ARE LIKELY REPRESENT A MANIFESTATION OF SMALL VESSEL DISEASE. NO ACUTE ISCHEMIC    FOCUS IN THE BRAIN. CT ABDOMEN PELVIS W IV CONTRAST Additional Contrast? Radiologist Recommendation   Final Result      No evidence for metastatic disease within the abdomen/pelvis. Soft tissue nodule right hemidiaphragm, likely metastatic. Recommend dedicated CT of the lungs for further evaluation of underlying malignancy. Small hiatal hernia with possible area of esophageal outpouching.         ==========           Assessment/Plan:    s/p cerebellar mass resection   Doing well, following commands, sitting in chair. Drain removed    Hpl: on statin    Htn: monitor BP, adjust meds as appropriate.      Anticoag: on xarelto for vte hx, this has been held by neurosurgery    Ok to dc when cleared by neurosurg    35 minutes total care time, >1/2 in unit/floor time and care coordination     Electronically signed by Rosey Paiz MD

## 2019-08-10 NOTE — PROGRESS NOTES
Hematology/Oncology   Progress Note        CHIEF COMPLAINT/HPI:  Follow up of metastatic cancer in the brain. Ct of the abdomen showed a soft mass in the right hemidiaphram. For ct chest.     REVIEW OF SYSTEMS:    Unremarkable except for symptoms mentioned in HPI.     Current Inpatient Medications:    Current Facility-Administered Medications   Medication Dose Route Frequency Provider Last Rate Last Dose    glycopyrrolate-formoterol (BEVESPI) 9-4.8 MCG/ACT inhaler 2 puff  2 puff Inhalation BID Sherry Keyes MD        dexamethasone (DECADRON) tablet 4 mg  4 mg Oral 2 times per day Selma Cheema MD   4 mg at 08/10/19 0838    0.9 % sodium chloride infusion   Intravenous Continuous Selma Cheema MD 75 mL/hr at 08/09/19 2131      ceFAZolin (ANCEF) 1 g in dextrose 5 % 50 mL IVPB (mini-bag)  1 g Intravenous Q8H Selma Cheema  mL/hr at 08/10/19 0839 1 g at 08/10/19 0839    labetalol (NORMODYNE;TRANDATE) injection syringe 10 mg  10 mg Intravenous Q1H PRN Selma Cheema MD        hydrALAZINE (APRESOLINE) injection 10 mg  10 mg Intravenous Q1H PRN Selma Cheema MD   10 mg at 08/10/19 0011    metoprolol tartrate (LOPRESSOR) tablet 25 mg  25 mg Oral BID Selma Cheema MD   25 mg at 08/10/19 7462    gabapentin (NEURONTIN) capsule 300 mg  300 mg Oral TID Selma Cheema MD   300 mg at 08/10/19 0838    pantoprazole (PROTONIX) tablet 40 mg  40 mg Oral QAM AC Selma Cheema MD   40 mg at 08/10/19 3083    pravastatin (PRAVACHOL) tablet 40 mg  40 mg Oral Daily Selma Cheema MD   40 mg at 08/09/19 2123    sodium chloride flush 0.9 % injection 10 mL  10 mL Intravenous 2 times per day Selma Cheema MD   10 mL at 08/10/19 0843    sodium chloride flush 0.9 % injection 10 mL  10 mL Intravenous PRN Selma Cheema MD   10 mL at 07/31/19 0040    magnesium hydroxide (MILK OF MAGNESIA) 400 MG/5ML suspension 30 mL  30 mL Oral Daily PRN Selma Cheema MD        ondansetron [facility]) injection 4 mg  4 mg Intravenous Q6H PRN Selma Cheema MD        acetaminophen (TYLENOL) tablet 650 mg  650 mg Oral Q4H PRN Caden Sandoval MD   650 mg at 08/06/19 1023    albuterol sulfate  (90 Base) MCG/ACT inhaler 2 puff  2 puff Inhalation Q4H PRN Caden Sandoval MD        oxyCODONE-acetaminophen (PERCOCET) 5-325 MG per tablet 1 tablet  1 tablet Oral Q4H PRN Caden Sandoval MD   1 tablet at 08/10/19 0148    tiZANidine (ZANAFLEX) tablet 4 mg  4 mg Oral Q8H PRN Caden Sandoval MD   4 mg at 08/09/19 2127    nicotine (NICODERM CQ) 21 MG/24HR 1 patch  1 patch Transdermal Daily Caden Sandoval MD   Stopped at 08/01/19 9254       PHYSICAL EXAM:    EYES:  Lids and lashes normal, pupils equal, round and reactive to light, extra ocular muscles intact, sclera clear, conjunctiva normal    ENT:  Normocephalic, without obvious abnormality, atraumatic, sinuses nontender on palpation, external ears without lesions, oral pharynx with moist mucus membranes, tonsils without erythema or exudates, gums normal and good dentition. NECK:  Supple, symmetrical, trachea midline, no adenopathy, thyroid symmetric, not enlarged and no tenderness, skin normal    CHEST:    LUNGS:  No increased work of breathing, good air exchange, clear to auscultation bilaterally, no crackles or wheezing    CARDIOVASCULAR:  Normal apical impulse, regular rate and rhythm, normal S1 and S2, no S3 or S4, and no murmur noted    ABDOMEN:  No scars, normal bowel sounds, soft, non-distended, non-tender, no masses palpated, no hepatosplenomegally    MUSCULOSKELETAL:  There is no redness, warmth, or swelling of the joints. Full range of motion noted. Motor strength is 5 out of 5 all extremities bilaterally.   Tone is normal.  EXTREMITIES:    NEURO:    DATA:      PT/INR:  No results found for: PTINR  PTT:    Lab Results   Component Value Date    APTT 33.3 07/30/2019     CMP:    Lab Results   Component Value Date     08/07/2019    K 4.6 08/07/2019     08/07/2019    CO2 19 08/07/2019    BUN 41 08/07/2019    PROT 8.3 07/30/2019     Magnesium:    Lab Results

## 2019-08-10 NOTE — PROGRESS NOTES
absent    Medications:  Reviewed    Infusion Medications:   Scheduled Medications:    metoprolol tartrate  25 mg Oral BID    gabapentin  300 mg Oral TID    pantoprazole  40 mg Oral QAM AC    pravastatin  40 mg Oral Daily    isosorbide mononitrate  30 mg Oral Daily    dexamethasone  10 mg Intravenous Q6H    sodium chloride flush  10 mL Intravenous 2 times per day    nicotine  1 patch Transdermal Daily     PRN Meds: sodium chloride flush, magnesium hydroxide, ondansetron, acetaminophen, albuterol sulfate HFA, oxyCODONE-acetaminophen, tiZANidine    Labs:   No results for input(s): WBC, HGB, HCT, PLT in the last 72 hours. No results for input(s): NA, K, CL, CO2, BUN, CREATININE, CALCIUM, PHOS in the last 72 hours. Invalid input(s): MAGNES  No results for input(s): AST, ALT, BILIDIR, BILITOT, ALKPHOS in the last 72 hours. No results for input(s): INR in the last 72 hours. No results for input(s): Yonis Nerissa in the last 72 hours. Urinalysis:   Lab Results   Component Value Date    NITRU Negative 07/30/2019    WBCUA 0-2 07/30/2019    BACTERIA Rare 07/30/2019    RBCUA 0-2 07/30/2019    BLOODU Negative 07/30/2019    SPECGRAV 1.020 07/30/2019    GLUCOSEU Negative 07/30/2019       Radiology:   Most recent    EEG No procedure found. MRI of Brain   Results for orders placed during the hospital encounter of 07/30/19   MRI BRAIN W WO CONTRAST    Narrative EXAMINATION:  MRI BRAIN WITHOUT AND WITH IV CONTRAST    CLINICAL HISTORY:  HEADACHE, DIZZINESS, NAUSEA, DISEQUILIBRIUM WITH UNSTEADY GAIT AND WEAKNESS IN BOTH LOWER EXTREMITIES, RECENT ABNORMAL CT BRAIN    COMPARISON:  CTA HEAD FROM 7/30/2019    TECHNIQUE:  Multisequence/multiplane MRI brain obtained without and with IV injection of 15 mL of MultiHance. FINDINGS:  The MRI brain without and with IV contrast demonstrates the following:    Posterior fossa:  There is an approximately 2.5 x 2 x 2 cm inhomogeneously enhancing midline cerebellar mass found posterior to the fourth ventricle with associated cerebral edema. There appears to be a cystic component to this cerebellar tumor. Also,   there is some paramagnetic signal within the tumor suggesting a hemorrhagic component to the tumor mass. The MRI findings are compatible with an intra-axial posterior fossa tumor and the differential considerations are primarily between a metastasis or   less likely a hemangioblastoma. The tumor is not causing significant extrinsic \"mass effect \" on the fourth ventricle or aqueductal stenosis and there is no hydrocephalus. There is no other enhancing lesion or tumor in the posterior fossa or in the   supratentorial brain. Supratentorial brain: There are punctate and patchy T2 hyperintense foci in the white matter of both cerebral hemispheres which are likely a manifestation of small vessel disease. There is no restricted diffusion or MRI evidence of an acute or subacute   ischemic focus in the brain. There is a mild degree of cerebral atrophy which is appropriate for the patient's age. The remainder of the supratentorial brain appears unremarkable. Impression 1. APPROXIMATELY 2.5 X 2 X 2 CM INHOMOGENEOUSLY ENHANCING MIDLINE CEREBELLAR MASS WHICH APPEARS TO HAVE A CYSTIC COMPONENT AND A HEMORRHAGIC COMPONENT. MRI FINDINGS ARE COMPATIBLE WITH AN INTRA-AXIAL POSTERIOR FOSSA TUMOR AND DIFFERENTIAL CONSIDERATIONS   ARE PRIMARILY BETWEEN A METASTASIS OR LESS LIKELY A HEMANGIOBLASTOMA. 2. NO OTHER ENHANCING LESION IN THE POSTERIOR FOSSA OR IN THE SUPRATENTORIAL BRAIN. THERE IS A MILD DEGREE OF CEREBRAL ATROPHY AND WHITE MATTER T2 HYPERINTENSE FOCI WHICH ARE LIKELY REPRESENT A MANIFESTATION OF SMALL VESSEL DISEASE. NO ACUTE ISCHEMIC   FOCUS IN THE BRAIN. No results found for this or any previous visit. MRA of the Head and Neck: No results found for this or any previous visit. No results found for this or any previous visit. No results found for

## 2019-08-10 NOTE — CONSULTS
bilateral middle cerebral arteries, terminal internal carotid arteries, basilar artery, or cervical internal carotid arteries. 2.  2.7 cm ring-enhancing lesion in the posterior fossa abutting the vermis and extending to the left. There is some central lower density that is not clearly cystic on these images. There is some surrounding low density consistent with edema and probable mass effect. The fourth ventricle is normal in appearance. There is no hydrocephalus. On these images there are no additional parenchymal brain lesions. Primary differential would include solitary metastatic lesion most commonly breast or lung. The possibility of an hemangioblastoma is also mentioned. These findings were discussed with Dr. Delvis Muller in the emergency room. EXAMINATION: CTA HEAD W WO CONTRAST, CTA NECK W WO CONTRAST CTA HEAD DATE AND TIME:7/30/2019 2:15 PM CLINICAL HISTORY: Stroke  gait instability  COMPARISON: None TECHNIQUE:Helical CTA of the head and neck was performed from the vertex to the aortic arch following the uneventful intravenous administration of 100 cc of nonionic contrast without incident. 2-D images were reconstructed in the sagittal and coronal planes. Three Dimensional Maximum Intensity Projection (3D-MIP) images were created. All images were reviewed and primarily archived to PACS workstation. All CT scans at this facility use dose modulation, iterative reconstruction, and/or weight based dosing when appropriate to reduce radiation dose to as low as reasonably achievable. NASCET Criteria were utilized FINDINGS CTA Head     Intracranial ICAs: Flow is visualized within the precavernous, cavernous, clinoid and supraclinoid segments of the internal carotid arteries bilaterally     Anterior cerebral arteries:Flow is visualized within the bilateral anterior cerebral arteries with no large vessel occlusion or significant stenosis. The left A1 segment is relatively hypoplastic.      Middle cerebral arteries:Flow visualized throughout the middle cerebral arteries bilaterally with no large vessel occlusion or significant stenosis. Posterior Circulation: Bilateral posterior cerebral arteries are patent. Basilar artery:Flow is visualized in the basilar artery without significant stenosis. Aneurysm No aneurysm or dissection in the anterior or posterior circulations. .     Neurocranium there is a 2.7 cm ring-enhancing posterior fossa lesion abutting the vermis and extending to the left. There is a central lower density that is not clearly cystic on these images. There is some surrounding low density consistent with edema. The fourth ventricle is normal in appearance. There is no hydrocephalus. On these images there are no additional parenchymal brain lesions. Primary differential would include solitary metastatic lesion most commonly breast or lung. The possibility  of hemangioblastoma is also mentioned. Dural sinus: As visualized the opacified dural venous sinuses are unremarkable. [address] [place] [address]. CTA NECK DATE AND TIME:7/30/2019 2:15 PM CLINICAL HISTORY: Stroke symptoms   gait instability  TECHNIQUE: TECHNIQUE:Helical CTA of the head and neck was performed from the vertex to the aortic arch following the uneventful intravenous administration of 100 cc of nonionic contrast without incident. 2-D images were reconstructed in the sagittal and  coronal planes. Three Dimensional Maximum Intensity Projection (3D-MIP) images were created. All images were reviewed and primarily archived to PACS workstation. All CT scans at this facility use dose modulation, iterative reconstruction, and/or weight  based dosing when appropriate to reduce radiation dose to as low as reasonably achievable. NASCET Criteria were utilized Result / Findings: CTA NECK: Aortic Arch: The visualized portions of the aortic arch and proximal arch vessels demonstrates no focal stenosis aneurysm or dissection.   Carotid:   Right nonionic contrast without incident. 2-D images were reconstructed in the sagittal and coronal planes. Three Dimensional Maximum Intensity Projection (3D-MIP) images were created. All images were reviewed and primarily archived to PACS workstation. All CT scans at this facility use dose modulation, iterative reconstruction, and/or weight based dosing when appropriate to reduce radiation dose to as low as reasonably achievable. NASCET Criteria were utilized FINDINGS CTA Head     Intracranial ICAs: Flow is visualized within the precavernous, cavernous, clinoid and supraclinoid segments of the internal carotid arteries bilaterally     Anterior cerebral arteries:Flow is visualized within the bilateral anterior cerebral arteries with no large vessel occlusion or significant stenosis. The left A1 segment is relatively hypoplastic. Middle cerebral arteries:Flow visualized throughout the middle cerebral arteries bilaterally with no large vessel occlusion or significant stenosis. Posterior Circulation: Bilateral posterior cerebral arteries are patent. Basilar artery:Flow is visualized in the basilar artery without significant stenosis. Aneurysm No aneurysm or dissection in the anterior or posterior circulations. .     Neurocranium there is a 2.7 cm ring-enhancing posterior fossa lesion abutting the vermis and extending to the left. There is a central lower density that is not clearly cystic on these images. There is some surrounding low density consistent with edema. The fourth ventricle is normal in appearance. There is no hydrocephalus. On these images there are no additional parenchymal brain lesions. Primary differential would include solitary metastatic lesion most commonly breast or lung. The possibility  of hemangioblastoma is also mentioned. Dural sinus: As visualized the opacified dural venous sinuses are unremarkable. [address] [place] [address].  CTA NECK DATE AND TIME:7/30/2019 2:15 PM biapical areas of scarring fibrosis. There is moderate emphysematous disease in the upper one third of the lung parenchyma. There is a pleural-based soft tissue density at the base medial aspect of the right lower lobe. It measures 2.7 x 2.9 x 1.0 cm in the transverse and AP and cephalocaudad dimension. Best seen on the coronal reconstructions. No other focal parenchymal abnormalities. No pleural effusions. No pneumothoraces. No significant periarticular. There is pretracheal adenopathy. No significant perihilar or subcarinal adenopathy. PLEURAL-BASED SOFT TISSUE MASS OVERLYING THE MEDIAL ASPECT OF THE RIGHT LOWER LOBE, RIGHT HEMIDIAPHRAGM. THIS IS NEW SINCE THE PRIOR STUDY OF MAY 6, 2019. THE LACK OF IV CONTRAST LIMITS FULL EVALUATION. THIS IS SUPERIMPOSED UPON MODERATE EMPHYSEMATOUS DISEASE ONE PARENCHYMA AS DESCRIBED ABOVE. All CT scans at this facility use dose modulation, iterative reconstruction, and/or weight based dosing when appropriate to reduce radiation dose to as low as reasonably achievable. Ct Abdomen Pelvis W Iv Contrast Additional Contrast? Radiologist Recommendation    Result Date: 7/31/2019  CT ABDOMEN PELVIS W IV CONTRAST HISTORY:   met smith . Brain lesion, possibly metastatic. Concern for malignancy. TECHNIQUE: CT of the abdomen and pelvis was performed using standard technique, scanning from just above the dome of the diaphragm to the symphysis pubis. Including delayed images through the kidneys. Contrast: IV: 100 ml Isovue 300 Oral:  None. All CT scans at this facility use dose modulation, iterative reconstruction, and/or weight based dosing when appropriate to reduce radiation dose to as low as reasonably achievable. COMPARISON: CT 4/23/2018; MRI 12/3/2018. RESULT: Liver: Subcentimeter low-attenuation lesions throughout the liver, including 0.8 x 0.9 cm low-attenuation lesion in the right inferior hepatic lobe (series 2 image 27).  These are similar to MRI 12/3/2018 and appear to be Component Value Date    INR 1.1 08/06/2019         I discussed results with patient. Current Rehabilitation Assessments:    Rehabilitation:  Physical therapy: FIMS:  BedMobility:      Transfers: Sit to Stand: Contact guard assistance  Stand to sit: Contact guard assistance  Bed to Chair: Contact guard assistance, Ambulation 1  Surface: level tile  Device: Hand-Held Assist  Other Apparatus: O2  Assistance: Minimal assistance  Quality of Gait: mod-severe postural sway, ataxic LEs, short steps  Distance: 8 ft   Comments: SAO2 - 95% throughtout tx. on RA. ,      FIMS: ,  , Assessment: Pt with exacerbated impairments from previous PT eval s/p craniotomy and prolonged time in bed. Pt did demo ataxia with gait and increased assist for all mobility as well as SOB with just short durations. Continue PT to progress mobility and return to PLOF. Occupational therapy: FIMS:   ,  , Assessment: Pt is a 76 y.o. female s/p SUBOCCIPITAL CRANIOTOMY FOR TUMOR AND VENTRICULOSTOMY. Pt with the above mentioned deficits impairing ability to complete ADL's at baseline IND level. Pt may benefit from skilled OT service to address deficits and increase to highest level of function. ADL  Feeding: Setup (08/09/19 1323)  Grooming: Setup (08/07/19 1450)  UE Bathing: Stand by assistance (08/07/19 1450)  LE Bathing: Moderate assistance (08/07/19 1450)  UE Dressing: Stand by assistance;Setup (08/07/19 1450)  LE Dressing: Setup(donning/doffing socks in reclined chair) (08/09/19 1323)  Toileting: Minimal assistance (08/07/19 1450)  Additional Comments: ADL's simulated bed side.   (08/07/19 1450)    LTG:                 Speech therapy: FIMS:          Past Medical History:          Diagnosis Date    Back pain, chronic     Pt  is with pain management    Chronic kidney disease     COPD (chronic obstructive pulmonary disease) (HCC)     Coronary artery disease involving native coronary artery of native heart without angina pectoris Transdermal, Daily      Social History:    Social History     Socioeconomic History    Marital status:       Spouse name: Not on file    Number of children: Not on file    Years of education: Not on file    Highest education level: Not on file   Occupational History    Not on file   Social Needs    Financial resource strain: Not on file    Food insecurity:     Worry: Not on file     Inability: Not on file    Transportation needs:     Medical: Not on file     Non-medical: Not on file   Tobacco Use    Smoking status: Heavy Tobacco Smoker     Packs/day: 1.00     Years: 57.00     Pack years: 57.00     Types: Cigarettes     Start date: 1/1/1960    Smokeless tobacco: Never Used    Tobacco comment: 4/24/19 now smoking 1/2ppd   Substance and Sexual Activity    Alcohol use: Yes     Comment: occ    Drug use: No    Sexual activity: Never   Lifestyle    Physical activity:     Days per week: Not on file     Minutes per session: Not on file    Stress: Not on file   Relationships    Social connections:     Talks on phone: Not on file     Gets together: Not on file     Attends [employer] service: Not on file     Active member of club or organization: Not on file     Attends meetings of clubs or organizations: Not on file     Relationship status: Not on file    Intimate partner violence:     Fear of current or ex partner: Not on file     Emotionally abused: Not on file     Physically abused: Not on file     Forced sexual activity: Not on file   Other Topics Concern    Not on file   Social History Narrative         Lives With: Family(2 dtrs)    Type of Home: House  One level    Home Access: Stairs to enter without rails    Entrance Stairs - Number of Steps: 4    Bathroom Shower/Tub: Walk-in shower    Bathroom Equipment: Shower chair    Home Equipment: Rolling walker, 4 wheeled walker, Quad cane    Receives Help From: Family    ADL Assistance: Independent    Ambulation Assistance: Independent(quad cane most of

## 2019-08-11 ENCOUNTER — HOSPITAL ENCOUNTER (INPATIENT)
Age: 74
LOS: 10 days | Discharge: HOME | DRG: 949 | End: 2019-08-21
Attending: PHYSICAL MEDICINE & REHABILITATION | Admitting: PHYSICAL MEDICINE & REHABILITATION
Payer: MEDICARE

## 2019-08-11 VITALS
OXYGEN SATURATION: 98 % | SYSTOLIC BLOOD PRESSURE: 147 MMHG | DIASTOLIC BLOOD PRESSURE: 79 MMHG | RESPIRATION RATE: 18 BRPM | WEIGHT: 153.66 LBS | HEART RATE: 60 BPM | BODY MASS INDEX: 30.98 KG/M2 | HEIGHT: 59 IN | TEMPERATURE: 97.9 F

## 2019-08-11 PROBLEM — R26.9 ABNORMALITY OF GAIT AND MOBILITY: Status: ACTIVE | Noted: 2019-08-11

## 2019-08-11 PROCEDURE — 94664 DEMO&/EVAL PT USE INHALER: CPT

## 2019-08-11 PROCEDURE — 6370000000 HC RX 637 (ALT 250 FOR IP): Performed by: NEUROLOGICAL SURGERY

## 2019-08-11 PROCEDURE — 2580000003 HC RX 258: Performed by: NEUROLOGICAL SURGERY

## 2019-08-11 PROCEDURE — 97116 GAIT TRAINING THERAPY: CPT

## 2019-08-11 PROCEDURE — 6360000002 HC RX W HCPCS: Performed by: NEUROLOGICAL SURGERY

## 2019-08-11 PROCEDURE — 6370000000 HC RX 637 (ALT 250 FOR IP): Performed by: INTERNAL MEDICINE

## 2019-08-11 PROCEDURE — 1180000000 HC REHAB R&B

## 2019-08-11 PROCEDURE — 6360000002 HC RX W HCPCS: Performed by: PHYSICAL MEDICINE & REHABILITATION

## 2019-08-11 PROCEDURE — 6370000000 HC RX 637 (ALT 250 FOR IP): Performed by: PHYSICAL MEDICINE & REHABILITATION

## 2019-08-11 PROCEDURE — 2700000000 HC OXYGEN THERAPY PER DAY

## 2019-08-11 PROCEDURE — 99232 SBSQ HOSP IP/OBS MODERATE 35: CPT | Performed by: INTERNAL MEDICINE

## 2019-08-11 PROCEDURE — 94150 VITAL CAPACITY TEST: CPT

## 2019-08-11 RX ORDER — OXYCODONE HYDROCHLORIDE AND ACETAMINOPHEN 5; 325 MG/1; MG/1
1 TABLET ORAL EVERY 4 HOURS PRN
Status: DISCONTINUED | OUTPATIENT
Start: 2019-08-11 | End: 2019-08-21 | Stop reason: HOSPADM

## 2019-08-11 RX ORDER — SODIUM CHLORIDE 0.9 % (FLUSH) 0.9 %
10 SYRINGE (ML) INJECTION EVERY 12 HOURS SCHEDULED
Status: CANCELLED | OUTPATIENT
Start: 2019-08-11

## 2019-08-11 RX ORDER — ACETAMINOPHEN 325 MG/1
650 TABLET ORAL EVERY 4 HOURS PRN
Status: CANCELLED | OUTPATIENT
Start: 2019-08-11

## 2019-08-11 RX ORDER — ALBUTEROL SULFATE 90 UG/1
2 AEROSOL, METERED RESPIRATORY (INHALATION) EVERY 4 HOURS PRN
Status: CANCELLED | OUTPATIENT
Start: 2019-08-11

## 2019-08-11 RX ORDER — NICOTINE 21 MG/24HR
1 PATCH, TRANSDERMAL 24 HOURS TRANSDERMAL DAILY
Status: CANCELLED | OUTPATIENT
Start: 2019-08-12

## 2019-08-11 RX ORDER — PRAVASTATIN SODIUM 40 MG
40 TABLET ORAL DAILY
Status: CANCELLED | OUTPATIENT
Start: 2019-08-11

## 2019-08-11 RX ORDER — SODIUM CHLORIDE 0.9 % (FLUSH) 0.9 %
10 SYRINGE (ML) INJECTION EVERY 12 HOURS SCHEDULED
Status: DISCONTINUED | OUTPATIENT
Start: 2019-08-11 | End: 2019-08-12 | Stop reason: ALTCHOICE

## 2019-08-11 RX ORDER — LIDOCAINE 4 G/G
3 PATCH TOPICAL DAILY
Status: DISCONTINUED | OUTPATIENT
Start: 2019-08-12 | End: 2019-08-21 | Stop reason: HOSPADM

## 2019-08-11 RX ORDER — ONDANSETRON 2 MG/ML
4 INJECTION INTRAMUSCULAR; INTRAVENOUS EVERY 6 HOURS PRN
Status: DISCONTINUED | OUTPATIENT
Start: 2019-08-11 | End: 2019-08-21 | Stop reason: HOSPADM

## 2019-08-11 RX ORDER — TIZANIDINE 4 MG/1
4 TABLET ORAL EVERY 8 HOURS PRN
Status: DISCONTINUED | OUTPATIENT
Start: 2019-08-11 | End: 2019-08-21 | Stop reason: HOSPADM

## 2019-08-11 RX ORDER — ANALGESIC BALM 1.74; 4.06 G/29G; G/29G
OINTMENT TOPICAL 3 TIMES DAILY
Status: CANCELLED | OUTPATIENT
Start: 2019-08-11

## 2019-08-11 RX ORDER — DOCUSATE SODIUM 100 MG/1
100 CAPSULE, LIQUID FILLED ORAL 2 TIMES DAILY
Status: CANCELLED | OUTPATIENT
Start: 2019-08-11

## 2019-08-11 RX ORDER — GABAPENTIN 300 MG/1
300 CAPSULE ORAL 3 TIMES DAILY
Status: CANCELLED | OUTPATIENT
Start: 2019-08-11

## 2019-08-11 RX ORDER — LIDOCAINE 4 G/G
3 PATCH TOPICAL DAILY
Status: DISCONTINUED | OUTPATIENT
Start: 2019-08-11 | End: 2019-08-11 | Stop reason: HOSPADM

## 2019-08-11 RX ORDER — DEXAMETHASONE 1 MG
2 TABLET ORAL EVERY 12 HOURS SCHEDULED
Status: CANCELLED | OUTPATIENT
Start: 2019-08-11

## 2019-08-11 RX ORDER — PRAVASTATIN SODIUM 40 MG
40 TABLET ORAL DAILY
Status: DISCONTINUED | OUTPATIENT
Start: 2019-08-11 | End: 2019-08-21 | Stop reason: HOSPADM

## 2019-08-11 RX ORDER — ALBUTEROL SULFATE 90 UG/1
2 AEROSOL, METERED RESPIRATORY (INHALATION) EVERY 4 HOURS PRN
Status: DISCONTINUED | OUTPATIENT
Start: 2019-08-11 | End: 2019-08-21 | Stop reason: HOSPADM

## 2019-08-11 RX ORDER — DEXAMETHASONE 1 MG
2 TABLET ORAL EVERY 12 HOURS SCHEDULED
Status: DISCONTINUED | OUTPATIENT
Start: 2019-08-11 | End: 2019-08-12

## 2019-08-11 RX ORDER — PANTOPRAZOLE SODIUM 40 MG/1
40 TABLET, DELAYED RELEASE ORAL
Status: CANCELLED | OUTPATIENT
Start: 2019-08-12

## 2019-08-11 RX ORDER — ANALGESIC BALM 1.74; 4.06 G/29G; G/29G
OINTMENT TOPICAL 3 TIMES DAILY
Status: DISCONTINUED | OUTPATIENT
Start: 2019-08-11 | End: 2019-08-11 | Stop reason: HOSPADM

## 2019-08-11 RX ORDER — NICOTINE 21 MG/24HR
1 PATCH, TRANSDERMAL 24 HOURS TRANSDERMAL DAILY
Status: DISCONTINUED | OUTPATIENT
Start: 2019-08-12 | End: 2019-08-21 | Stop reason: HOSPADM

## 2019-08-11 RX ORDER — TIZANIDINE 4 MG/1
4 TABLET ORAL EVERY 8 HOURS PRN
Status: CANCELLED | OUTPATIENT
Start: 2019-08-11

## 2019-08-11 RX ORDER — HYDRALAZINE HYDROCHLORIDE 20 MG/ML
10 INJECTION INTRAMUSCULAR; INTRAVENOUS
Status: DISCONTINUED | OUTPATIENT
Start: 2019-08-11 | End: 2019-08-15

## 2019-08-11 RX ORDER — ACETAMINOPHEN 325 MG/1
650 TABLET ORAL EVERY 4 HOURS PRN
Status: DISCONTINUED | OUTPATIENT
Start: 2019-08-11 | End: 2019-08-21 | Stop reason: HOSPADM

## 2019-08-11 RX ORDER — LIDOCAINE 4 G/G
3 PATCH TOPICAL DAILY
Status: CANCELLED | OUTPATIENT
Start: 2019-08-11

## 2019-08-11 RX ORDER — DOCUSATE SODIUM 100 MG/1
100 CAPSULE, LIQUID FILLED ORAL 2 TIMES DAILY
Status: DISCONTINUED | OUTPATIENT
Start: 2019-08-11 | End: 2019-08-13

## 2019-08-11 RX ORDER — CEPHALEXIN 250 MG/1
500 CAPSULE ORAL EVERY 8 HOURS SCHEDULED
Status: COMPLETED | OUTPATIENT
Start: 2019-08-11 | End: 2019-08-17

## 2019-08-11 RX ORDER — SODIUM CHLORIDE 0.9 % (FLUSH) 0.9 %
10 SYRINGE (ML) INJECTION PRN
Status: CANCELLED | OUTPATIENT
Start: 2019-08-11

## 2019-08-11 RX ORDER — SODIUM CHLORIDE 0.9 % (FLUSH) 0.9 %
10 SYRINGE (ML) INJECTION PRN
Status: DISCONTINUED | OUTPATIENT
Start: 2019-08-11 | End: 2019-08-21 | Stop reason: HOSPADM

## 2019-08-11 RX ORDER — HYDRALAZINE HYDROCHLORIDE 20 MG/ML
10 INJECTION INTRAMUSCULAR; INTRAVENOUS
Status: CANCELLED | OUTPATIENT
Start: 2019-08-11

## 2019-08-11 RX ORDER — PANTOPRAZOLE SODIUM 40 MG/1
40 TABLET, DELAYED RELEASE ORAL
Status: DISCONTINUED | OUTPATIENT
Start: 2019-08-12 | End: 2019-08-21 | Stop reason: HOSPADM

## 2019-08-11 RX ORDER — GABAPENTIN 300 MG/1
300 CAPSULE ORAL 3 TIMES DAILY
Status: DISCONTINUED | OUTPATIENT
Start: 2019-08-11 | End: 2019-08-21 | Stop reason: HOSPADM

## 2019-08-11 RX ORDER — ONDANSETRON 2 MG/ML
4 INJECTION INTRAMUSCULAR; INTRAVENOUS EVERY 6 HOURS PRN
Status: CANCELLED | OUTPATIENT
Start: 2019-08-11

## 2019-08-11 RX ORDER — OXYCODONE HYDROCHLORIDE AND ACETAMINOPHEN 5; 325 MG/1; MG/1
1 TABLET ORAL EVERY 4 HOURS PRN
Status: CANCELLED | OUTPATIENT
Start: 2019-08-11

## 2019-08-11 RX ORDER — ANALGESIC BALM 1.74; 4.06 G/29G; G/29G
OINTMENT TOPICAL 3 TIMES DAILY
Status: DISCONTINUED | OUTPATIENT
Start: 2019-08-11 | End: 2019-08-21 | Stop reason: HOSPADM

## 2019-08-11 RX ORDER — CEPHALEXIN 500 MG/1
500 CAPSULE ORAL EVERY 8 HOURS SCHEDULED
Status: CANCELLED | OUTPATIENT
Start: 2019-08-11 | End: 2019-08-17

## 2019-08-11 RX ADMIN — METOPROLOL TARTRATE 25 MG: 25 TABLET ORAL at 08:37

## 2019-08-11 RX ADMIN — CEPHALEXIN 500 MG: 500 CAPSULE ORAL at 05:55

## 2019-08-11 RX ADMIN — OXYCODONE HYDROCHLORIDE AND ACETAMINOPHEN 1 TABLET: 5; 325 TABLET ORAL at 21:34

## 2019-08-11 RX ADMIN — MENTHOL AND METHYL SALICYLATE: 7.6; 29 OINTMENT TOPICAL at 13:47

## 2019-08-11 RX ADMIN — GABAPENTIN 300 MG: 300 CAPSULE ORAL at 21:35

## 2019-08-11 RX ADMIN — MENTHOL AND METHYL SALICYLATE: 7.6; 29 OINTMENT TOPICAL at 21:35

## 2019-08-11 RX ADMIN — GABAPENTIN 300 MG: 300 CAPSULE ORAL at 13:45

## 2019-08-11 RX ADMIN — DEXAMETHASONE 2 MG: 4 TABLET ORAL at 08:37

## 2019-08-11 RX ADMIN — ACETAMINOPHEN 650 MG: 325 TABLET ORAL at 08:36

## 2019-08-11 RX ADMIN — CEPHALEXIN 500 MG: 250 CAPSULE ORAL at 21:35

## 2019-08-11 RX ADMIN — DEXAMETHASONE 2 MG: 1 TABLET ORAL at 21:35

## 2019-08-11 RX ADMIN — DOCUSATE SODIUM 100 MG: 100 CAPSULE, LIQUID FILLED ORAL at 08:36

## 2019-08-11 RX ADMIN — PRAVASTATIN SODIUM 40 MG: 40 TABLET ORAL at 21:35

## 2019-08-11 RX ADMIN — GABAPENTIN 300 MG: 300 CAPSULE ORAL at 08:37

## 2019-08-11 RX ADMIN — PANTOPRAZOLE SODIUM 40 MG: 40 TABLET, DELAYED RELEASE ORAL at 05:55

## 2019-08-11 RX ADMIN — Medication 10 ML: at 08:37

## 2019-08-11 RX ADMIN — METOPROLOL TARTRATE 25 MG: 25 TABLET ORAL at 21:35

## 2019-08-11 RX ADMIN — CEPHALEXIN 500 MG: 500 CAPSULE ORAL at 13:45

## 2019-08-11 RX ADMIN — DOCUSATE SODIUM 100 MG: 100 CAPSULE, LIQUID FILLED ORAL at 21:35

## 2019-08-11 SDOH — ECONOMIC STABILITY: FOOD INSECURITY: WITHIN THE PAST 12 MONTHS, YOU WORRIED THAT YOUR FOOD WOULD RUN OUT BEFORE YOU GOT MONEY TO BUY MORE.: NEVER TRUE

## 2019-08-11 SDOH — HEALTH STABILITY: PHYSICAL HEALTH: ON AVERAGE, HOW MANY DAYS PER WEEK DO YOU ENGAGE IN MODERATE TO STRENUOUS EXERCISE (LIKE A BRISK WALK)?: 0 DAYS

## 2019-08-11 SDOH — ECONOMIC STABILITY: FOOD INSECURITY: WITHIN THE PAST 12 MONTHS, THE FOOD YOU BOUGHT JUST DIDN'T LAST AND YOU DIDN'T HAVE MONEY TO GET MORE.: NEVER TRUE

## 2019-08-11 SDOH — SOCIAL STABILITY: SOCIAL INSECURITY
WITHIN THE LAST YEAR, HAVE YOU BEEN KICKED, HIT, SLAPPED, OR OTHERWISE PHYSICALLY HURT BY YOUR PARTNER OR EX-PARTNER?: NO

## 2019-08-11 SDOH — SOCIAL STABILITY: SOCIAL NETWORK
IN A TYPICAL WEEK, HOW MANY TIMES DO YOU TALK ON THE PHONE WITH FAMILY, FRIENDS, OR NEIGHBORS?: MORE THAN THREE TIMES A WEEK

## 2019-08-11 SDOH — SOCIAL STABILITY: SOCIAL NETWORK: HOW OFTEN DO YOU GET TOGETHER WITH FRIENDS OR RELATIVES?: MORE THAN THREE TIMES A WEEK

## 2019-08-11 SDOH — SOCIAL STABILITY: SOCIAL INSECURITY: WITHIN THE LAST YEAR, HAVE YOU BEEN AFRAID OF YOUR PARTNER OR EX-PARTNER?: NO

## 2019-08-11 SDOH — SOCIAL STABILITY: SOCIAL NETWORK: HOW OFTEN DO YOU ATTENT MEETINGS OF THE CLUB OR ORGANIZATION YOU BELONG TO?: NEVER

## 2019-08-11 SDOH — SOCIAL STABILITY: SOCIAL INSECURITY
WITHIN THE LAST YEAR, HAVE TO BEEN RAPED OR FORCED TO HAVE ANY KIND OF SEXUAL ACTIVITY BY YOUR PARTNER OR EX-PARTNER?: NO

## 2019-08-11 SDOH — SOCIAL STABILITY: SOCIAL NETWORK: ARE YOU MARRIED, WIDOWED, DIVORCED, SEPARATED, NEVER MARRIED, OR LIVING WITH A PARTNER?: WIDOWED

## 2019-08-11 SDOH — ECONOMIC STABILITY: TRANSPORTATION INSECURITY
IN THE PAST 12 MONTHS, HAS LACK OF TRANSPORTATION KEPT YOU FROM MEETINGS, WORK, OR FROM GETTING THINGS NEEDED FOR DAILY LIVING?: NO

## 2019-08-11 SDOH — ECONOMIC STABILITY: TRANSPORTATION INSECURITY
IN THE PAST 12 MONTHS, HAS THE LACK OF TRANSPORTATION KEPT YOU FROM MEDICAL APPOINTMENTS OR FROM GETTING MEDICATIONS?: NO

## 2019-08-11 SDOH — ECONOMIC STABILITY: INCOME INSECURITY: HOW HARD IS IT FOR YOU TO PAY FOR THE VERY BASICS LIKE FOOD, HOUSING, MEDICAL CARE, AND HEATING?: SOMEWHAT HARD

## 2019-08-11 SDOH — HEALTH STABILITY: MENTAL HEALTH
STRESS IS WHEN SOMEONE FEELS TENSE, NERVOUS, ANXIOUS, OR CAN'T SLEEP AT NIGHT BECAUSE THEIR MIND IS TROUBLED. HOW STRESSED ARE YOU?: ONLY A LITTLE

## 2019-08-11 SDOH — SOCIAL STABILITY: SOCIAL NETWORK
DO YOU BELONG TO ANY CLUBS OR ORGANIZATIONS SUCH AS CHURCH GROUPS UNIONS, FRATERNAL OR ATHLETIC GROUPS, OR SCHOOL GROUPS?: NO

## 2019-08-11 SDOH — SOCIAL STABILITY: SOCIAL INSECURITY: WITHIN THE LAST YEAR, HAVE YOU BEEN HUMILIATED OR EMOTIONALLY ABUSED IN OTHER WAYS BY YOUR PARTNER OR EX-PARTNER?: NO

## 2019-08-11 SDOH — SOCIAL STABILITY: SOCIAL NETWORK: HOW OFTEN DO YOU ATTEND CHURCH OR RELIGIOUS SERVICES?: MORE THAN 4 TIMES PER YEAR

## 2019-08-11 ASSESSMENT — PAIN SCALES - GENERAL
PAINLEVEL_OUTOF10: 0
PAINLEVEL_OUTOF10: 0
PAINLEVEL_OUTOF10: 5
PAINLEVEL_OUTOF10: 5
PAINLEVEL_OUTOF10: 8

## 2019-08-11 ASSESSMENT — PAIN DESCRIPTION - LOCATION
LOCATION: BACK;HEAD
LOCATION: BACK;HEAD

## 2019-08-11 ASSESSMENT — PAIN DESCRIPTION - PAIN TYPE: TYPE: ACUTE PAIN;CHRONIC PAIN;SURGICAL PAIN

## 2019-08-11 NOTE — CARE COORDINATION
PATIENT DISCHARGE PLANNING IS STILL PENDING PATIENT STILL HAS SUBDURAL DRAIN. [facility] CAME BACK BUT [place] THEY MAY DO THIS TONIGHT OR TOMORROW. ONCE DRAIN REMOVED PATIENT STABLE FOR [address] OUT OF ICU. D/C PLAN IS STILL [facility]. HOWEVER PATIENTS STILL STATES IF SHE IS STABLE ENOUGH TO GO HOME, SHE WOULD WANT TO GO HOME. LSW/C3 TO MONITOR FOR ANY NEEDS.
Patient given numbers for [facility] for [address] area per conversation on 8/1. Additional needs to be assessed post-op. Concerned for possible need for raised toilet seat, wheelchair, and O2 post-op. C3 to follow.
Patient to have CT chest today and transfer to [address] [place] [address] when bed available. Plan remains [facility] rehab at discharge.   Electronically signed by Roland Sarmiento RN on 8/10/19 at 4:44 PM
Per Dr Yoselyn Valdez patient can go to rehab today. RN will send a message to Dr Douglas Olmedo. Denisha Liter in rehab aware and will get room assignment. Electronically signed by Meli Lomeli RN on 8/11/19 at 12:25 PM      Medicare IMM reviewed with patient. She verbalized understanding and initials obtained. She will go to room 259. I made her aware and she requested I call her daughter Nirmal Altamirano. I called Jade and left a message.   Electronically signed by Meli Lomeli RN on 8/11/19 at 1:43 PM
arrangements needed for discharge? No    Does Patient Have a High-Risk for Readmission Diagnosis (CHF, PN, MI, COPD)? No    If yes,    Consult with a pulmonologist? Not Applicable   Consult with cardiologist?  Not Applicable   Cardiac Rehab referral if EF <35%? Not Applicable   Consult with Pharmacy for medication assessment prior to discharge? Not Applicable   Consult with Behavioral Health to aid in depression, anxiety or coping issues? Not Applicable   Consult with our Transition Care Nurses for education and follow up? Not Applicable   Palliative Care Consult? Not Applicable   Pulmonary Rehab order for COPD, PN and CHF (if EF >35%)? Not Applicable   Does patient have reliable scale and know how to read it (for CHF)? Not Applicable   Nutrition consult for CHF? Not Applicable   Respiratory therapy consult that includes bedside instruction on administration of nebulizer and/or inhalers, and assessment of oxygen and equipment needs in the home? Not Applicable    Initial Discharge Plan? (Note: please see concurrent daily documentation for any updates after initial note). Reported during rounds that patient will have family meeting with Dr Gina Cali tomorrow @ noon. Patient from home w/ 2 daughters in a mobile home and has a wheeled walker and a rollator. Patient stating during rounds that she intends to return home for her granddaughters wedding on Friday. Reported patient will have further imaging to further eval for metastatic disease.      Electronically signed by Barry Braswell RN on 7/31/2019 at 12:05 PM

## 2019-08-11 NOTE — PROGRESS NOTES
is also mentioned. These findings were discussed with Dr. Aliza Benito in the emergency room. EXAMINATION: CTA HEAD W WO CONTRAST, CTA NECK W WO CONTRAST CTA HEAD DATE AND TIME:7/30/2019 2:15 PM CLINICAL HISTORY: Stroke  gait instability  COMPARISON: None TECHNIQUE:Helical CTA of the head and neck was performed from the vertex to the aortic arch following the uneventful intravenous administration of 100 cc of nonionic contrast without incident. 2-D images were reconstructed in the sagittal and coronal planes. Three Dimensional Maximum Intensity Projection (3D-MIP) images were created. All images were reviewed and primarily archived to PACS workstation. All CT scans at this facility use dose modulation, iterative reconstruction, and/or weight based dosing when appropriate to reduce radiation dose to as low as reasonably achievable. NASCET Criteria were utilized FINDINGS CTA Head     Intracranial ICAs: Flow is visualized within the precavernous, cavernous, clinoid and supraclinoid segments of the internal carotid arteries bilaterally     Anterior cerebral arteries:Flow is visualized within the bilateral anterior cerebral arteries with no large vessel occlusion or significant stenosis. The left A1 segment is relatively hypoplastic. Middle cerebral arteries:Flow visualized throughout the middle cerebral arteries bilaterally with no large vessel occlusion or significant stenosis. Posterior Circulation: Bilateral posterior cerebral arteries are patent. Basilar artery:Flow is visualized in the basilar artery without significant stenosis. Aneurysm No aneurysm or dissection in the anterior or posterior circulations. .     Neurocranium there is a 2.7 cm ring-enhancing posterior fossa lesion abutting the vermis and extending to the left. There is a central lower density that is not clearly cystic on these images. There is some surrounding low density consistent with edema.  The fourth ventricle is normal in Head Wo Contrast    Result Date: 8/9/2019  EXAMINATION:  CT HEAD WO CONTRAST HISTORY:   fu closed ev  ro hydro TECHNIQUE:  Serial axial images without IV contrast were obtained from the vertex to the foramen magnum. All CT scans at this facility use dose modulation, iterative reconstruction, and/or weight based dosing when appropriate to reduce radiation dose to as low as reasonably achievable. COMPARISON:  8/6/2019. RESULT: Post-operative change:  Right frontal approach ventricular catheter with tip near midline between the frontal horns, unchanged. Interval resolution of the pneumocephalus. The size of the ventricular system is unchanged. Occipital craniotomy with associated postsurgical changes from tumor resection within the cerebellum, with small amount of gas in the tissues adjacent to the craniotomy. . Acute change:   No evidence of an acute infarct. Hemorrhage:    No evidence of new/acute intracranial hemorrhage. Mass Lesion / Mass Effect: No significant mass effect. Chronic change:   Scattered patchy foci of low attenuation are present within supratentorial white matter which is a nonspecific finding but likely represents mild microvascular ischemia. Parenchyma: There is no significant volume loss. The brain parenchyma is otherwise within normal limits for age. Ventricles:   Stable in size. Paranasal sinuses and skull base: The visualized paranasal sinuses are grossly clear. The skull base and imaged soft tissues are unremarkable. Postsurgical changes without superimposed complication or superimposed acute finding. Ct Head Wo Contrast    Result Date: 8/7/2019  EXAMINATION:  CT HEAD WO CONTRAST CLINICAL HISTORY:   Postop COMPARISONS:  August 6, 2019 TECHNIQUE:  Spiral axial images of the brain were obtained without contrast enhancement. Multiplanar two-dimensional reformatting was completed at CT console.  FINDINGS:  Again noted are postoperative findings, including occipital craniotomy extending from the foramen magnum, posteriorly toward the internal occipital protuberance. The operative bed contains a small amount of residual blood, diminished since August 6, 2019. There is less pneumocephalus now. The right frontal ventriculostomy catheter is unchanged. There is no hydrocephalus. There is no midline shift. Extra-axial CSF spaces remain widely patent. There is no acute superimposed abnormality. NO UNEXPECTED POSTOPERATIVE FINDINGS, WITH DIMINISHING POSTOPERATIVE BLOOD AT THE OPERATIVE SITE AND DIMINISHING PNEUMOCEPHALUS. VENTRICULOSTOMY IS UNCHANGED. THERE IS NO VENTRICULOMEGALY. THERE IS NO EFFACEMENT OF EXTRA-AXIAL CSF SPACES All CT scans at this facility use dose modulation, iterative reconstruction, and/or weight based dosing when appropriate to reduce radiation dose to as low as reasonably achievable. Ct Head Wo Contrast    Result Date: 8/6/2019  CT Brain Contrast medium:  Not utilized. History:  Craniotomy. Tumor resection Comparison:  August 6, 2019, 0826 hours. Findings: In the interval, a right frontal craniotomy has been performed with insertion of an intraventricular drainage catheter, the tip found near the base at the level of the lateral ventricles, at or just proximal to the cephalad portion of the third ventricle. An additional, midline craniotomy at the skull base is also evident. The previously visualized mass found centrally within the cerebellum is no longer evident. A focal 9 x 9 mm area of increased attenuation is found in the region of the midline cerebellar resection. No extra-axial fluid collection, midline shift, or mass effect. There is identified along the anterior margin of the ambient cistern bilaterally as well as within the suprasellar cistern. Ventricles normal in size. Facial sinuses patent. Mastoid air cells well pneumatized.      Impression: Interval craniotomy, posterior midline skull base or resection of cerebellar mass and interval insertion of an intraventricular shunt catheter via right frontal craniotomy. Postsurgical changes discussed. All CT scans at this facility use dose modulation, iterative reconstruction, and/or weight based dosing when appropriate to reduce radiation dose to as low as reasonably achievable. Ct Head W Wo Contrast    Result Date: 8/6/2019  CT HEAD W WO CONTRAST CLINICAL HISTORY:  MASS COMPARISON: MRI July 31, 2019. TECHNIQUE: Multiple unenhanced and enhanced serial axial images of the brain from the vertex of the skull to the base of the skull were performed following the intravenous administration of 50 mL of Isovue-300. FINDINGS: The ventricles are dilated. This is compensatory to the surrounding generalized parenchymal volume loss. Unchanged. [place] No midline shift. The cisterns are patent. There are white matter and periventricular changes most likely consistent with chronic small vessel disease. There is a mass within the posterior inferior aspect of the left cerebellar hemisphere. It shows circumferential increased attenuation which may resent persistence of contrast from prior CT/and/or MRI. It measures approximately 1.4 x 1.7 x 1.4 in the transverse AP and cephalocaudad dimension. Following the  intravenous administration of contrast there are no vascular abnormalities. The mass is again identified within the left cerebral hemisphere and shows heterogeneous enhancement. No additional masses are identified. No acute intra-axial or extra-axial findings. The visualized osseous structures are unremarkable. The visualized portion of the paranasal sinuses, and mastoids are unremarkable. THERE IS A MASS WITH HETEROGENEOUS ENHANCEMENT IN THE LEFT CEREBELLAR HEMISPHERE. THERE IS ALSO INCREASED PERIPHERAL CIRCUMFERENTIAL ATTENUATION WHICH MAY REPRESENT EFFECTS OF RESIDUAL PRIOR CONTRAST FROM CT AND/OR MRI NO ACUTE EXTRA-AXIAL FINDINGS.  All CT scans at this facility use dose modulation, iterative reconstruction, and/or weight IS NEW SINCE THE PRIOR STUDY OF MAY 6, 2019. THE LACK OF IV CONTRAST LIMITS FULL EVALUATION. THIS IS SUPERIMPOSED UPON MODERATE EMPHYSEMATOUS DISEASE ONE PARENCHYMA AS DESCRIBED ABOVE. All CT scans at this facility use dose modulation, iterative reconstruction, and/or weight based dosing when appropriate to reduce radiation dose to as low as reasonably achievable. Ct Abdomen Pelvis W Iv Contrast Additional Contrast? Radiologist Recommendation    Result Date: 7/31/2019  CT ABDOMEN PELVIS W IV CONTRAST HISTORY:   met smith . Brain lesion, possibly metastatic. Concern for malignancy. TECHNIQUE: CT of the abdomen and pelvis was performed using standard technique, scanning from just above the dome of the diaphragm to the symphysis pubis. Including delayed images through the kidneys. Contrast: IV: 100 ml Isovue 300 Oral:  None. All CT scans at this facility use dose modulation, iterative reconstruction, and/or weight based dosing when appropriate to reduce radiation dose to as low as reasonably achievable. COMPARISON: CT 4/23/2018; MRI 12/3/2018. RESULT: Liver: Subcentimeter low-attenuation lesions throughout the liver, including 0.8 x 0.9 cm low-attenuation lesion in the right inferior hepatic lobe (series 2 image 27). These are similar to MRI 12/3/2018 and appear to be benign cysts. Biliary: No bile duct dilation. Gallbladder is collapsed. Pancreas: Diffuse pancreatic calcifications. No discrete pancreatic mass or pancreatic ductal dilation. Spleen: No mass. No splenomegaly. Adrenals: No mass. 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 No hydronephrosis or nephrolithiasis. Subcentimeter lesion(s) that are too small to characterize but likely benign. GI tract: No dilation or wall thickening. Mild diverticulosis without diverticulitis. Lymph nodes: No abdominal or pelvic lymphadenopathy. Mesentery/Peritoneum: No ascites or mass. Retroperitoneum: No mass. Vasculature: The celiac axis and SMA are patent.  The FOSSA OR IN THE SUPRATENTORIAL BRAIN. THERE IS A MILD DEGREE OF CEREBRAL ATROPHY AND WHITE MATTER T2 HYPERINTENSE FOCI WHICH ARE LIKELY REPRESENT A MANIFESTATION OF SMALL VESSEL DISEASE. NO ACUTE ISCHEMIC FOCUS IN THE BRAIN.              IMPRESSION AND SUGGESTION:  Patient is at risk due to   · COPD currently at baseline, has mild wheezing no apparent acute exacerbation  · Pleural-based right lower lobe supra diaphragmatic mass new since last CT scan,  · Cerebellar mass metastatic carcinoma primary is not clear yet  · Smoking      Recommendation  · Continue ICS/LABA   · Watch volume status and avoid overload  · Incentive spirometry  · Follow-up final pathology report       Electronically signed by Darion Trammell MD,  [facility]   on 8/11/2019 at 1:27 PM

## 2019-08-11 NOTE — FLOWSHEET NOTE
Assessment complete. Lungs clear but diminished. AXOX4. No signs of neuro deficits. Complained of headache medicated with tylenol per order. Call light within reach bed alarm on.

## 2019-08-11 NOTE — PROGRESS NOTES
facial asymmetry, speech difficulty, weakness, light-headedness, numbness and headaches. Psychiatric/Behavioral: Negative for agitation, confusion and hallucinations. The patient is not nervous/anxious. Physical Exam   Constitutional: She is oriented to person, place, and time. She appears well-nourished. No distress. HENT:   Head: Normocephalic and atraumatic. Eyes: Pupils are equal, round, and reactive to light. EOM are normal.   Neck: Neck supple. No JVD present. Cardiovascular: Normal rate and regular rhythm. Pulmonary/Chest: Effort normal and breath sounds normal. No respiratory distress. Abdominal: Soft. Bowel sounds are normal. She exhibits no distension. There is no tenderness. Musculoskeletal: She exhibits no edema. Neurological: She is alert and oriented to person, place, and time. Skin: Skin is warm and dry. She is not diaphoretic. Nursing note and vitals reviewed.        /81   Pulse 60   Temp 97.9 °F (36.6 °C)   Resp 19   Ht 4' 10.75\" (1.492 m) Comment: from another encounter  Wt 153 lb 10.6 oz (69.7 kg)   LMP  (LMP Unknown)   SpO2 97%   BMI 31.30 kg/m²     Mental Status Exam:             Level of Alertness:   awake            Orientation:   person, place, time            Memory:   normal            Attention/Concentration:  normal            Language:  normal     Cranial Nerves          Cranial nerve III           Pupils:  equal, round, reactive to light      Cranial nerves III, IV, VI           Extraocular Movements: intact      Cranial nerve V           Facial sensation:  intact      Cranial nerve VII           Facial strength: intact      Cranial nerve VIII           Hearing:  intact      Cranial nerve IX           Palate:  intact      Cranial nerve XI         Shoulder shrug:  intact      Cranial nerve XII          Tongue movement:  normal     Motor:    Drift:  absent  Motor exam is symmetrical 4 out of 5 all extremities bilaterally  Tone:  normal  Abnormal Movements:  absent  GAIT ATAXIA central    Medications:  Reviewed    Infusion Medications:   Scheduled Medications:    metoprolol tartrate  25 mg Oral BID    gabapentin  300 mg Oral TID    pantoprazole  40 mg Oral QAM AC    pravastatin  40 mg Oral Daily    isosorbide mononitrate  30 mg Oral Daily    dexamethasone  10 mg Intravenous Q6H    sodium chloride flush  10 mL Intravenous 2 times per day    nicotine  1 patch Transdermal Daily     PRN Meds: sodium chloride flush, magnesium hydroxide, ondansetron, acetaminophen, albuterol sulfate HFA, oxyCODONE-acetaminophen, tiZANidine    Labs:   No results for input(s): WBC, HGB, HCT, PLT in the last 72 hours. No results for input(s): NA, K, CL, CO2, BUN, CREATININE, CALCIUM, PHOS in the last 72 hours. Invalid input(s): MAGNES  No results for input(s): AST, ALT, BILIDIR, BILITOT, ALKPHOS in the last 72 hours. No results for input(s): INR in the last 72 hours. No results for input(s): [place] Roam in the last 72 hours. Urinalysis:   Lab Results   Component Value Date    NITRU Negative 07/30/2019    WBCUA 0-2 07/30/2019    BACTERIA Rare 07/30/2019    RBCUA 0-2 07/30/2019    BLOODU Negative 07/30/2019    SPECGRAV 1.020 07/30/2019    GLUCOSEU Negative 07/30/2019       Radiology:   Most recent    EEG No procedure found. MRI of Brain   Results for orders placed during the hospital encounter of 07/30/19   MRI BRAIN W WO CONTRAST    Narrative EXAMINATION:  MRI BRAIN WITHOUT AND WITH IV CONTRAST    CLINICAL HISTORY:  HEADACHE, DIZZINESS, NAUSEA, DISEQUILIBRIUM WITH UNSTEADY GAIT AND WEAKNESS IN BOTH LOWER EXTREMITIES, RECENT ABNORMAL CT BRAIN    COMPARISON:  CTA HEAD FROM 7/30/2019    TECHNIQUE:  Multisequence/multiplane MRI brain obtained without and with IV injection of 15 mL of MultiHance. FINDINGS:  The MRI brain without and with IV contrast demonstrates the following:    Posterior fossa:  There is an approximately 2.5 x 2 x 2 cm inhomogeneously previous visit. No results found for this or any previous visit. CT of the Head:   Results for orders placed during the hospital encounter of 06/12/18   CT Head WO Contrast    Narrative HISTORY: Keyonna Choudhury is a Female of 68 years age with history of head injury and blurred vision. COMPARISON: November 20, 2016; March 19, 2017    TECHNIQUE:  Spiral CT examination of the brain was performed without intravenous contrast.  Images were obtained from the base of the skull up to the convexities in axial slices, and then examined in the blood, brain parenchymal and bony windows. Sagittal and coronal reformatted images were also obtained. FINDINGS:  No acute changes are noted. There is no evidence for mass, mass effect or midline shift. No abnormal extra-axial fluid collections are appreciated. Age-appropriate cortical volume loss is seen. There there are areas of mild periventricular   white matter changes most consistent with micro-ischemic changes. .  There are no acute parenchymal alterations, blood byproducts or abnormal extracerebral fluid. The calvarium is grossly intact. The visualized paranasal sinuses                                      show mild mucoperiosteal thickening in the ethmoid air cells and mild opacification of the posterior left mastoid sinus which is unchanged. Impression 1. Age-appropriate cortical atrophy and periventricular microangiopathy. 2.  No acute hemorrhage or extra-axial fluid collection is seen                    . All CT scans at this facility use dose modulation, iterative reconstruction, and/or weight based dosing when appropriate to reduce radiation dose to as low as reasonably achievable. No results found for this or any previous visit. No results found for this or any previous visit. Carotid duplex: No results found for this or any previous visit. No results found for this or any previous visit.   Results for orders placed better  Still ataxia, somewhat more pronounced after surgery as per PT  No appetite but no dysphagia  RLL lung mass/ not diagnostic therefore the brain sugeryGait ataxia, improved  NSGY following  Decadron, sx improved  Resection biopsy  done  Await Rehab today  Has stairs/ therefore needs tune up  Safety issue    Gabriele Alfaro MD, Demetris Aceves, American Board of Psychiatry & Neurology  Board Certified in Vascular Neurology  Board Certified in Neuromuscular Medicine  Certified in . [place]

## 2019-08-11 NOTE — DISCHARGE SUMMARY
the aortic arch following the uneventful intravenous administration of 100 cc of nonionic contrast without incident. 2-D images were reconstructed in the sagittal and  coronal planes. Three Dimensional Maximum Intensity Projection (3D-MIP) images were created. All images were reviewed and primarily archived to PACS workstation. All CT scans at this facility use dose modulation, iterative reconstruction, and/or weight  based dosing when appropriate to reduce radiation dose to as low as reasonably achievable. NASCET Criteria were utilized Result / Findings: CTA NECK: Aortic Arch: The visualized portions of the aortic arch and proximal arch vessels demonstrates no focal stenosis aneurysm or dissection. Carotid:   Right  Carotid Stenosis (% by NASCET Criteria): There is no (than 20%) hemodynamically significant stenosis, vascular dissection or aneurysm of the right common or internal carotid arteries. Left  Carotid Stenosis (% by NASCET Criteria): There is some calcified plaque in the left carotid bulb. There is no (less than 20%) hemodynamically significant stenosis, vascular dissection or aneurysm in the left common or internal carotid arteries. Cervical Vertebral Arteries:    Patency: The vertebral arteries are well visualized to the level of the basilar artery. There is no focal stenosis aneurysm or dissection. Vertebral arteries are codominant. Cta Neck W Wo Contrast    Result Date: 7/30/2019  CTA HEAD AND CTA NECK 1. There is no large vessel occlusion or significant stenosis of the bilateral middle cerebral arteries, terminal internal carotid arteries, basilar artery, or cervical internal carotid arteries. 2.  2.7 cm ring-enhancing lesion in the posterior fossa abutting the vermis and extending to the left. There is some central lower density that is not clearly cystic on these images. There is some surrounding low density consistent with edema and probable mass effect.  The fourth ventricle is internal carotid arteries. Cervical Vertebral Arteries:    Patency: The vertebral arteries are well visualized to the level of the basilar artery. There is no focal stenosis aneurysm or dissection. Vertebral arteries are codominant. Ct Chest Wo Contrast    Result Date: 7/31/2019  EXAMINATION:  CT SCAN CHEST CLINICAL HISTORY:  Recent diagnosis brain mass. COMPARISON:  May 6, 2019. TECHNIQUE:  Multiple serial axial images from the base neck through the upper abdomen with both sagittal coronal reconstruction was performed without intravenous or oral administration of contrast. The lack of IV contrast limits full evaluation. FINDINGS:    There are biapical areas of scarring fibrosis. There is moderate emphysematous disease in the upper one third of the lung parenchyma. There is a pleural-based soft tissue density at the base medial aspect of the right lower lobe. It measures 2.7 x 2.9 x 1.0 cm in the transverse and AP and cephalocaudad dimension. Best seen on the coronal reconstructions. No other focal parenchymal abnormalities. No pleural effusions. No pneumothoraces. No significant periarticular. There is pretracheal adenopathy. No significant perihilar or subcarinal adenopathy. PLEURAL-BASED SOFT TISSUE MASS OVERLYING THE MEDIAL ASPECT OF THE RIGHT LOWER LOBE, RIGHT HEMIDIAPHRAGM. THIS IS NEW SINCE THE PRIOR STUDY OF MAY 6, 2019. THE LACK OF IV CONTRAST LIMITS FULL EVALUATION. THIS IS SUPERIMPOSED UPON MODERATE EMPHYSEMATOUS DISEASE ONE PARENCHYMA AS DESCRIBED ABOVE. All CT scans at this facility use dose modulation, iterative reconstruction, and/or weight based dosing when appropriate to reduce radiation dose to as low as reasonably achievable. Ct Abdomen Pelvis W Iv Contrast Additional Contrast? Radiologist Recommendation    Result Date: 7/31/2019  CT ABDOMEN PELVIS W IV CONTRAST HISTORY:   met smith . Brain lesion, possibly metastatic. Concern for malignancy.  TECHNIQUE: CT of the abdomen and pelvis was

## 2019-08-12 ENCOUNTER — APPOINTMENT (OUTPATIENT)
Dept: CT IMAGING | Age: 74
DRG: 949 | End: 2019-08-12
Attending: PHYSICAL MEDICINE & REHABILITATION
Payer: MEDICARE

## 2019-08-12 ENCOUNTER — APPOINTMENT (OUTPATIENT)
Dept: GENERAL RADIOLOGY | Age: 74
DRG: 949 | End: 2019-08-12
Attending: PHYSICAL MEDICINE & REHABILITATION
Payer: MEDICARE

## 2019-08-12 ENCOUNTER — APPOINTMENT (OUTPATIENT)
Dept: ULTRASOUND IMAGING | Age: 74
DRG: 949 | End: 2019-08-12
Attending: PHYSICAL MEDICINE & REHABILITATION
Payer: MEDICARE

## 2019-08-12 PROBLEM — R26.9 GAIT ABNORMALITY: Status: RESOLVED | Noted: 2019-08-10 | Resolved: 2019-08-12

## 2019-08-12 PROBLEM — M81.0 OSTEOPOROSIS: Status: ACTIVE | Noted: 2019-08-12

## 2019-08-12 PROBLEM — M54.9 BACK PAIN, CHRONIC: Status: ACTIVE | Noted: 2019-08-12

## 2019-08-12 PROBLEM — G89.29 BACK PAIN, CHRONIC: Status: ACTIVE | Noted: 2019-08-12

## 2019-08-12 PROBLEM — E66.9 OBESITY (BMI 30-39.9): Status: ACTIVE | Noted: 2019-08-12

## 2019-08-12 LAB
BILIRUBIN URINE: NEGATIVE
BLOOD, URINE: NEGATIVE
CLARITY: CLEAR
COLOR: YELLOW
D DIMER: 2.87 MG/L FEU (ref 0–0.5)
EKG ATRIAL RATE: 60 BPM
EKG P AXIS: 72 DEGREES
EKG P-R INTERVAL: 130 MS
EKG Q-T INTERVAL: 420 MS
EKG QRS DURATION: 70 MS
EKG QTC CALCULATION (BAZETT): 420 MS
EKG R AXIS: 82 DEGREES
EKG T AXIS: 78 DEGREES
EKG VENTRICULAR RATE: 60 BPM
GLUCOSE URINE: NEGATIVE MG/DL
HCT VFR BLD CALC: 38 % (ref 37–47)
HEMOGLOBIN: 13 G/DL (ref 12–16)
KETONES, URINE: NEGATIVE MG/DL
LEUKOCYTE ESTERASE, URINE: NEGATIVE
MCH RBC QN AUTO: 31.5 PG (ref 27–31.3)
MCHC RBC AUTO-ENTMCNC: 34.2 % (ref 33–37)
MCV RBC AUTO: 92.1 FL (ref 82–100)
NITRITE, URINE: NEGATIVE
PDW BLD-RTO: 16.8 % (ref 11.5–14.5)
PH UA: 8 (ref 5–9)
PLATELET # BLD: 124 K/UL (ref 130–400)
PROTEIN UA: NEGATIVE MG/DL
RBC # BLD: 4.13 M/UL (ref 4.2–5.4)
SPECIFIC GRAVITY UA: 1.01 (ref 1–1.03)
TROPONIN: <0.01 NG/ML (ref 0–0.01)
URINE REFLEX TO CULTURE: NORMAL
UROBILINOGEN, URINE: 0.2 E.U./DL
WBC # BLD: 14 K/UL (ref 4.8–10.8)

## 2019-08-12 PROCEDURE — 97163 PT EVAL HIGH COMPLEX 45 MIN: CPT

## 2019-08-12 PROCEDURE — 6370000000 HC RX 637 (ALT 250 FOR IP): Performed by: INTERNAL MEDICINE

## 2019-08-12 PROCEDURE — 6360000002 HC RX W HCPCS: Performed by: PHYSICAL MEDICINE & REHABILITATION

## 2019-08-12 PROCEDURE — 1180000000 HC REHAB R&B

## 2019-08-12 PROCEDURE — 6360000004 HC RX CONTRAST MEDICATION: Performed by: INTERNAL MEDICINE

## 2019-08-12 PROCEDURE — 71045 X-RAY EXAM CHEST 1 VIEW: CPT

## 2019-08-12 PROCEDURE — 92523 SPEECH SOUND LANG COMPREHEN: CPT

## 2019-08-12 PROCEDURE — 81003 URINALYSIS AUTO W/O SCOPE: CPT

## 2019-08-12 PROCEDURE — 99233 SBSQ HOSP IP/OBS HIGH 50: CPT | Performed by: INTERNAL MEDICINE

## 2019-08-12 PROCEDURE — 97165 OT EVAL LOW COMPLEX 30 MIN: CPT

## 2019-08-12 PROCEDURE — 99223 1ST HOSP IP/OBS HIGH 75: CPT | Performed by: PHYSICAL MEDICINE & REHABILITATION

## 2019-08-12 PROCEDURE — 93970 EXTREMITY STUDY: CPT

## 2019-08-12 PROCEDURE — 84484 ASSAY OF TROPONIN QUANT: CPT

## 2019-08-12 PROCEDURE — 71275 CT ANGIOGRAPHY CHEST: CPT

## 2019-08-12 PROCEDURE — 85379 FIBRIN DEGRADATION QUANT: CPT

## 2019-08-12 PROCEDURE — 97110 THERAPEUTIC EXERCISES: CPT

## 2019-08-12 PROCEDURE — 2500000003 HC RX 250 WO HCPCS: Performed by: PHYSICAL MEDICINE & REHABILITATION

## 2019-08-12 PROCEDURE — 94640 AIRWAY INHALATION TREATMENT: CPT

## 2019-08-12 PROCEDURE — 85027 COMPLETE CBC AUTOMATED: CPT

## 2019-08-12 PROCEDURE — 6370000000 HC RX 637 (ALT 250 FOR IP)

## 2019-08-12 PROCEDURE — 36415 COLL VENOUS BLD VENIPUNCTURE: CPT

## 2019-08-12 PROCEDURE — 6370000000 HC RX 637 (ALT 250 FOR IP): Performed by: PHYSICAL MEDICINE & REHABILITATION

## 2019-08-12 PROCEDURE — 99222 1ST HOSP IP/OBS MODERATE 55: CPT | Performed by: INTERNAL MEDICINE

## 2019-08-12 PROCEDURE — 93005 ELECTROCARDIOGRAM TRACING: CPT | Performed by: INTERNAL MEDICINE

## 2019-08-12 RX ORDER — NITROGLYCERIN 0.4 MG/1
TABLET SUBLINGUAL
Status: COMPLETED
Start: 2019-08-12 | End: 2019-08-12

## 2019-08-12 RX ORDER — ERGOCALCIFEROL 1.25 MG/1
50000 CAPSULE ORAL WEEKLY
Status: DISCONTINUED | OUTPATIENT
Start: 2019-08-12 | End: 2019-08-21 | Stop reason: HOSPADM

## 2019-08-12 RX ORDER — POVIDONE-IODINE 7.5 MG/ML
SOLUTION TOPICAL 3 TIMES DAILY
Status: DISCONTINUED | OUTPATIENT
Start: 2019-08-12 | End: 2019-08-21 | Stop reason: HOSPADM

## 2019-08-12 RX ORDER — LISINOPRIL 10 MG/1
10 TABLET ORAL 2 TIMES DAILY
Status: DISCONTINUED | OUTPATIENT
Start: 2019-08-12 | End: 2019-08-14

## 2019-08-12 RX ORDER — CYANOCOBALAMIN 1000 UG/ML
1000 INJECTION INTRAMUSCULAR; SUBCUTANEOUS WEEKLY
Status: DISCONTINUED | OUTPATIENT
Start: 2019-08-12 | End: 2019-08-21 | Stop reason: HOSPADM

## 2019-08-12 RX ORDER — L. ACIDOPHILUS/L.BULGARICUS 1MM CELL
2 TABLET ORAL 3 TIMES DAILY
Status: DISCONTINUED | OUTPATIENT
Start: 2019-08-12 | End: 2019-08-21 | Stop reason: HOSPADM

## 2019-08-12 RX ORDER — NITROGLYCERIN 0.4 MG/1
0.4 TABLET SUBLINGUAL EVERY 5 MIN PRN
Status: DISCONTINUED | OUTPATIENT
Start: 2019-08-12 | End: 2019-08-21 | Stop reason: HOSPADM

## 2019-08-12 RX ADMIN — ERGOCALCIFEROL 50000 UNITS: 1.25 CAPSULE ORAL at 09:03

## 2019-08-12 RX ADMIN — CYANOCOBALAMIN 1000 MCG: 1000 INJECTION, SOLUTION INTRAMUSCULAR; SUBCUTANEOUS at 11:37

## 2019-08-12 RX ADMIN — LISINOPRIL 10 MG: 10 TABLET ORAL at 21:00

## 2019-08-12 RX ADMIN — DEXAMETHASONE 2 MG: 1 TABLET ORAL at 09:03

## 2019-08-12 RX ADMIN — MENTHOL AND METHYL SALICYLATE: 7.6; 29 OINTMENT TOPICAL at 15:01

## 2019-08-12 RX ADMIN — VITAMIN D, TAB 1000IU (100/BT) 1000 UNITS: 25 TAB at 11:37

## 2019-08-12 RX ADMIN — PANTOPRAZOLE SODIUM 40 MG: 40 TABLET, DELAYED RELEASE ORAL at 05:57

## 2019-08-12 RX ADMIN — NITROGLYCERIN 0.4 MG: 0.4 TABLET, ORALLY DISINTEGRATING SUBLINGUAL at 08:50

## 2019-08-12 RX ADMIN — OXYCODONE HYDROCHLORIDE AND ACETAMINOPHEN 1 TABLET: 5; 325 TABLET ORAL at 12:23

## 2019-08-12 RX ADMIN — GABAPENTIN 300 MG: 300 CAPSULE ORAL at 21:00

## 2019-08-12 RX ADMIN — TIZANIDINE 4 MG: 4 TABLET ORAL at 15:11

## 2019-08-12 RX ADMIN — CEPHALEXIN 500 MG: 250 CAPSULE ORAL at 15:00

## 2019-08-12 RX ADMIN — NITROGLYCERIN 0.4 MG: 0.4 TABLET SUBLINGUAL at 08:50

## 2019-08-12 RX ADMIN — LACTOBACILLUS TAB 2 TABLET: TAB at 09:03

## 2019-08-12 RX ADMIN — DOCUSATE SODIUM 100 MG: 100 CAPSULE, LIQUID FILLED ORAL at 09:03

## 2019-08-12 RX ADMIN — PRAVASTATIN SODIUM 40 MG: 40 TABLET ORAL at 21:00

## 2019-08-12 RX ADMIN — METOPROLOL TARTRATE 25 MG: 25 TABLET ORAL at 21:01

## 2019-08-12 RX ADMIN — METOPROLOL TARTRATE 25 MG: 25 TABLET ORAL at 08:50

## 2019-08-12 RX ADMIN — MENTHOL AND METHYL SALICYLATE: 7.6; 29 OINTMENT TOPICAL at 09:10

## 2019-08-12 RX ADMIN — GABAPENTIN 300 MG: 300 CAPSULE ORAL at 15:00

## 2019-08-12 RX ADMIN — LISINOPRIL 10 MG: 10 TABLET ORAL at 12:17

## 2019-08-12 RX ADMIN — PROVIDONE IODINE: 7.5 STICK TOPICAL at 22:17

## 2019-08-12 RX ADMIN — LACTOBACILLUS TAB 2 TABLET: TAB at 15:00

## 2019-08-12 RX ADMIN — GABAPENTIN 300 MG: 300 CAPSULE ORAL at 09:03

## 2019-08-12 RX ADMIN — ACETAMINOPHEN 650 MG: 325 TABLET ORAL at 09:03

## 2019-08-12 RX ADMIN — OXYCODONE HYDROCHLORIDE AND ACETAMINOPHEN 1 TABLET: 5; 325 TABLET ORAL at 18:18

## 2019-08-12 RX ADMIN — MENTHOL AND METHYL SALICYLATE: 7.6; 29 OINTMENT TOPICAL at 21:08

## 2019-08-12 RX ADMIN — CEPHALEXIN 500 MG: 250 CAPSULE ORAL at 05:57

## 2019-08-12 RX ADMIN — GLYCOPYRROLATE AND FORMOTEROL FUMARATE 2 PUFF: 9; 4.8 AEROSOL, METERED RESPIRATORY (INHALATION) at 15:59

## 2019-08-12 RX ADMIN — CEPHALEXIN 500 MG: 250 CAPSULE ORAL at 21:00

## 2019-08-12 RX ADMIN — LACTOBACILLUS TAB 2 TABLET: TAB at 21:00

## 2019-08-12 RX ADMIN — IOPAMIDOL 100 ML: 612 INJECTION, SOLUTION INTRAVENOUS at 12:00

## 2019-08-12 SDOH — HEALTH STABILITY: PHYSICAL HEALTH: ON AVERAGE, HOW MANY MINUTES DO YOU ENGAGE IN EXERCISE AT THIS LEVEL?: 0 MIN

## 2019-08-12 ASSESSMENT — ENCOUNTER SYMPTOMS
SORE THROAT: 0
VOMITING: 0
TROUBLE SWALLOWING: 0
APNEA: 0
COLOR CHANGE: 0
BLOOD IN STOOL: 0
DIARRHEA: 0
RESPIRATORY NEGATIVE: 1
BOWEL INCONTINENCE: 0
CHEST TIGHTNESS: 0
EYE PAIN: 0
ABDOMINAL DISTENTION: 0
SHORTNESS OF BREATH: 0
CONSTIPATION: 1
GASTROINTESTINAL NEGATIVE: 1
EYE REDNESS: 0
SHORTNESS OF BREATH: 1
NAUSEA: 0
STRIDOR: 0
BACK PAIN: 1
BLURRED VISION: 0
WHEEZING: 0
ABDOMINAL PAIN: 0
PHOTOPHOBIA: 0
EYES NEGATIVE: 1
EYE WATERING: 0
EYE DISCHARGE: 0
COUGH: 0
BACK PAIN: 0

## 2019-08-12 ASSESSMENT — PAIN DESCRIPTION - PAIN TYPE
TYPE: OTHER (COMMENT)
TYPE: OTHER (COMMENT)

## 2019-08-12 ASSESSMENT — PAIN SCALES - GENERAL
PAINLEVEL_OUTOF10: 8
PAINLEVEL_OUTOF10: 0
PAINLEVEL_OUTOF10: 4
PAINLEVEL_OUTOF10: 4
PAINLEVEL_OUTOF10: 7
PAINLEVEL_OUTOF10: 10
PAINLEVEL_OUTOF10: 3

## 2019-08-12 ASSESSMENT — PAIN DESCRIPTION - LOCATION
LOCATION: CHEST

## 2019-08-12 ASSESSMENT — PAIN DESCRIPTION - DESCRIPTORS
DESCRIPTORS: CONSTANT
DESCRIPTORS: CONSTANT

## 2019-08-12 ASSESSMENT — PAIN - FUNCTIONAL ASSESSMENT: PAIN_FUNCTIONAL_ASSESSMENT: 0-10

## 2019-08-12 NOTE — CONSULTS
oxyCODONE-acetaminophen, tiZANidine, hydrALAZINE  . Allergies:     No Known Allergies     Review of Systems:       Review of Systems   Constitutional: Negative. Negative for diaphoresis and fatigue. HENT: Negative. Eyes: Negative. Respiratory: Negative. Negative for cough, chest tightness, shortness of breath, wheezing and stridor. Cardiovascular: Positive for chest pain. Negative for palpitations and leg swelling. Gastrointestinal: Negative. Negative for blood in stool and nausea. Genitourinary: Negative. Musculoskeletal: Negative. Skin: Negative. Neurological: Positive for weakness. Negative for dizziness, syncope and light-headedness. Hematological: Negative. Psychiatric/Behavioral: Negative. Objective Findings:     Vitals:BP (!) 169/80   Pulse 60   Temp 98 °F (36.7 °C) (Oral)   Resp 20   Ht 4' 10.75\" (1.492 m)   Wt 152 lb 12.5 oz (69.3 kg)   LMP  (LMP Unknown)   SpO2 97%   BMI 31.12 kg/m²      Physical Examination:    Physical Exam   Constitutional: No distress. She appears chronically ill. HENT:   Normal cephalic and Atraumatic   Eyes: Pupils are equal, round, and reactive to light. Neck: Normal range of motion and thyroid normal. Neck supple. No JVD present. No neck adenopathy. No thyromegaly present. Cardiovascular: Normal rate, regular rhythm, intact distal pulses and normal pulses. Murmur heard. Pulmonary/Chest: Effort normal and breath sounds normal. She has no wheezes. She has no rales. She exhibits no tenderness. Abdominal: Soft. Bowel sounds are normal. There is no tenderness. Musculoskeletal: Normal range of motion. She exhibits no edema or tenderness. Neurological: She is alert and oriented to person, place, and time. Skin: Skin is warm. No cyanosis. Nails show no clubbing.          Results/ Medications reviewed 8/12/2019, 10:24 AM     Laboratory, Microbiology, Pathology, Radiology, Cardiology, Medications and Transcriptions hydrocephalus. On these images there are no additional parenchymal brain lesions. Primary differential would include solitary metastatic lesion most commonly breast or lung. The possibility  of hemangioblastoma is also mentioned. Dural sinus: As visualized the opacified dural venous sinuses are unremarkable. [address] [place] [address]. CTA NECK DATE AND TIME:7/30/2019 2:15 PM CLINICAL HISTORY: Stroke symptoms   gait instability  TECHNIQUE: TECHNIQUE:Helical CTA of the head and neck was performed from the vertex to the aortic arch following the uneventful intravenous administration of 100 cc of nonionic contrast without incident. 2-D images were reconstructed in the sagittal and  coronal planes. Three Dimensional Maximum Intensity Projection (3D-MIP) images were created. All images were reviewed and primarily archived to PACS workstation. All CT scans at this facility use dose modulation, iterative reconstruction, and/or weight  based dosing when appropriate to reduce radiation dose to as low as reasonably achievable. NASCET Criteria were utilized Result / Findings: CTA NECK: Aortic Arch: The visualized portions of the aortic arch and proximal arch vessels demonstrates no focal stenosis aneurysm or dissection. Carotid:   Right  Carotid Stenosis (% by NASCET Criteria): There is no (than 20%) hemodynamically significant stenosis, vascular dissection or aneurysm of the right common or internal carotid arteries. Left  Carotid Stenosis (% by NASCET Criteria): There is some calcified plaque in the left carotid bulb. There is no (less than 20%) hemodynamically significant stenosis, vascular dissection or aneurysm in the left common or internal carotid arteries. Cervical Vertebral Arteries:    Patency: The vertebral arteries are well visualized to the level of the basilar artery. There is no focal stenosis aneurysm or dissection. Vertebral arteries are codominant.      Ct Head Wo Contrast    Result visualized within the bilateral anterior cerebral arteries with no large vessel occlusion or significant stenosis. The left A1 segment is relatively hypoplastic. Middle cerebral arteries:Flow visualized throughout the middle cerebral arteries bilaterally with no large vessel occlusion or significant stenosis. Posterior Circulation: Bilateral posterior cerebral arteries are patent. Basilar artery:Flow is visualized in the basilar artery without significant stenosis. Aneurysm No aneurysm or dissection in the anterior or posterior circulations. .     Neurocranium there is a 2.7 cm ring-enhancing posterior fossa lesion abutting the vermis and extending to the left. There is a central lower density that is not clearly cystic on these images. There is some surrounding low density consistent with edema. The fourth ventricle is normal in appearance. There is no hydrocephalus. On these images there are no additional parenchymal brain lesions. Primary differential would include solitary metastatic lesion most commonly breast or lung. The possibility  of hemangioblastoma is also mentioned. Dural sinus: As visualized the opacified dural venous sinuses are unremarkable. [address] [place] [address]. CTA NECK DATE AND TIME:7/30/2019 2:15 PM CLINICAL HISTORY: Stroke symptoms   gait instability  TECHNIQUE: TECHNIQUE:Helical CTA of the head and neck was performed from the vertex to the aortic arch following the uneventful intravenous administration of 100 cc of nonionic contrast without incident. 2-D images were reconstructed in the sagittal and  coronal planes. Three Dimensional Maximum Intensity Projection (3D-MIP) images were created. All images were reviewed and primarily archived to PACS workstation. All CT scans at this facility use dose modulation, iterative reconstruction, and/or weight  based dosing when appropriate to reduce radiation dose to as low as reasonably achievable.  NASCET Criteria were utilized Result / Findings: CTA NECK: Aortic Arch: The visualized portions of the aortic arch and proximal arch vessels demonstrates no focal stenosis aneurysm or dissection. Carotid:   Right  Carotid Stenosis (% by NASCET Criteria): There is no (than 20%) hemodynamically significant stenosis, vascular dissection or aneurysm of the right common or internal carotid arteries. Left  Carotid Stenosis (% by NASCET Criteria): There is some calcified plaque in the left carotid bulb. There is no (less than 20%) hemodynamically significant stenosis, vascular dissection or aneurysm in the left common or internal carotid arteries. Cervical Vertebral Arteries:    Patency: The vertebral arteries are well visualized to the level of the basilar artery. There is no focal stenosis aneurysm or dissection. Vertebral arteries are codominant. Ct Chest Wo Contrast    Result Date: 7/31/2019  EXAMINATION:  CT SCAN CHEST CLINICAL HISTORY:  Recent diagnosis brain mass. COMPARISON:  May 6, 2019. TECHNIQUE:  Multiple serial axial images from the base neck through the upper abdomen with both sagittal coronal reconstruction was performed without intravenous or oral administration of contrast. The lack of IV contrast limits full evaluation. FINDINGS:    There are biapical areas of scarring fibrosis. There is moderate emphysematous disease in the upper one third of the lung parenchyma. There is a pleural-based soft tissue density at the base medial aspect of the right lower lobe. It measures 2.7 x 2.9 x 1.0 cm in the transverse and AP and cephalocaudad dimension. Best seen on the coronal reconstructions. No other focal parenchymal abnormalities. No pleural effusions. No pneumothoraces. No significant periarticular. There is pretracheal adenopathy. No significant perihilar or subcarinal adenopathy. PLEURAL-BASED SOFT TISSUE MASS OVERLYING THE MEDIAL ASPECT OF THE RIGHT LOWER LOBE, RIGHT HEMIDIAPHRAGM.  THIS IS NEW SINCE THE TUMOR AND DIFFERENTIAL CONSIDERATIONS ARE PRIMARILY BETWEEN A METASTASIS OR LESS LIKELY A HEMANGIOBLASTOMA. 2. NO OTHER ENHANCING LESION IN THE POSTERIOR FOSSA OR IN THE SUPRATENTORIAL BRAIN. THERE IS A MILD DEGREE OF CEREBRAL ATROPHY AND WHITE MATTER T2 HYPERINTENSE FOCI WHICH ARE LIKELY REPRESENT A MANIFESTATION OF SMALL VESSEL DISEASE. NO ACUTE ISCHEMIC FOCUS IN THE BRAIN. Active Hospital Problems    Diagnosis Date Noted    Coronary artery disease involving native coronary artery of native heart without angina pectoris [I25.10] 03/19/2017     Priority: High    Essential hypertension [I10] 11/20/2016     Priority: High    Dyslipidemia [E78.5] 11/20/2016     Priority: High    Syncope [R55] 11/20/2016     Priority: High    Tobacco abuse [Z72.0] 11/20/2016     Priority: High    Osteoporosis [M81.0] 08/12/2019     Priority: Low    Back pain, chronic [M54.9, G89.29] 08/12/2019     Priority: Low    Obesity (BMI 30-39. 9) [E66.9] 08/12/2019     Priority: Low    Abnormality of gait and mobility due to NTBI s/p Suboccipital Craniotomy for Metastatic Posterior Fossa Brain Mass with Impaired Mobility. [facility] admit 08/11/19.  [R26.9] 08/11/2019     Priority: Low    Ataxy cerebellar ([address] [G11.9] 08/10/2019     Priority: Low    COPD (chronic obstructive pulmonary disease) ([facility] [J44.9] 07/31/2019     Priority: Low    Brain mass [G93.9] 07/30/2019     Priority: Low    SSS (sick sinus syndrome) ([address] [I49.5] 02/11/2019     Priority: Low    Presence of permanent cardiac pacemaker [Z95.0] 02/11/2019     Priority: Low    Age-related osteoporosis without current pathological fracture [M81.0] 02/11/2019     Priority: Low    Idiopathic chronic pancreatitis ([address] [K86.1] 01/08/2019     Priority: Low    Hiatal hernia [K44.9] 01/08/2019     Priority: Low    PAD (peripheral artery disease) ([address] [I73.9] 11/15/2018     Priority: Low    VTE (venous thromboembolism) [I82.90] 11/08/2018     Priority: Low    CKD (chronic kidney disease), stage III ([facility] [N18.3] 11/08/2018     Priority: Low    MGUS (monoclonal gammopathy of unknown significance) [D47.2] 11/08/2018     Priority: Low    Lung nodule [R91.1] 05/14/2018     Priority: Low    Hypotension [I95.9]      Priority: Low    Factor V Leiden mutation ([facility] [D68.51] 05/15/2015     Priority: Low    Tobacco dependence [F17.200] 08/25/2014     Priority: Low    DVT (deep venous thrombosis) ([facility] [I82.409] 08/01/2014     Priority: Low    Lumbosacral spondylosis without myelopathy [M47.817] 07/20/2011     Priority: Low    Facet arthropathy, lumbar [M47.816] 07/20/2011     Priority: Low         Impression/Plan:   1. CP- does not appear to be cardiac. It is reproducible. 2. Metastatic Brain Ca s/p resection  3. Right Diaphragmatic mass- w/u in progress. 4. CAD stable no Angina. Resume ASA when surgically appropriate. 5. PPM  6. HTN -uncontrolled- add ACE bid     Thank you for allowing us to participate in the care of this patient. Will continue to follow. Please call if questions or concerns arise.     Electronically signed by Homar Jarvis MD on 8/12/2019 at 10:24 AM

## 2019-08-12 NOTE — CONSULTS
Movements:  ataxia              Medications:  Reviewed    Infusion Medications:   Scheduled Medications:    Povidone-Iodine   Topical TID    cyanocobalamin  1,000 mcg Intramuscular Weekly    vitamin D  50,000 Units Oral Weekly    vitamin D  1,000 Units Oral Lunch    lactobacillus acidophilus  2 tablet Oral TID    lisinopril  10 mg Oral BID    analgesic ointment   Topical TID    cephALEXin  500 mg Oral 3 times per day    dexamethasone  2 mg Oral 2 times per day    docusate sodium  100 mg Oral BID    gabapentin  300 mg Oral TID    glycopyrrolate-formoterol  2 puff Inhalation BID    lidocaine  3 patch Transdermal Daily    metoprolol tartrate  25 mg Oral BID    nicotine  1 patch Transdermal Daily    pantoprazole  40 mg Oral QAM AC    pravastatin  40 mg Oral Daily     PRN Meds: nitroGLYCERIN, nystatin, stomahesive in petrolatum, acetaminophen, magnesium hydroxide, ondansetron, sodium chloride flush, albuterol sulfate HFA, oxyCODONE-acetaminophen, tiZANidine, hydrALAZINE    Labs:   Recent Labs     08/12/19  0544   WBC 14.0*   HGB 13.0   HCT 38.0   *     No results for input(s): NA, K, CL, CO2, BUN, CREATININE, CALCIUM, PHOS in the last 72 hours. Invalid input(s): MAGNES  No results for input(s): AST, ALT, BILIDIR, BILITOT, ALKPHOS in the last 72 hours. No results for input(s): INR in the last 72 hours. Recent Labs     08/12/19  0544 08/12/19  0920   TROPONINI <0.010 <0.010       Urinalysis:   Lab Results   Component Value Date    NITRU Negative 08/12/2019    WBCUA 0-2 07/30/2019    BACTERIA Rare 07/30/2019    RBCUA 0-2 07/30/2019    BLOODU Negative 08/12/2019    SPECGRAV 1.011 08/12/2019    GLUCOSEU Negative 08/12/2019       Radiology:   Most recent    EEG No procedure found.     MRI of Brain   Results for orders placed during the hospital encounter of 07/30/19   MRI BRAIN W WO CONTRAST    Narrative EXAMINATION:  MRI BRAIN WITHOUT AND WITH IV CONTRAST    CLINICAL HISTORY:  HEADACHE, DIZZINESS, Peak systolic velocities in the proximal ECA and mid CCA   are 138 and 85cm/s. There is antegrade flow in the left vertebral artery. Impression NO HEMODYNAMICALLY SIGNIFICANT INTERNAL CAROTID ARTERY STENOSES. ANTEGRADE VERTEBRAL FLOW. Echo No results found for this or any previous visit. Assessment/Plan:    Metastatic Cerebellar Tumor with mass effect  S/p craniotomy 8/6 with biopsy, doing better  Still ataxic, improving  Right diaphragmatic mass  Gait ataxia, improved  NSGY and oncology following  Decadron  PT/OT    Gabriele R. Zoran Jimenez MD, Meghan Grewal, American Board of Psychiatry & Neurology  Board Certified in Vascular Neurology  Board Certified in Neuromuscular Medicine  Certified in Neurorehabilitation         Collaborating physicians: Dr Zoran Jimenez, Dr WILLY Pineda, Dr Josefina Gilliland    Electronically signed by JULIANNA Hobbs CNP on 8/12/2019 at 2:42 PM

## 2019-08-12 NOTE — PROGRESS NOTES
Speech Language Pathology    NAME:  Williams Mortimer  ROOM: [id number]  :  1945  DATE: 2019      Speech Therapy attempted to see Williams Mortimer on this date for a/an:    Clinical Bedside Swallow Evaluation    Pt was unable to be seen due to:   Patient refused   Pt refused evaluations due to shortness of breath. She stated, \"I just don't think I could do it right now. It's just too much. \"  Clinician called RNRitchie to notify him. He stated that he pt is on tx hold until results of pt's scans from this morning are in. Clinician notified OT therapist Mendez.         Electronically signed by VINCENT Alcantar on 19 at 1:45 PM

## 2019-08-12 NOTE — H&P
nausea, photophobia, seizures, sore throat, tinnitus or vomiting. The symptoms are aggravated by activity. She has tried cold packs and darkened room for the symptoms. The treatment provided moderate relief. Her past medical history is significant for cancer, hypertension and immunosuppression. There is no history of cluster headaches, migraine headaches, migraines in the family, obesity, pseudotumor cerebri or recent head traumas. Fatigue   This is a recurrent problem. The current episode started in the past 7 days. The problem occurs constantly. The problem has been gradually improving. Associated symptoms include fatigue, headaches, joint swelling, myalgias, neck pain, vertigo and weakness. Pertinent negatives include no abdominal pain, anorexia, chest pain, chills, congestion, coughing, diaphoresis, fever, nausea, rash, sore throat or vomiting. The symptoms are aggravated by bending and walking. She has tried eating and rest for the symptoms. The patient has stabilized medically andis able to participate at acute level rehab but is too medically complex for SNF due to need for therapy at the acute level with at least 15 hours a week of PT OT and cognitive and recreational therapy at an acute level with daily medical monitoring. Imaging:    Imaging and other studies reviewed and discussed with patient and staff    Cta Head  7/30/2019  CTA HEAD AND CTA NECK 1. There is no large vessel occlusion or significant stenosis of the bilateral middle cerebral arteries, terminal internal carotid arteries, basilar artery, or cervical internal carotid arteries. 2.  2.7 cm ring-enhancing lesion in the posterior fossa abutting the vermis and extending to the left. There is some central lower density that is not clearly cystic on these images. There is some surrounding low density consistent with edema and probable mass effect. The fourth ventricle is normal in appearance. There is no hydrocephalus.  On these Patency: The vertebral arteries are well visualized to the level of the basilar artery. There is no focal stenosis aneurysm or dissection. Vertebral arteries are codominant. Ct Head Wo  8/9/2019   Postsurgical changes without superimposed complication or superimposed acute finding. Ct Head  8/7/2019  EXAMINATION:  CT HEAD WO CONTRAST CLINICAL HISTORY:   Postop COMPARISONS:  August 6, 2019 TECHNIQUE:    NO UNEXPECTED POSTOPERATIVE FINDINGS, WITH DIMINISHING POSTOPERATIVE BLOOD AT THE OPERATIVE SITE AND DIMINISHING PNEUMOCEPHALUS. VENTRICULOSTOMY IS UNCHANGED. THERE IS NO VENTRICULOMEGALY. THERE IS NO EFFACEMENT OF EXTRA-AXIAL CSF SPACES           Ct Head Wo  8/6/2019  CT Brain Contrast medium:  Not utilized. History:  Craniotomy. Tumor resection Comparison:  August 6, 2019, 0826 hours. Findings: In the interval, a right frontal craniotomy has been performed with insertion of an intraventricular drainage catheter, the tip found near the base at the level of the lateral ventricles, at or just proximal to the cephalad portion of the third ventricle. An additional, midline craniotomy at the skull base is also evident. The previously visualized mass found centrally within the cerebellum is no longer evident. A focal 9 x 9 mm area of increased attenuation is found in the region of the midline cerebellar resection. No extra-axial fluid collection, midline shift, or mass effect. There is identified along the anterior margin of the ambient cistern bilaterally as well as within the suprasellar cistern. Ventricles normal in size. Facial sinuses patent. Mastoid air cells well pneumatized. Impression: Interval craniotomy, posterior midline skull base or resection of cerebellar mass and interval insertion of an intraventricular shunt catheter via right frontal craniotomy. Postsurgical changes discussed. Ct Head : 8/6/2019   THERE IS A MASS WITH HETEROGENEOUS ENHANCEMENT IN THE LEFT CEREBELLAR HEMISPHERE.  THERE base neck through the upper abdomen with both sagittal coronal reconstruction was performed without intravenous or oral administration of contrast. The lack of IV contrast limits full evaluation. FINDINGS:    There are biapical areas of scarring fibrosis. There is moderate emphysematous disease in the upper one third of the lung parenchyma. There is a pleural-based soft tissue density at the base medial aspect of the right lower lobe. It measures 2.7 x 2.9 x 1.0 cm in the transverse and AP and cephalocaudad dimension. Best seen on the coronal reconstructions. No other focal parenchymal abnormalities. No pleural effusions. No pneumothoraces. No significant periarticular. There is pretracheal adenopathy. No significant perihilar or subcarinal adenopathy. PLEURAL-BASED SOFT TISSUE MASS OVERLYING THE MEDIAL ASPECT OF THE RIGHT LOWER LOBE, RIGHT HEMIDIAPHRAGM. THIS IS NEW SINCE THE PRIOR STUDY OF MAY 6, 2019. THE LACK OF IV CONTRAST LIMITS FULL EVALUATION. THIS IS SUPERIMPOSED UPON MODERATE EMPHYSEMATOUS DISEASE ONE PARENCHYMA AS DESCRIBED ABOVE. Ct Chest W  8/10/2019  EXAMINATION:  CT SCAN CHEST CLINICAL HISTORY:  Lung nodule COMPARISON:  July 31, 2019 TECHNIQUE:  Multiple    THERE IS A SMOOTH LOBULATED MASS AT THE ANTERIOR MEDIAL ASPECT OF THE RIGHT LOWER LOBE/DIAPHRAGM THE MASS APPEARS TO BE \"EXTRAPLEURAL \"AND MAY BE ORIGINATING IN FROM THE DIAPHRAGM. ETIOLOGY UNCERTAIN AS PRIMARY DIAPHRAGMATIC TUMORS ARE RARE. MOST TEND BE  BENIGN BUT UNDERLYING MALIGNANCY IS NOT EXCLUDED. CONTINUED FOLLOW-UP WILL BE NEEDED         Ct Abdomen Pelvis W  7/31/2019  CT ABDOMEN PELVIS W IV CONTRAST HISTORY:   met smith . Brain lesion, possibly metastatic. Concern for malignancy. No evidence for metastatic disease within the abdomen/pelvis. Soft tissue nodule right hemidiaphragm, likely metastatic. Recommend dedicated CT of the lungs for further evaluation of underlying malignancy.  Small hiatal hernia with possible area of MPV 9.1 05/07/2015     No results found for: VITD25  Lab Results   Component Value Date    COLORU Yellow 08/12/2019    NITRU Negative 08/12/2019    GLUCOSEU Negative 08/12/2019    KETUA Negative 08/12/2019    UROBILINOGEN 0.2 08/12/2019    BILIRUBINUR Negative 08/12/2019     Lab Results   Component Value Date    PROTIME 14.3 08/06/2019     Lab Results   Component Value Date    INR 1.1 08/06/2019         I discussed results with patient. The patient remains highly medically complex and continues to have severe problems with activities of daily living and mobility. The patient was assessed to be able to tolerate intensive rehabilitation and therefore was admitted to Rehabilitation to address these needs. Prior Function; everyday activities:     Social History     Socioeconomic History    Marital status:      Spouse name: Not on file    Number of children: 3    Years of education: Not on file    Highest education level: Not on file   Occupational History    Occupation:  Retired beautician    Occupation: Retired various factory jobs   Social Needs    Financial resource strain: Somewhat hard   [place] insecurity:     Worry: Never true     Inability: Never true    Transportation needs:     Medical: No     Non-medical: No   Tobacco Use    Smoking status: Heavy Tobacco Smoker     Packs/day: 1.00     Years: 57.00     Pack years: 57.00     Types: Cigarettes     Start date: 1/1/1960    Smokeless tobacco: Never Used    Tobacco comment: 4/24/19 now smoking 1/2ppd   Substance and Sexual Activity    Alcohol use: Yes     Comment: occ    Drug use: No    Sexual activity: Never   Lifestyle    Physical activity:     Days per week: 0 days     Minutes per session: 0 min    Stress:  Only a little   Relationships    Social connections:     Talks on phone: More than three times a week     Gets together: More than three times a week     Attends Baptism service: More than 4 times per year     Active member a week     Attends Confucianism service: More than 4 times per year     Active member of club or organization: No     Attends meetings of clubs or organizations: Never     Relationship status:     Intimate partner violence:     Fear of current or ex partner: No     Emotionally abused: No     Physically abused: No     Forced sexual activity: No   Other Topics Concern    Not on file   Social History Narrative         Lives With: Family  (2 dtrs-works full-time afternoon shift the other does not both are helpful)    Type of Home: House  One level    Home Access: Stairs to enter without rails - Number of Steps: 4    Bathroom Shower/Tub: Walk-in shower    Bathroom Equipment: Shower chair    Home Equipment: Rolling walker, 4 wheeled walker, Quad cane    Receives Help From: Family    ADL Assistance: Independent    Ambulation Assistance: Independent(quad cane most of the time)    Transfer Assistance: Independent    Active : No    Occupation: Retired    Her hobbies are reading classic literature especially works by the Nathaniel sisters                 FAMILY HISTORY:  Does not pertain tochief complaint. Review of Systems   Constitutional: Positive for fatigue. Negative for chills, diaphoresis and fever. HENT: Negative for congestion, ear discharge, ear pain, hearing loss, nosebleeds, sore throat and tinnitus. Eyes: Negative for blurred vision, photophobia, pain and redness. Respiratory: Positive for shortness of breath. Negative for cough, wheezing and stridor. Shortness of breath on exertion   Cardiovascular: Negative for chest pain, palpitations, leg swelling and near-syncope. Gastrointestinal: Positive for constipation. Negative for abdominal pain, anorexia, blood in stool, bowel incontinence, diarrhea, nausea and vomiting. Endocrine: Negative for polydipsia. Genitourinary: Negative for bladder incontinence, dysuria, flank pain, frequency, hematuria and urgency.    Musculoskeletal: sounds. She has no wheezes. She has no rales. She exhibits no tenderness. Abdominal: Soft. Bowel sounds are normal. She exhibits no distension and no mass. There is no tenderness. There is no rebound and no guarding. Musculoskeletal: She exhibits tenderness. She exhibits no edema. Right shoulder: Normal.        Left shoulder: Normal.        Right elbow: Normal.       Left elbow: Normal.        Right wrist: Normal.        Left wrist: Normal.        Right hip: Normal.        Left hip: Normal.        Right knee: Normal.        Left knee: Normal.        Right ankle: Normal. Achilles tendon normal.        Left ankle: Normal. Achilles tendon normal.        Cervical back: Normal.        Thoracic back: Normal.        Lumbar back: She exhibits decreased range of motion, tenderness, bony tenderness and pain. She exhibits no swelling, no edema, no deformity, no laceration and normal pulse. Right upper arm: Normal.        Left upper arm: Normal.        Right forearm: Normal.        Left forearm: Normal.        Right hand: Normal.        Left hand: Normal.        Right upper leg: Normal.        Left upper leg: Normal.        Right lower leg: Normal.        Left lower leg: Normal.        Right foot: Normal.        Left foot: Normal.   Tender areas are indicated by numbered spot         Lymphadenopathy:     She has no cervical adenopathy. She has no axillary adenopathy. Right: No inguinal adenopathy present. Left: No inguinal adenopathy present. Neurological: She is alert and oriented to person, place, and time. She displays abnormal reflex. She displays no atrophy and no tremor. A sensory deficit is present. No cranial nerve deficit. She exhibits normal muscle tone. She has an abnormal Finger-Nose-Finger Test, an abnormal Heel to [employer], an abnormal Romberg Test and an abnormal Tandem Gait Test. Gait abnormal. Coordination normal. She displays no Babinski's sign on the right side.  She oncology CTA showed no metastatic lesions  3. Age-related osteoporosis without current pathological fracture-CTA of chest showed no bony lesions  4. CKD (chronic kidney disease), stage III -limit toxic medications  5. DVT (deep venous thrombosis), VTE (venous thromboembolism),   Factor V Leiden mutation  -complex blood thinner regimen because of her the vascular nature of her brain tumor and recent brain surgery, blood thinners currently on hold statin complicated work-up to rule out PE rule out DVT is pending  6. Facet arthropathy, lumbar,   Lumbosacral spondylosis without myelopathy-add Lidoderm, add Percocet, use lowest effective dose  7. Hiatal hernia and history of idiopathic chronic pancreatitis   8. MGUS (monoclonal gammopathy of unknown significance)-reconsult hematology oncology and recheck CBC  9. Tobacco dependence-stop smoking counseling-stop smoking counseling  10. Ataxy cerebellar -focus on balance and PT OT         I am especially concerned about chest pain acute on chronic rule out musculoskeletal rule out PE, rule out cardiac. The patient's high risk medication use includes any blood thinner given the complexity of the hemorrhagic component of her original brain tumor. She may be okay to be on low-dose Lovenox however it looks like she may have a PE fully full work-up pending discussed at length with medical and neurosurgery she cannot be on Xarelto at this point. We may consider IVC filter if needed. .    The patient is high risk for urinary tract infection, an admission urinalysis has been ordered. I will have the nurses check post void residual bladder volumes and place acatheter if excessive urine is retained in the bladder after voiding.      The patient is risk for deep venous thromosis,complex deep venous thrombosis protocol prophylaxis has been ordered blood thinners on hold because of recent vascular nature of her brain tumor with recent surgery may need IVC filter may

## 2019-08-12 NOTE — CONSULTS
Hematology/Oncology Consult  Encounter Date: 2019 1:58 PM    Ms. Deepak Singh is a 76 y.o. female  : 1945  MRN: [id number]  Acct Number: [de-identified]  Requesting Provider: DR Gutierrez Human    Reason for request: Metastatic brain cancer      CONSULTANT: Miriam Guevara    HPI: Agnes Henson was admitted to rehab. She presented with headache and unsteady gait. Found to have a  Midline mass in the cerebellar region. Suboccipital craniotomy showed poorly differentiated carcinoma. Ct chest showed a 3.4 x 3.2 cm mass in the medial aspect of right diaphragm.  CEA has been normal. Stained positive for CA 19.9    Patient Active Problem List   Diagnosis    Syncope    Essential hypertension    Dyslipidemia    Tobacco abuse    Coronary artery disease involving native coronary artery of native heart without angina pectoris    Hypotension    Lung nodule    SOB (shortness of breath) on exertion    Brain mass    COPD (chronic obstructive pulmonary disease) (HCC)    Age-related osteoporosis without current pathological fracture    Bilateral carotid artery stenosis    Chronic anticoagulation    CKD (chronic kidney disease), stage III (HCC)    DVT (deep venous thrombosis) (HCC)    VTE (venous thromboembolism)    Elevated alkaline phosphatase level    Esophageal dysphagia    Facet arthropathy, lumbar    Factor V Leiden mutation ([address]    Fracture of vertebra due to osteoporosis ([address]    Hiatal hernia    Hypercoagulable state, primary ([address]    Idiopathic chronic pancreatitis ([address]    Lumbosacral spondylosis without myelopathy    MGUS (monoclonal gammopathy of unknown significance)    Presence of permanent cardiac pacemaker    SSS (sick sinus syndrome) ([address]    Tobacco dependence    Neoplasm of brain causing mass effect on adjacent structures ([address]    PAD (peripheral artery disease) (HCC)    Ataxy cerebellar (HCC)    Abnormality of gait and mobility due to NTBI s/p Suboccipital Craniotomy for Metastatic [unfilled]  No Known Allergies     Review of Systems  All other systems are normal other than ones menbtioned in HPI  PHYSICAL EXAMINATION:   VITAL SIGNS: BP (!) 167/72   Pulse 61   Temp 98 °F (36.7 °C) (Oral)   Resp 18   Ht 4' 10.75\" (1.492 m)   Wt 152 lb 12.5 oz (69.3 kg)   LMP  (LMP Unknown)   SpO2 91%   BMI 31.12 kg/m²       GENERAL: In no acute distress, well- nourished, well- developed,alert and oriented to person place and time. SKIN: Warm and dry, withoutjaundice, ecchymoses, or petechiae. HEENT: Normocephalic, sclera anicteric, oral mucosa moist without lesion or exudate in the visible oral cavity or oropharynx, tongue mid-line with good mobility and no deviation with extension. NODES: No palpable adenopathy in the neck Levels I-V, bilateral   Supraclavicular fossae, axillary chains, or inguinal regions. LUNGS: Good inspiratory effort, no accessory muscle use, clear bilaterally, no focal wheeze, rales or rhonchi. CARDIAC: Regular rate and rhythm, without murmurs, rubs or gallops. ABDOMINAL: Normal bowel soundspresent, soft, non-tender, no mass or  organomegaly.   MUSKL:     LAB RESULTS:  Recent Results (from the past 24 hour(s))   URINE RT REFLEX TO CULTURE    Collection Time: 08/12/19  3:01 AM   Result Value Ref Range    Color, UA Yellow Straw/Yellow    Clarity, UA Clear Clear    Glucose, Ur Negative Negative mg/dL    Bilirubin Urine Negative Negative    Ketones, Urine Negative Negative mg/dL    Specific Gravity, UA 1.011 1.005 - 1.030    Blood, Urine Negative Negative    pH, UA 8.0 5.0 - 9.0    Protein, UA Negative Negative mg/dL    Urobilinogen, Urine 0.2 <2.0 E.U./dL    Nitrite, Urine Negative Negative    Leukocyte Esterase, Urine Negative Negative    Urine Reflex to Culture Not Indicated    CBC    Collection Time: 08/12/19  5:44 AM   Result Value Ref Range    WBC 14.0 (H) 4.8 - 10.8 K/uL    RBC 4.13 (L) 4.20 - 5.40 M/uL    Hemoglobin 13.0 12.0 - 16.0 g/dL    Hematocrit 38.0 37.0 - 47.0 %    MCV 92.1 82.0 - 100.0 fL    MCH 31.5 (H) 27.0 - 31.3 pg    MCHC 34.2 33.0 - 37.0 %    RDW 16.8 (H) 11.5 - 14.5 %    Platelets 112 (L) 002 - 400 K/uL   Troponin    Collection Time: 08/12/19  5:44 AM   Result Value Ref Range    Troponin <0.010 0.000 - 0.010 ng/mL   Troponin    Collection Time: 08/12/19  9:20 AM   Result Value Ref Range    Troponin <0.010 0.000 - 0.010 ng/mL   D-Dimer, Quantitative    Collection Time: 08/12/19  9:20 AM   Result Value Ref Range    D-Dimer, Quant 2.87 (HH) 0.00 - 0.50 mg/L FEU     Recent Labs     08/12/19  0301   COLORU Yellow   PHUR 8.0   CLARITYU Clear   SPECGRAV 1.011   LEUKOCYTESUR Negative   UROBILINOGEN 0.2   BILIRUBINUR Negative   BLOODU Negative        Pathology:     RADIOLOGY RESULTS:  Cta Head W Wo Contrast    Result Date: 7/30/2019  CTA HEAD AND CTA NECK 1. There is no large vessel occlusion or significant stenosis of the bilateral middle cerebral arteries, terminal internal carotid arteries, basilar artery, or cervical internal carotid arteries. 2.  2.7 cm ring-enhancing lesion in the posterior fossa abutting the vermis and extending to the left. There is some central lower density that is not clearly cystic on these images. There is some surrounding low density consistent with edema and probable mass effect. The fourth ventricle is normal in appearance. There is no hydrocephalus. On these images there are no additional parenchymal brain lesions. Primary differential would include solitary metastatic lesion most commonly breast or lung. The possibility of an hemangioblastoma is also mentioned. These findings were discussed with Dr. Fadi El in the emergency room.  EXAMINATION: CTA HEAD W WO CONTRAST, CTA NECK W WO CONTRAST CTA HEAD DATE AND TIME:7/30/2019 2:15 PM CLINICAL HISTORY: Stroke  gait instability  COMPARISON: None TECHNIQUE:Helical CTA of the head and neck was performed from the vertex to the aortic arch following the uneventful intravenous administration of 100 cc of nonionic contrast without incident. 2-D images were reconstructed in the sagittal and coronal planes. Three Dimensional Maximum Intensity Projection (3D-MIP) images were created. All images were reviewed and primarily archived to PACS workstation. All CT scans at this facility use dose modulation, iterative reconstruction, and/or weight based dosing when appropriate to reduce radiation dose to as low as reasonably achievable. NASCET Criteria were utilized FINDINGS CTA Head     Intracranial ICAs: Flow is visualized within the precavernous, cavernous, clinoid and supraclinoid segments of the internal carotid arteries bilaterally     Anterior cerebral arteries:Flow is visualized within the bilateral anterior cerebral arteries with no large vessel occlusion or significant stenosis. The left A1 segment is relatively hypoplastic. Middle cerebral arteries:Flow visualized throughout the middle cerebral arteries bilaterally with no large vessel occlusion or significant stenosis. Posterior Circulation: Bilateral posterior cerebral arteries are patent. Basilar artery:Flow is visualized in the basilar artery without significant stenosis. Aneurysm No aneurysm or dissection in the anterior or posterior circulations. .     Neurocranium there is a 2.7 cm ring-enhancing posterior fossa lesion abutting the vermis and extending to the left. There is a central lower density that is not clearly cystic on these images. There is some surrounding low density consistent with edema. The fourth ventricle is normal in appearance. There is no hydrocephalus. On these images there are no additional parenchymal brain lesions. Primary differential would include solitary metastatic lesion most commonly breast or lung. The possibility  of hemangioblastoma is also mentioned. Dural sinus: As visualized the opacified dural venous sinuses are unremarkable. [address] [place] [address].  CTA NECK DATE AND TIME:7/30/2019 2:15 PM 8/6/2019. RESULT: Post-operative change:  Right frontal approach ventricular catheter with tip near midline between the frontal horns, unchanged. Interval resolution of the pneumocephalus. The size of the ventricular system is unchanged. Occipital craniotomy with associated postsurgical changes from tumor resection within the cerebellum, with small amount of gas in the tissues adjacent to the craniotomy. . Acute change:   No evidence of an acute infarct. Hemorrhage:    No evidence of new/acute intracranial hemorrhage. Mass Lesion / Mass Effect: No significant mass effect. Chronic change:   Scattered patchy foci of low attenuation are present within supratentorial white matter which is a nonspecific finding but likely represents mild microvascular ischemia. Parenchyma: There is no significant volume loss. The brain parenchyma is otherwise within normal limits for age. Ventricles:   Stable in size. Paranasal sinuses and skull base: The visualized paranasal sinuses are grossly clear. The skull base and imaged soft tissues are unremarkable. Postsurgical changes without superimposed complication or superimposed acute finding. Ct Head Wo Contrast    Result Date: 8/7/2019  EXAMINATION:  CT HEAD WO CONTRAST CLINICAL HISTORY:   Postop COMPARISONS:  August 6, 2019 TECHNIQUE:  Spiral axial images of the brain were obtained without contrast enhancement. Multiplanar two-dimensional reformatting was completed at CT console. FINDINGS:  Again noted are postoperative findings, including occipital craniotomy extending from the foramen magnum, posteriorly toward the internal occipital protuberance. The operative bed contains a small amount of residual blood, diminished since August 6, 2019. There is less pneumocephalus now. The right frontal ventriculostomy catheter is unchanged. There is no hydrocephalus. There is no midline shift. Extra-axial CSF spaces remain widely patent.  There is no acute superimposed abnormality. NO UNEXPECTED POSTOPERATIVE FINDINGS, WITH DIMINISHING POSTOPERATIVE BLOOD AT THE OPERATIVE SITE AND DIMINISHING PNEUMOCEPHALUS. VENTRICULOSTOMY IS UNCHANGED. THERE IS NO VENTRICULOMEGALY. THERE IS NO EFFACEMENT OF EXTRA-AXIAL CSF SPACES All CT scans at this facility use dose modulation, iterative reconstruction, and/or weight based dosing when appropriate to reduce radiation dose to as low as reasonably achievable. Ct Head Wo Contrast    Result Date: 8/6/2019  CT Brain Contrast medium:  Not utilized. History:  Craniotomy. Tumor resection Comparison:  August 6, 2019, 0826 hours. Findings: In the interval, a right frontal craniotomy has been performed with insertion of an intraventricular drainage catheter, the tip found near the base at the level of the lateral ventricles, at or just proximal to the cephalad portion of the third ventricle. An additional, midline craniotomy at the skull base is also evident. The previously visualized mass found centrally within the cerebellum is no longer evident. A focal 9 x 9 mm area of increased attenuation is found in the region of the midline cerebellar resection. No extra-axial fluid collection, midline shift, or mass effect. There is identified along the anterior margin of the ambient cistern bilaterally as well as within the suprasellar cistern. Ventricles normal in size. Facial sinuses patent. Mastoid air cells well pneumatized. Impression: Interval craniotomy, posterior midline skull base or resection of cerebellar mass and interval insertion of an intraventricular shunt catheter via right frontal craniotomy. Postsurgical changes discussed. All CT scans at this facility use dose modulation, iterative reconstruction, and/or weight based dosing when appropriate to reduce radiation dose to as low as reasonably achievable. Ct Head W Wo Contrast    Result Date: 8/6/2019  CT HEAD W WO CONTRAST CLINICAL HISTORY:  MASS COMPARISON: MRI July 31, 2019. 8/10/2019  EXAMINATION:  CT SCAN CHEST CLINICAL HISTORY:  Lung nodule COMPARISON:  July 31, 2019 TECHNIQUE:  Multiple serial axial images from the base neck through the upper abdomen with sagittal coronal reconstruction was performed following the intravenous administration of 75 mL of Isovue-300. FINDINGS:  FINDINGS:    There are biapical areas of scarring fibrosis. There is moderate emphysematous disease in the upper one third of the lung parenchyma. Again note is made of a soft tissue density at the anterior medial aspect of the right lower lobe and right hemidiaphragm. It measures There is a pleural-based soft tissue density at the base medial aspect of the right lower lobe. It measures 3.4 x 1.1 in the transverse AP dimension. It is best appreciated on the coronal and sagittal reconstructions,  In the transverse and AP and cephalocaudad dimension. Best seen on the coronal reconstructions. On this study is of low attenuation without enhancement on the prior study intermediate attenuation. No other focal parenchymal abnormalities. No pleural effusions. No pneumothoraces. No significant periaortic there is pretracheal adenopathy. No significant perihilar or subcarinal adenopathy. Within the field-of-view of the abdomen again note is made of a small to moderate hiatal hernia. As well as partially visualized area of low-attenuation at the inferior aspect the right lobe of liver. Please see CT scan abdomen pelvis report of July 31, 2018 for additional details Visualized osseous structures show multilevel degenerative changes. THERE IS A SMOOTH LOBULATED MASS AT THE ANTERIOR MEDIAL ASPECT OF THE RIGHT LOWER LOBE/DIAPHRAGM THE MASS APPEARS TO BE \"EXTRAPLEURAL \"AND MAY BE ORIGINATING IN FROM THE DIAPHRAGM. ETIOLOGY UNCERTAIN AS PRIMARY DIAPHRAGMATIC TUMORS ARE RARE. MOST TEND BE  BENIGN BUT UNDERLYING MALIGNANCY IS NOT EXCLUDED.  CONTINUED FOLLOW-UP WILL BE NEEDED All CT scans at this facility use dose modulation, iterative reconstruction, and/or weight based dosing when appropriate to reduce radiation dose to as low as reasonably achievable. Cta Chest W Wo Contrast    Result Date: 8/12/2019  EXAM: CT SCAN OF THE THORAX WITH CONTRAST/PE PROTOCOL/CTA CHEST COMPARISON: CT CHEST, AUGUST 10, 2019, NOVEMBER 21, 2016. CT ABDOMEN PELVIS, JULY 31, 2019 . CHEST RADIOGRAPH, EARLIER TODAY. REASON FOR EXAMINATION:  CHEST PAIN, WORSE ON INSPIRATION. FRACTURE 5 DEFICIENCY. RECENT CEREBRAL RESECTION. TECHNIQUE: Helical CTA was performed through the chest utilizing 100 cc of Isovue 300 intravenous contrast.  Images were obtained with bolus tracking in order to opacify the pulmonary arteries. Thick section coronal MIP 3D reconstructions were performed  on a separate workstation. FINDINGS:  No intraluminal filling defects, pulmonary arterial tree. Thoracic aorta normal in course and caliber. Cardiac size normal. No pericardial effusion. Peripheral reflux contrast medium into inferior vena cava, and hepatic veins. Right lung again demonstrates right apical scarring. Diffuse emphysematous change. Interval development of peripheral subsegmental consolidation right posterior lung base. A 4.2 x 4.5 cm low-attenuation mass at the medial right diaphragm is again identified, and has increased in size from 3.4 x 3.2 cm. Left lung shows diffuse emphysematous change, and apical scarring. Peripheral consolidation, posterior left lung base identified. Limited imaging upper abdomen shows small hiatal hernia. Adrenal glands without anomaly. No osteoblastic, and no osteolytic lesions. No CT evidence pulmonary embolism. Bibasilar dependent atelectasis. Emphysema. Interval increase in size, right diaphragmatic mass discussed. Cannot exclude metastatic malignancy. Peripheral reflux of contrast medium into inferior vena cava and hepatic veins. Findings may reflect component of right heart failure.  Universal presence of contrast into IVC/hepatic veins peripherally may reflect early incomplete mixing of intravenous contrast medium. However, location of contrast medium, secondary to thrombosis not entirely excluded. Clinical correlation required. All CT scans at this facility use dose modulation, iterative reconstruction, and/or weight based dosing when appropriate to reduce radiation dose to as low as reasonably achievable. Ct Abdomen Pelvis W Iv Contrast Additional Contrast? Radiologist Recommendation    Result Date: 7/31/2019  CT ABDOMEN PELVIS W IV CONTRAST HISTORY:   met smith . Brain lesion, possibly metastatic. Concern for malignancy. TECHNIQUE: CT of the abdomen and pelvis was performed using standard technique, scanning from just above the dome of the diaphragm to the symphysis pubis. Including delayed images through the kidneys. Contrast: IV: 100 ml Isovue 300 Oral:  None. All CT scans at this facility use dose modulation, iterative reconstruction, and/or weight based dosing when appropriate to reduce radiation dose to as low as reasonably achievable. COMPARISON: CT 4/23/2018; MRI 12/3/2018. RESULT: Liver: Subcentimeter low-attenuation lesions throughout the liver, including 0.8 x 0.9 cm low-attenuation lesion in the right inferior hepatic lobe (series 2 image 27). These are similar to MRI 12/3/2018 and appear to be benign cysts. Biliary: No bile duct dilation. Gallbladder is collapsed. Pancreas: Diffuse pancreatic calcifications. No discrete pancreatic mass or pancreatic ductal dilation. Spleen: No mass. No splenomegaly. Adrenals: No mass. 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 No hydronephrosis or nephrolithiasis. Subcentimeter lesion(s) that are too small to characterize but likely benign. GI tract: No dilation or wall thickening. Mild diverticulosis without diverticulitis. Lymph nodes: No abdominal or pelvic lymphadenopathy. Mesentery/Peritoneum: No ascites or mass. Retroperitoneum: No mass. Vasculature:   The celiac axis and SMA are

## 2019-08-12 NOTE — PROGRESS NOTES
the sagittal and coronal planes. Three Dimensional Maximum Intensity Projection (3D-MIP) images were created. All images were reviewed and primarily archived to PACS workstation. All CT scans at this facility use dose modulation, iterative reconstruction, and/or weight based dosing when appropriate to reduce radiation dose to as low as reasonably achievable. NASCET Criteria were utilized FINDINGS CTA Head     Intracranial ICAs: Flow is visualized within the precavernous, cavernous, clinoid and supraclinoid segments of the internal carotid arteries bilaterally     Anterior cerebral arteries:Flow is visualized within the bilateral anterior cerebral arteries with no large vessel occlusion or significant stenosis. The left A1 segment is relatively hypoplastic. Middle cerebral arteries:Flow visualized throughout the middle cerebral arteries bilaterally with no large vessel occlusion or significant stenosis. Posterior Circulation: Bilateral posterior cerebral arteries are patent. Basilar artery:Flow is visualized in the basilar artery without significant stenosis. Aneurysm No aneurysm or dissection in the anterior or posterior circulations. .     Neurocranium there is a 2.7 cm ring-enhancing posterior fossa lesion abutting the vermis and extending to the left. There is a central lower density that is not clearly cystic on these images. There is some surrounding low density consistent with edema. The fourth ventricle is normal in appearance. There is no hydrocephalus. On these images there are no additional parenchymal brain lesions. Primary differential would include solitary metastatic lesion most commonly breast or lung. The possibility  of hemangioblastoma is also mentioned. Dural sinus: As visualized the opacified dural venous sinuses are unremarkable. [address] [place] [address].  CTA NECK DATE AND TIME:7/30/2019 2:15 PM CLINICAL HISTORY: Stroke symptoms   gait instability  TECHNIQUE: emphysematous disease in the upper one third of the lung parenchyma. There is a pleural-based soft tissue density at the base medial aspect of the right lower lobe. It measures 2.7 x 2.9 x 1.0 cm in the transverse and AP and cephalocaudad dimension. Best seen on the coronal reconstructions. No other focal parenchymal abnormalities. No pleural effusions. No pneumothoraces. No significant periarticular. There is pretracheal adenopathy. No significant perihilar or subcarinal adenopathy. PLEURAL-BASED SOFT TISSUE MASS OVERLYING THE MEDIAL ASPECT OF THE RIGHT LOWER LOBE, RIGHT HEMIDIAPHRAGM. THIS IS NEW SINCE THE PRIOR STUDY OF MAY 6, 2019. THE LACK OF IV CONTRAST LIMITS FULL EVALUATION. THIS IS SUPERIMPOSED UPON MODERATE EMPHYSEMATOUS DISEASE ONE PARENCHYMA AS DESCRIBED ABOVE. All CT scans at this facility use dose modulation, iterative reconstruction, and/or weight based dosing when appropriate to reduce radiation dose to as low as reasonably achievable. Ct Abdomen Pelvis W Iv Contrast Additional Contrast? Radiologist Recommendation    Result Date: 7/31/2019  CT ABDOMEN PELVIS W IV CONTRAST HISTORY:   met smith . Brain lesion, possibly metastatic. Concern for malignancy. TECHNIQUE: CT of the abdomen and pelvis was performed using standard technique, scanning from just above the dome of the diaphragm to the symphysis pubis. Including delayed images through the kidneys. Contrast: IV: 100 ml Isovue 300 Oral:  None. All CT scans at this facility use dose modulation, iterative reconstruction, and/or weight based dosing when appropriate to reduce radiation dose to as low as reasonably achievable. COMPARISON: CT 4/23/2018; MRI 12/3/2018. RESULT: Liver: Subcentimeter low-attenuation lesions throughout the liver, including 0.8 x 0.9 cm low-attenuation lesion in the right inferior hepatic lobe (series 2 image 27). These are similar to MRI 12/3/2018 and appear to be benign cysts. Biliary: No bile duct dilation.   Gallbladder diffusion or MRI evidence of an acute or subacute ischemic focus in the brain. There is a mild degree of cerebral atrophy which is appropriate for the patient's age. The remainder of the supratentorial brain appears unremarkable. 1. APPROXIMATELY 2.5 X 2 X 2 CM INHOMOGENEOUSLY ENHANCING MIDLINE CEREBELLAR MASS WHICH APPEARS TO HAVE A CYSTIC COMPONENT AND A HEMORRHAGIC COMPONENT. MRI FINDINGS ARE COMPATIBLE WITH AN INTRA-AXIAL POSTERIOR FOSSA TUMOR AND DIFFERENTIAL CONSIDERATIONS ARE PRIMARILY BETWEEN A METASTASIS OR LESS LIKELY A HEMANGIOBLASTOMA. 2. NO OTHER ENHANCING LESION IN THE POSTERIOR FOSSA OR IN THE SUPRATENTORIAL BRAIN. THERE IS A MILD DEGREE OF CEREBRAL ATROPHY AND WHITE MATTER T2 HYPERINTENSE FOCI WHICH ARE LIKELY REPRESENT A MANIFESTATION OF SMALL VESSEL DISEASE. NO ACUTE ISCHEMIC FOCUS IN THE BRAIN. Impression       No CT evidence pulmonary embolism.       Bibasilar dependent atelectasis.       Emphysema.       Interval increase in size, right diaphragmatic mass discussed. Cannot exclude metastatic malignancy.       Peripheral reflux of contrast medium into inferior vena cava and hepatic veins. Findings may reflect component of right heart failure. Universal presence of contrast into IVC/hepatic veins peripherally may reflect early incomplete mixing of intravenous    contrast medium. However, location of contrast medium, secondary to thrombosis not entirely excluded. Clinical correlation required.                   All CT scans at this facility use dose modulation, iterative reconstruction, and/or weight based dosing when appropriate to reduce radiation dose to as low as reasonably achievable.               IMPRESSION AND SUGGESTION:    1. COPD currently at baseline, has mild wheezing no apparent acute exacerbation  2. Pleural-based right lower lobe supra diaphragmatic mass new since last CT scan,  3. Cerebellar mass metastatic carcinoma primary is not clear yet  4.  Tobacco abuse    Continue

## 2019-08-12 NOTE — PROGRESS NOTES
no verbal/tactile cues. []  Patient and/or caregiver will demonstrate understanding of recommended HEP for .         [x]  Patient's cognition will improve to safely perform ADLs:  Comprehension: 7  Expression: 7  Social Interaction: 7  Problem Solvin  Memory: 6          Therapy Time   Individual Concurrent Group Co-treatment   Time In 1400         Time Out 1500         Minutes 61                 Rudolph Desai OTR/L    Electronically signed by Rudolph Desai OT on 2019 at 4:44 PM

## 2019-08-12 NOTE — PROGRESS NOTES
[facility] Respiratory Therapy Evaluation   Current Order:  ALBUTEROL MDI Q4 PRN       Home Regimen: PRN       Ordering Physician: Jeff Felder  Re-evaluation Date:  EVAL DONE      Diagnosis: BRAIN MASS       Patient Status: Stable / Unstable + Physician notified    The following MDI Criteria must be met in order to convert aerosol to MDI with spacer. If unable to meet, MDI will be converted to aerosol:  []  Patient able to demonstrate the ability to use MDI effectively  []  Patient alert and cooperative  []  Patient able to take deep breath with 5-10 second hold  []  Medication(s) available in this delivery method   []  Peak flow greater than or equal to 200 ml/min            Current Order Substituted To  (same drug, same frequency)   Aerosol to MDI [] Albuterol Sulfate 0.083% unit dose by aerosol Albuterol Sulfate MDI 2 puffs by inhalation with spacer    [] Levalbuterol 1.25 mg unit dose by aerosol Levalbuterol MDI 2 puffs by inhalation with spacer    [] Levalbuterol 0.63 mg unit dose by aerosol Levalbuterol MDI 2 puffs by inhalation with spacer    [] Ipratropium Bromide 0.02% unit dose by aerosol Ipratropium Bromide MDI 2 puffs by inhalation with spacer    [] Duoneb (Ipratropium + Albuterol) unit dose by aerosol Ipratropium MDI + Albuterol MDI 2 puffs by inhalation w/spacer   MDI to Aerosol [] Albuterol Sulfate MDI Albuterol Sulfate 0.083% unit dose by aerosol    [] Levalbuterol MDI 2 puffs by inhalation Levalbuterol 1.25 mg unit dose by aerosol    [] Ipratropium Bromide MDI by inhalation Ipratropium Bromide 0.02% unit dose by aerosol    [] Combivent (Ipratropium + Albuterol) MDI by inhalation Duoneb (Ipratropium + Albuterol) unit dose by aerosol   Treatment Assessment [Frequency/Schedule]:  Change frequency to: ___________NO CHANGES _______________________________________per Protocol, P&T, MEC      Points 0 1 2 3 4   Pulmonary Status  Non-Smoker  []   Smoking history   < 20 pack years  []   Smoking history  ?  21

## 2019-08-12 NOTE — PROGRESS NOTES
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    Patient Active Problem List    Diagnosis Date Noted    Coronary artery disease involving native coronary artery of native heart without angina pectoris 2017     Priority: High    Syncope 2016     Priority: High    Essential hypertension 2016     Priority: High    Dyslipidemia 2016     Priority: High    Tobacco abuse 2016     Priority: High    Abnormality of gait and mobility 2019    Ataxy cerebellar ([address] 08/10/2019    Neoplasm of brain causing mass effect on adjacent structures ([address] 2019    COPD (chronic obstructive pulmonary disease) ([address] 2019    Brain mass 2019    Age-related osteoporosis without current pathological fracture 2019    Elevated alkaline phosphatase level 2019    Fracture of vertebra due to osteoporosis ([address] 2019    Presence of permanent cardiac pacemaker 2019    SSS (sick sinus syndrome) ([address] 2019    Hiatal hernia 2019    Idiopathic chronic pancreatitis ([address] 2019    Bilateral carotid artery stenosis 11/15/2018    PAD (peripheral artery disease) ([address] 11/15/2018    Chronic anticoagulation 2018    CKD (chronic kidney disease), stage III ([address] 2018    VTE (venous thromboembolism) 2018    MGUS (monoclonal gammopathy of unknown significance) 2018    SOB (shortness of breath) on exertion     Lung nodule 2018    Hypotension     Factor V Leiden mutation ([address] 05/15/2015    Hypercoagulable state, primary ([address] 05/15/2015    Esophageal dysphagia 03/10/2015    Tobacco dependence 2014    DVT (deep venous thrombosis) ([address] 2014    Facet arthropathy,

## 2019-08-13 LAB
CA 19-9: 51 U/ML (ref 0–35)
CA 27-29: 11 U/ML (ref 0–38)

## 2019-08-13 PROCEDURE — 97530 THERAPEUTIC ACTIVITIES: CPT

## 2019-08-13 PROCEDURE — 97535 SELF CARE MNGMENT TRAINING: CPT

## 2019-08-13 PROCEDURE — 6360000002 HC RX W HCPCS: Performed by: PHYSICAL MEDICINE & REHABILITATION

## 2019-08-13 PROCEDURE — 2580000003 HC RX 258: Performed by: PHYSICAL MEDICINE & REHABILITATION

## 2019-08-13 PROCEDURE — 6370000000 HC RX 637 (ALT 250 FOR IP): Performed by: PHYSICAL MEDICINE & REHABILITATION

## 2019-08-13 PROCEDURE — 6370000000 HC RX 637 (ALT 250 FOR IP): Performed by: INTERNAL MEDICINE

## 2019-08-13 PROCEDURE — 2500000003 HC RX 250 WO HCPCS: Performed by: PHYSICAL MEDICINE & REHABILITATION

## 2019-08-13 PROCEDURE — 94640 AIRWAY INHALATION TREATMENT: CPT

## 2019-08-13 PROCEDURE — 99233 SBSQ HOSP IP/OBS HIGH 50: CPT | Performed by: PHYSICAL MEDICINE & REHABILITATION

## 2019-08-13 PROCEDURE — 97110 THERAPEUTIC EXERCISES: CPT

## 2019-08-13 PROCEDURE — 1180000000 HC REHAB R&B

## 2019-08-13 PROCEDURE — 2700000000 HC OXYGEN THERAPY PER DAY

## 2019-08-13 PROCEDURE — 99232 SBSQ HOSP IP/OBS MODERATE 35: CPT | Performed by: INTERNAL MEDICINE

## 2019-08-13 PROCEDURE — 97116 GAIT TRAINING THERAPY: CPT

## 2019-08-13 PROCEDURE — 93010 ELECTROCARDIOGRAM REPORT: CPT | Performed by: INTERNAL MEDICINE

## 2019-08-13 PROCEDURE — 92610 EVALUATE SWALLOWING FUNCTION: CPT

## 2019-08-13 RX ORDER — DOCUSATE SODIUM 100 MG/1
100 CAPSULE, LIQUID FILLED ORAL DAILY
Status: DISCONTINUED | OUTPATIENT
Start: 2019-08-13 | End: 2019-08-21 | Stop reason: HOSPADM

## 2019-08-13 RX ORDER — ALBUTEROL SULFATE 2.5 MG/3ML
2.5 SOLUTION RESPIRATORY (INHALATION) ONCE
Status: COMPLETED | OUTPATIENT
Start: 2019-08-13 | End: 2019-08-13

## 2019-08-13 RX ORDER — OXYCODONE AND ACETAMINOPHEN 7.5; 325 MG/1; MG/1
1 TABLET ORAL EVERY 4 HOURS PRN
Status: DISCONTINUED | OUTPATIENT
Start: 2019-08-13 | End: 2019-08-21 | Stop reason: HOSPADM

## 2019-08-13 RX ADMIN — GLYCOPYRROLATE AND FORMOTEROL FUMARATE 2 PUFF: 9; 4.8 AEROSOL, METERED RESPIRATORY (INHALATION) at 16:34

## 2019-08-13 RX ADMIN — LISINOPRIL 10 MG: 10 TABLET ORAL at 21:40

## 2019-08-13 RX ADMIN — OXYCODONE HYDROCHLORIDE AND ACETAMINOPHEN 1 TABLET: 5; 325 TABLET ORAL at 10:17

## 2019-08-13 RX ADMIN — ALBUTEROL SULFATE 2.5 MG: 2.5 SOLUTION RESPIRATORY (INHALATION) at 10:01

## 2019-08-13 RX ADMIN — METOPROLOL TARTRATE 25 MG: 25 TABLET ORAL at 21:40

## 2019-08-13 RX ADMIN — ALBUTEROL SULFATE 2 PUFF: 90 AEROSOL, METERED RESPIRATORY (INHALATION) at 03:14

## 2019-08-13 RX ADMIN — METOPROLOL TARTRATE 25 MG: 25 TABLET ORAL at 08:44

## 2019-08-13 RX ADMIN — PANTOPRAZOLE SODIUM 40 MG: 40 TABLET, DELAYED RELEASE ORAL at 06:08

## 2019-08-13 RX ADMIN — OXYCODONE HYDROCHLORIDE AND ACETAMINOPHEN 1 TABLET: 5; 325 TABLET ORAL at 02:51

## 2019-08-13 RX ADMIN — GABAPENTIN 300 MG: 300 CAPSULE ORAL at 14:43

## 2019-08-13 RX ADMIN — LACTOBACILLUS TAB 2 TABLET: TAB at 21:41

## 2019-08-13 RX ADMIN — MENTHOL AND METHYL SALICYLATE: 7.6; 29 OINTMENT TOPICAL at 21:46

## 2019-08-13 RX ADMIN — ACETAMINOPHEN 650 MG: 325 TABLET ORAL at 08:44

## 2019-08-13 RX ADMIN — CEPHALEXIN 500 MG: 250 CAPSULE ORAL at 14:43

## 2019-08-13 RX ADMIN — VITAMIN D, TAB 1000IU (100/BT) 1000 UNITS: 25 TAB at 12:11

## 2019-08-13 RX ADMIN — PROVIDONE IODINE: 7.5 STICK TOPICAL at 21:45

## 2019-08-13 RX ADMIN — PRAVASTATIN SODIUM 40 MG: 40 TABLET ORAL at 21:41

## 2019-08-13 RX ADMIN — Medication 10 ML: at 15:53

## 2019-08-13 RX ADMIN — CEPHALEXIN 500 MG: 250 CAPSULE ORAL at 21:41

## 2019-08-13 RX ADMIN — GABAPENTIN 300 MG: 300 CAPSULE ORAL at 08:44

## 2019-08-13 RX ADMIN — LACTOBACILLUS TAB 2 TABLET: TAB at 08:44

## 2019-08-13 RX ADMIN — GABAPENTIN 300 MG: 300 CAPSULE ORAL at 21:41

## 2019-08-13 RX ADMIN — CEPHALEXIN 500 MG: 250 CAPSULE ORAL at 06:08

## 2019-08-13 RX ADMIN — DOCUSATE SODIUM 100 MG: 100 CAPSULE, LIQUID FILLED ORAL at 08:44

## 2019-08-13 RX ADMIN — LISINOPRIL 10 MG: 10 TABLET ORAL at 08:44

## 2019-08-13 RX ADMIN — GLYCOPYRROLATE AND FORMOTEROL FUMARATE 2 PUFF: 9; 4.8 AEROSOL, METERED RESPIRATORY (INHALATION) at 03:14

## 2019-08-13 RX ADMIN — LACTOBACILLUS TAB 2 TABLET: TAB at 14:43

## 2019-08-13 RX ADMIN — TIZANIDINE 4 MG: 4 TABLET ORAL at 06:08

## 2019-08-13 ASSESSMENT — PAIN DESCRIPTION - LOCATION
LOCATION: CHEST
LOCATION: CHEST;HEAD

## 2019-08-13 ASSESSMENT — PAIN SCALES - GENERAL
PAINLEVEL_OUTOF10: 7
PAINLEVEL_OUTOF10: 4
PAINLEVEL_OUTOF10: 4
PAINLEVEL_OUTOF10: 9
PAINLEVEL_OUTOF10: 5
PAINLEVEL_OUTOF10: 5
PAINLEVEL_OUTOF10: 4

## 2019-08-13 ASSESSMENT — PAIN DESCRIPTION - DESCRIPTORS
DESCRIPTORS: HEADACHE
DESCRIPTORS: SHARP;SHOOTING;SPASM
DESCRIPTORS: SHARP;SHOOTING
DESCRIPTORS: SHARP

## 2019-08-13 ASSESSMENT — PAIN DESCRIPTION - FREQUENCY
FREQUENCY: CONTINUOUS

## 2019-08-13 ASSESSMENT — PAIN - FUNCTIONAL ASSESSMENT
PAIN_FUNCTIONAL_ASSESSMENT: PREVENTS OR INTERFERES SOME ACTIVE ACTIVITIES AND ADLS
PAIN_FUNCTIONAL_ASSESSMENT: ACTIVITIES ARE NOT PREVENTED
PAIN_FUNCTIONAL_ASSESSMENT: 0-10

## 2019-08-13 ASSESSMENT — ENCOUNTER SYMPTOMS
ABDOMINAL PAIN: 1
EYES NEGATIVE: 1
NAUSEA: 0
STRIDOR: 0
SHORTNESS OF BREATH: 0
CHEST TIGHTNESS: 0
RESPIRATORY NEGATIVE: 1
BLOOD IN STOOL: 0
COUGH: 0
WHEEZING: 0

## 2019-08-13 ASSESSMENT — PAIN DESCRIPTION - PAIN TYPE
TYPE: ACUTE PAIN

## 2019-08-13 ASSESSMENT — PAIN DESCRIPTION - ORIENTATION: ORIENTATION: MID

## 2019-08-13 ASSESSMENT — PAIN SCALES - WONG BAKER: WONGBAKER_NUMERICALRESPONSE: 10

## 2019-08-13 ASSESSMENT — PAIN DESCRIPTION - ONSET: ONSET: ON-GOING

## 2019-08-13 ASSESSMENT — PAIN DESCRIPTION - PROGRESSION: CLINICAL_PROGRESSION: RAPIDLY WORSENING

## 2019-08-13 NOTE — PROGRESS NOTES
pleural-based soft tissue density at the base medial aspect of the right lower lobe. It measures 2.7 x 2.9 x 1.0 cm in the transverse and AP and cephalocaudad dimension. Best seen on the coronal reconstructions. No other focal parenchymal abnormalities. No pleural effusions. No pneumothoraces. No significant periarticular. There is pretracheal adenopathy. No significant perihilar or subcarinal adenopathy. PLEURAL-BASED SOFT TISSUE MASS OVERLYING THE MEDIAL ASPECT OF THE RIGHT LOWER LOBE, RIGHT HEMIDIAPHRAGM. THIS IS NEW SINCE THE PRIOR STUDY OF MAY 6, 2019. THE LACK OF IV CONTRAST LIMITS FULL EVALUATION. THIS IS SUPERIMPOSED UPON MODERATE EMPHYSEMATOUS DISEASE ONE PARENCHYMA AS DESCRIBED ABOVE. All CT scans at this facility use dose modulation, iterative reconstruction, and/or weight based dosing when appropriate to reduce radiation dose to as low as reasonably achievable. Ct Abdomen Pelvis W Iv Contrast Additional Contrast? Radiologist Recommendation    Result Date: 7/31/2019  CT ABDOMEN PELVIS W IV CONTRAST HISTORY:   met smith . Brain lesion, possibly metastatic. Concern for malignancy. TECHNIQUE: CT of the abdomen and pelvis was performed using standard technique, scanning from just above the dome of the diaphragm to the symphysis pubis. Including delayed images through the kidneys. Contrast: IV: 100 ml Isovue 300 Oral:  None. All CT scans at this facility use dose modulation, iterative reconstruction, and/or weight based dosing when appropriate to reduce radiation dose to as low as reasonably achievable. COMPARISON: CT 4/23/2018; MRI 12/3/2018. RESULT: Liver: Subcentimeter low-attenuation lesions throughout the liver, including 0.8 x 0.9 cm low-attenuation lesion in the right inferior hepatic lobe (series 2 image 27). These are similar to MRI 12/3/2018 and appear to be benign cysts. Biliary: No bile duct dilation. Gallbladder is collapsed. Pancreas: Diffuse pancreatic calcifications.  No discrete

## 2019-08-13 NOTE — PROGRESS NOTES
Occupational Therapy  Facility/Department: [facility]  Daily Treatment Note  NAME: Robel James  : 1945  MRN: [id number]    Date of Service: 2019    Discharge Recommendations:  Continue to assess pending progress    Assessment: Pt limited significantly by pain this date. Pt tearful from pain by end of session. Applied cold to pt's chest and notified Shanti Lopez. Activity Tolerance  Activity Tolerance: Patient limited by pain  Safety Devices  Safety Devices in place: Yes  Type of devices: All fall risk precautions in place         Patient Diagnosis(es): There were no encounter diagnoses. has a past medical history of Back pain, chronic, Chronic kidney disease, COPD (chronic obstructive pulmonary disease) ([facility] [facility] Coronary artery disease involving native coronary artery of native heart without angina pectoris, Dyslipidemia, Essential hypertension, Factor V deficiency ([facility] [facility] H/O blood clots, Headache, Hypertension, Syncope, and Tobacco abuse.   has a past surgical history that includes Coronary angioplasty with stent; Breast biopsy (Right); and craniotomy (N/A, 2019). Restrictions  Restrictions/Precautions  Restrictions/Precautions: Fall Risk     Subjective  General  Chart Reviewed: Yes  Patient assessed for rehabilitation services?: Yes  Response to previous treatment: Patient reporting fatigue but able to participate  Family / Caregiver Present: No  Referring Practitioner: Dr. Baires Mean  Diagnosis: brain mass with impaired mobility s/p suboccipital craniotomy metastic posterior fossa  Pain Assessment  Pain Assessment: Faces  Ernst-Baker Pain Rating: Hurts worst(Pt in tears )  Pain Type: Acute pain  Pain Location: Chest  Pain Orientation: Mid  Pain Descriptors: Fronie Arnoldo; Shooting;Spasm(muscle spasm )  Pain Frequency: Continuous  Pain Onset: On-going  Clinical Progression: Rapidly worsening  Non-Pharmaceutical Pain Intervention(s): Rest;Repositioned;Cold applied  Response to Pain Intervention: None  Pre Treatment Pain Screening  Pain at present: 4  Scale Used: Numeric Score  Intervention List: Patient able to continue with treatment;Patient declined any intervention  Comments / Details: Pt reports that she recently had pain medication   Vital Signs  Patient Currently in Pain: Yes     Objective: Pt scheduled for full ADL with OT session this date. Respiratory arrived partway through session reporting that the doctor ordered an ASAP breathing treatment. Pt dressed and returned to bed for breathing treatment. ADL  Grooming: Setup(to wash face while seated at sink )  UE Bathing: Setup  UE Dressing: Unable to assess [facility] gow only )    Shower Transfers  Shower Transfers: Not tested  [employer] Transfers Comments: Pt washed up at sink      Bed mobility  Supine to Sit: Stand by assistance  Sit to Supine: Moderate assistance(Assistance to bring BLEs into bed. )  Comment: Pt significantly limited by pain getting back into bed after ADL      Transfers  Stand Step Transfers: Stand by assistance(for room mobility using FWW)  Sit to stand: Stand by assistance  Stand to sit: Stand by assistance     Cognition  Overall Cognitive Status: Exceptions  Arousal/Alertness: Appropriate responses to stimuli  Following Commands: Follows multistep commands with repitition  Attention Span: Attends with cues to redirect(distractible d/t pain )  Memory: Appears intact  Problem Solving: Assistance required to implement solutions  Insights: Decreased awareness of deficits  Initiation: Requires cues for some  Sequencing: Requires cues for some  Cognition Comment: comp.  SBA; Exp. MI; Soci: Ind.; Prob Min A; Mem Min A     Plan  Plan  Times per week: 5-7x/week  Plan weeks: 1.5 week  Current Treatment Recommendations: Strengthening, Balance Training, Functional Mobility Training, Endurance Training, Safety Education & Training, Patient/Caregiver Education & Training, Equipment Evaluation, Education, & procurement, Self-Care / ADL, Home

## 2019-08-13 NOTE — PROGRESS NOTES
for age. Ventricles:   Stable in size. Paranasal sinuses and skull base: The visualized paranasal sinuses are grossly clear. The skull base and imaged soft tissues are unremarkable. Postsurgical changes without superimposed complication or superimposed acute finding. Ct Head Wo Contrast    Result Date: 8/7/2019  EXAMINATION:  CT HEAD WO CONTRAST CLINICAL HISTORY:   Postop COMPARISONS:  August 6, 2019 TECHNIQUE:  Spiral axial images of the brain were obtained without contrast enhancement. Multiplanar two-dimensional reformatting was completed at CT console. FINDINGS:  Again noted are postoperative findings, including occipital craniotomy extending from the foramen magnum, posteriorly toward the internal occipital protuberance. The operative bed contains a small amount of residual blood, diminished since August 6, 2019. There is less pneumocephalus now. The right frontal ventriculostomy catheter is unchanged. There is no hydrocephalus. There is no midline shift. Extra-axial CSF spaces remain widely patent. There is no acute superimposed abnormality. NO UNEXPECTED POSTOPERATIVE FINDINGS, WITH DIMINISHING POSTOPERATIVE BLOOD AT THE OPERATIVE SITE AND DIMINISHING PNEUMOCEPHALUS. VENTRICULOSTOMY IS UNCHANGED. THERE IS NO VENTRICULOMEGALY. THERE IS NO EFFACEMENT OF EXTRA-AXIAL CSF SPACES All CT scans at this facility use dose modulation, iterative reconstruction, and/or weight based dosing when appropriate to reduce radiation dose to as low as reasonably achievable. Ct Head Wo Contrast    Result Date: 8/6/2019  CT Brain Contrast medium:  Not utilized. History:  Craniotomy. Tumor resection Comparison:  August 6, 2019, 0826 hours. Findings: In the interval, a right frontal craniotomy has been performed with insertion of an intraventricular drainage catheter, the tip found near the base at the level of the lateral ventricles, at or just proximal to the cephalad portion of the third ventricle.  An nonionic contrast without incident. 2-D images were reconstructed in the sagittal and coronal planes. Three Dimensional Maximum Intensity Projection (3D-MIP) images were created. All images were reviewed and primarily archived to PACS workstation. All CT scans at this facility use dose modulation, iterative reconstruction, and/or weight based dosing when appropriate to reduce radiation dose to as low as reasonably achievable. NASCET Criteria were utilized FINDINGS CTA Head     Intracranial ICAs: Flow is visualized within the precavernous, cavernous, clinoid and supraclinoid segments of the internal carotid arteries bilaterally     Anterior cerebral arteries:Flow is visualized within the bilateral anterior cerebral arteries with no large vessel occlusion or significant stenosis. The left A1 segment is relatively hypoplastic. Middle cerebral arteries:Flow visualized throughout the middle cerebral arteries bilaterally with no large vessel occlusion or significant stenosis. Posterior Circulation: Bilateral posterior cerebral arteries are patent. Basilar artery:Flow is visualized in the basilar artery without significant stenosis. Aneurysm No aneurysm or dissection in the anterior or posterior circulations. .     Neurocranium there is a 2.7 cm ring-enhancing posterior fossa lesion abutting the vermis and extending to the left. There is a central lower density that is not clearly cystic on these images. There is some surrounding low density consistent with edema. The fourth ventricle is normal in appearance. There is no hydrocephalus. On these images there are no additional parenchymal brain lesions. Primary differential would include solitary metastatic lesion most commonly breast or lung. The possibility  of hemangioblastoma is also mentioned. Dural sinus: As visualized the opacified dural venous sinuses are unremarkable. [address] [place] [address].  CTA NECK DATE AND TIME:7/30/2019 2:15 PM biapical areas of scarring fibrosis. There is moderate emphysematous disease in the upper one third of the lung parenchyma. There is a pleural-based soft tissue density at the base medial aspect of the right lower lobe. It measures 2.7 x 2.9 x 1.0 cm in the transverse and AP and cephalocaudad dimension. Best seen on the coronal reconstructions. No other focal parenchymal abnormalities. No pleural effusions. No pneumothoraces. No significant periarticular. There is pretracheal adenopathy. No significant perihilar or subcarinal adenopathy. PLEURAL-BASED SOFT TISSUE MASS OVERLYING THE MEDIAL ASPECT OF THE RIGHT LOWER LOBE, RIGHT HEMIDIAPHRAGM. THIS IS NEW SINCE THE PRIOR STUDY OF MAY 6, 2019. THE LACK OF IV CONTRAST LIMITS FULL EVALUATION. THIS IS SUPERIMPOSED UPON MODERATE EMPHYSEMATOUS DISEASE ONE PARENCHYMA AS DESCRIBED ABOVE. All CT scans at this facility use dose modulation, iterative reconstruction, and/or weight based dosing when appropriate to reduce radiation dose to as low as reasonably achievable. Ct Chest W Contrast    Result Date: 8/10/2019  EXAMINATION:  CT SCAN CHEST CLINICAL HISTORY:  Lung nodule COMPARISON:  July 31, 2019 TECHNIQUE:  Multiple serial axial images from the base neck through the upper abdomen with sagittal coronal reconstruction was performed following the intravenous administration of 75 mL of Isovue-300. FINDINGS:  FINDINGS:    There are biapical areas of scarring fibrosis. There is moderate emphysematous disease in the upper one third of the lung parenchyma. Again note is made of a soft tissue density at the anterior medial aspect of the right lower lobe and right hemidiaphragm. It measures There is a pleural-based soft tissue density at the base medial aspect of the right lower lobe. It measures 3.4 x 1.1 in the transverse AP dimension. It is best appreciated on the coronal and sagittal reconstructions,  In the transverse and AP and cephalocaudad dimension.  Best seen on the dose modulation, iterative reconstruction, and/or weight based dosing when appropriate to reduce radiation dose to as low as reasonably achievable. COMPARISON: CT 4/23/2018; MRI 12/3/2018. RESULT: Liver: Subcentimeter low-attenuation lesions throughout the liver, including 0.8 x 0.9 cm low-attenuation lesion in the right inferior hepatic lobe (series 2 image 27). These are similar to MRI 12/3/2018 and appear to be benign cysts. Biliary: No bile duct dilation. Gallbladder is collapsed. Pancreas: Diffuse pancreatic calcifications. No discrete pancreatic mass or pancreatic ductal dilation. Spleen: No mass. No splenomegaly. Adrenals: No mass. 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 No hydronephrosis or nephrolithiasis. Subcentimeter lesion(s) that are too small to characterize but likely benign. GI tract: No dilation or wall thickening. Mild diverticulosis without diverticulitis. Lymph nodes: No abdominal or pelvic lymphadenopathy. Mesentery/Peritoneum: No ascites or mass. Retroperitoneum: No mass. Vasculature: The celiac axis and SMA are patent. The portal vein and branches, splenic vein, SMV, and hepatic veins are patent. Severe arterial atherosclerotic disease without aneurysm with mild ectasia of the infrarenal abdominal aorta measuring 2.5 cm. . Pelvis: No mass, ascites or fluid collection. Uterus unremarkable. Decompressed bladder with trabeculated wall. Bones/Soft Tissues: No acute osseous findings. No destructive osseous lesions. Osteopenia. Scoliosis and degenerative changes throughout the spine with superior endplate sclerosis at U0-T0 and L3-L4 . Small amount of heterogeneous gas right anterior abdominal wall, likely from injection. Lower thorax: Soft tissue nodule along the right hemidiaphragm, appearing pleural-based, measures 3.2 x 3.1 x 1.9 cm (series 2 image 7). Right basilar atelectasis. Emphysematous changes in the lungs. No suspicious lung nodules.  Small hiatal hernia with an apparent Venous    Result Date: 8/12/2019  EXAMINATION: VENOUS DUPLEX DOPPLER STUDY OF BILATERAL LOWER EXTREMITIES: CLINICAL INFORMATION:  CP, R/O DVT, Factor V COMPARISON:  NONE AVAILABLE TECHNIQUE: Dahlia Commander scale, duplex doppler, with compression and augmentation. Sonographic imaging was performed by a registered sonographer and the images were submitted for interpretation. FINDINGS:    There is normal deep venous flow and compressibility from the groins to the knees bilaterally. There is segmental visualization of patent veins in the calves. NEGATIVE FOR DEEP VENOUS THROMBOSIS BOTH LOWER EXTREMITIES. Previous extensive, complex labs, notes and diagnostics reviewed and analyzed. ALLERGIES:    Allergies as of 08/11/2019    (No Known Allergies)      (please also verify by checking MAR)     Yesterday I evaluated this patient for periodic reassessment of medical and functional status. The patient was discussed in detail at the treatment team meeting focusing on current medical issues, progress in therapies, social issues, psychological issues, barriers to progress and strategies to address these barriers, and discharge planning. See the hand written addendum to rehab progress note. The patient continues to be high risk for future disability and their medical and rehabilitation prognosis continue to be good and therefore, we will continue the patient's rehabilitation course as planned. The patient's tentative discharge date was set. Patient and family education was discussed. The patient was made aware of the team discussion regarding their progress. Discharge plans were discussed along with barriers to progress and strategies to address these barriers, patient encouraged to continue to discuss discharge plans with . Complex Physical Medicine & Rehab Issues Assess & Plan:   1.  Severe abnormality of gait and mobility and impaired self-care and ADL's secondary to progressive metastatic brain

## 2019-08-13 NOTE — PROGRESS NOTES
ASSESSMENT/COMMENTS: Pt tolerated fairly well. Improved gait with RW vs without AD. Pt limited by chest pain, dyspnea and fatigue. Pt very motivated to get better       PLAN OF CARE/Safety:   Safety Devices  Type of devices: All fall risk precautions in place; Bed alarm in place; Left in bed      Therapy Time:   Individual   Time In 830   Time Out 0900   Minutes 30     Minutes:30      Transfer/Bed mobility trainin      Gait training: 10      Neuro re education: 0     Therapeutic ex: Kevin Isaac PTA, 19 at 9:06 AM

## 2019-08-13 NOTE — PROGRESS NOTES
Physical Therapy Rehab Treatment Note  Facility/Department: [facility]  Room: R259R259-01       NAME: Haroon Lynne  : 1945 (79 y.o.)  MRN: [id number]  CODE STATUS: Full Code    Date of Service: 2019  Chart Reviewed: Yes  Family / Caregiver Present: No  SUBJECTIVE: Subjective: I have a headache  Response To Previous Treatment: Patient with no complaints from previous session. Pain Screening  Patient Currently in Pain: Yes  Post Treatment Pain Screenin/10  Pain Assessment  Pain Assessment: 0-10  Pain Level: 4  Pain Type: Acute pain  Pain Location: Chest  Pain Descriptors: Sharp  Pain Frequency: Continuous    OBJECTIVE:     Bed mobility  Supine to Sit: Stand by assistance  Sit to Supine: (Assistance to bring BLEs into bed. )  Scooting: Stand by assistance  Comment: verbal cues for log roll technique, patient required use of rail     Transfers  Sit to Stand: Stand by assistance  Stand to sit: Stand by assistance  Bed to Chair: Stand by assistance    Ambulation 1  Assistance: Stand by assistance;Supervision  Distance: 200'x2   Stairs/Curb  Stairs?: Yes   Stairs  # Steps : 4  Stairs Height: 6\"  Rails: Bilateral  Comment: nonreciprocal     ASSESSMENT/COMMENTS: patient demonstrates improved tolerance to ambulation this PM.        PLAN OF CARE/Safety:   Safety Devices  Type of devices: All fall risk precautions in place; Bed alarm in place; Left in bed      Therapy Time:   Individual   Time In 1300   Time Out 1330   Minutes 30     Minutes:30      Transfer/Bed mobility training:10      Gait trainin         [facility], [facility], 19 at 4:03 PM

## 2019-08-13 NOTE — PROGRESS NOTES
activity:     Days per week: 0 days     Minutes per session: 0 min    Stress: Only a little   Relationships    Social connections:     Talks on phone: More than three times a week     Gets together: More than three times a week     Attends [employer] witness service: More than 4 times per year     Active member of club or organization: No     Attends meetings of clubs or organizations: Never     Relationship status:     Intimate partner violence:     Fear of current or ex partner: No     Emotionally abused: No     Physically abused: No     Forced sexual activity: No   Other Topics Concern    None   Social History Narrative         Lives With: Family  (2 dtrs-works full-time afternoon shift the other does not both are helpful)    Type of Home: House  One level    Home Access: Stairs to enter without rails - Number of Steps: 4    Bathroom Shower/Tub: Walk-in shower    Bathroom Equipment: Shower chair    Home Equipment: Rolling walker, 4 wheeled walker, Quad cane    Receives Help From: Family    ADL Assistance: Independent    Ambulation Assistance: Independent(quad cane most of the time)    Transfer Assistance: Independent    Active : No    Occupation: Retired    Her hobbies are reading classic literature especially works by the Nathaniel sisters       Subjective/HPI sitting up in bed eating. No sob. Still has upper abd/lolwer cp- reproducible    EKG:        Review of Systems:   Review of Systems   Constitutional: Negative. Negative for diaphoresis and fatigue. HENT: Negative. Eyes: Negative. Respiratory: Negative. Negative for cough, chest tightness, shortness of breath, wheezing and stridor. Cardiovascular: Positive for chest pain. Negative for palpitations and leg swelling. Gastrointestinal: Positive for abdominal pain. Negative for blood in stool and nausea. Genitourinary: Negative. Musculoskeletal: Negative. Skin: Negative. Neurological: Positive for weakness.  Negative for dizziness, syncope and light-headedness. Hematological: Negative. Psychiatric/Behavioral: Negative. Physical Examination:    /61   Pulse 60   Temp 98 °F (36.7 °C) (Oral)   Resp 18   Ht 4' 10.75\" (1.492 m)   Wt 152 lb 12.5 oz (69.3 kg)   LMP  (LMP Unknown)   SpO2 92%   BMI 31.12 kg/m²    Physical Exam   Constitutional: She appears healthy. No distress. HENT:   Normal cephalic and Atraumatic   Eyes: Pupils are equal, round, and reactive to light. Neck: Normal range of motion and thyroid normal. Neck supple. No JVD present. No neck adenopathy. No thyromegaly present. Cardiovascular: Normal rate, regular rhythm, intact distal pulses and normal pulses. Murmur heard. Pulmonary/Chest: Effort normal and breath sounds normal. She has no wheezes. She has no rales. She exhibits no tenderness. Abdominal: Soft. Bowel sounds are normal. There is no tenderness. Musculoskeletal: Normal range of motion. She exhibits no edema or tenderness. Neurological: She is alert and oriented to person, place, and time. Skin: Skin is warm. No cyanosis. Nails show no clubbing.        LABS:  CBC:   Lab Results   Component Value Date    WBC 14.0 08/12/2019    RBC 4.13 08/12/2019    HGB 13.0 08/12/2019    HCT 38.0 08/12/2019    MCV 92.1 08/12/2019    MCH 31.5 08/12/2019    MCHC 34.2 08/12/2019    RDW 16.8 08/12/2019     08/12/2019    MPV 9.1 05/07/2015     CBC with Differential:    Lab Results   Component Value Date    WBC 14.0 08/12/2019    RBC 4.13 08/12/2019    HGB 13.0 08/12/2019    HCT 38.0 08/12/2019     08/12/2019    MCV 92.1 08/12/2019    MCH 31.5 08/12/2019    MCHC 34.2 08/12/2019    RDW 16.8 08/12/2019    BANDSPCT 1 08/06/2019    METASPCT 1 05/14/2014    LYMPHOPCT 1.0 08/07/2019    MONOPCT 4.1 08/07/2019    BASOPCT 0.3 08/07/2019    MONOSABS 0.8 08/07/2019    LYMPHSABS 0.2 08/07/2019    EOSABS 0.0 08/07/2019    BASOSABS 0.1 08/07/2019     CMP:    Lab Results   Component Value Date

## 2019-08-14 PROCEDURE — 99232 SBSQ HOSP IP/OBS MODERATE 35: CPT | Performed by: PHYSICAL MEDICINE & REHABILITATION

## 2019-08-14 PROCEDURE — 6370000000 HC RX 637 (ALT 250 FOR IP): Performed by: INTERNAL MEDICINE

## 2019-08-14 PROCEDURE — 97127 HC SP THER IVNTJ W/FOCUS COG FUNCJ: CPT

## 2019-08-14 PROCEDURE — 2700000000 HC OXYGEN THERAPY PER DAY

## 2019-08-14 PROCEDURE — 2500000003 HC RX 250 WO HCPCS: Performed by: PHYSICAL MEDICINE & REHABILITATION

## 2019-08-14 PROCEDURE — 6370000000 HC RX 637 (ALT 250 FOR IP): Performed by: PHYSICAL MEDICINE & REHABILITATION

## 2019-08-14 PROCEDURE — 97535 SELF CARE MNGMENT TRAINING: CPT

## 2019-08-14 PROCEDURE — 97116 GAIT TRAINING THERAPY: CPT

## 2019-08-14 PROCEDURE — 97530 THERAPEUTIC ACTIVITIES: CPT

## 2019-08-14 PROCEDURE — 1180000000 HC REHAB R&B

## 2019-08-14 PROCEDURE — 99232 SBSQ HOSP IP/OBS MODERATE 35: CPT | Performed by: INTERNAL MEDICINE

## 2019-08-14 PROCEDURE — 97127 HC OT THER IVNTJ W/FOCUS COG FUNCJ: CPT

## 2019-08-14 PROCEDURE — 97112 NEUROMUSCULAR REEDUCATION: CPT

## 2019-08-14 PROCEDURE — 94640 AIRWAY INHALATION TREATMENT: CPT

## 2019-08-14 RX ORDER — LISINOPRIL 2.5 MG/1
2.5 TABLET ORAL 2 TIMES DAILY
Status: DISCONTINUED | OUTPATIENT
Start: 2019-08-14 | End: 2019-08-21

## 2019-08-14 RX ADMIN — LACTOBACILLUS TAB 2 TABLET: TAB at 08:52

## 2019-08-14 RX ADMIN — LISINOPRIL 2.5 MG: 2.5 TABLET ORAL at 21:16

## 2019-08-14 RX ADMIN — VITAMIN D, TAB 1000IU (100/BT) 1000 UNITS: 25 TAB at 12:56

## 2019-08-14 RX ADMIN — LACTOBACILLUS TAB 2 TABLET: TAB at 14:51

## 2019-08-14 RX ADMIN — CEPHALEXIN 500 MG: 250 CAPSULE ORAL at 21:17

## 2019-08-14 RX ADMIN — PROVIDONE IODINE: 7.5 STICK TOPICAL at 21:21

## 2019-08-14 RX ADMIN — OXYCODONE HYDROCHLORIDE AND ACETAMINOPHEN 1 TABLET: 7.5; 325 TABLET ORAL at 12:56

## 2019-08-14 RX ADMIN — GABAPENTIN 300 MG: 300 CAPSULE ORAL at 08:52

## 2019-08-14 RX ADMIN — GLYCOPYRROLATE AND FORMOTEROL FUMARATE 2 PUFF: 9; 4.8 AEROSOL, METERED RESPIRATORY (INHALATION) at 17:32

## 2019-08-14 RX ADMIN — ACETAMINOPHEN 650 MG: 325 TABLET ORAL at 05:32

## 2019-08-14 RX ADMIN — PANTOPRAZOLE SODIUM 40 MG: 40 TABLET, DELAYED RELEASE ORAL at 05:32

## 2019-08-14 RX ADMIN — GABAPENTIN 300 MG: 300 CAPSULE ORAL at 14:51

## 2019-08-14 RX ADMIN — OXYCODONE HYDROCHLORIDE AND ACETAMINOPHEN 1 TABLET: 7.5; 325 TABLET ORAL at 21:15

## 2019-08-14 RX ADMIN — LACTOBACILLUS TAB 2 TABLET: TAB at 21:16

## 2019-08-14 RX ADMIN — CEPHALEXIN 500 MG: 250 CAPSULE ORAL at 05:32

## 2019-08-14 RX ADMIN — PROVIDONE IODINE: 7.5 STICK TOPICAL at 08:54

## 2019-08-14 RX ADMIN — GABAPENTIN 300 MG: 300 CAPSULE ORAL at 21:17

## 2019-08-14 RX ADMIN — MENTHOL AND METHYL SALICYLATE: 7.6; 29 OINTMENT TOPICAL at 21:23

## 2019-08-14 RX ADMIN — GLYCOPYRROLATE AND FORMOTEROL FUMARATE 2 PUFF: 9; 4.8 AEROSOL, METERED RESPIRATORY (INHALATION) at 05:25

## 2019-08-14 RX ADMIN — DOCUSATE SODIUM 100 MG: 100 CAPSULE, LIQUID FILLED ORAL at 08:52

## 2019-08-14 RX ADMIN — CEPHALEXIN 500 MG: 250 CAPSULE ORAL at 14:51

## 2019-08-14 RX ADMIN — PRAVASTATIN SODIUM 40 MG: 40 TABLET ORAL at 21:17

## 2019-08-14 RX ADMIN — METOPROLOL TARTRATE 25 MG: 25 TABLET ORAL at 21:17

## 2019-08-14 ASSESSMENT — PAIN DESCRIPTION - FREQUENCY
FREQUENCY: CONTINUOUS

## 2019-08-14 ASSESSMENT — PAIN SCALES - WONG BAKER
WONGBAKER_NUMERICALRESPONSE: 6

## 2019-08-14 ASSESSMENT — PAIN SCALES - GENERAL
PAINLEVEL_OUTOF10: 0
PAINLEVEL_OUTOF10: 3
PAINLEVEL_OUTOF10: 5
PAINLEVEL_OUTOF10: 3
PAINLEVEL_OUTOF10: 5
PAINLEVEL_OUTOF10: 5
PAINLEVEL_OUTOF10: 2
PAINLEVEL_OUTOF10: 5

## 2019-08-14 ASSESSMENT — PAIN DESCRIPTION - PAIN TYPE
TYPE: ACUTE PAIN

## 2019-08-14 ASSESSMENT — PAIN DESCRIPTION - ONSET
ONSET: ON-GOING
ONSET: ON-GOING

## 2019-08-14 ASSESSMENT — ENCOUNTER SYMPTOMS
STRIDOR: 0
SHORTNESS OF BREATH: 0
WHEEZING: 0
VOMITING: 0
RESPIRATORY NEGATIVE: 1
CHEST TIGHTNESS: 0
EYES NEGATIVE: 1
TROUBLE SWALLOWING: 0
NAUSEA: 0
COUGH: 0
ABDOMINAL PAIN: 1
COLOR CHANGE: 0
BLOOD IN STOOL: 0

## 2019-08-14 ASSESSMENT — PAIN DESCRIPTION - LOCATION
LOCATION: CHEST

## 2019-08-14 ASSESSMENT — PAIN DESCRIPTION - DESCRIPTORS
DESCRIPTORS: SHARP
DESCRIPTORS: SORE
DESCRIPTORS: SHARP

## 2019-08-14 ASSESSMENT — PAIN DESCRIPTION - ORIENTATION
ORIENTATION: MID

## 2019-08-14 ASSESSMENT — PAIN DESCRIPTION - PROGRESSION
CLINICAL_PROGRESSION: GRADUALLY WORSENING
CLINICAL_PROGRESSION: GRADUALLY IMPROVING
CLINICAL_PROGRESSION: GRADUALLY WORSENING

## 2019-08-14 NOTE — PROGRESS NOTES
weakness. Negative for dizziness, syncope and light-headedness. Hematological: Negative. Psychiatric/Behavioral: Negative. Physical Examination:    BP 97/63   Pulse 68   Temp 98 °F (36.7 °C) (Oral)   Resp 18   Ht 4' 10.75\" (1.492 m)   Wt 152 lb 12.5 oz (69.3 kg)   LMP  (LMP Unknown)   SpO2 95%   BMI 31.12 kg/m²    Physical Exam   Constitutional: She appears healthy. No distress. HENT:   Normal cephalic and Atraumatic   Eyes: Pupils are equal, round, and reactive to light. Neck: Normal range of motion and thyroid normal. Neck supple. No JVD present. No neck adenopathy. No thyromegaly present. Cardiovascular: Normal rate, regular rhythm, intact distal pulses and normal pulses. Murmur heard. Pulmonary/Chest: Effort normal and breath sounds normal. She has no wheezes. She has no rales. She exhibits no tenderness. Abdominal: Soft. Bowel sounds are normal. There is no tenderness. Musculoskeletal: Normal range of motion. She exhibits no edema or tenderness. Neurological: She is alert and oriented to person, place, and time. Skin: Skin is warm. No cyanosis. Nails show no clubbing.        LABS:  CBC:   Lab Results   Component Value Date    WBC 14.0 08/12/2019    RBC 4.13 08/12/2019    HGB 13.0 08/12/2019    HCT 38.0 08/12/2019    MCV 92.1 08/12/2019    MCH 31.5 08/12/2019    MCHC 34.2 08/12/2019    RDW 16.8 08/12/2019     08/12/2019    MPV 9.1 05/07/2015     CBC with Differential:    Lab Results   Component Value Date    WBC 14.0 08/12/2019    RBC 4.13 08/12/2019    HGB 13.0 08/12/2019    HCT 38.0 08/12/2019     08/12/2019    MCV 92.1 08/12/2019    MCH 31.5 08/12/2019    MCHC 34.2 08/12/2019    RDW 16.8 08/12/2019    BANDSPCT 1 08/06/2019    METASPCT 1 05/14/2014    LYMPHOPCT 1.0 08/07/2019    MONOPCT 4.1 08/07/2019    BASOPCT 0.3 08/07/2019    MONOSABS 0.8 08/07/2019    LYMPHSABS 0.2 08/07/2019    EOSABS 0.0 08/07/2019    BASOSABS 0.1 08/07/2019     CMP:    Lab Results Brain mass [G93.9] 07/30/2019     Priority: Low    S[facility] syndrome) [facility] [I49.5] 02/11/2019     Priority: Low    Presence of permanent cardiac pacemaker [Z95.0] 02/11/2019     Priority: Low    Age-related osteoporosis without current pathological fracture [M81.0] 02/11/2019     Priority: Low    Idiopathic chronic pancreatitis ([facility] [K86.1] 01/08/2019     Priority: Low    Hiatal hernia [K44.9] 01/08/2019     Priority: Low    PAD (peripheral artery disease) ([facility] [I73.9] 11/15/2018     Priority: Low    VTE (venous thromboembolism) [I82.90] 11/08/2018     Priority: Low    CKD (chronic kidney disease), stage III ([facility] [N18.3] 11/08/2018     Priority: Low    MGUS (monoclonal gammopathy of unknown significance) [D47.2] 11/08/2018     Priority: Low    Lung nodule [R91.1] 05/14/2018     Priority: Low    Hypotension [I95.9]      Priority: Low    Factor V Leiden mutation ([facility] [D68.51] 05/15/2015     Priority: Low    Tobacco dependence [F17.200] 08/25/2014     Priority: Low    DVT (deep venous thrombosis) ([facility] [I82.409] 08/01/2014     Priority: Low    Lumbosacral spondylosis without myelopathy [M47.817] 07/20/2011     Priority: Low    Facet arthropathy, lumbar [M47.816] 07/20/2011     Priority: Low        Assessment/Plan:  1. CP- does not appear to be cardiac. It is reproducible. Troponin x3 negative  2. Metastatic Brain Ca s/p resection  3. Right Diaphragmatic mass- w/u in progress. 4. CAD stable no Angina. Resume ASA when surgically appropriate. 5. PPM  6.  HTN -BP low- will decrease ACEI to 2.5 bid       Electronically signed by Tanisha Jacob MD on 8/14/2019 at 9:17 AM

## 2019-08-14 NOTE — PLAN OF CARE
Problem: Falls - Risk of:  Goal: Will remain free from falls  Description  Will remain free from falls  8/14/2019 0202 by Brian Hermosillo RN  Outcome: Ongoing  8/13/2019 1508 by hSakira Cardona RN  Outcome: Ongoing  Goal: Absence of physical injury  Description  Absence of physical injury  8/14/2019 0202 by Brian Hermosillo RN  Outcome: Ongoing  8/13/2019 1508 by Shakira Cardona RN  Outcome: Ongoing     Problem: Pain:  Goal: Pain level will decrease  Description  Pain level will decrease  8/14/2019 0202 by Brian Hermosillo RN  Outcome: Ongoing  8/13/2019 1508 by Shakira Cardona RN  Outcome: Ongoing  Goal: Control of acute pain  Description  Control of acute pain  8/14/2019 0202 by Brian Hermosillo RN  Outcome: Ongoing  8/13/2019 1508 by Shakira Cardona RN  Outcome: Ongoing  Goal: Control of chronic pain  Description  Control of chronic pain  8/14/2019 0202 by Brian Hermosillo RN  Outcome: Ongoing  8/13/2019 1508 by Shakira Cardona RN  Outcome: Ongoing

## 2019-08-14 NOTE — PROGRESS NOTES
board. Pt used her right hand to insert casings and her left hand to remove casings. Pt had Min difficulty with activity and worked at a steady pace without rest breaks. To improve hand fine motor coordination for mgmt of clothing fasteners/ADL containers in a timely manner. Upper Extremity Function  UE Strengthing: Box Rina  Pt used bilateral hands on the handles of a box rina to push it across the tabletop as follows: 2# weight added to box: Forward and Backward: 2 sets x 10 reps   Side to Side: 2 sets x 10 reps   Clockwise Circles: 2 sets x 10 reps   Counterclockwise Circles: 2 sets x 10 reps   Pt had Min difficulty with activity and required rest breaks throughout. To increase BUE strength and endurance for improved transfers.      Plan   Plan  Times per week: 5-7x/week  Plan weeks: 1.5 week  Current Treatment Recommendations: Strengthening, Balance Training, Functional Mobility Training, Endurance Training, Safety Education & Training, Patient/Caregiver Education & Training, Equipment Evaluation, Education, & procurement, Self-Care / ADL, Home Management Training  Plan Comment: Continue OT per POC    Goals  Patient Goals   Patient goals : \"get rid of this chest pain\"       Therapy Time   Individual Concurrent Group Co-treatment   Time In 1400         Time Out 1500         Minutes 60           Electronically signed by MARI Banerjee on 8/14/2019 at 3:09 PM    MARI Banerjee

## 2019-08-14 NOTE — PROGRESS NOTES
Obtained pt VS again to assess before medication administration, still low (see flowsheets), held BP medications, notified Dr. Stone , Dr. Stone  at bedside rounding, Dr. Stone  cancelled pt cardiac monitoring. Pt asymptomatic no complaint of SOB, dizziness or pain, pt was encouraged to increase fluid intake. Pt currently working with speech in her room.  Electronically signed by Prabhakar Macias RN on 8/14/2019 at 9:21 AM

## 2019-08-14 NOTE — PROGRESS NOTES
Occupational Therapy  Facility/Department: [facility]  Daily Treatment Note  NAME: Chris Farooq  : 1945  MRN: [id number]    Date of Service: 2019    Discharge Recommendations:  Continue to assess pending progress    Assessment: Pt able to tolerate entire session with no complaint of pain or fatigue, however, pt moans throughout and demonstrates sights and symptoms of fatigue. Pt requires rest breaks throughout session. Pt arrived to therapy with her nasal cannula off. SPO2 reading of 87% at start of session with no activity. Pt donned nasal cannula and wore 2L O2 throughout session. Partway through session SPO2 level was taken with a reading of 95%. Activity Tolerance  Activity Tolerance: Patient limited by fatigue  Activity Tolerance: 2L O2   Safety Devices  Safety Devices in place: Yes  Type of devices: All fall risk precautions in place       Patient Diagnosis(es): There were no encounter diagnoses. has a past medical history of Back pain, chronic, Chronic kidney disease, COPD (chronic obstructive pulmonary disease) ([facility] Coronary artery disease involving native coronary artery of native heart without angina pectoris, Dyslipidemia, Essential hypertension, Factor V deficiency ([facility] H/O blood clots, Headache, Hypertension, Syncope, and Tobacco abuse.   has a past surgical history that includes Coronary angioplasty with stent; Breast biopsy (Right); and craniotomy (N/A, 2019).     Restrictions  Restrictions/Precautions  Restrictions/Precautions: Fall Risk     Subjective  General  Chart Reviewed: Yes  Patient assessed for rehabilitation services?: Yes  Response to previous treatment: Patient reporting fatigue but able to participate  Family / Caregiver Present: Yes  Referring Practitioner: Dr. Darryl Osuna  Diagnosis: brain mass with impaired mobility s/p suboccipital craniotomy metastic posterior fossa  General Comment  Comments: Pt on 2L of O2 via NC  Pain Assessment  Pain Assessment: filling the entire week worth of pills. Pt reports that she has a \"butter tub\" that she fills with her medications daily. Despite education and cuing pt still struggled to comprehend filling the while box for the week. The remaining 2 of the 5 errors were d/t pt putting too many pills in one time box. Pt required assistance to identify errors, correct errors made, and implement solutions. Coordination  Fine Motor:Theraputty: Pink putty (medium resistance), squeezing, rolling, pinching, folding, inserting and removing beads. Pt successfully inserted/removed 15 of 15 beads with Min difficulty. To improve hand strength/fine motor coordination for mgmt of clothing fasteners/ADL containers in a timely manner. Pt noted to have hand tremors throughout. Upper Extremity Function  UE Strengthing: Pt wore 1# wrist weights to insert large pegs into a board using alternating hands. Pt had Min/Mod difficulty with activity and worked at a slow and steady pace with rest breaks prn. Pt limited by increased pain. Repetitive tabletop reaching to increase BUE strength and endurance for improved transfers.       Plan  Plan  Times per week: 5-7x/week  Plan weeks: 1.5 week  Current Treatment Recommendations: Strengthening, Balance Training, Functional Mobility Training, Endurance Training, Safety Education & Training, Patient/Caregiver Education & Training, Equipment Evaluation, Education, & procurement, Self-Care / ADL, Home Management Training  Plan Comment: Continue OT per POC    Goals  Patient Goals   Patient goals : \"get rid of this chest pain\"    Therapy Time   Individual Concurrent Group Co-treatment   Time In 1030         Time Out 1130         Minutes 60           Electronically signed by MARI Cerrato on 8/14/2019 at 11:53 AM    MARI Cerrato

## 2019-08-14 NOTE — PROGRESS NOTES
Physical Therapy Rehab Treatment Note  Facility/Department: [facility]  Room: R259/R259-01       NAME: Chris Farooq  : 1945 (34 y.o.)  MRN: [id number]  CODE STATUS: Full Code    Date of Service: 2019  Chart Reviewed: Yes  Family / Caregiver Present: No    Restrictions:  Restrictions/Precautions: Fall Risk       SUBJECTIVE: Subjective: I feel better today. Response To Previous Treatment: Patient with no complaints from previous session. Pain Screening  Patient Currently in Pain: Yes  Pre Treatment Pain Screening  Pain at present: 0  Scale Used: Numeric Score  Intervention List: Patient declined any intervention  Comments / Details: Once in a while the chest grabs me. I had a dull headache this morning, but not right now. Post Treatment Pain Screening:  Pain Assessment  Pain Level: 5  Pain Type: Acute pain  Pain Location: Chest  Pain Orientation: Mid  Pain Descriptors: Sharp  Pain Frequency: Continuous  Clinical Progression: Gradually worsening  Non-Pharmaceutical Pain Intervention(s): Rest;Declines  Response to Pain Intervention: None    OBJECTIVE:      Bed mobility  Rolling to Left: Stand by assistance  Rolling to Right: Stand by assistance  Supine to Sit: Stand by assistance  Sit to Supine: Stand by assistance  Scooting: Stand by assistance  Comment: Pt with decreased pain with sit to supine if HOB partially raised but still needs excessive time and effort to complete task. Pt educated that log rolling technique will decrease pain secondary to closing chest cavity during movement. Pt states understanding. Transfers  Sit to Stand: Supervision  Stand to sit: Supervision  Bed to Chair: Supervision;Stand by assistance    Ambulation  Ambulation?: Yes  Ambulation 1  Surface: carpet  Device: Rollator  Other Apparatus: O2(2L)  Assistance: Supervision;Stand by assistance  Quality of Gait: Flexed trunk with gait with fair alex, reciprocal gait.   Distance: 150'  Stairs/Curb  Stairs?: Yes

## 2019-08-14 NOTE — PROGRESS NOTES
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 I am relieved to see that both her duplex and her spiral CT recently were unremarkable. She does not appear to have any blood clots. We are holding off as best we can and as long as we can with blood thinners given the vascular nature of her recent tumor with removal.  We are following her closely. ROS x10: The patient also complains of severely impaired mobility and activities of daily living. Otherwise no new problems with vision, hearing, nose, mouth, throat, dermal, cardiovascular, GI, , pulmonary, musculoskeletal, psychiatric or neurological. See Rehab H&P on Rehab chart dated . Vital signs:  BP 97/63   Pulse 68   Temp 98 °F (36.7 °C) (Oral)   Resp 18   Ht 4' 10.75\" (1.492 m)   Wt 152 lb 12.5 oz (69.3 kg)   LMP  (LMP Unknown)   SpO2 95%   BMI 31.12 kg/m²   I/O:   PO/Intake:  fair PO intake, no problems observed or reported. Bowel/Bladder:  continent, no problems noted. General:  Patient is well developed, adequately nourished, non-obese and     well kempt. HEENT:    PERRLA, hearing intact to loud voice, external inspection of ear     and nose benign. Inspection of lips, tongue and gums benign  Musculoskeletal: No significant change in strength or tone. All joints stable. Inspection and palpation of digits and nails show no clubbing,       cyanosis or inflammatory conditions. Neuro/Psychiatric: Affect: flat but pleasant. Alert and oriented to person, place and     situation. No significant change in deep tendon reflexes or     sensation  Lungs:  Diminished, CTA-B. Respiration effort is normal at rest.     Heart:   S1 = S2, RRR.   No loud murmurs. Abdomen:  Soft, non-tender, no enlargement of liver or spleen. Extremities:  No significant lower extremity edema or tenderness. Skin:   Intact to general survey, healing posterior and right frontoparietal craniotomy incisions    Rehabilitation:  Physical therapy: FIMS:  Bed Mobility: Scooting: Stand by assistance    Transfers: Sit to Stand: Stand by assistance  Stand to sit: Stand by assistance  Bed to Chair: Stand by assistance, Ambulation 1  Surface: level tile  Device: Rolling Walker  Other Apparatus: O2(2L)  Assistance: Stand by assistance, Supervision  Quality of Gait: Assist with picking up walker to ease pain in chest.  Side stepping only at EOB for 3 steps. Gait Deviations: Slow Connie, Increased MARY ANN, Decreased step length  Distance: 200'x2   Comments: distance restricted to toilet to bed gait due to reports of chest pain, Stairs  # Steps : 4  Stairs Height: 6\"  Rails: Bilateral  Comment: nonreciprocal     FIMS: Bed, Chair, Wheel Chair: 4 - Requires steadying assistance only <25% assist  and/or requires assist with one leg only  Walk: 2 - Maximal Assistance Requires up to Maximal Assistance AND requires assistance of one person to walk between  feet (Patient performs 25-49% of locomotion effort or goes between  feet)  Distance Walked: 75'  Distance Traveled in Wheel Chair: 0  Stairs: 2- Maximal Assistance Performs 25-49% of the effort to go up and down 4 to 6 stairs and requires the assistance of one person only,  , Assessment: Pt demonstrates deficits as listed above. Pt was experiencing chest pain during this session. Items included in eval limited as result. Further assessment of balance, strengtha dn motor control will be completed as pt able.  Pt is in need of PT once medical status stabilized    Occupational therapy: FIMS:  Eatin - Feeds self with setup/supervision/cues and/or requires only setup/supervision/cues to perform tube feedings(Setup)  Groomin - Requires arterial atherosclerotic disease without aneurysm with mild ectasia of the infrarenal abdominal aorta measuring 2.5 cm. . Pelvis: No mass, ascites or fluid collection. Uterus unremarkable. Decompressed bladder with trabeculated wall. Bones/Soft Tissues: No acute osseous findings. No destructive osseous lesions. Osteopenia. Scoliosis and degenerative changes throughout the spine with superior endplate sclerosis at J7-X9 and L3-L4 . Small amount of heterogeneous gas right anterior abdominal wall, likely from injection. Lower thorax: Soft tissue nodule along the right hemidiaphragm, appearing pleural-based, measures 3.2 x 3.1 x 1.9 cm (series 2 image 7). Right basilar atelectasis. Emphysematous changes in the lungs. No suspicious lung nodules. Small hiatal hernia with an apparent area of outpouching. Partially visualized pacemaker leads. No evidence for metastatic disease within the abdomen/pelvis. Soft tissue nodule right hemidiaphragm, likely metastatic. Recommend dedicated CT of the lungs for further evaluation of underlying malignancy. Small hiatal hernia with possible area of esophageal outpouching. ==========     Xr Chest   8/12/2019    CONCLUSION: COPD. NEW SUBPULMONIC EFFUSION ON THE LEFT. COMPARISON STUDY IS ADVISED. Mri Brain  7/31/2019     1. APPROXIMATELY 2.5 X 2 X 2 CM INHOMOGENEOUSLY ENHANCING MIDLINE CEREBELLAR MASS WHICH APPEARS TO HAVE A CYSTIC COMPONENT AND A HEMORRHAGIC COMPONENT. MRI FINDINGS ARE COMPATIBLE WITH AN INTRA-AXIAL POSTERIOR FOSSA TUMOR AND DIFFERENTIAL CONSIDERATIONS ARE PRIMARILY BETWEEN A METASTASIS OR LESS LIKELY A HEMANGIOBLASTOMA. 2. NO OTHER ENHANCING LESION IN THE POSTERIOR FOSSA OR IN THE SUPRATENTORIAL BRAIN. THERE IS A MILD DEGREE OF CEREBRAL ATROPHY AND WHITE MATTER T2 HYPERINTENSE FOCI WHICH ARE LIKELY REPRESENT A MANIFESTATION OF SMALL VESSEL DISEASE. NO ACUTE ISCHEMIC FOCUS IN THE BRAIN.      Us Dup Lower   8/12/2019     NEGATIVE FOR DEEP VENOUS THROMBOSIS BOTH Duragesic low-dose, modalities prn in therapy, Lidoderm, K-pad prn.   4. Skin healing posterior occipital craniotomy and right cranial drain sites and breakdown risk:  continue pressure relief program.  Daily skin exams and reports from nursing. 5. Severe fatigue due to nutritional and hydration deficiency:  continue to monitor I&Os, calorie counts prn, dietary consult prn. Add vitamin B12 vitamin D and CoQ10 add protein and encourage p.o. intake  6. Acute episodic insomnia with situational adjustment disorder:  prn Ambien, monitor for day time sedation. 7. Falls risk elevated:  patient to use call light to get nursing assistance to get up, bed and chair alarm. 8. Elevated DVT risk: progressive activities in PT, continue prophylaxis ZECHARIAH hose, elevation and reconsult neurosurgery as to when she can go back on Xarelto given her history of factor V Leiden deficiency. 9. Complex discharge planning: Discharge 8/25/2019 home with her daughters with help from her daughters and home health care PT OT RN aide  I do not feel that she will need a device at discharge. Weekly team meeting  Monday to assess progress towards goals, discuss and address social, psychological and medical comorbidities and to address difficulties they may be having progressing in therapy. Patient and family education is in progress. The patient is to follow-up with their family physician after discharge. Complex Active General Medical Issues that complicate care Assess & Plan:    1. Essential hypertension, Dyslipidemia,   Presence of permanent cardiac pacemaker, SSS (sick sinus syndrome),   PAD (peripheral artery disease) -vital signs every shift, dose and titrate cardiac medications to include  2. Lung nodule, COPD (chronic obstructive pulmonary disease) -consult hematology oncology CTA showed no metastatic lesions  3.    Age-related osteoporosis without current pathological fracture-CTA of chest showed no bony lesions  4. CKD (chronic kidney disease), stage III -limit toxic medications  5. History of DVT (deep venous thrombosis), history of VTE (venous thromboembolism),   Factor V Leiden mutation  -complex blood thinner regimen because of her the vascular nature of her brain tumor and recent brain surgery, blood thinners currently on hold statin complicated work-up to rule out PE rule out DVT is negative CTA is also negative  6. Facet arthropathy, lumbar,   Lumbosacral spondylosis without myelopathy-add Lidoderm, add Percocet, use lowest effective dose-increase pain medication and is patient's recent work-up for pleuritic chest pain has shown that this is likely arthritic in nature  7. Hiatal hernia and history of idiopathic chronic pancreatitis -treat constipation consider proton pump inhibitor. 8.   MGUS (monoclonal gammopathy of unknown significance)-reconsult hematology oncology and recheck CBC  9. Tobacco dependence-stop smoking counseling-stop smoking counseling  10.    Ataxy cerebellar -focus on balance and PT OT        Za Amos D.O., PM&R     Attending    [address] [facility]

## 2019-08-14 NOTE — PROGRESS NOTES
extending to the left. There is some central lower density that is not clearly cystic on these images. There is some surrounding low density consistent with edema and probable mass effect. The fourth ventricle is normal in appearance. There is no hydrocephalus. On these images there are no additional parenchymal brain lesions. Primary differential would include solitary metastatic lesion most commonly breast or lung. The possibility of an hemangioblastoma is also mentioned. These findings were discussed with Dr. Malika March in the emergency room. EXAMINATION: CTA HEAD W WO CONTRAST, CTA NECK W WO CONTRAST CTA HEAD DATE AND TIME:7/30/2019 2:15 PM CLINICAL HISTORY: Stroke  gait instability  COMPARISON: None TECHNIQUE:Helical CTA of the head and neck was performed from the vertex to the aortic arch following the uneventful intravenous administration of 100 cc of nonionic contrast without incident. 2-D images were reconstructed in the sagittal and coronal planes. Three Dimensional Maximum Intensity Projection (3D-MIP) images were created. All images were reviewed and primarily archived to PACS workstation. All CT scans at this facility use dose modulation, iterative reconstruction, and/or weight based dosing when appropriate to reduce radiation dose to as low as reasonably achievable. NASCET Criteria were utilized FINDINGS CTA Head     Intracranial ICAs: Flow is visualized within the precavernous, cavernous, clinoid and supraclinoid segments of the internal carotid arteries bilaterally     Anterior cerebral arteries:Flow is visualized within the bilateral anterior cerebral arteries with no large vessel occlusion or significant stenosis. The left A1 segment is relatively hypoplastic. Middle cerebral arteries:Flow visualized throughout the middle cerebral arteries bilaterally with no large vessel occlusion or significant stenosis. Posterior Circulation: Bilateral posterior cerebral arteries are patent.      Basilar Stenosis (% by NASCET Criteria): There is some calcified plaque in the left carotid bulb. There is no (less than 20%) hemodynamically significant stenosis, vascular dissection or aneurysm in the left common or internal carotid arteries. Cervical Vertebral Arteries:    Patency: The vertebral arteries are well visualized to the level of the basilar artery. There is no focal stenosis aneurysm or dissection. Vertebral arteries are codominant. Ct Head Wo Contrast    Result Date: 8/9/2019  EXAMINATION:  CT HEAD WO CONTRAST HISTORY:   fu closed ev  ro hydro TECHNIQUE:  Serial axial images without IV contrast were obtained from the vertex to the foramen magnum. All CT scans at this facility use dose modulation, iterative reconstruction, and/or weight based dosing when appropriate to reduce radiation dose to as low as reasonably achievable. COMPARISON:  8/6/2019. RESULT: Post-operative change:  Right frontal approach ventricular catheter with tip near midline between the frontal horns, unchanged. Interval resolution of the pneumocephalus. The size of the ventricular system is unchanged. Occipital craniotomy with associated postsurgical changes from tumor resection within the cerebellum, with small amount of gas in the tissues adjacent to the craniotomy. . Acute change:   No evidence of an acute infarct. Hemorrhage:    No evidence of new/acute intracranial hemorrhage. Mass Lesion / Mass Effect: No significant mass effect. Chronic change:   Scattered patchy foci of low attenuation are present within supratentorial white matter which is a nonspecific finding but likely represents mild microvascular ischemia. Parenchyma: There is no significant volume loss. The brain parenchyma is otherwise within normal limits for age. Ventricles:   Stable in size. Paranasal sinuses and skull base: The visualized paranasal sinuses are grossly clear. The skull base and imaged soft tissues are unremarkable.       Postsurgical structures are unremarkable. The visualized portion of the paranasal sinuses, and mastoids are unremarkable. THERE IS A MASS WITH HETEROGENEOUS ENHANCEMENT IN THE LEFT CEREBELLAR HEMISPHERE. THERE IS ALSO INCREASED PERIPHERAL CIRCUMFERENTIAL ATTENUATION WHICH MAY REPRESENT EFFECTS OF RESIDUAL PRIOR CONTRAST FROM CT AND/OR MRI NO ACUTE EXTRA-AXIAL FINDINGS. All CT scans at this facility use dose modulation, iterative reconstruction, and/or weight based dosing when appropriate to reduce radiation dose to as low as reasonably achievable. Cta Neck W Wo Contrast    CTA HEAD AND CTA NECK 1. There is no large vessel occlusion or significant stenosis of the bilateral middle cerebral arteries, terminal internal carotid arteries, basilar artery, or cervical internal carotid arteries. 2.  2.7 cm ring-enhancing lesion in the posterior fossa abutting the vermis and extending to the left. There is some central lower density that is not clearly cystic on these images. There is some surrounding low density consistent with edema and probable mass effect. The fourth ventricle is normal in appearance. There is no hydrocephalus. On these images there are no additional parenchymal brain lesions. Primary differential would include solitary metastatic lesion most commonly breast or lung. The possibility of an hemangioblastoma is also mentioned. These findings were discussed with Dr. Hany Ewing in the emergency room. EXAMINATION: CTA HEAD W WO CONTRAST, CTA NECK W WO CONTRAST CTA HEAD DATE AND TIME:7/30/2019 2:15 PM CLINICAL HISTORY: Stroke  gait instability  COMPARISON: None TECHNIQUE:Helical CTA of the head and neck was performed from the vertex to the aortic arch following the uneventful intravenous administration of 100 cc of nonionic contrast without incident. 2-D images were reconstructed in the sagittal and coronal planes. Three Dimensional Maximum Intensity Projection (3D-MIP) images were created.   All images were reviewed and lobe. It measures 2.7 x 2.9 x 1.0 cm in the transverse and AP and cephalocaudad dimension. Best seen on the coronal reconstructions. No other focal parenchymal abnormalities. No pleural effusions. No pneumothoraces. No significant periarticular. There is pretracheal adenopathy. No significant perihilar or subcarinal adenopathy. PLEURAL-BASED SOFT TISSUE MASS OVERLYING THE MEDIAL ASPECT OF THE RIGHT LOWER LOBE, RIGHT HEMIDIAPHRAGM. THIS IS NEW SINCE THE PRIOR STUDY OF MAY 6, 2019. THE LACK OF IV CONTRAST LIMITS FULL EVALUATION. THIS IS SUPERIMPOSED UPON MODERATE EMPHYSEMATOUS DISEASE ONE PARENCHYMA AS DESCRIBED ABOVE. All CT scans at this facility use dose modulation, iterative reconstruction, and/or weight based dosing when appropriate to reduce radiation dose to as low as reasonably achievable. Ct Chest W Contrast    Result Date: 8/10/2019  EXAMINATION:  CT SCAN CHEST CLINICAL HISTORY:  Lung nodule COMPARISON:  July 31, 2019 TECHNIQUE:  Multiple serial axial images from the base neck through the upper abdomen with sagittal coronal reconstruction was performed following the intravenous administration of 75 mL of Isovue-300. FINDINGS:  FINDINGS:    There are biapical areas of scarring fibrosis. There is moderate emphysematous disease in the upper one third of the lung parenchyma. Again note is made of a soft tissue density at the anterior medial aspect of the right lower lobe and right hemidiaphragm. It measures There is a pleural-based soft tissue density at the base medial aspect of the right lower lobe. It measures 3.4 x 1.1 in the transverse AP dimension. It is best appreciated on the coronal and sagittal reconstructions,  In the transverse and AP and cephalocaudad dimension. Best seen on the coronal reconstructions. On this study is of low attenuation without enhancement on the prior study intermediate attenuation. No other focal parenchymal abnormalities. No pleural effusions. No pneumothoraces.  No low-attenuation lesions throughout the liver, including 0.8 x 0.9 cm low-attenuation lesion in the right inferior hepatic lobe (series 2 image 27). These are similar to MRI 12/3/2018 and appear to be benign cysts. Biliary: No bile duct dilation. Gallbladder is collapsed. Pancreas: Diffuse pancreatic calcifications. No discrete pancreatic mass or pancreatic ductal dilation. Spleen: No mass. No splenomegaly. Adrenals: No mass. 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 No hydronephrosis or nephrolithiasis. Subcentimeter lesion(s) that are too small to characterize but likely benign. GI tract: No dilation or wall thickening. Mild diverticulosis without diverticulitis. Lymph nodes: No abdominal or pelvic lymphadenopathy. Mesentery/Peritoneum: No ascites or mass. Retroperitoneum: No mass. Vasculature: The celiac axis and SMA are patent. The portal vein and branches, splenic vein, SMV, and hepatic veins are patent. Severe arterial atherosclerotic disease without aneurysm with mild ectasia of the infrarenal abdominal aorta measuring 2.5 cm. . Pelvis: No mass, ascites or fluid collection. Uterus unremarkable. Decompressed bladder with trabeculated wall. Bones/Soft Tissues: No acute osseous findings. No destructive osseous lesions. Osteopenia. Scoliosis and degenerative changes throughout the spine with superior endplate sclerosis at H5-R7 and L3-L4 . Small amount of heterogeneous gas right anterior abdominal wall, likely from injection. Lower thorax: Soft tissue nodule along the right hemidiaphragm, appearing pleural-based, measures 3.2 x 3.1 x 1.9 cm (series 2 image 7). Right basilar atelectasis. Emphysematous changes in the lungs. No suspicious lung nodules. Small hiatal hernia with an apparent area of outpouching. Partially visualized pacemaker leads. No evidence for metastatic disease within the abdomen/pelvis. Soft tissue nodule right hemidiaphragm, likely metastatic.  Recommend dedicated CT of the the fourth ventricle with associated cerebral edema. There appears to be a cystic component to this cerebellar tumor. Also, there is some paramagnetic signal within the tumor suggesting a hemorrhagic component to the tumor mass. The MRI findings are compatible with an intra-axial posterior fossa tumor and the differential considerations are primarily between a metastasis or less likely a hemangioblastoma. The tumor is not causing significant extrinsic \"mass effect \" on the fourth ventricle or aqueductal stenosis and there is no hydrocephalus. There is no other enhancing lesion or tumor in the posterior fossa or in the supratentorial brain. Supratentorial brain: There are punctate and patchy T2 hyperintense foci in the white matter of both cerebral hemispheres which are likely a manifestation of small vessel disease. There is no restricted diffusion or MRI evidence of an acute or subacute ischemic focus in the brain. There is a mild degree of cerebral atrophy which is appropriate for the patient's age. The remainder of the supratentorial brain appears unremarkable. 1. APPROXIMATELY 2.5 X 2 X 2 CM INHOMOGENEOUSLY ENHANCING MIDLINE CEREBELLAR MASS WHICH APPEARS TO HAVE A CYSTIC COMPONENT AND A HEMORRHAGIC COMPONENT. MRI FINDINGS ARE COMPATIBLE WITH AN INTRA-AXIAL POSTERIOR FOSSA TUMOR AND DIFFERENTIAL CONSIDERATIONS ARE PRIMARILY BETWEEN A METASTASIS OR LESS LIKELY A HEMANGIOBLASTOMA. 2. NO OTHER ENHANCING LESION IN THE POSTERIOR FOSSA OR IN THE SUPRATENTORIAL BRAIN. THERE IS A MILD DEGREE OF CEREBRAL ATROPHY AND WHITE MATTER T2 HYPERINTENSE FOCI WHICH ARE LIKELY REPRESENT A MANIFESTATION OF SMALL VESSEL DISEASE. NO ACUTE ISCHEMIC FOCUS IN THE BRAIN.      Us Dup Lower Extremities Bilateral Venous    Result Date: 8/12/2019  EXAMINATION: VENOUS DUPLEX DOPPLER STUDY OF BILATERAL LOWER EXTREMITIES: CLINICAL INFORMATION:  CP, R/O DVT, Factor V COMPARISON:  NONE AVAILABLE TECHNIQUE: Gray scale, duplex doppler, with

## 2019-08-15 PROCEDURE — 1180000000 HC REHAB R&B

## 2019-08-15 PROCEDURE — 6370000000 HC RX 637 (ALT 250 FOR IP): Performed by: INTERNAL MEDICINE

## 2019-08-15 PROCEDURE — 97535 SELF CARE MNGMENT TRAINING: CPT

## 2019-08-15 PROCEDURE — 99232 SBSQ HOSP IP/OBS MODERATE 35: CPT | Performed by: PHYSICAL MEDICINE & REHABILITATION

## 2019-08-15 PROCEDURE — 2500000003 HC RX 250 WO HCPCS: Performed by: PHYSICAL MEDICINE & REHABILITATION

## 2019-08-15 PROCEDURE — 97530 THERAPEUTIC ACTIVITIES: CPT

## 2019-08-15 PROCEDURE — 97110 THERAPEUTIC EXERCISES: CPT

## 2019-08-15 PROCEDURE — 97116 GAIT TRAINING THERAPY: CPT

## 2019-08-15 PROCEDURE — 97127 HC SP THER IVNTJ W/FOCUS COG FUNCJ: CPT

## 2019-08-15 PROCEDURE — 97127 HC OT THER IVNTJ W/FOCUS COG FUNCJ: CPT

## 2019-08-15 PROCEDURE — 2700000000 HC OXYGEN THERAPY PER DAY

## 2019-08-15 PROCEDURE — 94640 AIRWAY INHALATION TREATMENT: CPT

## 2019-08-15 PROCEDURE — 6370000000 HC RX 637 (ALT 250 FOR IP): Performed by: PHYSICAL MEDICINE & REHABILITATION

## 2019-08-15 RX ADMIN — METOPROLOL TARTRATE 25 MG: 25 TABLET ORAL at 08:57

## 2019-08-15 RX ADMIN — PROVIDONE IODINE: 7.5 STICK TOPICAL at 08:56

## 2019-08-15 RX ADMIN — GABAPENTIN 300 MG: 300 CAPSULE ORAL at 12:22

## 2019-08-15 RX ADMIN — LACTOBACILLUS TAB 2 TABLET: TAB at 08:56

## 2019-08-15 RX ADMIN — PANTOPRAZOLE SODIUM 40 MG: 40 TABLET, DELAYED RELEASE ORAL at 04:46

## 2019-08-15 RX ADMIN — OXYCODONE HYDROCHLORIDE AND ACETAMINOPHEN 1 TABLET: 7.5; 325 TABLET ORAL at 16:31

## 2019-08-15 RX ADMIN — LACTOBACILLUS TAB 2 TABLET: TAB at 12:23

## 2019-08-15 RX ADMIN — LACTOBACILLUS TAB 2 TABLET: TAB at 21:15

## 2019-08-15 RX ADMIN — GABAPENTIN 300 MG: 300 CAPSULE ORAL at 21:14

## 2019-08-15 RX ADMIN — LISINOPRIL 2.5 MG: 2.5 TABLET ORAL at 08:56

## 2019-08-15 RX ADMIN — OXYCODONE HYDROCHLORIDE AND ACETAMINOPHEN 1 TABLET: 7.5; 325 TABLET ORAL at 04:45

## 2019-08-15 RX ADMIN — OXYCODONE HYDROCHLORIDE AND ACETAMINOPHEN 1 TABLET: 7.5; 325 TABLET ORAL at 09:42

## 2019-08-15 RX ADMIN — Medication: at 12:25

## 2019-08-15 RX ADMIN — MENTHOL AND METHYL SALICYLATE: 7.6; 29 OINTMENT TOPICAL at 21:19

## 2019-08-15 RX ADMIN — METOPROLOL TARTRATE 25 MG: 25 TABLET ORAL at 21:14

## 2019-08-15 RX ADMIN — CEPHALEXIN 500 MG: 250 CAPSULE ORAL at 21:15

## 2019-08-15 RX ADMIN — DOCUSATE SODIUM 100 MG: 100 CAPSULE, LIQUID FILLED ORAL at 08:57

## 2019-08-15 RX ADMIN — GLYCOPYRROLATE AND FORMOTEROL FUMARATE 2 PUFF: 9; 4.8 AEROSOL, METERED RESPIRATORY (INHALATION) at 16:27

## 2019-08-15 RX ADMIN — PRAVASTATIN SODIUM 40 MG: 40 TABLET ORAL at 21:15

## 2019-08-15 RX ADMIN — MENTHOL AND METHYL SALICYLATE: 7.6; 29 OINTMENT TOPICAL at 12:25

## 2019-08-15 RX ADMIN — CEPHALEXIN 500 MG: 250 CAPSULE ORAL at 12:22

## 2019-08-15 RX ADMIN — MENTHOL AND METHYL SALICYLATE: 7.6; 29 OINTMENT TOPICAL at 09:02

## 2019-08-15 RX ADMIN — LISINOPRIL 2.5 MG: 2.5 TABLET ORAL at 21:15

## 2019-08-15 RX ADMIN — GLYCOPYRROLATE AND FORMOTEROL FUMARATE 2 PUFF: 9; 4.8 AEROSOL, METERED RESPIRATORY (INHALATION) at 04:51

## 2019-08-15 RX ADMIN — CEPHALEXIN 500 MG: 250 CAPSULE ORAL at 04:46

## 2019-08-15 RX ADMIN — GABAPENTIN 300 MG: 300 CAPSULE ORAL at 08:57

## 2019-08-15 RX ADMIN — VITAMIN D, TAB 1000IU (100/BT) 1000 UNITS: 25 TAB at 12:23

## 2019-08-15 RX ADMIN — PROVIDONE IODINE: 7.5 STICK TOPICAL at 14:00

## 2019-08-15 ASSESSMENT — PAIN SCALES - GENERAL
PAINLEVEL_OUTOF10: 0
PAINLEVEL_OUTOF10: 6
PAINLEVEL_OUTOF10: 2
PAINLEVEL_OUTOF10: 6
PAINLEVEL_OUTOF10: 5
PAINLEVEL_OUTOF10: 3
PAINLEVEL_OUTOF10: 0
PAINLEVEL_OUTOF10: 3

## 2019-08-15 ASSESSMENT — PAIN DESCRIPTION - ORIENTATION
ORIENTATION: MID

## 2019-08-15 ASSESSMENT — PAIN DESCRIPTION - FREQUENCY: FREQUENCY: CONTINUOUS

## 2019-08-15 ASSESSMENT — PAIN DESCRIPTION - PROGRESSION
CLINICAL_PROGRESSION: NOT CHANGED

## 2019-08-15 ASSESSMENT — PAIN DESCRIPTION - LOCATION
LOCATION: CHEST

## 2019-08-15 ASSESSMENT — ENCOUNTER SYMPTOMS
NAUSEA: 0
WHEEZING: 0
VOMITING: 0
TROUBLE SWALLOWING: 0
CHEST TIGHTNESS: 0
COUGH: 0
COLOR CHANGE: 0
SHORTNESS OF BREATH: 0

## 2019-08-15 ASSESSMENT — PAIN DESCRIPTION - DESCRIPTORS
DESCRIPTORS: ACHING;SORE
DESCRIPTORS: SORE;NAGGING
DESCRIPTORS: ACHING;SORE

## 2019-08-15 ASSESSMENT — PAIN DESCRIPTION - ONSET: ONSET: ON-GOING

## 2019-08-15 ASSESSMENT — PAIN DESCRIPTION - PAIN TYPE
TYPE: ACUTE PAIN
TYPE: ACUTE PAIN

## 2019-08-15 NOTE — PROGRESS NOTES
attention to managing O2 cord with movement, as may be needing to wear it during the day, once home, vs just at night. Pt instructed to turn towards the cord when turning but did not always follow directions. PLAN OF CARE/Safety:   Safety Devices  Type of devices: All fall risk precautions in place;Call light within reach; Chair alarm in place; Left in chair      Therapy Time:   Individual   Time In 1430   Time Out 1500   Minutes 30     Minutes:30      Transfer/Bed mobility trainin      Gait trainin      Neuro re education: 0     Therapeutic ex: 5      Arnaldo Whitfield PTA, 08/15/19 at 3:34 PM

## 2019-08-15 NOTE — PROGRESS NOTES
Occupational Therapy  Facility/Department: [facility]  Daily Treatment Note  NAME: Farida Callejas  : 1945  MRN: [id number]    Date of Service: 8/15/2019    Discharge Recommendations:  Continue to assess pending progress       Assessment      Activity Tolerance  Activity Tolerance: Patient limited by fatigue  Activity Tolerance: Pt on 2L O2 throughout session   Safety Devices  Safety Devices in place: Yes  Type of devices: Left in bed;Bed alarm in place         Patient Diagnosis(es): There were no encounter diagnoses. has a past medical history of Back pain, chronic, Chronic kidney disease, COPD (chronic obstructive pulmonary disease) ([facility] Coronary artery disease involving native coronary artery of native heart without angina pectoris, Dyslipidemia, Essential hypertension, Factor V deficiency ([facility] [address] [facility] H/O blood clots, Headache, Hypertension, Syncope, and Tobacco abuse.   has a past surgical history that includes Coronary angioplasty with stent; Breast biopsy (Right); and craniotomy (N/A, 2019). Restrictions  Restrictions/Precautions  Restrictions/Precautions: Fall Risk  Subjective   General  Chart Reviewed: Yes  Patient assessed for rehabilitation services?: Yes  Response to previous treatment: Patient reporting fatigue but able to participate  Family / Caregiver Present: Yes(Daughter )  Referring Practitioner: Dr. Radha Child  Diagnosis: brain mass with impaired mobility s/p suboccipital craniotomy metastic posterior fossa  General Comment  Comments: Pt on 2L of O2 via NC    Patient had large amount of pain when she was laying down in bed.  Patient decided to remain on her side and a pillow was placed behind her  Orientation     Objective    ADL  Grooming: (Patient stood at the sink and cleaned her teeth and used mouthwash and washed her hands with no LOB but a need to lean against the sink for support)  Toileting: Modified independent   Money Management  Money Management Level of Assistance: Independent  Money Management: Patient was able to correctly count money amounts 3 of 3 attempts with one minor mistake with a quarter missing. Patient was able to correctly figure the amount in a checkbook with 3 entries with no problems. Toilet Transfers  Toilet - Technique: Ambulating  Equipment Used: Grab bars  Toilet Transfer: Supervision  Toilet Transfers Comments: used ww and had O2 in place      Patient completed endurance task with working on the Find it game with 1/4 pound weights on her wrists with need for occasional breaks to rest her arms on the table.                Plan   Plan  Times per week: 5-7x/week  Plan weeks: 1.5 week  Current Treatment Recommendations: Strengthening, Balance Training, Functional Mobility Training, Endurance Training, Safety Education & Training, Patient/Caregiver Education & Training, Equipment Evaluation, Education, & procurement, Self-Care / ADL, Home Management Training  Plan Comment: Continue OT per POC    Goals  Patient Goals   Patient goals : \"get rid of this chest pain\"       Therapy Time   Individual Concurrent Group Co-treatment   Time In 1500         Time Out 1600         [facility]    Electronically signed by Sherryle Odea, OTR/L on 8/15/2019 at 4:47 PM

## 2019-08-15 NOTE — PROGRESS NOTES
symmetrical 5 out of 5 all extremities bilaterally  Tone:  normal  Abnormal Movements:  absent              Medications:  Reviewed    Infusion Medications:   Scheduled Medications:    lisinopril  2.5 mg Oral BID    docusate sodium  100 mg Oral Daily    Povidone-Iodine   Topical TID    cyanocobalamin  1,000 mcg Intramuscular Weekly    vitamin D  50,000 Units Oral Weekly    vitamin D  1,000 Units Oral Lunch    lactobacillus acidophilus  2 tablet Oral TID    analgesic ointment   Topical TID    cephALEXin  500 mg Oral 3 times per day    gabapentin  300 mg Oral TID    glycopyrrolate-formoterol  2 puff Inhalation BID    lidocaine  3 patch Transdermal Daily    metoprolol tartrate  25 mg Oral BID    nicotine  1 patch Transdermal Daily    pantoprazole  40 mg Oral QAM AC    pravastatin  40 mg Oral Daily     PRN Meds: oxyCODONE-acetaminophen, nitroGLYCERIN, nystatin, stomahesive in petrolatum, acetaminophen, magnesium hydroxide, ondansetron, sodium chloride flush, albuterol sulfate HFA, oxyCODONE-acetaminophen, tiZANidine, hydrALAZINE    Labs:   No results for input(s): WBC, HGB, HCT, PLT in the last 72 hours. No results for input(s): NA, K, CL, CO2, BUN, CREATININE, CALCIUM, PHOS in the last 72 hours. Invalid input(s): MAGNES  No results for input(s): AST, ALT, BILIDIR, BILITOT, ALKPHOS in the last 72 hours. No results for input(s): INR in the last 72 hours. Recent Labs     08/12/19  1557 08/12/19  2102   TROPONINI <0.010 <0.010       Urinalysis:   Lab Results   Component Value Date    NITRU Negative 08/12/2019    WBCUA 0-2 07/30/2019    BACTERIA Rare 07/30/2019    RBCUA 0-2 07/30/2019    BLOODU Negative 08/12/2019    SPECGRAV 1.011 08/12/2019    GLUCOSEU Negative 08/12/2019       Radiology:   Most recent    EEG No procedure found.     MRI of Brain   Results for orders placed during the hospital encounter of 07/30/19   MRI BRAIN W WO CONTRAST    Narrative EXAMINATION:  MRI BRAIN WITHOUT AND WITH IV otherwise within normal limits for age. Ventricles:   Stable in size. Paranasal sinuses and skull base: The visualized paranasal sinuses are grossly clear. The skull base and imaged soft tissues are unremarkable. Impression Postsurgical changes without superimposed complication or superimposed acute finding. No results found for this or any previous visit. Results for orders placed during the hospital encounter of 07/30/19   CT HEAD W WO CONTRAST    Narrative CT HEAD W WO CONTRAST    CLINICAL HISTORY:  MASS     COMPARISON: MRI July 31, 2019. TECHNIQUE: Multiple unenhanced and enhanced serial axial images of the brain from the vertex of the skull to the base of the skull were performed following the intravenous administration of 50 mL of Isovue-300. FINDINGS: The ventricles are dilated. This is compensatory to the surrounding generalized parenchymal volume loss. Unchanged. Minor Ronde No midline shift. The cisterns are patent. There are white matter and periventricular changes most likely consistent with   chronic small vessel disease. There is a mass within the posterior inferior aspect of the left cerebellar hemisphere. It shows circumferential increased attenuation which may resent persistence of contrast from prior CT/and/or MRI. It measures approximately 1.4 x 1.7 x 1.4 in the   transverse AP and cephalocaudad dimension. Following the  intravenous administration of contrast there are no vascular abnormalities. The mass is again identified within the left cerebral hemisphere and shows heterogeneous enhancement. No additional masses are identified. No acute intra-axial or extra-axial findings. The visualized osseous structures are unremarkable. The visualized portion of the paranasal sinuses, and mastoids are unremarkable. Impression THERE IS A MASS WITH HETEROGENEOUS ENHANCEMENT IN THE LEFT CEREBELLAR HEMISPHERE.  THERE IS ALSO INCREASED PERIPHERAL CIRCUMFERENTIAL ATTENUATION WHICH MAY REPRESENT EFFECTS OF RESIDUAL PRIOR CONTRAST FROM CT AND/OR MRI    NO ACUTE EXTRA-AXIAL FINDINGS. All CT scans at this facility use dose modulation, iterative reconstruction, and/or weight based dosing when appropriate to reduce radiation dose to as low as reasonably achievable. Carotid duplex: No results found for this or any previous visit. No results found for this or any previous visit. Results for orders placed during the hospital encounter of 12/05/18   US CAROTID ARTERY BILATERAL    Narrative EXAMINATION: CAROTID ULTRASOUND    CLINICAL HISTORY: 70-year-old with hypertension and hyperlipidemia. Positive smoking history. FINDINGS: Duplex and color Doppler ultrasound were performed of the bilateral extracranial carotid systems. Velocity criteria and internal carotid artery stenoses are extrapolated from data as defined by the Society of Radiologist in [place] [address] Radiology 2003; 214;379-216. Comparison made with a previous carotid ultrasound from 11/20/2016     RIGHT CAROTID SYSTEM: There is a small amount of heterogeneous plaque in the bulb. There are no elevations of peak systolic velocities or ICA/CCA velocity ratios. Velocities in the ICA range from 74 cm/s proximal aspect, 102 cm/s mid aspect to 83 cm/s   distal aspect. ICA/CCA velocity ratio is 1.4. By ultrasound criteria there is less than 50 percent diameter reduction of the right ICA. Peak systolic velocities in the proximal ECA and mid CCA are 125 and 70cm/s. There is antegrade flow in the right   vertebral artery. LEFT CAROTID SYSTEM: There is a small amount of heterogeneous plaque in the mid common carotid artery and bulb extending into the proximal ICA. There are no elevations of peak systolic velocities or ICA/CCA velocity ratios. Velocities in the ICA range   from 79 cm/s proximal aspect, 96 cm/s mid aspect to 79 cm/s distal aspect. ICA/CCA velocity ratio is 1.1.  By ultrasound

## 2019-08-15 NOTE — PROGRESS NOTES
0.8 x 0.9 cm low-attenuation lesion in the right inferior hepatic lobe (series 2 image 27). These are similar to MRI 12/3/2018 and appear to be benign cysts. Biliary: No bile duct dilation. Gallbladder is collapsed. Pancreas: Diffuse pancreatic calcifications. No discrete pancreatic mass or pancreatic ductal dilation. Spleen: No mass. No splenomegaly. Adrenals: No mass. 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 No hydronephrosis or nephrolithiasis. Subcentimeter lesion(s) that are too small to characterize but likely benign. GI tract: No dilation or wall thickening. Mild diverticulosis without diverticulitis. Lymph nodes: No abdominal or pelvic lymphadenopathy. Mesentery/Peritoneum: No ascites or mass. Retroperitoneum: No mass. Vasculature: The celiac axis and SMA are patent. The portal vein and branches, splenic vein, SMV, and hepatic veins are patent. Severe arterial atherosclerotic disease without aneurysm with mild ectasia of the infrarenal abdominal aorta measuring 2.5 cm. . Pelvis: No mass, ascites or fluid collection. Uterus unremarkable. Decompressed bladder with trabeculated wall. Bones/Soft Tissues: No acute osseous findings. No destructive osseous lesions. Osteopenia. Scoliosis and degenerative changes throughout the spine with superior endplate sclerosis at C2-X1 and L3-L4 . Small amount of heterogeneous gas right anterior abdominal wall, likely from injection. Lower thorax: Soft tissue nodule along the right hemidiaphragm, appearing pleural-based, measures 3.2 x 3.1 x 1.9 cm (series 2 image 7). Right basilar atelectasis. Emphysematous changes in the lungs. No suspicious lung nodules. Small hiatal hernia with an apparent area of outpouching. Partially visualized pacemaker leads. No evidence for metastatic disease within the abdomen/pelvis. Soft tissue nodule right hemidiaphragm, likely metastatic. Recommend dedicated CT of the lungs for further evaluation of underlying malignancy.  Small comprehensive inpatient rehabilitation program including PT/OT to improve balance, ambulation, ADLs, and to improve the P/AROM. Therapeutic modifications regarding activities in therapies, place, amount of time per day and intensity of therapy made daily. In bed therapies or bedside therapies prn.   2. Bowel opiate related constipation and Bladder dysfunction:  frequent toileting, ambulate to bathroom with assistance, check post void residuals. Check for C.difficile x1 if >2 loose stools in 24 hours, continue bowel & bladder program.  Monitor bowel and bladder function. Lactinex 2 PO every AC. MOM prn, Brown Bomb prn, Glycerin suppository prn, enema prn.  3. Severe active chronic mid back low back arthritic pain as well as postop occipital pain and right scalp pain on top of generalized OA pain: reassess pain every shift and prior to and after each therapy session, give prn Tylenol and Percocet titrate dosing consider Duragesic low-dose, modalities prn in therapy, Lidoderm, K-pad prn.   4. Skin healing posterior occipital craniotomy and right cranial drain sites and breakdown risk:  continue pressure relief program.  Daily skin exams and reports from nursing. 5. Severe fatigue due to nutritional and hydration deficiency:  continue to monitor I&Os, calorie counts prn, dietary consult prn. Add vitamin B12 vitamin D and CoQ10 add protein and encourage p.o. intake  6. Acute episodic insomnia with situational adjustment disorder:  prn Ambien, monitor for day time sedation. 7. Falls risk elevated:  patient to use call light to get nursing assistance to get up, bed and chair alarm. 8. Elevated DVT risk especially are given factor V Leiden and history of clots: progressive activities in PT, continue prophylaxis ZECHARIAH hose, elevation and reconsult neurosurgery as to when she can go back on Xarelto given her history of factor V Leiden deficiency.   9. Complex discharge planning: Discharge 8/25/2019 home with her

## 2019-08-16 PROCEDURE — 97535 SELF CARE MNGMENT TRAINING: CPT

## 2019-08-16 PROCEDURE — 97530 THERAPEUTIC ACTIVITIES: CPT

## 2019-08-16 PROCEDURE — 99232 SBSQ HOSP IP/OBS MODERATE 35: CPT | Performed by: INTERNAL MEDICINE

## 2019-08-16 PROCEDURE — 1180000000 HC REHAB R&B

## 2019-08-16 PROCEDURE — 99232 SBSQ HOSP IP/OBS MODERATE 35: CPT | Performed by: PHYSICAL MEDICINE & REHABILITATION

## 2019-08-16 PROCEDURE — 2700000000 HC OXYGEN THERAPY PER DAY

## 2019-08-16 PROCEDURE — 6360000002 HC RX W HCPCS: Performed by: NEUROLOGICAL SURGERY

## 2019-08-16 PROCEDURE — 94640 AIRWAY INHALATION TREATMENT: CPT

## 2019-08-16 PROCEDURE — 6370000000 HC RX 637 (ALT 250 FOR IP): Performed by: PHYSICAL MEDICINE & REHABILITATION

## 2019-08-16 PROCEDURE — 97116 GAIT TRAINING THERAPY: CPT

## 2019-08-16 PROCEDURE — 97110 THERAPEUTIC EXERCISES: CPT

## 2019-08-16 PROCEDURE — 6370000000 HC RX 637 (ALT 250 FOR IP): Performed by: INTERNAL MEDICINE

## 2019-08-16 RX ADMIN — DOCUSATE SODIUM 100 MG: 100 CAPSULE, LIQUID FILLED ORAL at 08:55

## 2019-08-16 RX ADMIN — METOPROLOL TARTRATE 25 MG: 25 TABLET ORAL at 21:34

## 2019-08-16 RX ADMIN — VITAMIN D, TAB 1000IU (100/BT) 1000 UNITS: 25 TAB at 12:02

## 2019-08-16 RX ADMIN — LISINOPRIL 2.5 MG: 2.5 TABLET ORAL at 21:34

## 2019-08-16 RX ADMIN — CEPHALEXIN 500 MG: 250 CAPSULE ORAL at 05:51

## 2019-08-16 RX ADMIN — OXYCODONE HYDROCHLORIDE AND ACETAMINOPHEN 1 TABLET: 7.5; 325 TABLET ORAL at 10:26

## 2019-08-16 RX ADMIN — MENTHOL AND METHYL SALICYLATE: 7.6; 29 OINTMENT TOPICAL at 13:34

## 2019-08-16 RX ADMIN — LISINOPRIL 2.5 MG: 2.5 TABLET ORAL at 08:54

## 2019-08-16 RX ADMIN — CEPHALEXIN 500 MG: 250 CAPSULE ORAL at 13:33

## 2019-08-16 RX ADMIN — ACETAMINOPHEN 650 MG: 325 TABLET ORAL at 04:05

## 2019-08-16 RX ADMIN — LACTOBACILLUS TAB 2 TABLET: TAB at 13:33

## 2019-08-16 RX ADMIN — GABAPENTIN 300 MG: 300 CAPSULE ORAL at 08:54

## 2019-08-16 RX ADMIN — LACTOBACILLUS TAB 2 TABLET: TAB at 08:54

## 2019-08-16 RX ADMIN — GLYCOPYRROLATE AND FORMOTEROL FUMARATE 2 PUFF: 9; 4.8 AEROSOL, METERED RESPIRATORY (INHALATION) at 17:07

## 2019-08-16 RX ADMIN — GABAPENTIN 300 MG: 300 CAPSULE ORAL at 21:38

## 2019-08-16 RX ADMIN — PRAVASTATIN SODIUM 40 MG: 40 TABLET ORAL at 21:34

## 2019-08-16 RX ADMIN — OXYCODONE HYDROCHLORIDE AND ACETAMINOPHEN 1 TABLET: 5; 325 TABLET ORAL at 21:36

## 2019-08-16 RX ADMIN — PANTOPRAZOLE SODIUM 40 MG: 40 TABLET, DELAYED RELEASE ORAL at 05:51

## 2019-08-16 RX ADMIN — ENOXAPARIN SODIUM 40 MG: 40 INJECTION SUBCUTANEOUS at 08:54

## 2019-08-16 RX ADMIN — CEPHALEXIN 500 MG: 250 CAPSULE ORAL at 21:34

## 2019-08-16 RX ADMIN — MENTHOL AND METHYL SALICYLATE: 7.6; 29 OINTMENT TOPICAL at 08:56

## 2019-08-16 RX ADMIN — MENTHOL AND METHYL SALICYLATE: 7.6; 29 OINTMENT TOPICAL at 21:39

## 2019-08-16 RX ADMIN — GLYCOPYRROLATE AND FORMOTEROL FUMARATE 2 PUFF: 9; 4.8 AEROSOL, METERED RESPIRATORY (INHALATION) at 05:10

## 2019-08-16 RX ADMIN — GABAPENTIN 300 MG: 300 CAPSULE ORAL at 13:33

## 2019-08-16 RX ADMIN — LACTOBACILLUS TAB 2 TABLET: TAB at 21:34

## 2019-08-16 RX ADMIN — METOPROLOL TARTRATE 25 MG: 25 TABLET ORAL at 08:55

## 2019-08-16 ASSESSMENT — PAIN SCALES - GENERAL
PAINLEVEL_OUTOF10: 0
PAINLEVEL_OUTOF10: 4
PAINLEVEL_OUTOF10: 0
PAINLEVEL_OUTOF10: 7
PAINLEVEL_OUTOF10: 2
PAINLEVEL_OUTOF10: 0
PAINLEVEL_OUTOF10: 1
PAINLEVEL_OUTOF10: 4
PAINLEVEL_OUTOF10: 5

## 2019-08-16 ASSESSMENT — PAIN DESCRIPTION - FREQUENCY
FREQUENCY: CONTINUOUS
FREQUENCY: CONTINUOUS

## 2019-08-16 ASSESSMENT — PAIN DESCRIPTION - LOCATION
LOCATION: HAND
LOCATION: CHEST
LOCATION: CHEST
LOCATION: NECK
LOCATION: CHEST

## 2019-08-16 ASSESSMENT — PAIN DESCRIPTION - PAIN TYPE
TYPE: ACUTE PAIN

## 2019-08-16 ASSESSMENT — PAIN DESCRIPTION - DESCRIPTORS
DESCRIPTORS: CRAMPING
DESCRIPTORS: ACHING;SORE
DESCRIPTORS: ACHING
DESCRIPTORS: ACHING;SORE
DESCRIPTORS: ACHING

## 2019-08-16 ASSESSMENT — ENCOUNTER SYMPTOMS
CHEST TIGHTNESS: 0
DIARRHEA: 0
COUGH: 0
NAUSEA: 0
RESPIRATORY NEGATIVE: 1
BLOOD IN STOOL: 0
WHEEZING: 0
EYES NEGATIVE: 1
SHORTNESS OF BREATH: 0
SHORTNESS OF BREATH: 1
STRIDOR: 0
COUGH: 1
VOMITING: 0

## 2019-08-16 ASSESSMENT — PAIN DESCRIPTION - ONSET
ONSET: ON-GOING
ONSET: ON-GOING

## 2019-08-16 ASSESSMENT — PAIN DESCRIPTION - ORIENTATION
ORIENTATION: MID
ORIENTATION: RIGHT

## 2019-08-16 ASSESSMENT — PAIN - FUNCTIONAL ASSESSMENT
PAIN_FUNCTIONAL_ASSESSMENT: ACTIVITIES ARE NOT PREVENTED
PAIN_FUNCTIONAL_ASSESSMENT: ACTIVITIES ARE NOT PREVENTED

## 2019-08-16 ASSESSMENT — PAIN DESCRIPTION - PROGRESSION
CLINICAL_PROGRESSION: NOT CHANGED

## 2019-08-16 NOTE — PROGRESS NOTES
with chronic hypoxia/chronic respiratory failure  C/w o2 therapy and duonebs prn  4) Cerebellar mass metastatic carcinoma primary is not clear yet  Mathew Severin, MD  8/16/2019

## 2019-08-16 NOTE — PROGRESS NOTES
syncope and light-headedness. Hematological: Negative. Psychiatric/Behavioral: Negative. Physical Examination:    BP (!) 115/55   Pulse 61   Temp 98 °F (36.7 °C)   Resp 18   Ht 4' 10.75\" (1.492 m)   Wt 152 lb 12.5 oz (69.3 kg)   LMP  (LMP Unknown)   SpO2 92%   BMI 31.12 kg/m²    Physical Exam   Constitutional: She appears healthy. No distress. HENT:   Normal cephalic and Atraumatic   Eyes: Pupils are equal, round, and reactive to light. Neck: Normal range of motion and thyroid normal. Neck supple. No JVD present. No neck adenopathy. No thyromegaly present. Cardiovascular: Normal rate, regular rhythm, intact distal pulses and normal pulses. Murmur heard. Pulmonary/Chest: Effort normal and breath sounds normal. She has no wheezes. She has no rales. She exhibits no tenderness. Abdominal: Soft. Bowel sounds are normal. There is no tenderness. Musculoskeletal: Normal range of motion. She exhibits no edema or tenderness. Neurological: She is alert and oriented to person, place, and time. Skin: Skin is warm. No cyanosis. Nails show no clubbing.        LABS:  CBC:   Lab Results   Component Value Date    WBC 14.0 08/12/2019    RBC 4.13 08/12/2019    HGB 13.0 08/12/2019    HCT 38.0 08/12/2019    MCV 92.1 08/12/2019    MCH 31.5 08/12/2019    MCHC 34.2 08/12/2019    RDW 16.8 08/12/2019     08/12/2019    MPV 9.1 05/07/2015     CBC with Differential:    Lab Results   Component Value Date    WBC 14.0 08/12/2019    RBC 4.13 08/12/2019    HGB 13.0 08/12/2019    HCT 38.0 08/12/2019     08/12/2019    MCV 92.1 08/12/2019    MCH 31.5 08/12/2019    MCHC 34.2 08/12/2019    RDW 16.8 08/12/2019    BANDSPCT 1 08/06/2019    METASPCT 1 05/14/2014    LYMPHOPCT 1.0 08/07/2019    MONOPCT 4.1 08/07/2019    BASOPCT 0.3 08/07/2019    MONOSABS 0.8 08/07/2019    LYMPHSABS 0.2 08/07/2019    EOSABS 0.0 08/07/2019    BASOSABS 0.1 08/07/2019     CMP:    Lab Results   Component Value Date     Priority: Low    SSS (sick sinus syndrome) ([facility]) [I49.5] 02/11/2019     Priority: Low    Presence of permanent cardiac pacemaker [Z95.0] 02/11/2019     Priority: Low    Age-related osteoporosis without current pathological fracture [M81.0] 02/11/2019     Priority: Low    Idiopathic chronic pancreatitis ([facility] [K86.1] 01/08/2019     Priority: Low    Hiatal hernia [K44.9] 01/08/2019     Priority: Low    PAD (peripheral artery disease) ([facility] [I73.9] 11/15/2018     Priority: Low    VTE (venous thromboembolism) [I82.90] 11/08/2018     Priority: Low    CKD (chronic kidney disease), stage III ([facility] [N18.3] 11/08/2018     Priority: Low    MGUS (monoclonal gammopathy of unknown significance) [D47.2] 11/08/2018     Priority: Low    Lung nodule [R91.1] 05/14/2018     Priority: Low    Hypotension [I95.9]      Priority: Low    Factor V Leiden mutation ([facility] [D68.51] 05/15/2015     Priority: Low    Tobacco dependence [F17.200] 08/25/2014     Priority: Low    DVT (deep venous thrombosis) ([facility] [I82.409] 08/01/2014     Priority: Low    Lumbosacral spondylosis without myelopathy [M47.817] 07/20/2011     Priority: Low    Facet arthropathy, lumbar [M47.816] 07/20/2011     Priority: Low        Assessment/Plan:  1. CP- does not appear to be cardiac. It is reproducible. Troponin x3 negative  2. Metastatic Brain Ca s/p resection  3. Right Diaphragmatic mass- w/u in progress. 4. CAD stable no Angina. Resume ASA when surgically appropriate. 5. PPM  6. HTN -stabel. Continue meds.         Electronically signed by Ju Smyth MD on 8/16/2019 at 10:21 AM

## 2019-08-16 NOTE — PROGRESS NOTES
presthesis/brace only  Dressing-Lower: 5 - Requires setup/supervision/cues and/or staff applies TEDS/prosthesis/brace only  Toiletin - Requires setup/supervision/cues  Toilet Transfer: 5 - Requires setup/supervision/cues  Tub Transfer: 0 - Activity does not occur  Shower Transfer: 0 - Activity does not occur(Pt washed up at sink ),  , Assessment: Pt demonstrated SOB during functional tasks at supervision level. Pt continues to benefit from skilled OT services to maximize pt independence and safety with ADLs and transfers. Speech therapy: FIMS: Comprehension: 6 - Complex ideas 90% or device (hearing aid or glasses- if patient is primarily a visual learner)  Expression: 5 - Expresses basic ideas/needs only (hungry/hot/pain)  Social Interaction: 7 - Patient has appropriate behavior/relations 100% of the time  Problem Solvin - Patient able to solve simple/routine tasks  Memory: 5 - Patient requires prompting with stress/unfamiliar situations      Lab/X-ray studies reviewed, analyzed and discussed with patient and staff:   No results found for this or any previous visit (from the past 24 hour(s)). Cta Head   2019    Carotid:   Right  Carotid Stenosis (% by NASCET Criteria): There is no (than 20%) hemodynamically significant stenosis, vascular dissection or aneurysm of the right common or internal carotid arteries. Left  Carotid Stenosis (% by NASCET Criteria): There is some calcified plaque in the left carotid bulb. There is no (less than 20%) hemodynamically significant stenosis, vascular dissection or aneurysm in the left common or internal carotid arteries. Cervical Vertebral Arteries:    Patency: The vertebral arteries are well visualized to the level of the basilar artery. There is no focal stenosis aneurysm or dissection. Vertebral arteries are codominant. Ct Head Wo   2019  Postsurgical changes without superimposed complication or superimposed acute finding.      Ct Head Wo C.difficile x1 if >2 loose stools in 24 hours, continue bowel & bladder program.  Monitor bowel and bladder function. Lactinex 2 PO every AC. MOM prn, Brown Bomb prn, Glycerin suppository prn, enema prn.  3. Severe active chronic mid back low back arthritic pain as well as postop occipital pain and right scalp pain on top of generalized OA pain: reassess pain every shift and prior to and after each therapy session, give prn Tylenol and Percocet titrate dosing consider Duragesic low-dose, modalities prn in therapy, Lidoderm, K-pad prn.   4. Skin healing posterior occipital craniotomy and right cranial drain sites and breakdown risk:  continue pressure relief program.  Daily skin exams and reports from nursing. 5. Severe fatigue due to nutritional and hydration deficiency:  continue to monitor I&Os, calorie counts prn, dietary consult prn. Add vitamin B12 vitamin D and CoQ10 add protein and encourage p.o. intake  6. Acute episodic insomnia with situational adjustment disorder:  prn Ambien, monitor for day time sedation. 7. Falls risk elevated:  patient to use call light to get nursing assistance to get up, bed and chair alarm. 8. Elevated DVT risk especially are given factor V Leiden and history of clots: progressive activities in PT, continue prophylaxis ZECHARIAH hose, elevation and  neurosurgery as okay daily Lovenox given her history of factor V Leiden deficiency. 9. Complex discharge planning: Discharge 8/25/2019 home with her daughters with help from her daughters and home health care PT OT RN aide  I do not feel that she will need a device at discharge. Weekly team meeting  Monday to assess progress towards goals, discuss and address social, psychological and medical comorbidities and to address difficulties they may be having progressing in therapy. Patient and family education is in progress. The patient is to follow-up with their family physician after discharge.         Complex Active General Medical Issues that complicate care Assess & Plan:    1. Essential hypertension, Dyslipidemia,   Presence of permanent cardiac pacemaker, SSS (sick sinus syndrome),   PAD (peripheral artery disease) -vital signs every shift, dose and titrate cardiac medications to include  2. Lung nodule, COPD (chronic obstructive pulmonary disease) -consult hematology oncology CTA showed no metastatic lesions-CEA has been normal. Stained positive for CA 19. 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 S/p suboccipital craniotomy. Mass medial of right diaphragm. On rehab. She will need radiation therapy to brain after rehab. Send pathology to determine cancer primary. 3.   Age-related osteoporosis without current pathological fracture-CTA of chest showed no bony lesions  4. CKD (chronic kidney disease), stage III -limit toxic medications  5. History of DVT (deep venous thrombosis), history of VTE (venous thromboembolism),   Factor V Leiden mutation  -complex blood thinner regimen because of her the vascular nature of her brain tumor and recent brain surgery, blood thinners currently on hold statin complicated work-up to rule out PE rule out DVT is negative CTA is also negative  6. Facet arthropathy, lumbar,   Lumbosacral spondylosis without myelopathy-add Lidoderm, add Percocet, use lowest effective dose-increase pain medication and is patient's recent work-up for pleuritic chest pain has shown that this is likely arthritic in nature  7. Hiatal hernia and history of idiopathic chronic pancreatitis -treat constipation consider proton pump inhibitor. 8.   MGUS (monoclonal gammopathy of unknown significance)-reconsult hematology oncology and recheck CBC  9. Tobacco dependence-stop smoking counseling-stop smoking counseling  10.    Ataxy cerebellar -focus on balance and PT OT        Oracio Ramsay D.O., PM&R     Attending    [facility]

## 2019-08-16 NOTE — PROGRESS NOTES
gallop  ABD: Non-tender. Non-distended. No masses. No organmegaly. Normal bowel sounds. No hernia. Ext : No Pitting both leg , No Cyanosis No clubbing  Neuro: no focal weakness      DATA:   No results for input(s): WBC, HGB, HCT, MCV, PLT in the last 72 hours. No results for input(s): NA, K, CL, CO2, BUN, CREATININE, GLUCOSE, CALCIUM, PROT, LABALBU, BILITOT, ALKPHOS, AST, ALT, LABGLOM, GFRAA, AGRATIO, GLOB in the last 72 hours. MV Settings:          No results for input(s): PHART, UHE7PZQ, PO2ART, SWN3QWY, BEART, T1UJIRLI in the last 72 hours. O2 Device: None (Room air)  O2 Flow Rate (L/min): 2 L/min    DIET GENERAL; Carb Control: 4 carb choices (60 gms)/meal     MEDICATIONS during current hospitalization:    Continuous Infusions:      Scheduled Meds:   enoxaparin  40 mg Subcutaneous Daily    lisinopril  2.5 mg Oral BID    docusate sodium  100 mg Oral Daily    Povidone-Iodine   Topical TID    cyanocobalamin  1,000 mcg Intramuscular Weekly    vitamin D  50,000 Units Oral Weekly    vitamin D  1,000 Units Oral Lunch    lactobacillus acidophilus  2 tablet Oral TID    analgesic ointment   Topical TID    cephALEXin  500 mg Oral 3 times per day    gabapentin  300 mg Oral TID    glycopyrrolate-formoterol  2 puff Inhalation BID    lidocaine  3 patch Transdermal Daily    metoprolol tartrate  25 mg Oral BID    nicotine  1 patch Transdermal Daily    pantoprazole  40 mg Oral QAM AC    pravastatin  40 mg Oral Daily       PRN Meds:oxyCODONE-acetaminophen, nitroGLYCERIN, nystatin, stomahesive in petrolatum, acetaminophen, magnesium hydroxide, ondansetron, sodium chloride flush, albuterol sulfate HFA, oxyCODONE-acetaminophen, tiZANidine    Radiology  Cta Head W Wo Contrast    Result Date: 7/30/2019  CTA HEAD AND CTA NECK 1.  There is no large vessel occlusion or significant stenosis of the bilateral middle cerebral arteries, terminal internal carotid arteries, basilar artery, or cervical internal carotid Posterior Circulation: Bilateral posterior cerebral arteries are patent. Basilar artery:Flow is visualized in the basilar artery without significant stenosis. Aneurysm No aneurysm or dissection in the anterior or posterior circulations. .     Neurocranium there is a 2.7 cm ring-enhancing posterior fossa lesion abutting the vermis and extending to the left. There is a central lower density that is not clearly cystic on these images. There is some surrounding low density consistent with edema. The fourth ventricle is normal in appearance. There is no hydrocephalus. On these images there are no additional parenchymal brain lesions. Primary differential would include solitary metastatic lesion most commonly breast or lung. The possibility  of hemangioblastoma is also mentioned. Dural sinus: As visualized the opacified dural venous sinuses are unremarkable. [address] [place] [address]. CTA NECK DATE AND TIME:7/30/2019 2:15 PM CLINICAL HISTORY: Stroke symptoms   gait instability  TECHNIQUE: TECHNIQUE:Helical CTA of the head and neck was performed from the vertex to the aortic arch following the uneventful intravenous administration of 100 cc of nonionic contrast without incident. 2-D images were reconstructed in the sagittal and  coronal planes. Three Dimensional Maximum Intensity Projection (3D-MIP) images were created. All images were reviewed and primarily archived to PACS workstation. All CT scans at this facility use dose modulation, iterative reconstruction, and/or weight  based dosing when appropriate to reduce radiation dose to as low as reasonably achievable. NASCET Criteria were utilized Result / Findings: CTA NECK: Aortic Arch: The visualized portions of the aortic arch and proximal arch vessels demonstrates no focal stenosis aneurysm or dissection. Carotid:   Right  Carotid Stenosis (% by NASCET Criteria):   There is no (than 20%) hemodynamically significant stenosis, vascular dissection identified. No acute intra-axial or extra-axial findings. The visualized osseous structures are unremarkable. The visualized portion of the paranasal sinuses, and mastoids are unremarkable. THERE IS A MASS WITH HETEROGENEOUS ENHANCEMENT IN THE LEFT CEREBELLAR HEMISPHERE. THERE IS ALSO INCREASED PERIPHERAL CIRCUMFERENTIAL ATTENUATION WHICH MAY REPRESENT EFFECTS OF RESIDUAL PRIOR CONTRAST FROM CT AND/OR MRI NO ACUTE EXTRA-AXIAL FINDINGS. All CT scans at this facility use dose modulation, iterative reconstruction, and/or weight based dosing when appropriate to reduce radiation dose to as low as reasonably achievable. Cta Neck W Wo Contrast    CTA HEAD AND CTA NECK 1. There is no large vessel occlusion or significant stenosis of the bilateral middle cerebral arteries, terminal internal carotid arteries, basilar artery, or cervical internal carotid arteries. 2.  2.7 cm ring-enhancing lesion in the posterior fossa abutting the vermis and extending to the left. There is some central lower density that is not clearly cystic on these images. There is some surrounding low density consistent with edema and probable mass effect. The fourth ventricle is normal in appearance. There is no hydrocephalus. On these images there are no additional parenchymal brain lesions. Primary differential would include solitary metastatic lesion most commonly breast or lung. The possibility of an hemangioblastoma is also mentioned. These findings were discussed with Dr. Victoria Henderson in the emergency room. EXAMINATION: CTA HEAD W WO CONTRAST, CTA NECK W WO CONTRAST CTA HEAD DATE AND TIME:7/30/2019 2:15 PM CLINICAL HISTORY: Stroke  gait instability  COMPARISON: None TECHNIQUE:Helical CTA of the head and neck was performed from the vertex to the aortic arch following the uneventful intravenous administration of 100 cc of nonionic contrast without incident. 2-D images were reconstructed in the sagittal and coronal planes.  Three Dimensional Maximum a pleural-based soft tissue density at the base medial aspect of the right lower lobe. It measures 2.7 x 2.9 x 1.0 cm in the transverse and AP and cephalocaudad dimension. Best seen on the coronal reconstructions. No other focal parenchymal abnormalities. No pleural effusions. No pneumothoraces. No significant periarticular. There is pretracheal adenopathy. No significant perihilar or subcarinal adenopathy. PLEURAL-BASED SOFT TISSUE MASS OVERLYING THE MEDIAL ASPECT OF THE RIGHT LOWER LOBE, RIGHT HEMIDIAPHRAGM. THIS IS NEW SINCE THE PRIOR STUDY OF MAY 6, 2019. THE LACK OF IV CONTRAST LIMITS FULL EVALUATION. THIS IS SUPERIMPOSED UPON MODERATE EMPHYSEMATOUS DISEASE ONE PARENCHYMA AS DESCRIBED ABOVE. All CT scans at this facility use dose modulation, iterative reconstruction, and/or weight based dosing when appropriate to reduce radiation dose to as low as reasonably achievable. Ct Abdomen Pelvis W Iv Contrast Additional Contrast? Radiologist Recommendation    Result Date: 7/31/2019  CT ABDOMEN PELVIS W IV CONTRAST HISTORY:   met smith . Brain lesion, possibly metastatic. Concern for malignancy. TECHNIQUE: CT of the abdomen and pelvis was performed using standard technique, scanning from just above the dome of the diaphragm to the symphysis pubis. Including delayed images through the kidneys. Contrast: IV: 100 ml Isovue 300 Oral:  None. All CT scans at this facility use dose modulation, iterative reconstruction, and/or weight based dosing when appropriate to reduce radiation dose to as low as reasonably achievable. COMPARISON: CT 4/23/2018; MRI 12/3/2018. RESULT: Liver: Subcentimeter low-attenuation lesions throughout the liver, including 0.8 x 0.9 cm low-attenuation lesion in the right inferior hepatic lobe (series 2 image 27). These are similar to MRI 12/3/2018 and appear to be benign cysts. Biliary: No bile duct dilation. Gallbladder is collapsed. Pancreas: Diffuse pancreatic calcifications.  No discrete Left-sided ICD device leads overlying the cardiac silhouette. The cardiac silhouette is of normal size configuration. The mediastinum is unremarkable. Pulmonary vascular attenuated. Lungs are inflated. Right sided trachea. No focal infiltrates. No Pneumothoraces. NO ACUTE ACTIVE CARDIOPULMONARY PROCESS. RADIOGRAPHIC FINDINGS OF COPD    Mri Brain W Wo Contrast    Result Date: 7/31/2019  EXAMINATION:  MRI BRAIN WITHOUT AND WITH IV CONTRAST CLINICAL HISTORY:  HEADACHE, DIZZINESS, NAUSEA, DISEQUILIBRIUM WITH UNSTEADY GAIT AND WEAKNESS IN BOTH LOWER EXTREMITIES, RECENT ABNORMAL CT BRAIN COMPARISON:  CTA HEAD FROM 7/30/2019 TECHNIQUE:  Multisequence/multiplane MRI brain obtained without and with IV injection of 15 mL of MultiHance. FINDINGS:  The MRI brain without and with IV contrast demonstrates the following: Posterior fossa: There is an approximately 2.5 x 2 x 2 cm inhomogeneously enhancing midline cerebellar mass found posterior to the fourth ventricle with associated cerebral edema. There appears to be a cystic component to this cerebellar tumor. Also, there is some paramagnetic signal within the tumor suggesting a hemorrhagic component to the tumor mass. The MRI findings are compatible with an intra-axial posterior fossa tumor and the differential considerations are primarily between a metastasis or less likely a hemangioblastoma. The tumor is not causing significant extrinsic \"mass effect \" on the fourth ventricle or aqueductal stenosis and there is no hydrocephalus. There is no other enhancing lesion or tumor in the posterior fossa or in the supratentorial brain. Supratentorial brain: There are punctate and patchy T2 hyperintense foci in the white matter of both cerebral hemispheres which are likely a manifestation of small vessel disease.  There is no restricted diffusion or MRI evidence of an acute or subacute ischemic focus in has a brain tumor biopsy consistent with metastatic poorly differential carcinoma She had a CT chest done showing no evidence of pulmonary embolism. Bibasilar dependent atelectasis Emphysema. Interval increased size of right diaphragmatic mass cannot exclude metastatic malignancy. .  Patient to follow-up with Dr. Guillermo Song as outpatient.   Continue present treatment plan    Electronically signed by Juan A Leahy MD,  FCCP   on 8/16/2019 at 4:22 PM

## 2019-08-16 NOTE — FLOWSHEET NOTE
Patient in therapy working with PT. At rest patient on 2L 87% placed on 3L up to 96% but when ambulating patient dropped down to 77% asymptomatic. Will continue to monitor and notify respiratory.   Electronically signed by Patricia Farmer RN on 8/16/2019 at 2:49 PM

## 2019-08-16 NOTE — FLOWSHEET NOTE
Dr. Humberto Ann  Here to see patient regarding desaturations in therapy. Notified therapist ok to use oxygen at 4 liters if needed. Will continue to monitor.   Electronically signed by Scooter Eng RN on 8/16/2019 at 3:29 PM

## 2019-08-17 PROCEDURE — 94640 AIRWAY INHALATION TREATMENT: CPT

## 2019-08-17 PROCEDURE — 1180000000 HC REHAB R&B

## 2019-08-17 PROCEDURE — 2700000000 HC OXYGEN THERAPY PER DAY

## 2019-08-17 PROCEDURE — 6370000000 HC RX 637 (ALT 250 FOR IP): Performed by: PHYSICAL MEDICINE & REHABILITATION

## 2019-08-17 PROCEDURE — 6360000002 HC RX W HCPCS: Performed by: NEUROLOGICAL SURGERY

## 2019-08-17 PROCEDURE — 6370000000 HC RX 637 (ALT 250 FOR IP): Performed by: INTERNAL MEDICINE

## 2019-08-17 RX ADMIN — GLYCOPYRROLATE AND FORMOTEROL FUMARATE 2 PUFF: 9; 4.8 AEROSOL, METERED RESPIRATORY (INHALATION) at 05:22

## 2019-08-17 RX ADMIN — PANTOPRAZOLE SODIUM 40 MG: 40 TABLET, DELAYED RELEASE ORAL at 05:11

## 2019-08-17 RX ADMIN — GLYCOPYRROLATE AND FORMOTEROL FUMARATE 2 PUFF: 9; 4.8 AEROSOL, METERED RESPIRATORY (INHALATION) at 15:55

## 2019-08-17 RX ADMIN — ENOXAPARIN SODIUM 40 MG: 40 INJECTION SUBCUTANEOUS at 08:48

## 2019-08-17 RX ADMIN — CEPHALEXIN 500 MG: 250 CAPSULE ORAL at 14:27

## 2019-08-17 RX ADMIN — LACTOBACILLUS TAB 2 TABLET: TAB at 08:45

## 2019-08-17 RX ADMIN — METOPROLOL TARTRATE 25 MG: 25 TABLET ORAL at 21:00

## 2019-08-17 RX ADMIN — CEPHALEXIN 500 MG: 250 CAPSULE ORAL at 05:11

## 2019-08-17 RX ADMIN — GABAPENTIN 300 MG: 300 CAPSULE ORAL at 21:00

## 2019-08-17 RX ADMIN — ACETAMINOPHEN 650 MG: 325 TABLET ORAL at 10:26

## 2019-08-17 RX ADMIN — OXYCODONE HYDROCHLORIDE AND ACETAMINOPHEN 1 TABLET: 7.5; 325 TABLET ORAL at 12:26

## 2019-08-17 RX ADMIN — VITAMIN D, TAB 1000IU (100/BT) 1000 UNITS: 25 TAB at 12:26

## 2019-08-17 RX ADMIN — OXYCODONE HYDROCHLORIDE AND ACETAMINOPHEN 1 TABLET: 7.5; 325 TABLET ORAL at 18:21

## 2019-08-17 RX ADMIN — LACTOBACILLUS TAB 2 TABLET: TAB at 14:28

## 2019-08-17 RX ADMIN — GABAPENTIN 300 MG: 300 CAPSULE ORAL at 08:45

## 2019-08-17 RX ADMIN — GABAPENTIN 300 MG: 300 CAPSULE ORAL at 14:28

## 2019-08-17 RX ADMIN — ACETAMINOPHEN 650 MG: 325 TABLET ORAL at 20:59

## 2019-08-17 RX ADMIN — LACTOBACILLUS TAB 2 TABLET: TAB at 21:05

## 2019-08-17 RX ADMIN — PRAVASTATIN SODIUM 40 MG: 40 TABLET ORAL at 21:00

## 2019-08-17 RX ADMIN — MENTHOL AND METHYL SALICYLATE: 7.6; 29 OINTMENT TOPICAL at 21:03

## 2019-08-17 RX ADMIN — LISINOPRIL 2.5 MG: 2.5 TABLET ORAL at 21:00

## 2019-08-17 RX ADMIN — CEPHALEXIN 500 MG: 250 CAPSULE ORAL at 21:00

## 2019-08-17 RX ADMIN — OXYCODONE HYDROCHLORIDE AND ACETAMINOPHEN 1 TABLET: 7.5; 325 TABLET ORAL at 05:11

## 2019-08-17 RX ADMIN — DOCUSATE SODIUM 100 MG: 100 CAPSULE, LIQUID FILLED ORAL at 08:45

## 2019-08-17 ASSESSMENT — ENCOUNTER SYMPTOMS
COUGH: 0
CHEST TIGHTNESS: 0
VOMITING: 0
WHEEZING: 0
SHORTNESS OF BREATH: 0
TROUBLE SWALLOWING: 0
COLOR CHANGE: 0
NAUSEA: 0

## 2019-08-17 ASSESSMENT — PAIN SCALES - GENERAL
PAINLEVEL_OUTOF10: 7
PAINLEVEL_OUTOF10: 5
PAINLEVEL_OUTOF10: 0
PAINLEVEL_OUTOF10: 5
PAINLEVEL_OUTOF10: 5
PAINLEVEL_OUTOF10: 6
PAINLEVEL_OUTOF10: 5

## 2019-08-17 NOTE — PLAN OF CARE
Problem: Falls - Risk of:  Goal: Will remain free from falls  Description  Will remain free from falls  Outcome: Ongoing  Goal: Absence of physical injury  Description  Absence of physical injury  Outcome: Ongoing     Problem: Pain:  Goal: Pain level will decrease  Description  Pain level will decrease  Outcome: Ongoing  Goal: Control of acute pain  Description  Control of acute pain  Outcome: Ongoing  Goal: Control of chronic pain  Description  Control of chronic pain  Outcome: Ongoing     Problem: IP COMMUNICATION/DYSARTHRIA  Goal: LTG - patient will improve expressive language skills to allow for communication of wants and needs in daily activities  Outcome: Ongoing     Problem: IP SWALLOWING  Goal: LTG - patient will tolerate the least restrictive diet consistency to allow for safe consumption of daily meals  Outcome: Ongoing

## 2019-08-18 PROCEDURE — 97116 GAIT TRAINING THERAPY: CPT

## 2019-08-18 PROCEDURE — 6370000000 HC RX 637 (ALT 250 FOR IP): Performed by: INTERNAL MEDICINE

## 2019-08-18 PROCEDURE — 94640 AIRWAY INHALATION TREATMENT: CPT

## 2019-08-18 PROCEDURE — 6360000002 HC RX W HCPCS: Performed by: NEUROLOGICAL SURGERY

## 2019-08-18 PROCEDURE — 97112 NEUROMUSCULAR REEDUCATION: CPT

## 2019-08-18 PROCEDURE — 94760 N-INVAS EAR/PLS OXIMETRY 1: CPT

## 2019-08-18 PROCEDURE — 2500000003 HC RX 250 WO HCPCS: Performed by: PHYSICAL MEDICINE & REHABILITATION

## 2019-08-18 PROCEDURE — 6370000000 HC RX 637 (ALT 250 FOR IP): Performed by: PHYSICAL MEDICINE & REHABILITATION

## 2019-08-18 PROCEDURE — 2700000000 HC OXYGEN THERAPY PER DAY

## 2019-08-18 PROCEDURE — 1180000000 HC REHAB R&B

## 2019-08-18 RX ADMIN — OXYCODONE HYDROCHLORIDE AND ACETAMINOPHEN 1 TABLET: 7.5; 325 TABLET ORAL at 11:52

## 2019-08-18 RX ADMIN — MENTHOL AND METHYL SALICYLATE: 7.6; 29 OINTMENT TOPICAL at 21:26

## 2019-08-18 RX ADMIN — LISINOPRIL 2.5 MG: 2.5 TABLET ORAL at 21:22

## 2019-08-18 RX ADMIN — LACTOBACILLUS TAB 2 TABLET: TAB at 13:53

## 2019-08-18 RX ADMIN — MENTHOL AND METHYL SALICYLATE: 7.6; 29 OINTMENT TOPICAL at 08:41

## 2019-08-18 RX ADMIN — LACTOBACILLUS TAB 2 TABLET: TAB at 21:22

## 2019-08-18 RX ADMIN — GLYCOPYRROLATE AND FORMOTEROL FUMARATE 2 PUFF: 9; 4.8 AEROSOL, METERED RESPIRATORY (INHALATION) at 16:30

## 2019-08-18 RX ADMIN — GABAPENTIN 300 MG: 300 CAPSULE ORAL at 21:22

## 2019-08-18 RX ADMIN — VITAMIN D, TAB 1000IU (100/BT) 1000 UNITS: 25 TAB at 11:53

## 2019-08-18 RX ADMIN — OXYCODONE HYDROCHLORIDE AND ACETAMINOPHEN 1 TABLET: 7.5; 325 TABLET ORAL at 21:22

## 2019-08-18 RX ADMIN — LACTOBACILLUS TAB 2 TABLET: TAB at 08:40

## 2019-08-18 RX ADMIN — PROVIDONE IODINE: 7.5 STICK TOPICAL at 08:43

## 2019-08-18 RX ADMIN — GLYCOPYRROLATE AND FORMOTEROL FUMARATE 2 PUFF: 9; 4.8 AEROSOL, METERED RESPIRATORY (INHALATION) at 05:43

## 2019-08-18 RX ADMIN — METOPROLOL TARTRATE 25 MG: 25 TABLET ORAL at 21:22

## 2019-08-18 RX ADMIN — GABAPENTIN 300 MG: 300 CAPSULE ORAL at 13:52

## 2019-08-18 RX ADMIN — PANTOPRAZOLE SODIUM 40 MG: 40 TABLET, DELAYED RELEASE ORAL at 05:52

## 2019-08-18 RX ADMIN — GABAPENTIN 300 MG: 300 CAPSULE ORAL at 08:40

## 2019-08-18 RX ADMIN — OXYCODONE HYDROCHLORIDE AND ACETAMINOPHEN 1 TABLET: 5; 325 TABLET ORAL at 05:52

## 2019-08-18 RX ADMIN — LISINOPRIL 2.5 MG: 2.5 TABLET ORAL at 08:40

## 2019-08-18 RX ADMIN — DOCUSATE SODIUM 100 MG: 100 CAPSULE, LIQUID FILLED ORAL at 08:37

## 2019-08-18 RX ADMIN — PRAVASTATIN SODIUM 40 MG: 40 TABLET ORAL at 21:22

## 2019-08-18 RX ADMIN — ENOXAPARIN SODIUM 40 MG: 40 INJECTION SUBCUTANEOUS at 08:37

## 2019-08-18 RX ADMIN — Medication: at 08:49

## 2019-08-18 RX ADMIN — METOPROLOL TARTRATE 25 MG: 25 TABLET ORAL at 08:40

## 2019-08-18 ASSESSMENT — PAIN DESCRIPTION - FREQUENCY: FREQUENCY: CONTINUOUS

## 2019-08-18 ASSESSMENT — PAIN SCALES - GENERAL
PAINLEVEL_OUTOF10: 5
PAINLEVEL_OUTOF10: 7
PAINLEVEL_OUTOF10: 7
PAINLEVEL_OUTOF10: 4
PAINLEVEL_OUTOF10: 0
PAINLEVEL_OUTOF10: 3

## 2019-08-18 ASSESSMENT — PAIN DESCRIPTION - PAIN TYPE: TYPE: SURGICAL PAIN

## 2019-08-18 ASSESSMENT — PAIN DESCRIPTION - ORIENTATION: ORIENTATION: MID

## 2019-08-18 ASSESSMENT — PAIN - FUNCTIONAL ASSESSMENT: PAIN_FUNCTIONAL_ASSESSMENT: ACTIVITIES ARE NOT PREVENTED

## 2019-08-18 ASSESSMENT — PAIN DESCRIPTION - DESCRIPTORS
DESCRIPTORS: PRESSURE
DESCRIPTORS: ACHING;PRESSURE

## 2019-08-18 ASSESSMENT — PAIN DESCRIPTION - LOCATION
LOCATION: NECK;HEAD
LOCATION: HEAD;NECK

## 2019-08-18 ASSESSMENT — PAIN DESCRIPTION - ONSET: ONSET: ON-GOING

## 2019-08-18 ASSESSMENT — PAIN DESCRIPTION - PROGRESSION: CLINICAL_PROGRESSION: NOT CHANGED

## 2019-08-18 NOTE — PROGRESS NOTES
Subjective: The patient complains of severe  acute balance deficits especially truncal secondary to cerebellar lesion in her brain with recent evacuation of the tumor versus via craniotomy at [facility] partially relieved by medications, PT, OT, speech and language pathology and exacerbated by exertion. ROS x10: The patient also complains of severely impaired mobility and activities of daily living. Otherwise no new problems with vision, hearing, nose, mouth, throat, dermal, cardiovascular, GI, , pulmonary, musculoskeletal, psychiatric or neurological. See Rehab H&P on Rehab chart dated . Vital signs:  /76   Pulse 77   Temp 97 °F (36.1 °C) (Oral)   Resp 18   Ht 4' 10.75\" (1.492 m)   Wt 136 lb 14.5 oz (62.1 kg)   LMP  (LMP Unknown)   SpO2 95%   BMI 27.89 kg/m²   I/O:   PO/Intake:  fair PO intake, no problems observed or reported. Bowel/Bladder:  continent, no problems noted. General:  Patient is well developed, adequately nourished, non-obese and     well kempt. HEENT:    PERRLA, hearing intact to loud voice, external inspection of ear     and nose benign. Inspection of lips, tongue and gums benign  Musculoskeletal: No significant change in strength or tone. All joints stable. Inspection and palpation of digits and nails show no clubbing,       cyanosis or inflammatory conditions. Neuro/Psychiatric: Affect: flat but pleasant. Alert and oriented to person, place and     situation. No significant change in deep tendon reflexes or     sensation  Lungs:  Diminished, CTA-B. Respiration effort is normal at rest.     Heart:   S1 = S2, RRR. No loud murmurs. Abdomen:  Soft, non-tender, no enlargement of liver or spleen. Extremities:  No significant lower extremity edema or tenderness.   Skin:   Intact to general survey, healing posterior and right frontoparietal craniotomy incisions    Rehabilitation:  Physical therapy: FIMS:  Bed Mobility: Scooting: Minimal assistance    Transfers: Sit to Stand: Supervision, Modified independent  Stand to sit: Supervision, Modified independent  Bed to Chair: Supervision, Modified independent, Ambulation 1  Surface: level tile  Device: Rolling Walker  Other Apparatus: O2(3L)  Assistance: Supervision  Quality of Gait: Flexed trunk with gait with fair alex, reciprocal gait with increased dyspnea  Gait Deviations: Slow Alex, Increased MARY ANN, Decreased step length  Distance: 50' x 2  Comments: SpO2 decreased to 90% with gait on 3 L , Stairs  # Steps : 4  Stairs Height: 6\"  Rails: Right ascending  Assistance: Stand by assistance  Comment: nonreciprocal     FIMS: Bed, Chair, Wheel Chair: 4 - Requires steadying assistance only <25% assist  and/or requires assist with one leg only  Walk: 5 - Supervision Requires standby supervision or cuing to walk at least 150 feet  Distance Walked: 150'  Distance Traveled in Wheel Chair: 0  Stairs: 2- Maximal Assistance Performs 25-49% of the effort to go up and down 4 to 6 stairs and requires the assistance of one person only,  , Assessment: pT WITH NO C/OMPLAINTS OR DYSPNEA WITH GAIT, spo2 09-91% after gait on  3L. Pt c/o chest burning with dynamic activites, subsided once pt returned to bed.     Occupational therapy: FIMS:  Eatin - Feeds self with setup/supervision/cues and/or requires only setup/supervision/cues to perform tube feedings  Groomin - Requires setup/cues to do all tasks  Bathin - Able to bathe all 10 areas with setup/sup/cues  Dressing-Upper: 5 - Requires setup/supervision/cues and/or requires assist with presthesis/brace only  Dressing-Lower: 5 - Requires setup/supervision/cues and/or staff applies TEDS/prosthesis/brace only  Toiletin - Requires setup/supervision/cues  Toilet Transfer: 5 - Requires setup/supervision/cues  Tub Transfer: 0 - Activity does not occur  Shower Transfer: 0 - Activity does not occur(Pt washed up at sink ),  , Assessment: Pt demonstrated SOB during

## 2019-08-18 NOTE — PROGRESS NOTES
dizziness, tremors, seizures, syncope, facial asymmetry, speech difficulty, weakness, light-headedness, numbness and headaches. Psychiatric/Behavioral: Negative for agitation, confusion and hallucinations. The patient is not nervous/anxious. Physical Exam   Constitutional: She is oriented to person, place, and time. She appears well-nourished. No distress. HENT:   Head: Normocephalic. Surgical incision well approx and without erythema or edema   Eyes: Conjunctivae are normal. Right eye exhibits no discharge. Neck: Neck supple. No JVD present. Cardiovascular: Normal rate and regular rhythm. Pulmonary/Chest: Effort normal and breath sounds normal. No respiratory distress. Abdominal: Soft. Bowel sounds are normal. She exhibits no distension. Musculoskeletal: She exhibits no edema. Lymphadenopathy:     She has no cervical adenopathy. Neurological: She is alert and oriented to person, place, and time. Skin: Skin is warm and dry. She is not diaphoretic. Nursing note and vitals reviewed.     Mental Status Exam:             Level of Alertness:   awake            Orientation:   person, place, time            Memory:   normal            Attention/Concentration:  normal            Language:  normal    Cranial Nerves         Cranial nerve III           Pupils:  equal, round, reactive to light      Cranial nerves III, IV, VI           Extraocular Movements: intact      Cranial nerve V           Facial sensation:  intact      Cranial nerve VII           Facial strength: intact      Cranial nerve VIII           Hearing:  intact      Cranial nerve IX           Palate:  intact      Cranial nerve XI         Shoulder shrug:  intact      Cranial nerve XII          Tongue movement:  normal    Motor:    Drift:  absent  Motor exam is symmetrical 5 out of 5 all extremities bilaterally  Tone:  normal  Abnormal Movements:  absent              Medications:  Reviewed    Infusion Medications:   Scheduled Medications:

## 2019-08-18 NOTE — PROGRESS NOTES
Am assessment documented. Up sitting on recliner. Incision to cervical spine well approximated with staples, no exudate. Medicated for pain with (+) relief. Lungs clear and diminished, denies any SOB. Call light within reach. No other needs at this time.

## 2019-08-18 NOTE — PROGRESS NOTES
Physical Therapy Rehab Treatment Note  [facility]Department[facility]  [facility]       NAME: Jayleen Rai  : 1945 (76 y.o.)  MRN: [id number]  CODE STATUS: Full Code    Date of Service: 2019  Chart Reviewed: Yes  Family / Caregiver Present: No    Restrictions:  Restrictions/Precautions: Fall Risk       SUBJECTIVE: Subjective: Pt reports continued pain in neck and head despite meds  Pain Screening  Patient Currently in Pain: Yes       Post Treatment Pain Screenin  Pain Assessment  Pain Assessment: 0-10  Pain Level: 3  Pain Location: Head;Neck  Pain Descriptors: Pressure    OBJECTIVE:      Neuromuscular Education  Neuromuscular Comments: [place] tasks: 90* turns, reaching OOBOS, slow marching to improve SLS, SBA<>CGA      Bed mobility  Bridging: Stand by assistance  Supine to Sit: Stand by assistance;Supervision  Sit to Supine: Stand by assistance;Supervision  Scooting: Minimal assistance    Transfers  Sit to Stand: Supervision;Modified independent  Stand to sit: Supervision;Modified independent  Car Transfer: Stand by assistance    Ambulation  Ambulation?: Yes  Ambulation 1  Surface: level tile  Device: Rolling Walker  Other Apparatus: O2(3L)  Assistance: Supervision  Quality of Gait: Flexed trunk with gait with fair connie, reciprocal gait with increased dyspnea  Gait Deviations: Slow Connie; Increased MARY ANN; Decreased step length  Distance: 50' x 2  Comments: SpO2 decreased to 90% with gait on 3 L       ASSESSMENT/COMMENTS:  Body structures, Functions, Activity limitations: Decreased balance;Decreased functional mobility ; Decreased strength;Decreased coordination;Decreased endurance;Decreased safe awareness  Assessment: pt with no complaints or dyspnea with gait, spo2 09-91% after gait on  3L. Pt c/o chest burning with dynamic activites, subsided once pt returned to bed.   Prognosis: Good  REQUIRES PT FOLLOW UP: Yes    PLAN OF CARE/Safety:          Therapy Time:   Individual   Time In 935

## 2019-08-19 PROCEDURE — 6370000000 HC RX 637 (ALT 250 FOR IP): Performed by: INTERNAL MEDICINE

## 2019-08-19 PROCEDURE — 97530 THERAPEUTIC ACTIVITIES: CPT

## 2019-08-19 PROCEDURE — 97535 SELF CARE MNGMENT TRAINING: CPT

## 2019-08-19 PROCEDURE — 94760 N-INVAS EAR/PLS OXIMETRY 1: CPT

## 2019-08-19 PROCEDURE — 6370000000 HC RX 637 (ALT 250 FOR IP): Performed by: PHYSICAL MEDICINE & REHABILITATION

## 2019-08-19 PROCEDURE — 2500000003 HC RX 250 WO HCPCS: Performed by: PHYSICAL MEDICINE & REHABILITATION

## 2019-08-19 PROCEDURE — 97140 MANUAL THERAPY 1/> REGIONS: CPT

## 2019-08-19 PROCEDURE — 97116 GAIT TRAINING THERAPY: CPT

## 2019-08-19 PROCEDURE — 97112 NEUROMUSCULAR REEDUCATION: CPT

## 2019-08-19 PROCEDURE — 6360000002 HC RX W HCPCS: Performed by: NEUROLOGICAL SURGERY

## 2019-08-19 PROCEDURE — 97127 HC SP THER IVNTJ W/FOCUS COG FUNCJ: CPT

## 2019-08-19 PROCEDURE — 99232 SBSQ HOSP IP/OBS MODERATE 35: CPT | Performed by: INTERNAL MEDICINE

## 2019-08-19 PROCEDURE — 6360000002 HC RX W HCPCS: Performed by: PHYSICAL MEDICINE & REHABILITATION

## 2019-08-19 PROCEDURE — 1180000000 HC REHAB R&B

## 2019-08-19 PROCEDURE — 99233 SBSQ HOSP IP/OBS HIGH 50: CPT | Performed by: PHYSICAL MEDICINE & REHABILITATION

## 2019-08-19 PROCEDURE — 2700000000 HC OXYGEN THERAPY PER DAY

## 2019-08-19 PROCEDURE — 94640 AIRWAY INHALATION TREATMENT: CPT

## 2019-08-19 RX ORDER — ASPIRIN 81 MG/1
81 TABLET ORAL DAILY
Status: DISCONTINUED | OUTPATIENT
Start: 2019-08-19 | End: 2019-08-21 | Stop reason: HOSPADM

## 2019-08-19 RX ADMIN — PANTOPRAZOLE SODIUM 40 MG: 40 TABLET, DELAYED RELEASE ORAL at 06:19

## 2019-08-19 RX ADMIN — LACTOBACILLUS TAB 2 TABLET: TAB at 14:14

## 2019-08-19 RX ADMIN — DOCUSATE SODIUM 100 MG: 100 CAPSULE, LIQUID FILLED ORAL at 08:17

## 2019-08-19 RX ADMIN — CYANOCOBALAMIN 1000 MCG: 1000 INJECTION, SOLUTION INTRAMUSCULAR; SUBCUTANEOUS at 08:16

## 2019-08-19 RX ADMIN — OXYCODONE HYDROCHLORIDE AND ACETAMINOPHEN 1 TABLET: 5; 325 TABLET ORAL at 20:36

## 2019-08-19 RX ADMIN — LACTOBACILLUS TAB 2 TABLET: TAB at 08:17

## 2019-08-19 RX ADMIN — GLYCOPYRROLATE AND FORMOTEROL FUMARATE 2 PUFF: 9; 4.8 AEROSOL, METERED RESPIRATORY (INHALATION) at 04:38

## 2019-08-19 RX ADMIN — ASPIRIN 81 MG: 81 TABLET ORAL at 11:59

## 2019-08-19 RX ADMIN — GABAPENTIN 300 MG: 300 CAPSULE ORAL at 14:15

## 2019-08-19 RX ADMIN — GABAPENTIN 300 MG: 300 CAPSULE ORAL at 08:17

## 2019-08-19 RX ADMIN — PRAVASTATIN SODIUM 40 MG: 40 TABLET ORAL at 20:36

## 2019-08-19 RX ADMIN — MENTHOL AND METHYL SALICYLATE: 7.6; 29 OINTMENT TOPICAL at 20:38

## 2019-08-19 RX ADMIN — NYSTATIN 500000 UNITS: 500000 SUSPENSION ORAL at 16:21

## 2019-08-19 RX ADMIN — LISINOPRIL 2.5 MG: 2.5 TABLET ORAL at 20:36

## 2019-08-19 RX ADMIN — METOPROLOL TARTRATE 25 MG: 25 TABLET ORAL at 20:36

## 2019-08-19 RX ADMIN — LACTOBACILLUS TAB 2 TABLET: TAB at 20:36

## 2019-08-19 RX ADMIN — PROVIDONE IODINE: 7.5 STICK TOPICAL at 08:28

## 2019-08-19 RX ADMIN — GABAPENTIN 300 MG: 300 CAPSULE ORAL at 20:36

## 2019-08-19 RX ADMIN — NYSTATIN 500000 UNITS: 500000 SUSPENSION ORAL at 20:35

## 2019-08-19 RX ADMIN — PROVIDONE IODINE: 7.5 STICK TOPICAL at 14:17

## 2019-08-19 RX ADMIN — ENOXAPARIN SODIUM 40 MG: 40 INJECTION SUBCUTANEOUS at 08:17

## 2019-08-19 RX ADMIN — GLYCOPYRROLATE AND FORMOTEROL FUMARATE 2 PUFF: 9; 4.8 AEROSOL, METERED RESPIRATORY (INHALATION) at 15:45

## 2019-08-19 RX ADMIN — ERGOCALCIFEROL 50000 UNITS: 1.25 CAPSULE ORAL at 08:17

## 2019-08-19 RX ADMIN — VITAMIN D, TAB 1000IU (100/BT) 1000 UNITS: 25 TAB at 11:59

## 2019-08-19 ASSESSMENT — ENCOUNTER SYMPTOMS
EYES NEGATIVE: 1
STRIDOR: 0
CHEST TIGHTNESS: 0
VOMITING: 0
SHORTNESS OF BREATH: 1
COUGH: 0
WHEEZING: 0
NAUSEA: 0
COLOR CHANGE: 0
BLOOD IN STOOL: 0
SHORTNESS OF BREATH: 0
TROUBLE SWALLOWING: 0

## 2019-08-19 ASSESSMENT — PAIN SCALES - GENERAL
PAINLEVEL_OUTOF10: 0
PAINLEVEL_OUTOF10: 6
PAINLEVEL_OUTOF10: 7
PAINLEVEL_OUTOF10: 0

## 2019-08-19 ASSESSMENT — PAIN DESCRIPTION - LOCATION
LOCATION: BACK
LOCATION: BACK

## 2019-08-19 ASSESSMENT — PAIN DESCRIPTION - PAIN TYPE: TYPE: SURGICAL PAIN

## 2019-08-19 ASSESSMENT — PAIN DESCRIPTION - ONSET: ONSET: ON-GOING

## 2019-08-19 ASSESSMENT — PAIN DESCRIPTION - ORIENTATION
ORIENTATION: MID
ORIENTATION: MID

## 2019-08-19 ASSESSMENT — PAIN - FUNCTIONAL ASSESSMENT: PAIN_FUNCTIONAL_ASSESSMENT: ACTIVITIES ARE NOT PREVENTED

## 2019-08-19 ASSESSMENT — PAIN DESCRIPTION - DESCRIPTORS: DESCRIPTORS: ACHING

## 2019-08-19 ASSESSMENT — PAIN DESCRIPTION - PROGRESSION: CLINICAL_PROGRESSION: NOT CHANGED

## 2019-08-19 ASSESSMENT — PAIN DESCRIPTION - FREQUENCY: FREQUENCY: CONTINUOUS

## 2019-08-19 NOTE — PROGRESS NOTES
Progress Note  Patient: Georgina Aschoff  Unit/Bed: U859/F459-28  YOB: 1945  MRN: [id number]  Acct: [de-identified]   Admitting Diagnosis: Abnormality of gait and mobility [R26.9]  Admit Date:  8/11/2019  Hospital Day: 8    Chief Complaint: cp met brain cancer htn cad    Histories:  Past Medical History:   Diagnosis Date    Back pain, chronic     Pt  is with pain management    Chronic kidney disease     [facility]     Coronary artery disease involving native coronary artery of native heart without angina pectoris 3/19/2017    Dyslipidemia 11/20/2016    Essential hypertension 11/20/2016    [facility] deficiency [facility]     H/O blood clots     Headache     Hypertension     Syncope 11/20/2016    Tobacco abuse 11/20/2016     Past Surgical History:   Procedure Laterality Date    BREAST BIOPSY Right     CORONARY ANGIOPLASTY WITH STENT PLACEMENT      CRANIOTOMY N/A 8/6/2019    SUBOCCIPITAL CRANIOTOMY FOR TUMOR AND VENTRICULOSTOMY performed by Adams Gaona MD at [place] [address] reviewed. No pertinent family history. Social History     Socioeconomic History    Marital status:       Spouse name: None    Number of children: 3    Years of education: None    Highest education level: None   Occupational History    Occupation:  Retired beautician    Occupation: Retired various factory jobs   Social Needs    Financial resource strain: Somewhat hard   [place] insecurity:     Worry: Never true     Inability: Never true    Transportation needs:     Medical: No     Non-medical: No   Tobacco Use    Smoking status: Heavy Tobacco Smoker     Packs/day: 1.00     Years: 57.00     Pack years: 57.00     Types: Cigarettes     Start date: 1/1/1960    Smokeless tobacco: Never Used    Tobacco comment: 4/24/19 now smoking 1/2ppd   Substance and Sexual Activity    Alcohol use: Yes     Comment: occ    Drug use: No    Sexual activity: Never   Lifestyle    Physical activity:     Days per week: 0 days     Minutes per session: 0 min    Stress: Only a little   Relationships    Social connections:     Talks on phone: More than three times a week     Gets together: More than three times a week     Attends Congregation service: More than 4 times per year     Active member of club or organization: No     Attends meetings of clubs or organizations: Never     Relationship status:     Intimate partner violence:     Fear of current or ex partner: No     Emotionally abused: No     Physically abused: No     Forced sexual activity: No   Other Topics Concern    None   Social History Narrative         Lives With: Family  (2 dtrs-works full-time afternoon shift the other does not both are helpful)    Type of Home: House  One level    Home Access: Stairs to enter without rails - Number of Steps: 4    Bathroom Shower/Tub: Walk-in shower    Bathroom Equipment: Shower chair    Home Equipment: Rolling walker, 4 wheeled walker, Quad cane    Receives Help From: Family    ADL Assistance: Independent    Ambulation Assistance: Independent(quad cane most of the time)    Transfer Assistance: Independent    Active : No    Occupation: Retired    Her hobbies are reading classic literature especially works by the Nathaniel sisters       Subjective/HPI pt seen in [place]. Doing well with walking but desaturates despite using 3-4L O2      EKG:         Review of Systems:   Review of Systems   Constitutional: Negative. Negative for diaphoresis and fatigue. HENT: Negative. Eyes: Negative. Respiratory: Positive for shortness of breath. Negative for cough, chest tightness, wheezing and stridor. Cardiovascular: Negative for palpitations and leg swelling. Gastrointestinal: Negative for blood in stool and nausea. Genitourinary: Negative. Musculoskeletal: Positive for arthralgias and gait problem. Skin: Negative. Neurological: Positive for weakness.  Negative for dizziness, syncope and

## 2019-08-19 NOTE — PROGRESS NOTES
safety reasons. Pt non-symptomatic except dyspnea with activites. Pt requires frequent rest breaks. Chest pain no limiting pt anymore and has seemed to almost disappear except with rolling to left. PLAN OF CARE/Safety:   Safety Devices  Type of devices: All fall risk precautions in place; Chair alarm in place;Gait belt      Therapy Time:   Individual   Time In 0900   Time Out 0930   Minutes 30     Minutes:30      Transfer/Bed mobility training: 10      Gait trainin [address], PTA, 19 at 11:31 AM

## 2019-08-19 NOTE — PROGRESS NOTES
Subjective: The patient complains of severe  acute balance deficits especially truncal secondary to cerebellar lesion in her brain with recent evacuation of the tumor versus via craniotomy at [facility] partially relieved by medications, PT, OT, speech and language pathology and exacerbated by exertion. She complains of arthritic related neck pain I have ordered Percocet BenGay Tylenol heat and massage. She complains of ongoing anxiety and depression however she also  of being excessively fatigued at times and she was too tired to attend the support group over the weekend. ROS x10: The patient also complains of severely impaired mobility and activities of daily living. Otherwise no new problems with vision, hearing, nose, mouth, throat, dermal, cardiovascular, GI, , pulmonary, musculoskeletal, psychiatric or neurological. See Rehab H&P on Rehab chart dated . Vital signs:  BP (!) 96/57   Pulse 86   Temp 99 °F (37.2 °C) (Oral)   Resp 16   Ht 4' 10.75\" (1.492 m)   Wt 136 lb 14.5 oz (62.1 kg)   LMP  (LMP Unknown)   SpO2 94%   BMI 27.89 kg/m²   I/O:   PO/Intake:  fair PO intake, no problems observed or reported. Bowel/Bladder:  continent, no problems noted. General:  Patient is well developed, adequately nourished, non-obese and     well kempt. HEENT:    PERRLA, hearing intact to loud voice, external inspection of ear     and nose benign. Inspection of lips, tongue and gums benign  Musculoskeletal: No significant change in strength or tone. All joints stable. Inspection and palpation of digits and nails show no clubbing,       cyanosis or inflammatory conditions. Neuro/Psychiatric: Affect: flat but pleasant. Alert and oriented to person, place and     situation. No significant change in deep tendon reflexes or     sensation  Lungs:  Diminished, CTA-B. Respiration effort is normal at rest.     Heart:   S1 = S2, RRR. No loud murmurs.     Abdomen:  Soft, non-tender, no THE OPERATIVE SITE AND DIMINISHING PNEUMOCEPHALUS. VENTRICULOSTOMY IS UNCHANGED. THERE IS NO VENTRICULOMEGALY. THERE IS NO EFFACEMENT OF EXTRA-AXIAL CSF SPACES       Ct Head  8/6/2019. Impression: Interval craniotomy, posterior midline skull base or resection of cerebellar mass and interval insertion of an intraventricular shunt catheter via right frontal craniotomy. Postsurgical changes discussed. Ct Head   8/6/2019   THERE IS A MASS WITH HETEROGENEOUS ENHANCEMENT IN THE LEFT CEREBELLAR HEMISPHERE. THERE IS ALSO INCREASED PERIPHERAL CIRCUMFERENTIAL ATTENUATION WHICH MAY REPRESENT EFFECTS OF RESIDUAL PRIOR CONTRAST FROM CT AND/OR MRI NO ACUTE EXTRA-AXIAL FINDINGS. Ct Chest   8/10/2019  THERE IS A SMOOTH LOBULATED MASS AT THE ANTERIOR MEDIAL ASPECT OF THE RIGHT LOWER LOBE/DIAPHRAGM THE MASS APPEARS TO BE \"EXTRAPLEURAL \"AND MAY BE ORIGINATING IN FROM THE DIAPHRAGM. ETIOLOGY UNCERTAIN AS PRIMARY DIAPHRAGMATIC TUMORS ARE RARE. MOST TEND BE  BENIGN BUT UNDERLYING MALIGNANCY IS NOT EXCLUDED. CONTINUED FOLLOW-UP WILL BE NEEDED        Ct Abdomen Pelvis 7/31/2019     No evidence for metastatic disease within the abdomen/pelvis. Soft tissue nodule right hemidiaphragm, likely metastatic. Recommend dedicated CT of the lungs for further evaluation of underlying malignancy. Small hiatal hernia with possible area of esophageal outpouching     Xr Chest   8/12/2019    CONCLUSION: COPD. NEW SUBPULMONIC EFFUSION ON THE LEFT. COMPARISON STUDY IS ADVISED. Mri Brain  7/31/2019   1. APPROXIMATELY 2.5 X 2 X 2 CM INHOMOGENEOUSLY ENHANCING MIDLINE CEREBELLAR MASS WHICH APPEARS TO HAVE A CYSTIC COMPONENT AND A HEMORRHAGIC COMPONENT. MRI FINDINGS ARE COMPATIBLE WITH AN INTRA-AXIAL POSTERIOR FOSSA TUMOR AND DIFFERENTIAL CONSIDERATIONS ARE PRIMARILY BETWEEN A METASTASIS OR LESS LIKELY A HEMANGIOBLASTOMA. 2. NO OTHER ENHANCING LESION IN THE POSTERIOR FOSSA OR IN THE SUPRATENTORIAL BRAIN.  THERE IS A MILD DEGREE OF CEREBRAL ATROPHY AND WHITE MATTER T2 HYPERINTENSE FOCI WHICH ARE LIKELY REPRESENT A MANIFESTATION OF SMALL VESSEL DISEASE. NO ACUTE ISCHEMIC FOCUS IN THE BRAIN. Us Dup Lower   8/12/2019   NEGATIVE FOR DEEP VENOUS THROMBOSIS BOTH LOWER EXTREMITIES. CEA has been normal. Stained positive for CA 19.9    Previous extensive, complex labs, notes and diagnostics reviewed and analyzed. ALLERGIES:    Allergies as of 08/11/2019    (No Known Allergies)      (please also verify by checking MAR)      Today I evaluated this patient for periodic reassessment of medical and functional status. The patient was discussed in detail at the treatment team meeting focusing on current medical issues, progress in therapies, social issues, psychological issues, barriers to progress and strategies to address these barriers, and discharge planning. See the hand written addendum to rehab progress note. The patient continues to be high risk for future disability and their medical and rehabilitation prognosis continue to be good and therefore, we will continue the patient's rehabilitation course as planned. The patient's tentative discharge date was set. Patient and family education was discussed. The patient was made aware of the team discussion regarding their progress. Complex Physical Medicine & Rehab Issues Assess & Plan:   1. 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 Functional and medical status reassessed regarding patients ability to participate in therapies and patient found to be able to participate in acute intensive comprehensive inpatient rehabilitation program including PT/OT to improve balance, ambulation, ADLs, and to improve the P/AROM. Therapeutic modifications regarding activities in therapies, place, amount of time per day and intensity of therapy made daily.   In bed therapies or

## 2019-08-19 NOTE — PROGRESS NOTES
Physical Therapy Rehab Treatment Note  Facility/Department: [facility]  Room: R259/R259-01       NAME: Fernando Siemens  : 1945 (79 y.o.)  MRN: [id number]  CODE STATUS: Full Code    Date of Service: 2019  Chart Reviewed: Yes  Family / Caregiver Present: No    Restrictions:  Restrictions/Precautions: Fall Risk       SUBJECTIVE: Response To Previous Treatment: Patient with no complaints from previous session. Pain Screening  Patient Currently in Pain: Denies  Pre Treatment Pain Screening  Pain at present: 1  Scale Used: Numeric Score  Intervention List: Patient able to continue with treatment  Comments / Details: chest           OBJECTIVE:               Neuromuscular Education  NDT Treatment: Gait (managing O2 cord around obstacles with Supervision. Pt able to step over cord safely and problem solve well.  )        Transfers  Sit to Stand: Modified independent  Stand to sit: Modified independent      Ambulation  Ambulation?: Yes  Ambulation 1  Surface: carpet  Device: Rollator  Other Apparatus: O2(3L)  Assistance: Supervision  Quality of Gait: Steady gait. Dyspnea noted with gait. Pt managing O2 cord better with few cues. Distance: 100' x 2 with stopping x 3 with each gait to increase SpO2  Comments: SpO2 dropping into the 70's during gait. Initially staying in 90's but dropping for last 5'  Stairs/Curb  Stairs?: No     ASSESSMENT/COMMENTS:  Assessment: Pt's NP came in during treatment. Discussed pt's desaturation problem which limits gait distances. NP stated pt cane be placed on 4L O2 with activities. Pt continued to desat with gait even on 4L but not as quickly. PT Education: Energy Conservation;General Safety;Equipment; Functional Mobility Training;Precautions  Patient Education: Pt educated on importance of maintaining safe O2 levels, good energy conservation techniques and limited mobility. PLAN OF CARE/Safety:   Safety Devices  Type of devices:  All fall risk precautions in

## 2019-08-19 NOTE — PROGRESS NOTES
Occupational Therapy  Facility/Department: [facility]  Daily Treatment Note  NAME: Lior Goel  : 1945  MRN: [id number]    Date of Service: 2019    Discharge Recommendations:  Continue to assess pending progress       Assessment      Activity Tolerance  Activity Tolerance: Patient limited by fatigue  Activity Tolerance: Pt on 3L O2 throughout session. Safety Devices  Safety Devices in place: Yes  Type of devices: All fall risk precautions in place         Patient Diagnosis(es): There were no encounter diagnoses. has a past medical history of Back pain, chronic, Chronic kidney disease, COPD (chronic obstructive pulmonary disease) ([facility] [address] Coronary artery disease involving native coronary artery of native heart without angina pectoris, Dyslipidemia, Essential hypertension, Factor V deficiency ([facility] [address] H/O blood clots, Headache, Hypertension, Syncope, and Tobacco abuse.   has a past surgical history that includes Coronary angioplasty with stent; Breast biopsy (Right); and craniotomy (N/A, 2019). Restrictions  Restrictions/Precautions  Restrictions/Precautions: Fall Risk  Subjective   General  Chart Reviewed: Yes  Patient assessed for rehabilitation services?: Yes  Response to previous treatment: Patient reporting fatigue but able to participate  Family / Caregiver Present: Yes(Daughter )  Referring Practitioner: Dr. Verdugo Client  Diagnosis: brain mass with impaired mobility s/p suboccipital craniotomy metastic posterior fossa  General Comment  Comments: Pt on 3L of O2 via NC  Pain Assessment  Pain Level: 6  Patient's Stated Pain Goal: No pain  Pain Location: Back  Pain Orientation: Mid   Orientation     Objective        Patient walked from the recliner in her room to the wheelchair for transport to therapy room without assist and was mindful of the oxygen tubing although she did get it caught once and needed assist to unhook it from the walker. Patient worked on hand strengthening with pop beads. When patient was partially done with the task she reported that her thumb was cramping. Patient reported that this happens when she is not using her hands much. Patient continued with the task but then was noted to have such a significant cramp in her thumb that it was adducted against her palm. Gentle massage to the thenar eminence was performed and the thumb relaxed. Patient reported that it still felt like it was cramping. Patient was provided with a tennis ball and instructed to roll her hand around on it. Patient did and tolerated it well. Patient was then asked to place marbles onto the peg board to use her hands in AROM task and initiated the task with no complaints. Patient was then handed off to [facility] to complete the session.          Plan   Plan  Times per week: 5-7x/week  Plan weeks: 1.5 week  Current Treatment Recommendations: Strengthening, Balance Training, Functional Mobility Training, Endurance Training, Safety Education & Training, Patient/Caregiver Education & Training, Equipment Evaluation, Education, & procurement, Self-Care / ADL, Home Management Training  Plan Comment: Continue OT per POC    Goals  Patient Goals   Patient goals : \"get rid of this chest pain\"       Therapy Time   Individual Concurrent Group Co-treatment   Time In 1300         Time Out 1330         Minutes [place], OTR/L    Electronically signed by LIANA Jennings/L on 8/19/2019 at 5:12 PM

## 2019-08-19 NOTE — PROGRESS NOTES
Occupational Therapy  Fac[facility]/Department: [facility]  Daily Treatment Note  NAME: Bryon Candelaria  : 1945  MRN: [id number]    Date of Service: 2019    Discharge Recommendations:  Continue to assess pending progress       Assessment      Activity Tolerance  Activity Tolerance: Patient limited by fatigue  Activity Tolerance: Pt on 3L O2 throughout session. Required frequent rest breaks  Safety Devices  Safety Devices in place: Yes  Type of devices: All fall risk precautions in place         Patient Diagnosis(es): There were no encounter diagnoses. has a past medical history of Back pain, chronic, Chronic kidney disease, COPD (chronic obstructive pulmonary disease) ([facility] [address] Coronary artery disease involving native coronary artery of native heart without angina pectoris, Dyslipidemia, Essential hypertension, Factor V deficiency ([facility] [address] H/O blood clots, Headache, Hypertension, Syncope, and Tobacco abuse.   has a past surgical history that includes Coronary angioplasty with stent; Breast biopsy (Right); and craniotomy (N/A, 2019).     Restrictions  Restrictions/Precautions  Restrictions/Precautions: Fall Risk  Subjective   General  Chart Reviewed: Yes  Patient assessed for rehabilitation services?: Yes  Response to previous treatment: Patient reporting fatigue but able to participate  Family / Caregiver Present: Yes(Daughter )  Referring Practitioner: Dr. [place] Pedro  Diagnosis: brain mass with impaired mobility s/p suboccipital craniotomy metastic posterior fossa  General Comment  Comments: Pt on 2L of O2 via NC      Orientation     Objective    ADL  Feeding: Modified independent   Grooming: Supervision(performed grooming in stand, Hair was combed for patient due to multiple incisions and stictches/ staples in place)  UE Bathing: Setup  LE Bathing: Supervision  UE Dressing: Setup  LE Dressing: Setup  Toileting: None(did not need to go)        Toilet Transfers  Toilet Transfer: Unable to assess  Tub Transfers  Tub Transfers: Not tested  Tub Transfers Comments: Patient declined  Shower Transfers  Shower Transfers Comments: Pt washed up at sink                  Plan   Plan  Times per week: 5-7x/week  Plan weeks: 1.5 week  Current Treatment Recommendations: Strengthening, Balance Training, Functional Mobility Training, Endurance Training, Safety Education & Training, Patient/Caregiver Education & Training, Equipment Evaluation, Education, & procurement, Self-Care / ADL, Home Management Training  Plan Comment: Continue OT per POC    Goals  Patient Goals   Patient goals : \"get rid of this chest pain\"       Therapy Time   Individual Concurrent Group Co-treatment   Time In 0930         Time Out 1030         Minutes [facility] OTR/L    Electronically signed by Cyndee Hammans, OTR/L on 8/19/2019 at 11:14 AM

## 2019-08-19 NOTE — CARE COORDINATION
Message left for daughter Reg Marie after discussing pending earlier discharge with patient.  Electronically signed by Petros Jaramillo RN on 8/19/2019 at 4:28 PM

## 2019-08-19 NOTE — PROGRESS NOTES
Neurology Daily Progress Note  Name: Scott Laughlin  Age: 76 y.o. Gender: female  CodeStatus: Full Code  Allergies: No Known Allergies    Chief Complaint:No chief complaint on file. Primary Care Provider: JULIANNA Hamm CNP  InpatientTreatment Team: Treatment Team: Attending Provider: Bianca Garcia DO; Consulting Physician: Celio Flores MD; Consulting Physician: Sarkis Andujar MD; Consulting Physician: Alex Vicente MD; Consulting Physician: Barbi De Leon MD; Consulting Physician: Ryley Wei MD; Consulting Physician: Lynette Ramos MD; Registered Nurse: Kory Pablo, RN; Occupational Therapist: Audrey Singh, OTR/L; Registered Nurse: Shankar Quarles, PADMINI; Occupational Therapist Assistant: MARI Miller; Patient Care Tech: Adeline Yang  Admission Date: 8/11/2019      HPI Pt seen and examined for neuro follow up for cerebellar tumor with mass effect s/p craniotomy on 8/6. A&O x3, NAD, cooperative. Pt with global ataxia, improved. She has c/o continued CP today. Reproducible with palpation. Pain present with  Inspiration. CTA chest neg PE but does show 4.2 x 4.5 cm right mediastinal mass, increased in size. Cardiology, pulmonology and oncology following. CP improving  Vitals:    08/19/19 0747   BP: (!) 96/57   Pulse: 86   Resp: 16   Temp: 99 °F (37.2 °C)   SpO2: 94%      Review of Systems   Constitutional: Negative for appetite change, chills, fatigue and fever. HENT: Negative for hearing loss and trouble swallowing. Eyes: Negative for visual disturbance. Respiratory: Negative for cough, chest tightness, shortness of breath and wheezing. Cardiovascular: Positive for chest pain (with inspiration). Negative for palpitations and leg swelling. Gastrointestinal: Negative for nausea and vomiting. Musculoskeletal: Positive for gait problem. Skin: Negative for color change and rash.    Neurological: Negative for dizziness, tremors, seizures, syncope, facial asymmetry, speech difficulty, weakness, light-headedness, numbness and headaches. Psychiatric/Behavioral: Negative for agitation, confusion, hallucinations and suicidal ideas. The patient is not nervous/anxious. Physical Exam   Constitutional: She is oriented to person, place, and time. She appears well-nourished. No distress. HENT:   Head: Normocephalic and atraumatic. Eyes: Pupils are equal, round, and reactive to light. EOM are normal.   Neck: Neck supple. No JVD present. Cardiovascular: Normal rate and regular rhythm. Pulmonary/Chest: Effort normal and breath sounds normal. No respiratory distress. Abdominal: Soft. Bowel sounds are normal. She exhibits no distension. There is no tenderness. Neurological: She is alert and oriented to person, place, and time. See neuro exam   Skin: Skin is warm and dry. She is not diaphoretic. Nursing note and vitals reviewed.     Mental Status Exam:             Level of Alertness:   awake            Orientation:   person, place, time            Memory:   normal            Attention/Concentration:  normal            Language:  normal    Cranial Nerves         Cranial nerve III           Pupils:  equal, round, reactive to light      Cranial nerves III, IV, VI           Extraocular Movements: intact      Cranial nerve V           Facial sensation:  intact      Cranial nerve VII           Facial strength: intact      Cranial nerve VIII           Hearing:  intact      Cranial nerve IX           Palate:  intact      Cranial nerve XI         Shoulder shrug:  intact      Cranial nerve XII          Tongue movement:  normal    Motor:    Drift:  absent  Motor exam is symmetrical 5 out of 5 all extremities bilaterally  Tone:  normal  Abnormal Movements:  absent            Medications:  Reviewed    Infusion Medications:   Scheduled Medications:    aspirin  81 mg Oral Daily    enoxaparin  40 mg Subcutaneous Daily    lisinopril  2.5 mg Oral BID    docusate THE BRAIN. No results found for this or any previous visit. MRA of the Head and Neck: No results found for this or any previous visit. No results found for this or any previous visit. No results found for this or any previous visit. CT of the Head:   Results for orders placed during the hospital encounter of 07/30/19   CT HEAD WO CONTRAST    Narrative EXAMINATION:  CT HEAD WO CONTRAST    HISTORY:   fu closed ev  ro hydro     TECHNIQUE:  Serial axial images without IV contrast were obtained from the vertex to the foramen magnum. All CT scans at this facility use dose modulation, iterative reconstruction, and/or weight based dosing when appropriate to reduce radiation dose to as low as reasonably achievable. COMPARISON:  8/6/2019. RESULT:    Post-operative change:  Right frontal approach ventricular catheter with tip near midline between the frontal horns, unchanged. Interval resolution of the pneumocephalus. The size of the ventricular system is unchanged. Occipital craniotomy with   associated postsurgical changes from tumor resection within the cerebellum, with small amount of gas in the tissues adjacent to the craniotomy. .    Acute change:   No evidence of an acute infarct. Hemorrhage:    No evidence of new/acute intracranial hemorrhage. Mass Lesion / Mass Effect: No significant mass effect. Chronic change:   Scattered patchy foci of low attenuation are present within supratentorial white matter which is a nonspecific finding but likely represents mild microvascular ischemia. Parenchyma: There is no significant volume loss. The brain parenchyma is otherwise within normal limits for age. Ventricles:   Stable in size. Paranasal sinuses and skull base: The visualized paranasal sinuses are grossly clear. The skull base and imaged soft tissues are unremarkable.        Impression Postsurgical changes without superimposed

## 2019-08-20 PROCEDURE — 6360000002 HC RX W HCPCS: Performed by: NEUROLOGICAL SURGERY

## 2019-08-20 PROCEDURE — 94760 N-INVAS EAR/PLS OXIMETRY 1: CPT

## 2019-08-20 PROCEDURE — 99232 SBSQ HOSP IP/OBS MODERATE 35: CPT | Performed by: PHYSICAL MEDICINE & REHABILITATION

## 2019-08-20 PROCEDURE — 1180000000 HC REHAB R&B

## 2019-08-20 PROCEDURE — 92507 TX SP LANG VOICE COMM INDIV: CPT

## 2019-08-20 PROCEDURE — 2500000003 HC RX 250 WO HCPCS: Performed by: PHYSICAL MEDICINE & REHABILITATION

## 2019-08-20 PROCEDURE — 94640 AIRWAY INHALATION TREATMENT: CPT

## 2019-08-20 PROCEDURE — 97530 THERAPEUTIC ACTIVITIES: CPT

## 2019-08-20 PROCEDURE — 2700000000 HC OXYGEN THERAPY PER DAY

## 2019-08-20 PROCEDURE — 97127 HC SP THER IVNTJ W/FOCUS COG FUNCJ: CPT

## 2019-08-20 PROCEDURE — 97535 SELF CARE MNGMENT TRAINING: CPT

## 2019-08-20 PROCEDURE — 97116 GAIT TRAINING THERAPY: CPT

## 2019-08-20 PROCEDURE — 97110 THERAPEUTIC EXERCISES: CPT

## 2019-08-20 PROCEDURE — 6370000000 HC RX 637 (ALT 250 FOR IP): Performed by: INTERNAL MEDICINE

## 2019-08-20 PROCEDURE — 6370000000 HC RX 637 (ALT 250 FOR IP): Performed by: PHYSICAL MEDICINE & REHABILITATION

## 2019-08-20 RX ADMIN — GABAPENTIN 300 MG: 300 CAPSULE ORAL at 14:26

## 2019-08-20 RX ADMIN — LACTOBACILLUS TAB 2 TABLET: TAB at 14:26

## 2019-08-20 RX ADMIN — ENOXAPARIN SODIUM 40 MG: 40 INJECTION SUBCUTANEOUS at 10:45

## 2019-08-20 RX ADMIN — NYSTATIN 500000 UNITS: 500000 SUSPENSION ORAL at 16:48

## 2019-08-20 RX ADMIN — PROVIDONE IODINE: 7.5 STICK TOPICAL at 10:52

## 2019-08-20 RX ADMIN — NYSTATIN 500000 UNITS: 500000 SUSPENSION ORAL at 09:37

## 2019-08-20 RX ADMIN — NYSTATIN 500000 UNITS: 500000 SUSPENSION ORAL at 20:20

## 2019-08-20 RX ADMIN — ASPIRIN 81 MG: 81 TABLET ORAL at 09:37

## 2019-08-20 RX ADMIN — MENTHOL AND METHYL SALICYLATE: 7.6; 29 OINTMENT TOPICAL at 20:24

## 2019-08-20 RX ADMIN — NYSTATIN 500000 UNITS: 500000 SUSPENSION ORAL at 13:03

## 2019-08-20 RX ADMIN — LISINOPRIL 2.5 MG: 2.5 TABLET ORAL at 20:20

## 2019-08-20 RX ADMIN — GABAPENTIN 300 MG: 300 CAPSULE ORAL at 09:37

## 2019-08-20 RX ADMIN — LACTOBACILLUS TAB 2 TABLET: TAB at 09:36

## 2019-08-20 RX ADMIN — VITAMIN D, TAB 1000IU (100/BT) 1000 UNITS: 25 TAB at 11:57

## 2019-08-20 RX ADMIN — PRAVASTATIN SODIUM 40 MG: 40 TABLET ORAL at 20:20

## 2019-08-20 RX ADMIN — GLYCOPYRROLATE AND FORMOTEROL FUMARATE 2 PUFF: 9; 4.8 AEROSOL, METERED RESPIRATORY (INHALATION) at 16:13

## 2019-08-20 RX ADMIN — LACTOBACILLUS TAB 2 TABLET: TAB at 20:20

## 2019-08-20 RX ADMIN — PANTOPRAZOLE SODIUM 40 MG: 40 TABLET, DELAYED RELEASE ORAL at 05:48

## 2019-08-20 RX ADMIN — GLYCOPYRROLATE AND FORMOTEROL FUMARATE 2 PUFF: 9; 4.8 AEROSOL, METERED RESPIRATORY (INHALATION) at 05:40

## 2019-08-20 RX ADMIN — METOPROLOL TARTRATE 25 MG: 25 TABLET ORAL at 20:20

## 2019-08-20 RX ADMIN — GABAPENTIN 300 MG: 300 CAPSULE ORAL at 20:20

## 2019-08-20 RX ADMIN — DOCUSATE SODIUM 100 MG: 100 CAPSULE, LIQUID FILLED ORAL at 09:37

## 2019-08-20 RX ADMIN — METOPROLOL TARTRATE 25 MG: 25 TABLET ORAL at 10:46

## 2019-08-20 RX ADMIN — LISINOPRIL 2.5 MG: 2.5 TABLET ORAL at 10:46

## 2019-08-20 ASSESSMENT — PAIN SCALES - GENERAL: PAINLEVEL_OUTOF10: 0

## 2019-08-20 NOTE — PROGRESS NOTES
Physical Therapy Rehab Treatment Note  Fac[facility]/Department[facility]  Room: A384/E183-20       NAME: Deepak Singh  : 1945 (50 y.o.)  MRN: [id number]  CODE STATUS: Full Code    Date of Service: 2019  Chart Reviewed: Yes  Family / Caregiver Present: No    Restrictions:  Restrictions/Precautions: Fall Risk       SUBJECTIVE: Subjective: They forgot to hook me back up on my oxygen after I went to the bathroom until breakfast.  It was hooked up into the wall. Response To Previous Treatment: Patient with no complaints from previous session. Pain Screening  Patient Currently in Pain: Denies  Pre Treatment Pain Screening  Pain at present: 0  Scale Used: Numeric Score  Intervention List: Patient able to continue with treatment;Patient declined any intervention  Comments / Details: Pt does have increased pain with laying flat and increases to an 8/10         OBJECTIVE:        Bed mobility  Rolling to Left: Modified independent  Rolling to Right: Modified independent  Supine to Sit: Modified independent  Sit to Supine: Modified independent  Scooting: Modified independent  Comment: Hospital bed: flat and without rails. Needs increased time and effort with task. Transfers  Sit to Stand: Modified independent  Stand to sit: Modified independent  Bed to Chair: Modified independent  Car Transfer: Modified independent    Ambulation  Ambulation?: Yes  Ambulation 1  Surface: carpet  Device: Rollator  Other Apparatus: O2(3 - 4 L tested)  Assistance: Modified Independent;Supervision  Quality of Gait: Steady gait. Dyspnea noted with gait. Pt managing O2 cord better. Pt Modified Independent for short distances < 50' which is limited by SpO2 dropping. Gait Deviations: Slow Connie; Increased MARY ANN; Decreased step length  Distance: 50' x 3  Comments: SpO2 dropping into the 80's with pt concentrating on deep breathing. Pt recovers quickly after sitting down.     Stairs/Curb  Stairs?: Yes   Stairs  # Steps : 4  Stairs

## 2019-08-20 NOTE — PROGRESS NOTES
education: 1     Therapeutic ex: [address] [facility], [facility], 08/20/19 at 3:21 PM

## 2019-08-20 NOTE — CARE COORDINATION
Spoke with daughter Galdino Arriola in regards to discharge. Family is comfortable with patient coming home on 8/21. At this time, they are refusing all HHC and OP, they would like home exercise programs instead. Per Jade, daughters have all worked in [facility] and [place] [id number] and feel comfortable taking care of patient at a home level. Message left for Dr Severiano Members and information sent to therapy to update.   Electronically signed by Dakota Arrington RN on 8/20/2019 at 9:16 AM

## 2019-08-20 NOTE — PROGRESS NOTES
feedings  Groomin - Requires setup/cues to do all tasks  Bathin - Able to bathe all 10 areas with setup/sup/cues  Dressing-Upper: 5 - Requires setup/supervision/cues and/or requires assist with presthesis/brace only  Dressing-Lower: 5 - Requires setup/supervision/cues and/or staff applies TEDS/prosthesis/brace only  Toiletin - Requires setup/supervision/cues  Toilet Transfer: 5 - Requires setup/supervision/cues  Tub Transfer: 0 - Activity does not occur  Shower Transfer: 0 - Activity does not occur(Pt washed up at sink ),  , Assessment: Pt demonstrated SOB during functional tasks at supervision level. Pt continues to benefit from skilled OT services to maximize pt independence and safety with ADLs and transfers. Speech therapy: FIMS: Comprehension: 5 - Patient understands basic needs (hungry/hot/pain)  Expression: 6 - Device used to express complex ideas/needs  Social Interaction: 7 - Patient has appropriate behavior/relations 100% of the time  Problem Solvin - Patient able to solve simple/routine tasks  Memory: 4 - Patient remembers 75-90%+ of the time      Lab/X-ray studies reviewed, analyzed and discussed with patient and staff:   No results found for this or any previous visit (from the past 24 hour(s)). Cta [place]   2019    Carotid:   Right  Carotid Stenosis (% by NASCET Criteria): There is no (than 20%) hemodynamically significant stenosis, vascular dissection or aneurysm of the right common or internal carotid arteries. Left  Carotid Stenosis (% by NASCET Criteria): There is some calcified plaque in the left carotid bulb. There is no (less than 20%) hemodynamically significant stenosis, vascular dissection or aneurysm in the left common or internal carotid arteries. Cervical Vertebral Arteries:    Patency: The vertebral arteries are well visualized to the level of the basilar artery. There is no focal stenosis aneurysm or dissection. Vertebral arteries are codominant. metastatic brain tumor in the cerebellar region with recent craniotomy. Functional and medical status reassessed regarding patients ability to participate in therapies and patient found to be able to participate in acute intensive comprehensive inpatient rehabilitation program including PT/OT to improve balance, ambulation, ADLs, and to improve the P/AROM. Therapeutic modifications regarding activities in therapies, place, amount of time per day and intensity of therapy made daily. In bed therapies or bedside therapies prn.   2. Bowel opiate related constipation and Bladder dysfunction:  frequent toileting, ambulate to bathroom with assistance, check post void residuals. Check for C.difficile x1 if >2 loose stools in 24 hours, continue bowel & bladder program.  Monitor bowel and bladder function. Lactinex 2 PO every AC. MOM prn, Brown Bomb prn, Glycerin suppository prn, enema prn.  3. Severe active chronic mid back low back arthritic pain as well as postop occipital pain and right scalp pain on top of generalized OA pain: reassess pain every shift and prior to and after each therapy session, give prn Tylenol and Percocet titrate dosing consider Duragesic low-dose, modalities prn in therapy, Lidoderm, K-pad prn.   4. Skin healing posterior occipital craniotomy and right cranial drain sites and breakdown risk:  continue pressure relief program.  Daily skin exams and reports from nursing. 5. Severe fatigue due to nutritional and hydration deficiency:  continue to monitor I&Os, calorie counts prn, dietary consult prn. Add vitamin B12 vitamin D and CoQ10 add protein and encourage p.o. intake  6. Acute episodic insomnia with situational adjustment disorder:  prn Ambien, monitor for day time sedation. 7. Falls risk elevated:  patient to use call light to get nursing assistance to get up, bed and chair alarm.   8. Elevated DVT risk especially are given factor V Leiden and history of clots: progressive activities in PT, continue prophylaxis ZECHARIAH hose, elevation and  neurosurgery as okay daily Lovenox given her history of factor V Leiden deficiency. 9. Complex discharge planning: Discharge 8/25/2019 home with her daughters with help from her daughters and home health care PT OT RN aide  I do not feel that she will need a device at discharge. SP team meeting  Monday to assess progress towards goals, discuss and address social, psychological and medical comorbidities and to address difficulties they may be having progressing in therapy. Patient and family education is in progress. The patient is to follow-up with their family physician after discharge. Complex Active General Medical Issues that complicate care Assess & Plan:    1. Essential hypertension, Dyslipidemia,   Presence of permanent cardiac pacemaker, SSS (sick sinus syndrome),   PAD (peripheral artery disease) -vital signs every shift, dose and titrate cardiac medications to include  2. Lung nodule, COPD (chronic obstructive pulmonary disease) -consult hematology oncology CTA showed no metastatic lesions-CEA has been normal. Stained positive for CA 19. 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 S/p suboccipital craniotomy. Mass medial of right diaphragm. On rehab. She will need radiation therapy to brain after rehab. Send pathology to determine cancer primary. 3.   Age-related osteoporosis without current pathological fracture-CTA of chest showed no bony lesions  4. CKD (chronic kidney disease), stage III -limit toxic medications  5. History of DVT (deep venous thrombosis), history of VTE (venous thromboembolism),   Factor V Leiden mutation  -complex blood thinner regimen because of her the vascular nature of her brain tumor and recent brain surgery, blood thinners currently on hold statin complicated work-up to rule out PE rule out DVT is negative CTA is also negative  6.    Facet arthropathy, lumbar,   Lumbosacral

## 2019-08-21 VITALS
OXYGEN SATURATION: 98 % | DIASTOLIC BLOOD PRESSURE: 54 MMHG | HEIGHT: 59 IN | SYSTOLIC BLOOD PRESSURE: 109 MMHG | BODY MASS INDEX: 27.6 KG/M2 | TEMPERATURE: 98 F | HEART RATE: 69 BPM | WEIGHT: 136.91 LBS | RESPIRATION RATE: 15 BRPM

## 2019-08-21 PROBLEM — Z51.5 PALLIATIVE CARE PATIENT: Status: ACTIVE | Noted: 2019-08-21

## 2019-08-21 PROCEDURE — 99239 HOSP IP/OBS DSCHRG MGMT >30: CPT | Performed by: PHYSICAL MEDICINE & REHABILITATION

## 2019-08-21 PROCEDURE — 2700000000 HC OXYGEN THERAPY PER DAY

## 2019-08-21 PROCEDURE — 6370000000 HC RX 637 (ALT 250 FOR IP): Performed by: PHYSICAL MEDICINE & REHABILITATION

## 2019-08-21 PROCEDURE — 97535 SELF CARE MNGMENT TRAINING: CPT

## 2019-08-21 PROCEDURE — 99232 SBSQ HOSP IP/OBS MODERATE 35: CPT | Performed by: INTERNAL MEDICINE

## 2019-08-21 PROCEDURE — 97116 GAIT TRAINING THERAPY: CPT

## 2019-08-21 PROCEDURE — 2500000003 HC RX 250 WO HCPCS: Performed by: PHYSICAL MEDICINE & REHABILITATION

## 2019-08-21 PROCEDURE — 6370000000 HC RX 637 (ALT 250 FOR IP): Performed by: INTERNAL MEDICINE

## 2019-08-21 PROCEDURE — 6360000002 HC RX W HCPCS: Performed by: NEUROLOGICAL SURGERY

## 2019-08-21 PROCEDURE — 94640 AIRWAY INHALATION TREATMENT: CPT

## 2019-08-21 PROCEDURE — 97110 THERAPEUTIC EXERCISES: CPT

## 2019-08-21 RX ORDER — LISINOPRIL 2.5 MG/1
2.5 TABLET ORAL NIGHTLY
Qty: 30 TABLET | Refills: 3 | Status: SHIPPED | OUTPATIENT
Start: 2019-08-22 | End: 2020-02-18

## 2019-08-21 RX ORDER — NICOTINE 21 MG/24HR
1 PATCH, TRANSDERMAL 24 HOURS TRANSDERMAL DAILY
Qty: 30 PATCH | Refills: 3 | Status: SHIPPED | OUTPATIENT
Start: 2019-08-21 | End: 2019-09-11

## 2019-08-21 RX ORDER — GABAPENTIN 300 MG/1
300 CAPSULE ORAL 3 TIMES DAILY
Qty: 90 CAPSULE | Refills: 3 | Status: SHIPPED | OUTPATIENT
Start: 2019-08-21 | End: 2019-12-12

## 2019-08-21 RX ORDER — NITROGLYCERIN 0.4 MG/1
TABLET SUBLINGUAL
Qty: 25 TABLET | Refills: 3 | Status: SHIPPED | OUTPATIENT
Start: 2019-08-21

## 2019-08-21 RX ORDER — LISINOPRIL 2.5 MG/1
2.5 TABLET ORAL NIGHTLY
Status: DISCONTINUED | OUTPATIENT
Start: 2019-08-22 | End: 2019-08-21 | Stop reason: HOSPADM

## 2019-08-21 RX ORDER — L. ACIDOPHILUS/L.BULGARICUS 1MM CELL
2 TABLET ORAL 3 TIMES DAILY
Qty: 120 TABLET | Refills: 1 | Status: SHIPPED | OUTPATIENT
Start: 2019-08-21

## 2019-08-21 RX ORDER — LISINOPRIL 2.5 MG/1
2.5 TABLET ORAL 2 TIMES DAILY
Qty: 30 TABLET | Refills: 3 | Status: SHIPPED | OUTPATIENT
Start: 2019-08-21 | End: 2019-08-21 | Stop reason: HOSPADM

## 2019-08-21 RX ADMIN — ACETAMINOPHEN 650 MG: 325 TABLET ORAL at 10:54

## 2019-08-21 RX ADMIN — PROVIDONE IODINE: 7.5 STICK TOPICAL at 10:49

## 2019-08-21 RX ADMIN — GABAPENTIN 300 MG: 300 CAPSULE ORAL at 10:35

## 2019-08-21 RX ADMIN — MENTHOL AND METHYL SALICYLATE: 7.6; 29 OINTMENT TOPICAL at 10:44

## 2019-08-21 RX ADMIN — ASPIRIN 81 MG: 81 TABLET ORAL at 10:43

## 2019-08-21 RX ADMIN — NYSTATIN 500000 UNITS: 500000 SUSPENSION ORAL at 10:41

## 2019-08-21 RX ADMIN — DOCUSATE SODIUM 100 MG: 100 CAPSULE, LIQUID FILLED ORAL at 10:43

## 2019-08-21 RX ADMIN — LACTOBACILLUS TAB 2 TABLET: TAB at 15:11

## 2019-08-21 RX ADMIN — MENTHOL AND METHYL SALICYLATE: 7.6; 29 OINTMENT TOPICAL at 15:14

## 2019-08-21 RX ADMIN — LACTOBACILLUS TAB 2 TABLET: TAB at 10:42

## 2019-08-21 RX ADMIN — GLYCOPYRROLATE AND FORMOTEROL FUMARATE 2 PUFF: 9; 4.8 AEROSOL, METERED RESPIRATORY (INHALATION) at 05:49

## 2019-08-21 RX ADMIN — NYSTATIN 500000 UNITS: 500000 SUSPENSION ORAL at 15:11

## 2019-08-21 RX ADMIN — PANTOPRAZOLE SODIUM 40 MG: 40 TABLET, DELAYED RELEASE ORAL at 06:24

## 2019-08-21 RX ADMIN — ENOXAPARIN SODIUM 40 MG: 40 INJECTION SUBCUTANEOUS at 10:35

## 2019-08-21 RX ADMIN — PROVIDONE IODINE: 7.5 STICK TOPICAL at 15:15

## 2019-08-21 RX ADMIN — GABAPENTIN 300 MG: 300 CAPSULE ORAL at 15:10

## 2019-08-21 RX ADMIN — LISINOPRIL 2.5 MG: 2.5 TABLET ORAL at 10:42

## 2019-08-21 RX ADMIN — VITAMIN D, TAB 1000IU (100/BT) 1000 UNITS: 25 TAB at 15:11

## 2019-08-21 ASSESSMENT — ENCOUNTER SYMPTOMS
COLOR CHANGE: 0
TROUBLE SWALLOWING: 0
WHEEZING: 0
CHEST TIGHTNESS: 0
STRIDOR: 0
EYES NEGATIVE: 1
BLOOD IN STOOL: 0
NAUSEA: 0
SHORTNESS OF BREATH: 1
SHORTNESS OF BREATH: 0
VOMITING: 0
COUGH: 0

## 2019-08-21 ASSESSMENT — PAIN SCALES - GENERAL: PAINLEVEL_OUTOF10: 4

## 2019-08-21 NOTE — PROGRESS NOTES
Neurology Daily Progress Note  Name: Everette Lea  Age: 76 y.o. Gender: female  CodeStatus: Full Code  Allergies: No Known Allergies    Chief Complaint:No chief complaint on file. Primary Care Provider: JULIANNA Meyers CNP  InpatientTreatment Team: Treatment Team: Attending Provider: Cristian Holloway DO; Consulting Physician: Ana Robb MD; Consulting Physician: Hayden Longoria MD; Consulting Physician: Angelica Sims MD; Consulting Physician: Clovis Lord MD; Consulting Physician: Ramiro Morgan MD; Consulting Physician: Oswaldo Syed MD; Registered Nurse: Chinmay Reddy, RN; Occupational Therapist: Radha Torre OTR/L; Registered Nurse: Za Webster, PADMINI; Occupational Therapist Assistant: MARI Howe  Admission Date: 8/11/2019      HPI Pt seen and examined for neuro follow up for cerebellar tumor with mass effect s/p craniotomy on 8/6. A&O x3, NAD, cooperative. Pt with global ataxia, improved. She has c/o intermittent CP, overall improved. CTA chest neg PE but does show 4.2 x 4.5 cm right mediastinal mass, increased in size. Cardiology, pulmonology and oncology following. No acute complaints or concerns. Vitals:    08/21/19 1035   BP: (!) 109/54   Pulse: 69   Resp:    Temp:    SpO2:       Review of Systems   Constitutional: Negative for appetite change, chills, fatigue and fever. HENT: Negative for hearing loss and trouble swallowing. Eyes: Negative for visual disturbance. Respiratory: Negative for cough, chest tightness, shortness of breath and wheezing. Cardiovascular: Positive for chest pain. Negative for palpitations and leg swelling. Gastrointestinal: Negative for nausea and vomiting. Skin: Negative for color change and rash. Neurological: Negative for dizziness, tremors, seizures, syncope, facial asymmetry, speech difficulty, weakness, light-headedness, numbness and headaches.    Psychiatric/Behavioral: Negative for agitation, Povidone-Iodine   Topical TID    cyanocobalamin  1,000 mcg Intramuscular Weekly    vitamin D  50,000 Units Oral Weekly    vitamin D  1,000 Units Oral Lunch    lactobacillus acidophilus  2 tablet Oral TID    analgesic ointment   Topical TID    gabapentin  300 mg Oral TID    glycopyrrolate-formoterol  2 puff Inhalation BID    lidocaine  3 patch Transdermal Daily    nicotine  1 patch Transdermal Daily    pantoprazole  40 mg Oral QAM AC    pravastatin  40 mg Oral Daily     PRN Meds: benzocaine-menthol, oxyCODONE-acetaminophen, nitroGLYCERIN, nystatin, stomahesive in petrolatum, acetaminophen, magnesium hydroxide, ondansetron, sodium chloride flush, albuterol sulfate HFA, oxyCODONE-acetaminophen, tiZANidine    Labs:   No results for input(s): WBC, HGB, HCT, PLT in the last 72 hours. No results for input(s): NA, K, CL, CO2, BUN, CREATININE, CALCIUM, PHOS in the last 72 hours. Invalid input(s): MAGNES  No results for input(s): AST, ALT, BILIDIR, BILITOT, ALKPHOS in the last 72 hours. No results for input(s): INR in the last 72 hours. No results for input(s): Nola Harjinder in the last 72 hours. Urinalysis:   Lab Results   Component Value Date    NITRU Negative 08/12/2019    WBCUA 0-2 07/30/2019    BACTERIA Rare 07/30/2019    RBCUA 0-2 07/30/2019    BLOODU Negative 08/12/2019    SPECGRAV 1.011 08/12/2019    GLUCOSEU Negative 08/12/2019       Radiology:   Most recent    EEG No procedure found. MRI of Brain   Results for orders placed during the hospital encounter of 07/30/19   MRI BRAIN W WO CONTRAST    Narrative EXAMINATION:  MRI BRAIN WITHOUT AND WITH IV CONTRAST    CLINICAL HISTORY:  HEADACHE, DIZZINESS, NAUSEA, DISEQUILIBRIUM WITH UNSTEADY GAIT AND WEAKNESS IN BOTH LOWER EXTREMITIES, RECENT ABNORMAL CT BRAIN    COMPARISON:  CTA HEAD FROM 7/30/2019    TECHNIQUE:  Multisequence/multiplane MRI brain obtained without and with IV injection of 15 mL of MultiHance.      FINDINGS:  The MRI brain without and with IV contrast demonstrates the following:    Posterior fossa: There is an approximately 2.5 x 2 x 2 cm inhomogeneously enhancing midline cerebellar mass found posterior to the fourth ventricle with associated cerebral edema. There appears to be a cystic component to this cerebellar tumor. Also,   there is some paramagnetic signal within the tumor suggesting a hemorrhagic component to the tumor mass. The MRI findings are compatible with an intra-axial posterior fossa tumor and the differential considerations are primarily between a metastasis or   less likely a hemangioblastoma. The tumor is not causing significant extrinsic \"mass effect \" on the fourth ventricle or aqueductal stenosis and there is no hydrocephalus. There is no other enhancing lesion or tumor in the posterior fossa or in the   supratentorial brain. Supratentorial brain: There are punctate and patchy T2 hyperintense foci in the white matter of both cerebral hemispheres which are likely a manifestation of small vessel disease. There is no restricted diffusion or MRI evidence of an acute or subacute   ischemic focus in the brain. There is a mild degree of cerebral atrophy which is appropriate for the patient's age. The remainder of the supratentorial brain appears unremarkable. Impression 1. APPROXIMATELY 2.5 X 2 X 2 CM INHOMOGENEOUSLY ENHANCING MIDLINE CEREBELLAR MASS WHICH APPEARS TO HAVE A CYSTIC COMPONENT AND A HEMORRHAGIC COMPONENT. MRI FINDINGS ARE COMPATIBLE WITH AN INTRA-AXIAL POSTERIOR FOSSA TUMOR AND DIFFERENTIAL CONSIDERATIONS   ARE PRIMARILY BETWEEN A METASTASIS OR LESS LIKELY A HEMANGIOBLASTOMA. 2. NO OTHER ENHANCING LESION IN THE POSTERIOR FOSSA OR IN THE SUPRATENTORIAL BRAIN. THERE IS A MILD DEGREE OF CEREBRAL ATROPHY AND WHITE MATTER T2 HYPERINTENSE FOCI WHICH ARE LIKELY REPRESENT A MANIFESTATION OF SMALL VESSEL DISEASE. NO ACUTE ISCHEMIC   FOCUS IN THE BRAIN. No results found for this or any previous visit.

## 2019-08-21 NOTE — FLOWSHEET NOTE
Patient and daughter got the discharge instructions and all belongings. Patient was told to call Dr. Olya Mireles regarding an appointment because there were scheduling conflicts with Dr. Gabriella Griffin.

## 2019-08-21 NOTE — DISCHARGE INSTR - DIET

## 2019-08-21 NOTE — PROGRESS NOTES
Physical Therapy  Facility/Department: [facility] Discharge note    NAME: Eugenio Campo  : 1945  MRN: [id number]    Date of discharge:    Pts family report to  that they are comfortable assisting pt at this level of need      Past Medical History:   Diagnosis Date    Back pain, chronic     Pt  is with pain management    Chronic kidney disease     COPD [facility]chronic obstructive pulmonary disease) ([facility]     Coronary artery disease involving native coronary artery of native heart without angina pectoris 3/19/2017    Dyslipidemia 2016    Essential hypertension 2016    Factor V deficiency ([facility]     H/O blood clots     Headache     Hypertension     Syncope 2016    Tobacco abuse 2016     Past Surgical History:   Procedure Laterality Date    BREAST BIOPSY Right     CORONARY ANGIOPLASTY WITH STENT PLACEMENT      CRANIOTOMY N/A 2019    SUBOCCIPITAL CRANIOTOMY FOR TUMOR AND VENTRICULOSTOMY performed by Claire Casillas MD at [facility]       Restrictions  Restrictions/Precautions  Restrictions/Precautions: Fall Risk    Objective    Bed mobility  Bridging: Stand by assistance  Rolling to Left: Modified independent  Rolling to Right: Modified independent  Supine to Sit: Modified independent  Sit to Supine: Modified independent  Scooting: Modified independent  Comment: Hospital bed: flat and without rails. Needs increased time and effort with task.       Transfers  Sit to Stand: Modified independent  Stand to sit: Modified independent  Bed to Chair: Modified independent  Car Transfer: Modified independent  Comment: min assist with commode transfers    Ambulation  Ambulation?: Yes  Ambulation 1  Surface: carpet  Device: Rollator  Other Apparatus: O2(3 - 4 L tested)  Assistance: Modified Independent, Supervision  Quality of Gait: steady without LOB, dyspnea upon exertion, decreased speed   Gait Deviations: Slow Connie, Increased MARY ANN, Decreased step length  Distance: 50'   Comments: SpO2 dropping into the 80's with pt concentrating on deep breathing. Pt recovers quickly after sitting down. Ambulation 2  Surface - 2: ramp  Device 2: Rolling Walker  Assistance 2: Supervision  Quality of Gait 2: Steady gait with good control on ramp  Distance: patient ambulated short distances to focus on O2 line management     Stairs/Curb  Stairs?: Yes  Stairs  # Steps : 4  Stairs Height: 6\"  Rails: Right ascending  Curbs: 4\"  Device: Hand Held Assist  Assistance: Contact guard assistance  Comment: Non-reciprocal.   Pt's SpO2 at 93% after task and seated. Outcomes Measures:  Hawthorne Balance Score: 45       Pt/ family education/training: Pt family indicates they are comfortable assisting pt at this level. Pt educated on need to walk short distances due to respiratory status. Reminders ofr this are needed.  Pt is indep in HEP    Assessment: Pt has met all goals except for gait distance      LTG established:  Long term goal 1: indep with transfers (met)  Long term goal 2: ambulate 150 ft ww or cane supervision (met at 50' gait: limited by desaturation of O2)  Long term goal 3: pt to navigate 4 steps with supervision assist (met)  Long term goal 4: pt to score 14/56 or greater for Hawthorne testing (met)  Long term goal 5: indep bed mobility (met)    Discharge Plan: d/c to home - PT recommended however family declined        Electronically signed by Jeramy Cheung PT on 8/21/2019 at 5:11 PM

## 2019-08-21 NOTE — PROGRESS NOTES
activity:     Days per week: 0 days     Minutes per session: 0 min    Stress: Only a little   Relationships    Social connections:     Talks on phone: More than three times a week     Gets together: More than three times a week     Attends Sabianism service: More than 4 times per year     Active member of club or organization: No     Attends meetings of clubs or organizations: Never     Relationship status:     Intimate partner violence:     Fear of current or ex partner: No     Emotionally abused: No     Physically abused: No     Forced sexual activity: No   Other Topics Concern    None   Social History Narrative         Lives With: Family  (2 dtrs-works full-time afternoon shift the other does not both are helpful)    Type of Home: House  One level    Home Access: Stairs to enter without rails - Number of Steps: 4    Bathroom Shower/Tub: Walk-in shower    Bathroom Equipment: Shower chair    Home Equipment: Rolling walker, 4 wheeled walker, Quad cane    Receives Help From: Family    ADL Assistance: Independent    Ambulation Assistance: Independent(quad cane most of the time)    Transfer Assistance: Independent    Active : No    Occupation: Retired    Her hobbies are reading classic literature especially works by the Nathaniel sisters       Subjective/HPI pt seen in [facility]. Doing well with walking no cp breathing is c omfortable    EKG:         Review of Systems:   Review of Systems   Constitutional: Negative. Negative for diaphoresis and fatigue. HENT: Negative. Eyes: Negative. Respiratory: Positive for shortness of breath. Negative for cough, chest tightness, wheezing and stridor. Cardiovascular: Negative for palpitations and leg swelling. Gastrointestinal: Negative for blood in stool and nausea. Genitourinary: Negative. Musculoskeletal: Positive for arthralgias and gait problem. Skin: Negative. Neurological: Positive for weakness.  Negative for dizziness, syncope and Priority: Low    Brain mass [G93.9] 07/30/2019     Priority: Low    SSS (sick sinus syndrome) ([facility] [I49.5] 02/11/2019     Priority: Low    Presence of permanent cardiac pacemaker [Z95.0] 02/11/2019     Priority: Low    Age-related osteoporosis without current pathological fracture [M81.0] 02/11/2019     Priority: Low    Idiopathic chronic pancreatitis ([facility] [K86.1] 01/08/2019     Priority: Low    Hiatal hernia [K44.9] 01/08/2019     Priority: Low    PAD (peripheral artery disease) ([facility] [I73.9] 11/15/2018     Priority: Low    VTE (venous thromboembolism) [I82.90] 11/08/2018     Priority: Low    CKD (chronic kidney disease), stage III ([facility] [N18.3] 11/08/2018     Priority: Low    MGUS (monoclonal gammopathy of unknown significance) [D47.2] 11/08/2018     Priority: Low    Lung nodule [R91.1] 05/14/2018     Priority: Low    Hypotension [I95.9]      Priority: Low    Factor V Leiden mutation ([facility] [D68.51] 05/15/2015     Priority: Low    Tobacco dependence [F17.200] 08/25/2014     Priority: Low    DVT (deep venous thrombosis) ([facility] [I82.409] 08/01/2014     Priority: Low    Lumbosacral spondylosis without myelopathy [M47.817] 07/20/2011     Priority: Low    Facet arthropathy, lumbar [M47.816] 07/20/2011     Priority: Low        Assessment/Plan:  1. CP- does not appear to be cardiac. It is reproducible. Troponin x3 negative  2. SHER and Hypoxia  3. Metastatic Brain Ca s/p resection  4. Factor V Leiden Def w recurrent DVT- had been on Xarelto but since stopped due to above  5. Right Diaphragmatic mass- w/u in progress. 6. CAD stable no Angina. Tolerating Lovenox. Will add low dose ASA appropriate. 7. PPM  8. HTN -BP on low side.  Decrease BB to 12.5 bid and change Lisinopril 2.5 bid to QHS  9. F/u 2-3 weeks        Electronically signed by Willian Fong MD on 8/21/2019 at 10:56 AM

## 2019-08-21 NOTE — DISCHARGE SUMMARY
Subjective: The patient complains of severe  acute balance deficits especially truncal secondary to cerebellar lesion in her brain with recent evacuation of the tumor versus via craniotomy at [facility] partially relieved by medications, PT, OT, speech and language pathology and exacerbated by exertion. She continues to complain of neck pain I will order nystatin swish and follow swallow. I am concerned that some of the neck pain could not only be coming from the craniotomy with some pressure and lymphadenopathy from her metastatic tumors. On a positive note her functional status has improved and she is ready for discharge and she is eager to go home and get on with her cancer treatment. [id number] Course: The patient was admitted to the Rehabilitation Unit to address ADL and mobility deficits. The patient was enrolled in acute PT, OT program.  Weekly team meetings were held to assess functional progress toward their goals. The patient's medical issues were addressed. The patient progressed in the rehab program and is now ready for discharge. Refer to FIM scores summary report for detailed functional status. Greater than 35 minutes was spent on coordinating patients discharge including follow-up care, medications and patient/family education. Increase time needed in order to monitor for overuse or abuse of her medications or sedation from her medications including pain medications of high risk including Percocet and coordinate palliative care services after discharge. She also need to coordinate heme-onc services with possible radiation. ROS x10: The patient also complains of severely impaired mobility and activities of daily living. Otherwise no new problems with vision, hearing, nose, mouth, throat, dermal, cardiovascular, GI, , pulmonary, musculoskeletal, psychiatric or neurological. See Rehab H&P on Rehab chart dated .        Vital signs:  /71   Pulse 69 Height: 6\"  Rails: Right ascending  Curbs: 4\"  Device: Hand Held Assist  Assistance: Contact guard assistance  Comment: Non-reciprocal.   Pt's SpO2 at 93% after task and seated. FIMS: Bed, Chair, Wheel Chair: 6 - Requires assistive device (slide rail)  Walk: 2 - Maximal Assistance Requires up to Maximal Assistance AND requires assistance of one person to walk between  feet (Patient performs 25-49% of locomotion effort or goes between  feet)  Distance Walked: 50'  Distance Traveled in Wheel Chair: 0  Stairs: 2- Maximal Assistance Performs 25-49% of the effort to go up and down 4 to 6 stairs and requires the assistance of one person only,  , Assessment: Pt completed rest of testing. Pt with good balance but continues to require Supervision secondary to dyspnea, O2 levels and risk of pushing self further than necessary. Occupational therapy: FIMS:  Eatin - Feeds self with setup/supervision/cues and/or requires only setup/supervision/cues to perform tube feedings  Groomin - Requires setup/cues to do all tasks  Bathin - Able to bathe all 10 areas with setup/sup/cues  Dressing-Upper: 5 - Requires setup/supervision/cues and/or requires assist with presthesis/brace only  Dressing-Lower: 5 - Requires setup/supervision/cues and/or staff applies TEDS/prosthesis/brace only  Toiletin - Requires device (grab bar/walker/etc.)  Toilet Transfer: 5 - Requires setup/supervision/cues  Tub Transfer: 0 - Activity does not occur  Shower Transfer: 0 - Activity does not occur(Pt washed up at sink ),  , Assessment: Pt demonstrated SOB during functional tasks at supervision level. Pt continues to benefit from skilled OT services to maximize pt independence and safety with ADLs and transfers.      Speech therapy: FIMS: Comprehension: 6 - Complex ideas 90% or device (hearing aid or glasses- if patient is primarily a visual learner)  Expression: 6 - Device used to express complex ideas/needs  Social polypharmacy and weaning to the lowest effective dose of pain medications and eliminating the concomitant use of benzodiazepines. I see no medications of concern. I see no habits of combining sedatives and narcotics. Complex Physical Medicine & Rehab Issues Assess & Plan:   1. 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 Functional has improved and medical status have stabilized status post acute rehab at [facility] discharge 8/21/2019 home with her daughters with help from her daughters and home health care PT OT RN aide  I do not feel that she will need a device at discharge. SP team meeting  Monday to assess progress towards goals, discuss and address social, psychological and medical comorbidities and to address difficulties they may be having progressing in therapy. Patient and family education is in progress. The patient is to follow-up with their family physician after discharge. Complex Active General Medical Issues that complicate care Assess & Plan:    1. Essential hypertension, Dyslipidemia,   Presence of permanent cardiac pacemaker, SSS (sick sinus syndrome),   PAD (peripheral artery disease) -vital signs every shift, dose and titrate cardiac medications to include  2. Lung nodule, COPD (chronic obstructive pulmonary disease) -consult hematology oncology CTA showed no metastatic lesions-CEA has been normal. Stained positive for CA 19. 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 S/p suboccipital craniotomy. Mass medial of right diaphragm. On rehab. She will need radiation therapy to brain after rehab. Send pathology to determine cancer primary. 3.   Age-related osteoporosis without current pathological fracture-CTA of chest showed no bony lesions  4. CKD (chronic kidney disease), stage III -limit toxic medications  5.    History of DVT (deep venous thrombosis), history of VTE (venous thromboembolism),   Factor V Leiden mutation  -complex blood thinner regimen because of her the vascular nature of her brain tumor and recent brain surgery, blood thinners currently on hold statin complicated work-up to rule out PE rule out DVT is negative CTA is also negative  6. Facet arthropathy, lumbar,   Lumbosacral spondylosis without myelopathy-add Lidoderm, add Percocet, use lowest effective dose-increase pain medication and is patient's recent work-up for pleuritic chest pain has shown that this is likely arthritic in nature  7. Hiatal hernia and history of idiopathic chronic pancreatitis -treat constipation consider proton pump inhibitor. 8.   MGUS (monoclonal gammopathy of unknown significance)-reconsult hematology oncology and recheck CBC  9. Tobacco dependence-stop smoking counseling-stop smoking counseling  10. Ataxy cerebellar -focus on balance and PT OT  11. Sore throat-humidified air, at night Lyndsey Delgado, monitor for cervical adenopathy.         Osman Ahuja D.O., PM&R     Attending    [address] [facility]

## 2019-09-09 DIAGNOSIS — G93.89 BRAIN MASS: Primary | ICD-10-CM

## 2019-09-09 DIAGNOSIS — C34.31 MALIGNANT NEOPLASM OF LOWER LOBE OF RIGHT LUNG (HCC): ICD-10-CM

## 2019-09-11 ENCOUNTER — OFFICE VISIT (OUTPATIENT)
Dept: CARDIOLOGY CLINIC | Age: 74
End: 2019-09-11
Payer: MEDICARE

## 2019-09-11 VITALS
OXYGEN SATURATION: 97 % | SYSTOLIC BLOOD PRESSURE: 112 MMHG | DIASTOLIC BLOOD PRESSURE: 62 MMHG | HEART RATE: 86 BPM | RESPIRATION RATE: 16 BRPM

## 2019-09-11 DIAGNOSIS — I49.5 SSS (SICK SINUS SYNDROME) (HCC): ICD-10-CM

## 2019-09-11 DIAGNOSIS — C71.9 MALIGNANT NEOPLASM OF BRAIN, UNSPECIFIED LOCATION (HCC): ICD-10-CM

## 2019-09-11 DIAGNOSIS — I10 ESSENTIAL HYPERTENSION: Primary | ICD-10-CM

## 2019-09-11 DIAGNOSIS — I49.5 SSS (SICK SINUS SYNDROME) (HCC): Primary | ICD-10-CM

## 2019-09-11 DIAGNOSIS — I82.90 VTE (VENOUS THROMBOEMBOLISM): ICD-10-CM

## 2019-09-11 DIAGNOSIS — I73.9 PAD (PERIPHERAL ARTERY DISEASE) (HCC): ICD-10-CM

## 2019-09-11 DIAGNOSIS — D68.2 FACTOR V DEFICIENCY (HCC): ICD-10-CM

## 2019-09-11 DIAGNOSIS — I25.10 CORONARY ARTERY DISEASE INVOLVING NATIVE CORONARY ARTERY OF NATIVE HEART WITHOUT ANGINA PECTORIS: ICD-10-CM

## 2019-09-11 PROCEDURE — 3017F COLORECTAL CA SCREEN DOC REV: CPT | Performed by: INTERNAL MEDICINE

## 2019-09-11 PROCEDURE — G8427 DOCREV CUR MEDS BY ELIG CLIN: HCPCS | Performed by: INTERNAL MEDICINE

## 2019-09-11 PROCEDURE — 1111F DSCHRG MED/CURRENT MED MERGE: CPT | Performed by: INTERNAL MEDICINE

## 2019-09-11 PROCEDURE — 99214 OFFICE O/P EST MOD 30 MIN: CPT | Performed by: INTERNAL MEDICINE

## 2019-09-11 PROCEDURE — 4040F PNEUMOC VAC/ADMIN/RCVD: CPT | Performed by: INTERNAL MEDICINE

## 2019-09-11 PROCEDURE — 1090F PRES/ABSN URINE INCON ASSESS: CPT | Performed by: INTERNAL MEDICINE

## 2019-09-11 PROCEDURE — G8399 PT W/DXA RESULTS DOCUMENT: HCPCS | Performed by: INTERNAL MEDICINE

## 2019-09-11 PROCEDURE — G8417 CALC BMI ABV UP PARAM F/U: HCPCS | Performed by: INTERNAL MEDICINE

## 2019-09-11 PROCEDURE — 1123F ACP DISCUSS/DSCN MKR DOCD: CPT | Performed by: INTERNAL MEDICINE

## 2019-09-11 PROCEDURE — G8598 ASA/ANTIPLAT THER USED: HCPCS | Performed by: INTERNAL MEDICINE

## 2019-09-11 PROCEDURE — 4004F PT TOBACCO SCREEN RCVD TLK: CPT | Performed by: INTERNAL MEDICINE

## 2019-09-11 ASSESSMENT — ENCOUNTER SYMPTOMS
EYES NEGATIVE: 1
GASTROINTESTINAL NEGATIVE: 1
SHORTNESS OF BREATH: 0
WHEEZING: 0
BLOOD IN STOOL: 0
STRIDOR: 0
COUGH: 0
CHEST TIGHTNESS: 0
RESPIRATORY NEGATIVE: 1
NAUSEA: 0

## 2019-09-13 ENCOUNTER — HOSPITAL ENCOUNTER (OUTPATIENT)
Dept: CT IMAGING | Age: 74
Discharge: HOME | End: 2019-09-15
Payer: MEDICARE

## 2019-09-13 DIAGNOSIS — C79.31 SECONDARY MALIGNANT NEOPLASM OF BRAIN AND SPINAL CORD (HCC): ICD-10-CM

## 2019-09-13 DIAGNOSIS — C79.49 SECONDARY MALIGNANT NEOPLASM OF BRAIN AND SPINAL CORD (HCC): ICD-10-CM

## 2019-09-13 PROCEDURE — 3430000000 HC RX DIAGNOSTIC RADIOPHARMACEUTICAL: Performed by: INTERNAL MEDICINE

## 2019-09-13 PROCEDURE — 78815 PET IMAGE W/CT SKULL-THIGH: CPT

## 2019-09-13 PROCEDURE — A9552 F18 FDG: HCPCS | Performed by: INTERNAL MEDICINE

## 2019-09-13 RX ORDER — FLUDEOXYGLUCOSE F 18 200 MCI/ML
16.45 INJECTION, SOLUTION INTRAVENOUS
Status: COMPLETED | OUTPATIENT
Start: 2019-09-13 | End: 2019-09-13

## 2019-09-13 RX ADMIN — FLUDEOXYGLUCOSE F 18 16.45 MILLICURIE: 200 INJECTION, SOLUTION INTRAVENOUS at 09:51

## 2019-09-17 ENCOUNTER — HOSPITAL ENCOUNTER (OUTPATIENT)
Dept: MRI IMAGING | Age: 74
Discharge: HOME | End: 2019-09-19
Payer: MEDICARE

## 2019-09-17 VITALS
DIASTOLIC BLOOD PRESSURE: 79 MMHG | SYSTOLIC BLOOD PRESSURE: 165 MMHG | OXYGEN SATURATION: 92 % | RESPIRATION RATE: 16 BRPM | HEART RATE: 57 BPM

## 2019-09-17 DIAGNOSIS — C34.31 MALIGNANT NEOPLASM OF LOWER LOBE OF RIGHT LUNG (HCC): ICD-10-CM

## 2019-09-17 DIAGNOSIS — G93.89 BRAIN MASS: ICD-10-CM

## 2019-09-17 PROCEDURE — 6360000004 HC RX CONTRAST MEDICATION: Performed by: RADIOLOGY

## 2019-09-17 PROCEDURE — 70553 MRI BRAIN STEM W/O & W/DYE: CPT

## 2019-09-17 PROCEDURE — A9577 INJ MULTIHANCE: HCPCS | Performed by: RADIOLOGY

## 2019-09-17 RX ORDER — 0.9 % SODIUM CHLORIDE 0.9 %
10 VIAL (ML) INJECTION ONCE
Status: DISCONTINUED | OUTPATIENT
Start: 2019-09-17 | End: 2019-09-20 | Stop reason: HOSPADM

## 2019-09-17 RX ADMIN — GADOBENATE DIMEGLUMINE 15 ML: 529 INJECTION, SOLUTION INTRAVENOUS at 13:28

## 2019-09-17 NOTE — FLOWSHEET NOTE
Pt here today for MRI Brain. Scheduled for 0915 this AM but [employer] representative did not arrive until 1100. Verbal reassurance and multiple apologies given to patient who was angry. [id number] Pacemaker representative put her pacemaker into stand-by mode. MRI vitals monitor applied, VSS. Patient positioned supine on MRI table. Patient educated and provided with panic squeeze ball. MRI started. 1143 Pt tolerating well. VSS. Aziza spoke to patient through intercom, pt states she is doing fine. 1155 Pt states over intercom that she is doing \"alright. \" VSS.    1245 Difficulty starting IV for contrast admin. After 5 attempts, #22 in Left hand established by Dat Clark. Contrast administered. IV left in at this time. Pt tolerating well. 1249 Pt asking how much longer, Aziza MRI tech informed her about 7 more minutes. 1252 Monitor reading HR of 130's down to 20's, intercom used to communicate and Evert Therese communicating well and stating \"I'm alright. \"  Watching patient visually as well as on monitor very closely. 1254 Last scan is 3 minutes, patient informed and she states she is \"OK. \"    MRI complete. Pt brought out to [facility] pacemaker rep. Pt polite and smiling. Will take patient to her daughter when rep done with patient.

## 2019-09-18 ENCOUNTER — OFFICE VISIT (OUTPATIENT)
Dept: PULMONOLOGY | Age: 74
End: 2019-09-18
Payer: MEDICARE

## 2019-09-18 VITALS
DIASTOLIC BLOOD PRESSURE: 70 MMHG | HEART RATE: 62 BPM | WEIGHT: 133.8 LBS | SYSTOLIC BLOOD PRESSURE: 136 MMHG | RESPIRATION RATE: 15 BRPM | OXYGEN SATURATION: 97 % | BODY MASS INDEX: 28.09 KG/M2 | TEMPERATURE: 97.9 F | HEIGHT: 58 IN

## 2019-09-18 DIAGNOSIS — R07.82 INTERCOSTAL PAIN: ICD-10-CM

## 2019-09-18 DIAGNOSIS — J44.9 CHRONIC OBSTRUCTIVE PULMONARY DISEASE, UNSPECIFIED COPD TYPE (HCC): Primary | ICD-10-CM

## 2019-09-18 DIAGNOSIS — F17.200 SMOKING: ICD-10-CM

## 2019-09-18 DIAGNOSIS — E66.09 CLASS 1 OBESITY DUE TO EXCESS CALORIES WITHOUT SERIOUS COMORBIDITY WITH BODY MASS INDEX (BMI) OF 30.0 TO 30.9 IN ADULT: ICD-10-CM

## 2019-09-18 DIAGNOSIS — C34.31 MALIGNANT NEOPLASM OF LOWER LOBE OF RIGHT LUNG (HCC): ICD-10-CM

## 2019-09-18 PROCEDURE — 4040F PNEUMOC VAC/ADMIN/RCVD: CPT | Performed by: INTERNAL MEDICINE

## 2019-09-18 PROCEDURE — 1123F ACP DISCUSS/DSCN MKR DOCD: CPT | Performed by: INTERNAL MEDICINE

## 2019-09-18 PROCEDURE — G8427 DOCREV CUR MEDS BY ELIG CLIN: HCPCS | Performed by: INTERNAL MEDICINE

## 2019-09-18 PROCEDURE — 1111F DSCHRG MED/CURRENT MED MERGE: CPT | Performed by: INTERNAL MEDICINE

## 2019-09-18 PROCEDURE — 4004F PT TOBACCO SCREEN RCVD TLK: CPT | Performed by: INTERNAL MEDICINE

## 2019-09-18 PROCEDURE — G8399 PT W/DXA RESULTS DOCUMENT: HCPCS | Performed by: INTERNAL MEDICINE

## 2019-09-18 PROCEDURE — G8598 ASA/ANTIPLAT THER USED: HCPCS | Performed by: INTERNAL MEDICINE

## 2019-09-18 PROCEDURE — G8417 CALC BMI ABV UP PARAM F/U: HCPCS | Performed by: INTERNAL MEDICINE

## 2019-09-18 PROCEDURE — 99214 OFFICE O/P EST MOD 30 MIN: CPT | Performed by: INTERNAL MEDICINE

## 2019-09-18 PROCEDURE — 3023F SPIROM DOC REV: CPT | Performed by: INTERNAL MEDICINE

## 2019-09-18 PROCEDURE — 1090F PRES/ABSN URINE INCON ASSESS: CPT | Performed by: INTERNAL MEDICINE

## 2019-09-18 PROCEDURE — 3017F COLORECTAL CA SCREEN DOC REV: CPT | Performed by: INTERNAL MEDICINE

## 2019-09-18 PROCEDURE — G8926 SPIRO NO PERF OR DOC: HCPCS | Performed by: INTERNAL MEDICINE

## 2019-09-18 NOTE — PROGRESS NOTES
Subjective:     Nilton Bartlett is a 76 y.o. female who complains today of:     Chief Complaint   Patient presents with    Follow-up     COPD, Lung Nodules       HPI  Patient presents for COPD and lung cancer    Patient report dyspnea on exertion she does not have portable oxygen at home would like to have inogen, has dry cough with midsternal chest pain mainly when she coughs and on palpation, no fever, weight is stable, no lower extremity swelling, no nausea no vomiting, no postnasal drip, no heartburn, she is scheduled to see radiation oncology on the 23rd of this month. Allergies:  Patient has no known allergies. Past Medical History:   Diagnosis Date    Arthritis     Back pain, chronic     Pt  is with pain management    [facility]     Chronic back pain     Chronic kidney disease     COPD (chronic obstructive pulmonary disease) (HCC)     COPD (chronic obstructive pulmonary disease) (HCC)     Coronary artery disease     Coronary artery disease involving native coronary artery of native heart without angina pectoris 3/19/2017    Dyslipidemia 11/20/2016    Emphysema lung ([facility]     Essential hypertension 11/20/2016    Factor V deficiency ([facility]     H/O blood clots     Headache     Hypertension     Hypertension     Restless leg syndrome     Syncope 11/20/2016    Tobacco abuse 11/20/2016     Past Surgical History:   Procedure Laterality Date    BRAIN SURGERY      BREAST BIOPSY Right     CARDIAC PACEMAKER PLACEMENT      CORONARY ANGIOPLASTY WITH STENT PLACEMENT      CRANIOTOMY N/A 8/6/2019    SUBOCCIPITAL CRANIOTOMY FOR TUMOR AND VENTRICULOSTOMY performed by Amrita Galeana MD at [facility] OR     Family History   Problem Relation Age of Onset    High Blood Pressure Mother     Arthritis Father     High Blood Pressure Father      Social History     Socioeconomic History    Marital status:       Spouse name: Not on file    Number of children: 3    Years of education: Not on file

## 2019-09-20 ENCOUNTER — HOSPITAL ENCOUNTER (OUTPATIENT)
Dept: RADIATION ONCOLOGY | Age: 74
Discharge: HOME | End: 2019-09-20
Payer: MEDICARE

## 2019-09-20 VITALS
RESPIRATION RATE: 16 BRPM | TEMPERATURE: 97.9 F | WEIGHT: 138 LBS | HEIGHT: 58 IN | SYSTOLIC BLOOD PRESSURE: 132 MMHG | BODY MASS INDEX: 28.97 KG/M2 | OXYGEN SATURATION: 93 % | HEART RATE: 62 BPM | DIASTOLIC BLOOD PRESSURE: 68 MMHG

## 2019-09-20 DIAGNOSIS — C71.6 MALIGNANT NEOPLASM OF CEREBELLUM (HCC): ICD-10-CM

## 2019-09-20 DIAGNOSIS — C34.31 MALIGNANT NEOPLASM OF LOWER LOBE OF RIGHT LUNG (HCC): Primary | ICD-10-CM

## 2019-09-20 PROCEDURE — 99213 OFFICE O/P EST LOW 20 MIN: CPT | Performed by: RADIOLOGY

## 2019-09-20 PROCEDURE — 77334 RADIATION TREATMENT AID(S): CPT | Performed by: RADIOLOGY

## 2019-09-20 PROCEDURE — 77290 THER RAD SIMULAJ FIELD CPLX: CPT | Performed by: RADIOLOGY

## 2019-09-20 PROCEDURE — 77399 UNLISTED PX MED RADJ PHYSICS: CPT | Performed by: RADIOLOGY

## 2019-09-20 RX ORDER — METHYLPREDNISOLONE 4 MG/1
TABLET ORAL
Qty: 1 KIT | Refills: 1 | Status: SHIPPED | OUTPATIENT
Start: 2019-09-20 | End: 2019-09-26

## 2019-09-20 RX ORDER — ONDANSETRON HYDROCHLORIDE 8 MG/1
8 TABLET, FILM COATED ORAL EVERY 12 HOURS PRN
Qty: 20 TABLET | Refills: 1 | Status: SHIPPED | OUTPATIENT
Start: 2019-09-20 | End: 2019-09-30

## 2019-09-20 NOTE — H&P
SOCIAL HISTORY:   Social History     Tobacco Use   Smoking Status Former Smoker    Packs/day: 1.00    Years: 57.00    Pack years: 57.00    Types: Cigarettes    Start date: 1960    Last attempt to quit: 2019    Years since quittin.1   Smokeless Tobacco Never Used     Social History     Substance and Sexual Activity   Alcohol Use Yes    Comment: seldom       Review Of Systems  General: POSITVE for headache(front)No fevers, no unexplained weight loss, no chills, no night sweats. No fatigue. No malaise. Appetite  is stable. Eyes: No vision changes, eye pain, or drainage. Ears, nose, mouth, throat: No tinnitus. No ear pain. Normal hearing ability. No runny nose, no sinus problems. No epistaxis. No mouth  sores. no gingival bleeding, no toothache. No sore throat, no odynophagia. Respiratory: No cough, no hemoptysis. No shortness of breath. No exercise intolerance. No wheeze. Cardiovascular: No chest pain, no palpitations. No faintness, no orthopnea. Breast: No rashes, no itching. No changes in skin color. No lumps, no masses. No pain, no soreness. No discharge. Gastrointestinal: No abdominal pain. No nausea, no vomiting, no diarrhea, no constipation. No hematemesis, no melena, no BRBPR, no  hematochezia. No dysphagia. No anorexia. No changes in taste. Genitourinary: No pelvic pain. No kidney stones. No dysuria, no incontinence, no nocturia. No hematuria. Norma Orozco : 1945 ([id number]) Page 1 of 3  Musculoskeletal: No muscle aches, no cramps, no change in tone. No weakness. No misalignment. No joint stiffness, no joint swelling. Neurologic: No limb weakness. No parasthesias, no numbness. No dizziness. No headaches. No tremors. Psychiatric: No Insomnia. No anxiety, no depression, no delusions. Hematologic/Lymphatic: No lymphadenopathy. No bruises, no purpura, no petechiae, no bleeding. No anticoagulant/antiplatelet use. No  history of blood transfusion.    Endocrine: No

## 2019-09-23 DIAGNOSIS — Z95.0 PRESENCE OF PERMANENT CARDIAC PACEMAKER: Primary | ICD-10-CM

## 2019-09-24 PROCEDURE — 77338 DESIGN MLC DEVICE FOR IMRT: CPT | Performed by: RADIOLOGY

## 2019-09-24 PROCEDURE — 77293 RESPIRATOR MOTION MGMT SIMUL: CPT | Performed by: RADIOLOGY

## 2019-09-24 PROCEDURE — 77301 RADIOTHERAPY DOSE PLAN IMRT: CPT | Performed by: RADIOLOGY

## 2019-09-24 PROCEDURE — 77300 RADIATION THERAPY DOSE PLAN: CPT | Performed by: RADIOLOGY

## 2019-09-25 PROCEDURE — 77370 RADIATION PHYSICS CONSULT: CPT | Performed by: RADIOLOGY

## 2019-09-25 PROCEDURE — 77300 RADIATION THERAPY DOSE PLAN: CPT | Performed by: RADIOLOGY

## 2019-09-25 PROCEDURE — 77334 RADIATION TREATMENT AID(S): CPT | Performed by: RADIOLOGY

## 2019-09-25 PROCEDURE — 77295 3-D RADIOTHERAPY PLAN: CPT | Performed by: RADIOLOGY

## 2019-09-26 ENCOUNTER — HOSPITAL ENCOUNTER (OUTPATIENT)
Dept: CARDIOLOGY | Age: 74
Discharge: HOME | End: 2019-09-26
Payer: MEDICARE

## 2019-09-26 PROCEDURE — 93280 PM DEVICE PROGR EVAL DUAL: CPT

## 2019-09-26 PROCEDURE — 77280 THER RAD SIMULAJ FIELD SMPL: CPT | Performed by: RADIOLOGY

## 2019-09-26 PROCEDURE — 77470 SPECIAL RADIATION TREATMENT: CPT | Performed by: RADIOLOGY

## 2019-09-26 PROCEDURE — 77331 SPECIAL RADIATION DOSIMETRY: CPT | Performed by: RADIOLOGY

## 2019-09-26 PROCEDURE — 77386 HC NTSTY MODUL RAD TX DLVR CPLX: CPT | Performed by: RADIOLOGY

## 2019-09-27 PROCEDURE — 77386 HC NTSTY MODUL RAD TX DLVR CPLX: CPT | Performed by: RADIOLOGY

## 2019-09-27 PROCEDURE — 99212 OFFICE O/P EST SF 10 MIN: CPT | Performed by: RADIOLOGY

## 2019-09-27 RX ORDER — LEVETIRACETAM 500 MG/1
500 TABLET ORAL 2 TIMES DAILY
COMMUNITY

## 2019-09-30 PROCEDURE — 77386 HC NTSTY MODUL RAD TX DLVR CPLX: CPT | Performed by: RADIOLOGY

## 2019-10-01 ENCOUNTER — HOSPITAL ENCOUNTER (OUTPATIENT)
Dept: RADIATION ONCOLOGY | Age: 74
Discharge: HOME | End: 2019-10-01
Payer: MEDICARE

## 2019-10-01 PROCEDURE — 77386 HC NTSTY MODUL RAD TX DLVR CPLX: CPT | Performed by: RADIOLOGY

## 2019-10-01 PROCEDURE — 99212 OFFICE O/P EST SF 10 MIN: CPT | Performed by: RADIOLOGY

## 2019-10-02 VITALS
SYSTOLIC BLOOD PRESSURE: 122 MMHG | RESPIRATION RATE: 16 BRPM | HEART RATE: 112 BPM | DIASTOLIC BLOOD PRESSURE: 63 MMHG | OXYGEN SATURATION: 98 % | TEMPERATURE: 96.2 F | WEIGHT: 133.2 LBS | BODY MASS INDEX: 27.84 KG/M2

## 2019-10-02 PROCEDURE — 77331 SPECIAL RADIATION DOSIMETRY: CPT | Performed by: RADIOLOGY

## 2019-10-02 PROCEDURE — 77336 RADIATION PHYSICS CONSULT: CPT | Performed by: RADIOLOGY

## 2019-10-02 PROCEDURE — 77280 THER RAD SIMULAJ FIELD SMPL: CPT | Performed by: RADIOLOGY

## 2019-10-02 PROCEDURE — 99211 OFF/OP EST MAY X REQ PHY/QHP: CPT | Performed by: RADIOLOGY

## 2019-10-02 PROCEDURE — 77386 HC NTSTY MODUL RAD TX DLVR CPLX: CPT | Performed by: RADIOLOGY

## 2019-10-02 PROCEDURE — 77373 STRTCTC BDY RAD THER TX DLVR: CPT | Performed by: RADIOLOGY

## 2019-10-02 RX ORDER — METHYLPREDNISOLONE 4 MG/1
4 TABLET ORAL SEE ADMIN INSTRUCTIONS
COMMUNITY
End: 2019-12-12 | Stop reason: ALTCHOICE

## 2019-10-03 ENCOUNTER — HOSPITAL ENCOUNTER (OUTPATIENT)
Dept: CARDIOLOGY | Age: 74
Discharge: HOME | End: 2019-10-03
Payer: MEDICARE

## 2019-10-03 PROCEDURE — 77417 THER RADIOLOGY PORT IMAGE(S): CPT | Performed by: RADIOLOGY

## 2019-10-03 PROCEDURE — 77373 STRTCTC BDY RAD THER TX DLVR: CPT | Performed by: RADIOLOGY

## 2019-10-03 PROCEDURE — 99211 OFF/OP EST MAY X REQ PHY/QHP: CPT | Performed by: RADIOLOGY

## 2019-10-03 PROCEDURE — 93280 PM DEVICE PROGR EVAL DUAL: CPT

## 2019-10-04 PROCEDURE — 77417 THER RADIOLOGY PORT IMAGE(S): CPT | Performed by: RADIOLOGY

## 2019-10-04 PROCEDURE — 77373 STRTCTC BDY RAD THER TX DLVR: CPT | Performed by: RADIOLOGY

## 2019-10-04 PROCEDURE — 99211 OFF/OP EST MAY X REQ PHY/QHP: CPT | Performed by: RADIOLOGY

## 2019-10-07 PROCEDURE — 99211 OFF/OP EST MAY X REQ PHY/QHP: CPT | Performed by: RADIOLOGY

## 2019-10-07 PROCEDURE — 77373 STRTCTC BDY RAD THER TX DLVR: CPT | Performed by: RADIOLOGY

## 2019-10-07 PROCEDURE — 77417 THER RADIOLOGY PORT IMAGE(S): CPT | Performed by: RADIOLOGY

## 2019-10-08 PROCEDURE — 77417 THER RADIOLOGY PORT IMAGE(S): CPT | Performed by: RADIOLOGY

## 2019-10-08 PROCEDURE — 77373 STRTCTC BDY RAD THER TX DLVR: CPT | Performed by: RADIOLOGY

## 2019-10-08 PROCEDURE — 99211 OFF/OP EST MAY X REQ PHY/QHP: CPT | Performed by: RADIOLOGY

## 2019-10-08 PROCEDURE — 77336 RADIATION PHYSICS CONSULT: CPT | Performed by: RADIOLOGY

## 2019-10-14 ENCOUNTER — TELEPHONE (OUTPATIENT)
Dept: PULMONOLOGY | Age: 74
End: 2019-10-14

## 2019-10-14 ENCOUNTER — HOSPITAL ENCOUNTER (OUTPATIENT)
Dept: CARDIOLOGY | Age: 74
Discharge: HOME | End: 2019-10-14
Payer: MEDICARE

## 2019-10-14 PROCEDURE — 93280 PM DEVICE PROGR EVAL DUAL: CPT

## 2019-10-25 ENCOUNTER — TELEPHONE (OUTPATIENT)
Dept: PULMONOLOGY | Age: 74
End: 2019-10-25

## 2019-10-30 ENCOUNTER — HOSPITAL ENCOUNTER (OUTPATIENT)
Dept: INFUSION THERAPY | Age: 74
Setting detail: INFUSION SERIES
Discharge: HOME | End: 2019-10-30
Payer: MEDICARE

## 2019-10-30 VITALS
RESPIRATION RATE: 18 BRPM | DIASTOLIC BLOOD PRESSURE: 70 MMHG | HEART RATE: 83 BPM | TEMPERATURE: 98 F | SYSTOLIC BLOOD PRESSURE: 151 MMHG

## 2019-10-30 PROCEDURE — 2580000003 HC RX 258: Performed by: INTERNAL MEDICINE

## 2019-10-30 PROCEDURE — 6360000002 HC RX W HCPCS: Performed by: INTERNAL MEDICINE

## 2019-10-30 PROCEDURE — 96413 CHEMO IV INFUSION 1 HR: CPT

## 2019-10-30 PROCEDURE — 2580000003 HC RX 258

## 2019-10-30 RX ORDER — SODIUM CHLORIDE 9 MG/ML
INJECTION, SOLUTION INTRAVENOUS
Status: COMPLETED
Start: 2019-10-30 | End: 2019-10-30

## 2019-10-30 RX ADMIN — SODIUM CHLORIDE 200 MG: 9 INJECTION, SOLUTION INTRAVENOUS at 12:47

## 2019-10-30 RX ADMIN — SODIUM CHLORIDE 250 ML: 9 INJECTION, SOLUTION INTRAVENOUS at 12:34

## 2019-10-30 NOTE — FLOWSHEET NOTE
Patient to the floor ambulatory with a walker. Vital signs taken. Denies any discomfort. Call light within reach. Family at side. Call light within reach. Education given on Keytruda.

## 2019-12-10 ENCOUNTER — HOSPITAL ENCOUNTER (OUTPATIENT)
Dept: MRI IMAGING | Age: 74
Discharge: HOME | End: 2019-12-12
Payer: MEDICARE

## 2019-12-10 ENCOUNTER — HOSPITAL ENCOUNTER (OUTPATIENT)
Dept: CT IMAGING | Age: 74
Discharge: HOME | End: 2019-12-12
Payer: MEDICARE

## 2019-12-10 VITALS
RESPIRATION RATE: 16 BRPM | DIASTOLIC BLOOD PRESSURE: 78 MMHG | HEART RATE: 85 BPM | OXYGEN SATURATION: 93 % | SYSTOLIC BLOOD PRESSURE: 177 MMHG

## 2019-12-10 DIAGNOSIS — C71.6 MALIGNANT NEOPLASM OF CEREBELLUM (HCC): ICD-10-CM

## 2019-12-10 DIAGNOSIS — C34.31 MALIGNANT NEOPLASM OF LOWER LOBE OF RIGHT LUNG (HCC): ICD-10-CM

## 2019-12-10 PROCEDURE — 6360000004 HC RX CONTRAST MEDICATION: Performed by: RADIOLOGY

## 2019-12-10 PROCEDURE — A9577 INJ MULTIHANCE: HCPCS | Performed by: RADIOLOGY

## 2019-12-10 PROCEDURE — 71260 CT THORAX DX C+: CPT

## 2019-12-10 PROCEDURE — 70553 MRI BRAIN STEM W/O & W/DYE: CPT

## 2019-12-10 RX ORDER — 0.9 % SODIUM CHLORIDE 0.9 %
10 VIAL (ML) INJECTION ONCE
Status: DISCONTINUED | OUTPATIENT
Start: 2019-12-10 | End: 2019-12-13 | Stop reason: HOSPADM

## 2019-12-10 RX ADMIN — IOPAMIDOL 75 ML: 755 INJECTION, SOLUTION INTRAVENOUS at 12:31

## 2019-12-10 RX ADMIN — GADOBENATE DIMEGLUMINE 15 ML: 529 INJECTION, SOLUTION INTRAVENOUS at 12:09

## 2019-12-12 ENCOUNTER — HOSPITAL ENCOUNTER (OUTPATIENT)
Dept: RADIATION ONCOLOGY | Age: 74
Discharge: HOME | End: 2019-12-12
Payer: MEDICARE

## 2019-12-12 ENCOUNTER — HOSPITAL ENCOUNTER (OUTPATIENT)
Dept: RADIATION ONCOLOGY | Age: 74
Discharge: HOME | End: 2019-12-12
Attending: RADIOLOGY
Payer: MEDICARE

## 2019-12-12 VITALS
BODY MASS INDEX: 28.63 KG/M2 | OXYGEN SATURATION: 94 % | WEIGHT: 137 LBS | SYSTOLIC BLOOD PRESSURE: 149 MMHG | TEMPERATURE: 97.4 F | DIASTOLIC BLOOD PRESSURE: 83 MMHG | HEART RATE: 73 BPM | RESPIRATION RATE: 16 BRPM

## 2019-12-12 PROCEDURE — 77334 RADIATION TREATMENT AID(S): CPT | Performed by: RADIOLOGY

## 2019-12-12 PROCEDURE — 77290 THER RAD SIMULAJ FIELD CPLX: CPT | Performed by: RADIOLOGY

## 2019-12-12 PROCEDURE — 99212 OFFICE O/P EST SF 10 MIN: CPT | Performed by: RADIOLOGY

## 2019-12-12 RX ORDER — GABAPENTIN 600 MG/1
600 TABLET ORAL 3 TIMES DAILY
COMMUNITY

## 2019-12-12 RX ORDER — DEXAMETHASONE 1 MG
1 TABLET ORAL 2 TIMES DAILY WITH MEALS
Qty: 60 TABLET | Refills: 0 | Status: SHIPPED | OUTPATIENT
Start: 2019-12-12 | End: 2020-01-08 | Stop reason: DRUGHIGH

## 2019-12-13 PROCEDURE — 77300 RADIATION THERAPY DOSE PLAN: CPT | Performed by: RADIOLOGY

## 2019-12-13 PROCEDURE — 77334 RADIATION TREATMENT AID(S): CPT | Performed by: RADIOLOGY

## 2019-12-13 PROCEDURE — 77295 3-D RADIOTHERAPY PLAN: CPT | Performed by: RADIOLOGY

## 2019-12-30 ENCOUNTER — HOSPITAL ENCOUNTER (OUTPATIENT)
Dept: CARDIOLOGY | Age: 74
Discharge: HOME | End: 2019-12-30
Payer: MEDICARE

## 2019-12-30 PROCEDURE — 93280 PM DEVICE PROGR EVAL DUAL: CPT

## 2020-01-02 ENCOUNTER — HOSPITAL ENCOUNTER (OUTPATIENT)
Dept: RADIATION ONCOLOGY | Age: 75
Discharge: HOME | End: 2020-01-02
Attending: RADIOLOGY
Payer: MEDICARE

## 2020-01-02 PROCEDURE — 77280 THER RAD SIMULAJ FIELD SMPL: CPT | Performed by: RADIOLOGY

## 2020-01-06 ENCOUNTER — HOSPITAL ENCOUNTER (OUTPATIENT)
Dept: RADIATION ONCOLOGY | Age: 75
End: 2020-01-06
Attending: RADIOLOGY
Payer: MEDICARE

## 2020-01-07 ENCOUNTER — HOSPITAL ENCOUNTER (OUTPATIENT)
Dept: RADIATION ONCOLOGY | Age: 75
End: 2020-01-07
Attending: RADIOLOGY
Payer: MEDICARE

## 2020-01-08 ENCOUNTER — HOSPITAL ENCOUNTER (OUTPATIENT)
Dept: RADIATION ONCOLOGY | Age: 75
Discharge: HOME | End: 2020-01-08
Attending: RADIOLOGY
Payer: MEDICARE

## 2020-01-08 PROCEDURE — 77402 RADIATION TX DELIVERY LVL 1: CPT | Performed by: RADIOLOGY

## 2020-01-08 RX ORDER — DEXAMETHASONE 1 MG
1 TABLET ORAL EVERY 6 HOURS
Qty: 120 TABLET | Refills: 0 | Status: SHIPPED | OUTPATIENT
Start: 2020-01-08 | End: 2020-02-07

## 2020-01-09 ENCOUNTER — HOSPITAL ENCOUNTER (OUTPATIENT)
Dept: RADIATION ONCOLOGY | Age: 75
Discharge: HOME | End: 2020-01-09
Attending: RADIOLOGY
Payer: MEDICARE

## 2020-01-09 PROCEDURE — 77402 RADIATION TX DELIVERY LVL 1: CPT | Performed by: RADIOLOGY

## 2020-01-10 ENCOUNTER — HOSPITAL ENCOUNTER (OUTPATIENT)
Dept: RADIATION ONCOLOGY | Age: 75
Discharge: HOME | End: 2020-01-10
Attending: RADIOLOGY
Payer: MEDICARE

## 2020-01-10 PROCEDURE — 77402 RADIATION TX DELIVERY LVL 1: CPT | Performed by: RADIOLOGY

## 2020-01-13 ENCOUNTER — APPOINTMENT (OUTPATIENT)
Dept: RADIATION ONCOLOGY | Age: 75
End: 2020-01-13
Attending: RADIOLOGY
Payer: MEDICARE

## 2020-01-13 ENCOUNTER — HOSPITAL ENCOUNTER (OUTPATIENT)
Dept: CARDIOLOGY | Age: 75
Discharge: HOME | End: 2020-01-13
Payer: MEDICARE

## 2020-01-13 PROCEDURE — 77402 RADIATION TX DELIVERY LVL 1: CPT | Performed by: RADIOLOGY

## 2020-01-13 PROCEDURE — 93280 PM DEVICE PROGR EVAL DUAL: CPT

## 2020-01-14 ENCOUNTER — HOSPITAL ENCOUNTER (OUTPATIENT)
Dept: RADIATION ONCOLOGY | Age: 75
End: 2020-01-14
Attending: RADIOLOGY
Payer: MEDICARE

## 2020-01-14 PROCEDURE — 77417 THER RADIOLOGY PORT IMAGE(S): CPT | Performed by: RADIOLOGY

## 2020-01-14 PROCEDURE — 77402 RADIATION TX DELIVERY LVL 1: CPT | Performed by: RADIOLOGY

## 2020-01-14 PROCEDURE — 77336 RADIATION PHYSICS CONSULT: CPT | Performed by: RADIOLOGY

## 2020-01-14 PROCEDURE — 99212 OFFICE O/P EST SF 10 MIN: CPT | Performed by: RADIOLOGY

## 2020-01-15 ENCOUNTER — HOSPITAL ENCOUNTER (OUTPATIENT)
Dept: RADIATION ONCOLOGY | Age: 75
End: 2020-01-15
Attending: RADIOLOGY
Payer: MEDICARE

## 2020-01-15 PROCEDURE — 77402 RADIATION TX DELIVERY LVL 1: CPT | Performed by: RADIOLOGY

## 2020-01-16 ENCOUNTER — APPOINTMENT (OUTPATIENT)
Dept: RADIATION ONCOLOGY | Age: 75
End: 2020-01-16
Attending: RADIOLOGY
Payer: MEDICARE

## 2020-01-16 PROCEDURE — 77402 RADIATION TX DELIVERY LVL 1: CPT | Performed by: RADIOLOGY

## 2020-01-17 ENCOUNTER — HOSPITAL ENCOUNTER (OUTPATIENT)
Dept: RADIATION ONCOLOGY | Age: 75
Discharge: HOME | End: 2020-01-17
Attending: RADIOLOGY
Payer: MEDICARE

## 2020-01-17 PROCEDURE — 77402 RADIATION TX DELIVERY LVL 1: CPT | Performed by: RADIOLOGY

## 2020-01-20 ENCOUNTER — APPOINTMENT (OUTPATIENT)
Dept: RADIATION ONCOLOGY | Age: 75
End: 2020-01-20
Attending: RADIOLOGY
Payer: MEDICARE

## 2020-01-20 PROCEDURE — 77402 RADIATION TX DELIVERY LVL 1: CPT | Performed by: RADIOLOGY

## 2020-01-21 ENCOUNTER — HOSPITAL ENCOUNTER (OUTPATIENT)
Dept: RADIATION ONCOLOGY | Age: 75
Discharge: HOME | End: 2020-01-21
Attending: RADIOLOGY
Payer: MEDICARE

## 2020-01-21 PROCEDURE — 77336 RADIATION PHYSICS CONSULT: CPT | Performed by: RADIOLOGY

## 2020-01-21 PROCEDURE — 99213 OFFICE O/P EST LOW 20 MIN: CPT | Performed by: RADIOLOGY

## 2020-01-21 PROCEDURE — 77402 RADIATION TX DELIVERY LVL 1: CPT | Performed by: RADIOLOGY

## 2020-01-22 ENCOUNTER — HOSPITAL ENCOUNTER (OUTPATIENT)
Dept: CARDIOLOGY | Age: 75
Discharge: HOME | End: 2020-01-22
Payer: MEDICARE

## 2020-01-22 PROCEDURE — 93280 PM DEVICE PROGR EVAL DUAL: CPT

## 2020-01-27 ENCOUNTER — HOSPITAL ENCOUNTER (OUTPATIENT)
Dept: RADIATION ONCOLOGY | Age: 75
Discharge: HOME | End: 2020-01-27
Attending: RADIOLOGY
Payer: MEDICARE

## 2020-01-27 VITALS
OXYGEN SATURATION: 98 % | WEIGHT: 137 LBS | BODY MASS INDEX: 27.67 KG/M2 | SYSTOLIC BLOOD PRESSURE: 176 MMHG | TEMPERATURE: 96.7 F | HEART RATE: 60 BPM | RESPIRATION RATE: 18 BRPM | DIASTOLIC BLOOD PRESSURE: 78 MMHG

## 2020-01-27 PROCEDURE — 99211 OFF/OP EST MAY X REQ PHY/QHP: CPT | Performed by: RADIOLOGY

## 2020-01-27 NOTE — H&P
RADIATION FOLLOW UP NOTE  DATE OF VISIT: 1/27/2020    DIAGNOSIS & STAGING: NSCLC recurrent brain mets    PREVIOUS TREATMENT:  Treatment Dates:                    1/8-1/21/20     Site: Dose: Total # fractions: Dose per fraction: Energy: Technique   Whole brain 30 Gy 10 3 Gy 6 MV photons Opposed laterals      Concurrent therapy:             decadron 2 mg q am     Treatment Dates:                    10/2, 10/3, 10/4, 10/7, 108/19     Site: Dose: Total # fractions: Dose per fraction: Energy: Prescription Isodose: Technique   Posterior fossa tumor bed 30 Gy 5 6 Gy 6FFF MV photons 88.5% Isodose line SBRT/VMATIMRT/IGRT      Treatment Dates:                    9/26-10/2/19     Site: Dose: Total # fractions: Dose per fraction: Energy: Prescription Isodose: Technique   RLL and med 20 Gy 5 4 Gy 10 MV photons 96.7% Isodose line VMAT/IMRT/IGRT        CURRENT COMPLAINT: Here today for followup 1 week s/p completion and possible steroid taper    INTERVAL HISTORY: Jayden Roblero complains mostly of 10/10 low back pain today. She saw Dr Ha Daly earlier today and a PETCT is ordered for further evaluation. She has developed bilateral hand tremors and increased leg weakness since completion. She remains  On decadron 2 mg BID. She is here for followup and possible steroid taper. She has some fuzzy vision and nagging headache first thing in the am prior to taking steroids, unchanged.      PAST MEDICAL HISTORY:   Past Medical History:   Diagnosis Date    Arthritis     Back pain, chronic     Pt  is with pain management    Cancer ([address]     lung w brain metastasis    Chronic back pain     degenerative disc disease    Chronic kidney disease     COPD (chronic obstructive pulmonary disease) (HCC)     COPD (chronic obstructive pulmonary disease) (HCC)     Coronary artery disease     Coronary artery disease involving native coronary artery of native heart without angina pectoris 3/19/2017    Dyslipidemia 11/20/2016    Emphysema lung ([address]     inhaler Inhale 2 puffs into the lungs every 6 hours as needed for Wheezing   Yes Historical Provider, MD   aspirin 81 MG chewable tablet Take 1 tablet by mouth daily 16  Yes JULIANNA Schmidt CNP   isosorbide mononitrate (IMDUR) 30 MG extended release tablet Take 1 tablet by mouth daily 16  Yes JULIANNA Schmidt CNP   pravastatin (PRAVACHOL) 40 MG tablet Take 40 mg by mouth daily   Yes Historical Provider, MD   levETIRAcetam (KEPPRA) 500 MG tablet Take 500 mg by mouth 2 times daily    Erika Dimas, DO   nitroGLYCERIN (NITROSTAT) 0.4 MG SL tablet up to max of 3 total doses. If no relief after 1 dose, call 911. 19   Inessa Montana, DO   lactobacillus acidophilus [facility]) Take 2 tablets by mouth 3 times daily 19   Inessa Montana, DO   zoledronic acid (RECLAST) 5 MG/100ML SOLN Infuse 5 mg intravenously 11/15/18   Historical Provider, MD       I have personally reviewed and reconciled the medications listed. FAMILY HISTORY:   Family History   Problem Relation Age of Onset    High Blood Pressure Mother     Cancer Mother     Arthritis Father     High Blood Pressure Father     Cancer Sister        SOCIAL HISTORY:   Social History     Tobacco Use   Smoking Status Current Some Day Smoker    Packs/day: 1.00    Years: 57.00    Pack years: 57.00    Types: Cigarettes    Start date: 1960    Last attempt to quit: 2019    Years since quittin.4   Smokeless Tobacco Never Used     Social History     Substance and Sexual Activity   Alcohol Use Yes    Comment: seldom       Review Of Systems:   Pain Score:   Pain Score (1-10): 10  Pain Location: lower back  Pain Duration: intermittent   Pain Management/Control: percocet      Is pain affecting your ability to take care of yourself or move throughout your home? []? Yes   [x]? No    General: Weakness and Fatigue, pt states overall feels quite well  Patient has gained weight []? Yes   [x]?

## 2020-02-03 ENCOUNTER — HOSPITAL ENCOUNTER (OUTPATIENT)
Dept: RADIATION ONCOLOGY | Age: 75
Discharge: HOME | End: 2020-02-03
Attending: RADIOLOGY
Payer: MEDICARE

## 2020-02-03 VITALS
DIASTOLIC BLOOD PRESSURE: 58 MMHG | RESPIRATION RATE: 18 BRPM | HEART RATE: 103 BPM | SYSTOLIC BLOOD PRESSURE: 107 MMHG | WEIGHT: 136 LBS | BODY MASS INDEX: 27.42 KG/M2 | OXYGEN SATURATION: 96 % | HEIGHT: 59 IN | TEMPERATURE: 96.1 F

## 2020-02-03 PROCEDURE — 99212 OFFICE O/P EST SF 10 MIN: CPT | Performed by: RADIOLOGY

## 2020-02-03 NOTE — ONCOLOGY
NURSING ASSESSMENT     Date: 2/3/2020        Patient Name: Justyna Mckeon     YOB: 1945      Age:  76 y.o. MRN: [id number]     Chaperone [] Yes   [x] No      Pain Score:   Pain Score (1-10): 10   Pain Location: lower back   Pain Duration: constant   Pain Management/Control: percocet and gabapentin and muscle relaxer      Is pain affecting your ability to take care of yourself or move throughout your home? [x] Yes   [] No    General: Weakness and Fatigue, especially leg weakness  Patient has gained weight [] Yes   [x] No  Patient has lost weight [] Yes   [x] No  How much weight in pounds and over what length of time:     Eyes (Ophthalmic): No Change     Skin (Dermatological): No Problems     ENT: No Problems     Respiratory: SHER and Oxygen     Cardiovascular: edema right lower extremity       Device   [x] Yes   [] No   Copy of Card Obtained [] Yes   [x] No    Gastrointestinal: No Problems    Genito-Urinary: No Problems     Breast: No Problems     Musculoskeletal: Back Pain, complains of leg weakness, using walker, no falls    Neurological: Vertigo      Hematological and Lymphatic: No Problems     Endocrine: No Problems     A 10-point review of systems has been conducted and pertinent positives have been   recorded. All other review of systems are negative    Was the patient admitted during the course of treatment OR within 30 days of treatment?  no

## 2020-02-03 NOTE — H&P
RADIATION FOLLOW UP NOTE  DATE OF VISIT: 1/27/2020     DIAGNOSIS & STAGING: NSCLC recurrent brain mets     PREVIOUS TREATMENT:  Treatment Dates:                    1/8-1/21/20     Site: Dose: Total # fractions: Dose per fraction: Energy: Technique   Whole brain 30 Gy 10 3 Gy 6 MV photons Opposed laterals      Concurrent therapy:             decadron 2 mg q am      Treatment Dates:                    10/2, 10/3, 10/4, 10/7, 108/19     Site: Dose: Total # fractions: Dose per fraction: Energy: Prescription Isodose: Technique   Posterior fossa tumor bed 30 Gy 5 6 Gy 6FFF MV photons 88.5% Isodose line SBRT/VMATIMRT/IGRT      Treatment Dates:                    9/26-10/2/19     Site: Dose: Total # fractions: Dose per fraction: Energy: Prescription Isodose: Technique   RLL and med 20 Gy 5 4 Gy 10 MV photons 96.7% Isodose line VMAT/IMRT/IGRT         CURRENT COMPLAINT: Here today for followup 1 week s/p completion and possible steroid taper     INTERVAL HISTORY: Eugenia Benson complains mostly of 10/10 low back pain today. She saw Dr Deepa Montemayor last week and a PETCT is ordered for further evaluation on Friday. Sill unsure of compliance with steroids. Here today for taper.  Occasional not every day headaches tylenol relieves     PAST MEDICAL HISTORY:   Past Medical History        Past Medical History:   Diagnosis Date    Arthritis      Back pain, chronic       Pt  is with pain management    Cancer ([address]       lung w brain metastasis    Chronic back pain       degenerative disc disease    Chronic kidney disease      COPD (chronic obstructive pulmonary disease) (HCC)      COPD (chronic obstructive pulmonary disease) (HCC)      Coronary artery disease      Coronary artery disease involving native coronary artery of native heart without angina pectoris 3/19/2017    Dyslipidemia 11/20/2016    [facility]      Essential hypertension 11/20/2016    Factor V deficiency ([address]      H/O blood clots      Headache      Hypertension      Hypertension      Restless leg syndrome      Syncope 11/20/2016    Tobacco abuse 11/20/2016            PAST SURGICAL HISTORY:  Past Surgical History   Past Surgical History:   Procedure Laterality Date    BRAIN SURGERY        BREAST BIOPSY Right      CARDIAC PACEMAKER PLACEMENT        CORONARY ANGIOPLASTY WITH STENT PLACEMENT        CRANIOTOMY N/A 8/6/2019     SUBOCCIPITAL CRANIOTOMY FOR TUMOR AND VENTRICULOSTOMY performed by Eduar Flores MD at [address]:   No Known Allergies     I have personally reviewed and reconciled the allergies listed.     CURRENT MEDICATIONS:   Home Medications           Prior to Admission medications    Medication Sig Start Date End Date Taking?  Authorizing Provider   dexamethasone (DECADRON) 1 MG tablet Take 1 tablet by mouth every 6 hours 1/8/20 2/7/20 Yes Nell Hercules MD   gabapentin (NEURONTIN) 600 MG tablet Take 600 mg by mouth 3 times daily.     Yes Historical Provider, MD   fluticasone-umeclidin-vilant (TRELEGY ELLIPTA) 100-62.5-25 MCG/INH AEPB Inhale 1 puff into the lungs daily 10/21/19   Yes Hue Lindsey MD   OXYGEN Portable Oxygen on 2L and Nocturnal Oxygen on 2L 9/18/19   Yes Hue Lindsey MD   lisinopril (PRINIVIL;ZESTRIL) 2.5 MG tablet Take 1 tablet by mouth nightly 8/22/19   Yes Amari Ashley MD   metoprolol tartrate (LOPRESSOR) 25 MG tablet Take 0.5 tablets by mouth 2 times daily 8/21/19   Yes Amari Ashley MD   vitamin D (CHOLECALCIFEROL) 5000 units CAPS capsule Take 5,000 Units by mouth 2/7/19   Yes Historical Provider, MD   omeprazole (PRILOSEC) 20 MG delayed release capsule Take 20 mg by mouth 2/7/19   Yes Historical Provider, MD   oxyCODONE-acetaminophen (PERCOCET) 5-325 MG per tablet Take 1 tablet by mouth.     Yes Historical Provider, MD   tiZANidine (ZANAFLEX) 4 MG tablet Take 4 mg by mouth every 8 hours as needed     Yes Historical Provider, MD   albuterol sulfate  (90 Base) MCG/ACT inhaler Inhale 2 puffs especially leg weakness  Patient has gained weight []? Yes   [x]? No  Patient has lost weight []? Yes   [x]? No  How much weight in pounds and over what length of time:      Eyes (Ophthalmic): No Change                Skin (Dermatological): No Problems                ENT: No Problems                Respiratory: SHER and Oxygen                Cardiovascular: edema right lower extremity                             Device   [x]? Yes   []? No              Copy of Card Obtained []? Yes   [x]? No     Gastrointestinal: No Problems     Genito-Urinary: No Problems                Breast: No Problems                Musculoskeletal: Back Pain, complains of leg weakness, using walker, no falls     Neurological: Vertigo                            Hematological and Lymphatic: No Problems                Endocrine: No Problems         A 10-point review of systems has been conducted and pertinent positives have been   recorded. All other review of systems are negative.         ECOG PERFORMANCE STATUS: 2     VITAL SIGNS:   Vitals       Vitals:     2/3/20 14:39   BP: (!) 107/58   Pulse: 103   Resp: 18   Temp: 96.1 °F (35.6 °C)   SpO2: 96%   Weight: 136 lb (61.7 kg)            PHYSICAL EXAMINATION:  Constitutional: No acute distress.  awake, alert, cooperative     HEENT:  Normocephalic, without obvious abnormality, atraumatic, EOMI, external ears without lesions, oral pharynx with moist mucus membranes, orophayrnx clear, mucous membranes moist, resolving surgical alopecia     NECK:  Supple, without supraclavicular or cervical adenopathy, thyroid symmetric, not enlarged     CARDIOVASCULAR:  Normal apical impulse, regular rate and rhythm, normal S1 and S2, no S3 or S4, and no murmur noted     LUNGS:  No increased work of breathing, good air exchange, clear to auscultation bilaterally, no crackles or wheezing     ABDOMEN:  Nontender, nondistended, normal bowel sounds, soft, no masses, no hepatosplenomegally     EXTREMITIES: NO cyanosis

## 2020-02-07 ENCOUNTER — HOSPITAL ENCOUNTER (OUTPATIENT)
Dept: CT IMAGING | Age: 75
Discharge: HOME | End: 2020-02-09
Payer: MEDICARE

## 2020-02-07 PROCEDURE — 3430000000 HC RX DIAGNOSTIC RADIOPHARMACEUTICAL: Performed by: INTERNAL MEDICINE

## 2020-02-07 PROCEDURE — 78815 PET IMAGE W/CT SKULL-THIGH: CPT

## 2020-02-07 PROCEDURE — A9552 F18 FDG: HCPCS | Performed by: INTERNAL MEDICINE

## 2020-02-07 RX ORDER — FLUDEOXYGLUCOSE F 18 200 MCI/ML
14 INJECTION, SOLUTION INTRAVENOUS
Status: COMPLETED | OUTPATIENT
Start: 2020-02-07 | End: 2020-02-07

## 2020-02-07 RX ADMIN — FLUDEOXYGLUCOSE F 18 14 MILLICURIE: 200 INJECTION, SOLUTION INTRAVENOUS at 10:40

## 2020-02-10 ENCOUNTER — HOSPITAL ENCOUNTER (OUTPATIENT)
Dept: RADIATION ONCOLOGY | Age: 75
Discharge: HOME | End: 2020-02-10
Attending: RADIOLOGY
Payer: MEDICARE

## 2020-02-10 NOTE — ONCOLOGY
RADIATION FOLLOW UP NOTE  DATE OF VISIT: 1/27/2020     DIAGNOSIS & STAGING: NSCLC recurrent brain mets     PREVIOUS TREATMENT:  Treatment Dates:                    1/8-1/21/20     Site: Dose: Total # fractions: Dose per fraction: Energy: Technique   Whole brain 30 Gy 10 3 Gy 6 MV photons Opposed laterals      Concurrent therapy:             decadron 2 mg q am      Treatment Dates:                    10/2, 10/3, 10/4, 10/7, 108/19     Site: Dose: Total # fractions: Dose per fraction: Energy: Prescription Isodose: Technique   Posterior fossa tumor bed 30 Gy 5 6 Gy 6FFF MV photons 88.5% Isodose line SBRT/VMATIMRT/IGRT      Treatment Dates:                    9/26-10/2/19     Site: Dose: Total # fractions: Dose per fraction: Energy: Prescription Isodose: Technique   RLL and med 20 Gy 5 4 Gy 10 MV photons 96.7% Isodose line VMAT/IMRT/IGRT         CURRENT COMPLAINT: Here today for followup 1 week s/p completion and possible steroid taper     INTERVAL HISTORY: Waleska still complains mostly of 10/10 low back pain today. Taking decadron 1 mg BID with no further headaches. Here today for taper. PETCT on Friday shows favorable response:  EXAMINATION: PET CT SKULL BASE TO MID THIGH       DATE AND TIME:2/7/2020 9:01 AM       CLINICAL HISTORY: Restaging lung carcinoma  C34.91 Primary lung cancer with metastasis from lung to other site, right [facility]) ICD10        RADIOPHARMACEUTICAL: 19.0 millicuries.  [id number].       TECHNIQUE:   Following intravenous injection F18-FDG and an approximately 70 minute uptake period, low dose CT and PET images of the whole body from the base of the skull to the mid thigh on a dedicated PET/CT unit with the patient in a fasted state.  A    low dose, noncontrast CT provided attenuation correction and anatomic localization of PET abnormalities.  This  CT is not designed to produce, and cannot replace, state-of-the-art diagnostic CT scans with specific imaging protocols for different    body parts and indications.        The patient's baseline plasma glucose at the time of the test was  78  mg/dl.         COMPARISON FDG PET/CT: September 13, 2019       FINDINGS:        NECK:The distribution of FDG throughout the neck is within normal limits.       CHEST: The previously noted intensely FDG avid right diaphragmatic mass is no longer demonstrated. Findings consistent with complete treatment response at this site.       There is some very mild low level FDG activity at the right base with maximum SUV of 2 that is a nonspecific finding. This does not correspond to the site of the previous diaphragmatic mass. There also is a small focus of mild FDG activity in the right    lower lobe medial to the aorta. This corresponds to a small area of groundglass opacity seen on the accompanying low dose CT images. This is best seen on image 113.       The previous retrosternal soft tissue mass is smaller in size on today's exam measuring 1.5 x 1.4 cm compared with 2.6 x 1.8 cm. There is no significant FDG activity at this site with maximum SUV of 1. This compares with a previous maximum SUV of 7.3.       The previously noted hypermetabolic right hilar nodes show no FDG avidity on today's exam as well.       ABDOMEN-PELVIS The distribution of FDG throughout the abdomen and pelvis is within normal limits. Physiologic activity is noted in the liver, renal collecting systems, bladder and GI tract.       BONES:There is normal distribution of FDG  throughout the bony skeleton with no signs of metastatic bone disease.                             Impression   SIGNIFICANT FAVORABLE RESPONSE TO COMPLETE TREATMENT RESPONSE. RIGHT DIAPHRAGMATIC MASS AND RETROSTERNAL MASS SHOW NO SIGNIFICANT FDG ACTIVITY. VERY MINIMAL NONSPECIFIC FDG UPTAKE AT THE RIGHT BASE WHICH DOES NOT CORRESPOND TO THE SITE OF THE    PREVIOUS DIAPHRAGMATIC MASS, MOST LIKELY RELATED TO POSTTREATMENT CHANGES. Neurologically nonfocal. NAD.     Taper steroids to 1/2 MG bid x 10 days then 1/2 mg q am until gone. May continue to follow with Dr Tremayne Eddy who will order surveillance scans.

## 2020-02-18 RX ORDER — LISINOPRIL 2.5 MG/1
2.5 TABLET ORAL NIGHTLY
Qty: 30 TABLET | Refills: 11 | Status: SHIPPED | OUTPATIENT
Start: 2020-02-18

## 2020-02-23 ENCOUNTER — APPOINTMENT (OUTPATIENT)
Dept: CT IMAGING | Age: 75
End: 2020-02-23
Payer: MEDICARE

## 2020-02-23 ENCOUNTER — APPOINTMENT (OUTPATIENT)
Dept: GENERAL RADIOLOGY | Age: 75
End: 2020-02-23
Payer: MEDICARE

## 2020-02-23 ENCOUNTER — HOSPITAL ENCOUNTER (OUTPATIENT)
Age: 75
Setting detail: OBSERVATION
Discharge: HOME | End: 2020-02-25
Attending: EMERGENCY MEDICINE | Admitting: INTERNAL MEDICINE
Payer: MEDICARE

## 2020-02-23 PROBLEM — R11.2 NAUSEA & VOMITING: Status: ACTIVE | Noted: 2020-02-23

## 2020-02-23 LAB
ALBUMIN SERPL-MCNC: 3 G/DL (ref 3.5–4.6)
ALP BLD-CCNC: 113 U/L (ref 40–130)
ALT SERPL-CCNC: 15 U/L (ref 0–33)
ANION GAP SERPL CALCULATED.3IONS-SCNC: 16 MEQ/L (ref 9–15)
AST SERPL-CCNC: 22 U/L (ref 0–35)
BACTERIA: ABNORMAL /HPF
BASOPHILS ABSOLUTE: 0 K/UL (ref 0–0.2)
BASOPHILS RELATIVE PERCENT: 0.5 %
BILIRUB SERPL-MCNC: 0.6 MG/DL (ref 0.2–0.7)
BILIRUBIN URINE: NEGATIVE
BLOOD, URINE: NEGATIVE
BUN BLDV-MCNC: 17 MG/DL (ref 8–23)
CALCIUM SERPL-MCNC: 9.2 MG/DL (ref 8.5–9.9)
CHLORIDE BLD-SCNC: 96 MEQ/L (ref 95–107)
CLARITY: ABNORMAL
CO2: 22 MEQ/L (ref 20–31)
COLOR: YELLOW
CREAT SERPL-MCNC: 0.95 MG/DL (ref 0.5–0.9)
EKG ATRIAL RATE: 91 BPM
EKG P AXIS: 77 DEGREES
EKG P-R INTERVAL: 130 MS
EKG Q-T INTERVAL: 372 MS
EKG QRS DURATION: 60 MS
EKG QTC CALCULATION (BAZETT): 457 MS
EKG R AXIS: 39 DEGREES
EKG T AXIS: 69 DEGREES
EKG VENTRICULAR RATE: 91 BPM
EOSINOPHILS ABSOLUTE: 0 K/UL (ref 0–0.7)
EOSINOPHILS RELATIVE PERCENT: 0.2 %
EPITHELIAL CELLS, UA: >100 /HPF (ref 0–5)
GFR AFRICAN AMERICAN: >60
GFR NON-AFRICAN AMERICAN: 57.4
GLOBULIN: 3.2 G/DL (ref 2.3–3.5)
GLUCOSE BLD-MCNC: 88 MG/DL (ref 70–99)
GLUCOSE URINE: NEGATIVE MG/DL
HCT VFR BLD CALC: 40.5 % (ref 37–47)
HEMOGLOBIN: 13.6 G/DL (ref 12–16)
KETONES, URINE: 40 MG/DL
LACTIC ACID: 1.5 MMOL/L (ref 0.5–2.2)
LEUKOCYTE ESTERASE, URINE: NEGATIVE
LIPASE: 19 U/L (ref 12–95)
LYMPHOCYTES ABSOLUTE: 0.3 K/UL (ref 1–4.8)
LYMPHOCYTES RELATIVE PERCENT: 4.4 %
MAGNESIUM: 1.9 MG/DL (ref 1.7–2.4)
MCH RBC QN AUTO: 30.5 PG (ref 27–31.3)
MCHC RBC AUTO-ENTMCNC: 33.6 % (ref 33–37)
MCV RBC AUTO: 90.8 FL (ref 82–100)
MONOCYTES ABSOLUTE: 0.7 K/UL (ref 0.2–0.8)
MONOCYTES RELATIVE PERCENT: 10.9 %
NEUTROPHILS ABSOLUTE: 5.1 K/UL (ref 1.4–6.5)
NEUTROPHILS RELATIVE PERCENT: 84 %
NITRITE, URINE: NEGATIVE
PDW BLD-RTO: 18.1 % (ref 11.5–14.5)
PH UA: 6 (ref 5–9)
PLATELET # BLD: 151 K/UL (ref 130–400)
POTASSIUM SERPL-SCNC: 3.6 MEQ/L (ref 3.4–4.9)
PROTEIN UA: 30 MG/DL
RBC # BLD: 4.46 M/UL (ref 4.2–5.4)
RBC UA: ABNORMAL /HPF (ref 0–2)
SODIUM BLD-SCNC: 134 MEQ/L (ref 135–144)
SPECIFIC GRAVITY UA: 1.02 (ref 1–1.03)
TOTAL PROTEIN: 6.2 G/DL (ref 6.3–8)
UROBILINOGEN, URINE: 1 E.U./DL
WBC # BLD: 6.1 K/UL (ref 4.8–10.8)
WBC UA: ABNORMAL /HPF (ref 0–5)

## 2020-02-23 PROCEDURE — 2580000003 HC RX 258: Performed by: NURSE PRACTITIONER

## 2020-02-23 PROCEDURE — 93005 ELECTROCARDIOGRAM TRACING: CPT | Performed by: EMERGENCY MEDICINE

## 2020-02-23 PROCEDURE — G0378 HOSPITAL OBSERVATION PER HR: HCPCS

## 2020-02-23 PROCEDURE — 71046 X-RAY EXAM CHEST 2 VIEWS: CPT

## 2020-02-23 PROCEDURE — 83605 ASSAY OF LACTIC ACID: CPT

## 2020-02-23 PROCEDURE — 70450 CT HEAD/BRAIN W/O DYE: CPT

## 2020-02-23 PROCEDURE — 96372 THER/PROPH/DIAG INJ SC/IM: CPT

## 2020-02-23 PROCEDURE — 81001 URINALYSIS AUTO W/SCOPE: CPT

## 2020-02-23 PROCEDURE — 2580000003 HC RX 258: Performed by: EMERGENCY MEDICINE

## 2020-02-23 PROCEDURE — 83735 ASSAY OF MAGNESIUM: CPT

## 2020-02-23 PROCEDURE — 85025 COMPLETE CBC W/AUTO DIFF WBC: CPT

## 2020-02-23 PROCEDURE — 99285 EMERGENCY DEPT VISIT HI MDM: CPT

## 2020-02-23 PROCEDURE — 36415 COLL VENOUS BLD VENIPUNCTURE: CPT

## 2020-02-23 PROCEDURE — 96374 THER/PROPH/DIAG INJ IV PUSH: CPT

## 2020-02-23 PROCEDURE — 80053 COMPREHEN METABOLIC PANEL: CPT

## 2020-02-23 PROCEDURE — 6360000002 HC RX W HCPCS: Performed by: EMERGENCY MEDICINE

## 2020-02-23 PROCEDURE — 83690 ASSAY OF LIPASE: CPT

## 2020-02-23 PROCEDURE — 6360000002 HC RX W HCPCS: Performed by: NURSE PRACTITIONER

## 2020-02-23 RX ORDER — SODIUM CHLORIDE, SODIUM LACTATE, POTASSIUM CHLORIDE, CALCIUM CHLORIDE 600; 310; 30; 20 MG/100ML; MG/100ML; MG/100ML; MG/100ML
INJECTION, SOLUTION INTRAVENOUS CONTINUOUS
Status: DISCONTINUED | OUTPATIENT
Start: 2020-02-23 | End: 2020-02-25 | Stop reason: HOSPADM

## 2020-02-23 RX ORDER — SODIUM CHLORIDE 0.9 % (FLUSH) 0.9 %
10 SYRINGE (ML) INJECTION EVERY 12 HOURS SCHEDULED
Status: DISCONTINUED | OUTPATIENT
Start: 2020-02-23 | End: 2020-02-25 | Stop reason: HOSPADM

## 2020-02-23 RX ORDER — ONDANSETRON 2 MG/ML
4 INJECTION INTRAMUSCULAR; INTRAVENOUS ONCE
Status: COMPLETED | OUTPATIENT
Start: 2020-02-23 | End: 2020-02-23

## 2020-02-23 RX ORDER — POLYETHYLENE GLYCOL 3350 17 G/17G
17 POWDER, FOR SOLUTION ORAL DAILY PRN
Status: DISCONTINUED | OUTPATIENT
Start: 2020-02-23 | End: 2020-02-25 | Stop reason: HOSPADM

## 2020-02-23 RX ORDER — ONDANSETRON 2 MG/ML
4 INJECTION INTRAMUSCULAR; INTRAVENOUS EVERY 6 HOURS PRN
Status: DISCONTINUED | OUTPATIENT
Start: 2020-02-23 | End: 2020-02-25 | Stop reason: HOSPADM

## 2020-02-23 RX ORDER — NICOTINE 21 MG/24HR
1 PATCH, TRANSDERMAL 24 HOURS TRANSDERMAL DAILY
Status: DISCONTINUED | OUTPATIENT
Start: 2020-02-23 | End: 2020-02-25 | Stop reason: HOSPADM

## 2020-02-23 RX ORDER — SODIUM CHLORIDE 0.9 % (FLUSH) 0.9 %
10 SYRINGE (ML) INJECTION PRN
Status: DISCONTINUED | OUTPATIENT
Start: 2020-02-23 | End: 2020-02-25 | Stop reason: HOSPADM

## 2020-02-23 RX ORDER — 0.9 % SODIUM CHLORIDE 0.9 %
1000 INTRAVENOUS SOLUTION INTRAVENOUS ONCE
Status: COMPLETED | OUTPATIENT
Start: 2020-02-23 | End: 2020-02-23

## 2020-02-23 RX ORDER — PROMETHAZINE HYDROCHLORIDE 25 MG/1
12.5 TABLET ORAL EVERY 6 HOURS PRN
Status: DISCONTINUED | OUTPATIENT
Start: 2020-02-23 | End: 2020-02-25 | Stop reason: HOSPADM

## 2020-02-23 RX ORDER — PROMETHAZINE HYDROCHLORIDE 25 MG/ML
25 INJECTION, SOLUTION INTRAMUSCULAR; INTRAVENOUS ONCE
Status: COMPLETED | OUTPATIENT
Start: 2020-02-23 | End: 2020-02-23

## 2020-02-23 RX ORDER — ACETAMINOPHEN 650 MG/1
650 SUPPOSITORY RECTAL EVERY 6 HOURS PRN
Status: DISCONTINUED | OUTPATIENT
Start: 2020-02-23 | End: 2020-02-25 | Stop reason: HOSPADM

## 2020-02-23 RX ORDER — ACETAMINOPHEN 325 MG/1
650 TABLET ORAL EVERY 6 HOURS PRN
Status: DISCONTINUED | OUTPATIENT
Start: 2020-02-23 | End: 2020-02-25 | Stop reason: HOSPADM

## 2020-02-23 RX ADMIN — ENOXAPARIN SODIUM 40 MG: 40 INJECTION SUBCUTANEOUS at 21:24

## 2020-02-23 RX ADMIN — SODIUM CHLORIDE, POTASSIUM CHLORIDE, SODIUM LACTATE AND CALCIUM CHLORIDE: 600; 310; 30; 20 INJECTION, SOLUTION INTRAVENOUS at 21:25

## 2020-02-23 RX ADMIN — SODIUM CHLORIDE 1000 ML: 9 INJECTION, SOLUTION INTRAVENOUS at 15:15

## 2020-02-23 RX ADMIN — PROMETHAZINE HYDROCHLORIDE 25 MG: 25 INJECTION, SOLUTION INTRAMUSCULAR; INTRAVENOUS at 15:15

## 2020-02-23 RX ADMIN — ONDANSETRON 4 MG: 2 INJECTION INTRAMUSCULAR; INTRAVENOUS at 15:15

## 2020-02-23 RX ADMIN — Medication 10 ML: at 21:25

## 2020-02-23 ASSESSMENT — ENCOUNTER SYMPTOMS
ABDOMINAL PAIN: 0
COUGH: 0
SORE THROAT: 0
VOMITING: 1
BACK PAIN: 0
SHORTNESS OF BREATH: 0
DIARRHEA: 0
NAUSEA: 1

## 2020-02-23 NOTE — ED NOTES
Ice chips were given to the patient. Patient is tolerating ice chips.       Boby Soares  02/23/20 [id number]

## 2020-02-23 NOTE — ED NOTES
Fluids continue to infuse pt notified of admission room and accepting nurse      Matthew Gaspar RN  02/23/20 [id number]

## 2020-02-23 NOTE — ED TRIAGE NOTES
Pt reports that starting Friday she started to vomiting off and on unable to eat rr even non skin wnl pt alert x4 answering questions approprietly

## 2020-02-23 NOTE — H&P
Hospital Medicine History & Physical      PCP: JULIANNA Monreal - CNP    Date of Admission: 2/23/2020      Chief Complaint: Nausea and vomiting which started last night. History Of Present Illness:    76 y.o. female who presented with a h/o HTN, hpl, CAD, COPD, CKD, factor V leiden, lung cancer with mets to the brain on [place] presents to the ED with nausea and vomiting which she states she has had 10-12 episodes since last night. Pt denies fever, sob, dysuria, diarrhea. Pt denies any chest pain, chest discomfort. Patient states she still smokes half a pack of cigarettes per day. Patient is currently dynamically stable. Past Medical History:          Diagnosis Date    Arthritis     Back pain, chronic     Pt  is with pain management    Cancer ([address]     lung w brain metastasis    Chronic back pain     degenerative disc disease    Chronic kidney disease     COPD (chronic obstructive pulmonary disease) (HCC)     COPD (chronic obstructive pulmonary disease) (HCC)     Coronary artery disease     Coronary artery disease involving native coronary artery of native heart without angina pectoris 3/19/2017    Dyslipidemia 11/20/2016    [facility] [address]     Essential hypertension 11/20/2016    Factor V deficiency ([facility]     H/O blood clots     Headache     Hypertension     Hypertension     Restless leg syndrome     Syncope 11/20/2016    Tobacco abuse 11/20/2016       Past Surgical History:          Procedure Laterality Date    BRAIN SURGERY      BREAST BIOPSY Right     CARDIAC PACEMAKER PLACEMENT      CORONARY ANGIOPLASTY WITH STENT PLACEMENT      CRANIOTOMY N/A 8/6/2019    SUBOCCIPITAL CRANIOTOMY FOR TUMOR AND VENTRICULOSTOMY performed by Leno Sosa [facility] at [facility]       Medications Prior to Admission:      Prior to Admission medications    Medication Sig Start Date End Date Taking?  Authorizing Provider   lisinopril (PRINIVIL;ZESTRIL) 2.5 MG tablet Take 1 tablet by mouth nightly daughter    TOBACCO:   reports that she has been smoking cigarettes. She started smoking about 60 years ago. She has a 57.00 pack-year smoking history. She has never used smokeless tobacco.  ETOH:   reports current alcohol use. Family History:      Reviewed in detail and negative for DM, CAD, Cancer, CVA. Positive as follows:        Problem Relation Age of Onset    High Blood Pressure Mother     Cancer Mother     Arthritis Father     High Blood Pressure Father     Cancer Sister        REVIEW OF SYSTEMS:   Pertinent positives as noted in the HPI. All other systems reviewed and negative. PHYSICAL EXAM:    Vitals:    02/23/20 1645 02/23/20 1700 02/23/20 1715 02/23/20 1730   BP:  (!) 140/90     Pulse: 103 96 90 96   Resp: 20 19 22 17   SpO2: 97%  95% 95%   Weight:       Height:         Constitutional: Negative for activity change, chills and fever. General appearance:  No apparent distress, appears stated age and cooperative. HEENT:  Normal cephalic, atraumatic without obvious deformity. Pupils equal, round, and reactive to light. Extra ocular muscles intact. Conjunctivae/corneas clear. Neck: Supple, with full range of motion. No jugular venous distention. Trachea midline. Respiratory:  Diminished lung sounds throughout. Normal respiratory effort. Clear to auscultation, bilaterally without Rales/Wheezes/Rhonchi. Cardiovascular: Regular rate and rhythm with normal S1/S2 without murmurs, rubs or gallops. Abdomen: Soft, non-tender, non-distended with normal bowel sounds. Musculoskeletal:  No clubbing, cyanosis or edema bilaterally. Full range of motion without deformity. Gastrointestinal: Positive for nausea and vomiting. Negative for abdominal pain and diarrhea. Skin: Skin color, texture, turgor normal.  No rashes or lesions. Neurologic:  Neurovascularly intact without any focal sensory/motor deficits.   Psychiatric:  Alert and oriented, thought content appropriate, normal insight  Capillary Refill: Brisk,< 3 seconds   Peripheral Pulses: +2 palpable, equal bilaterally       Labs:     Recent Labs     02/23/20  1541   WBC 6.1   HGB 13.6   HCT 40.5        Recent Labs     02/23/20  1541   *   K 3.6   CL 96   CO2 22   BUN 17   CREATININE 0.95*   CALCIUM 9.2     Recent Labs     02/23/20  1541   AST 22   ALT 15   BILITOT 0.6   ALKPHOS 113     No results for input(s): INR in the last 72 hours. No results for input(s): Elysia Dapper in the last 72 hours. Urinalysis:      Lab Results   Component Value Date    NITRU Negative 02/23/2020    WBCUA 0-2 07/30/2019    BACTERIA Rare 07/30/2019    RBCUA 0-2 07/30/2019    BLOODU Negative 02/23/2020    SPECGRAV 1.024 02/23/2020    GLUCOSEU Negative 02/23/2020       Radiology:     CXR: I have reviewed the CXR with the following interpretation: Per radiology  EKG:  I have reviewed the EKG with the following interpretation: Please review    XR CHEST STANDARD (2 VW)   Final Result   Impression:     1. Nonspecific bibasilar airspace disease, findings can be seen in infiltrate/pneumonia and/or atelectasis. .   2. Small amounts of bilateral pleural effusion. CT Head WO Contrast    (Results Pending)       ASSESSMENT:    Active Hospital Problems    Diagnosis Date Noted    Nausea & vomiting [R11.2] 02/23/2020       PLAN:  Non-intractable vomiting with nausea, unspecified vomiting type: We will do supportive management with IV fluids LR as per ordered, will repeat blood work in the a.m., vital signs every shift and as needed. Will monitor intake and output. Dietitian consulted for any other additional dietary needs. General weakness/lung CA with mets: Patient's oncologist consulted regarding further recommendations, vital signs every shift and as needed, will monitor with telemetry. Tobacco abuse: Smoking cessation provided to patient, to educate and explained possible risk and outcomes.   Provide proper information prior to patient's discharge planning. DVT Prophylaxis: Lovenox 40 mg subcutaneously daily  Diet: Cardiac diet  Code Status: Full code  PT/OT Eval Status: To evaluate and treat    Dispo -To be determined     JULIANNA Black CNP    Thank you JULIANNA White CNP for the opportunity to be involved in this patient's care. If you have any questions or concerns please feel free to contact me.

## 2020-02-24 PROBLEM — E44.0 MODERATE MALNUTRITION (HCC): Status: ACTIVE | Noted: 2020-02-24

## 2020-02-24 LAB
ALBUMIN SERPL-MCNC: 2.7 G/DL (ref 3.5–4.6)
ALP BLD-CCNC: 95 U/L (ref 40–130)
ALT SERPL-CCNC: 13 U/L (ref 0–33)
ANION GAP SERPL CALCULATED.3IONS-SCNC: 15 MEQ/L (ref 9–15)
AST SERPL-CCNC: 19 U/L (ref 0–35)
BASOPHILS ABSOLUTE: 0 K/UL (ref 0–0.2)
BASOPHILS RELATIVE PERCENT: 0.5 %
BILIRUB SERPL-MCNC: 0.6 MG/DL (ref 0.2–0.7)
BUN BLDV-MCNC: 15 MG/DL (ref 8–23)
CALCIUM SERPL-MCNC: 8.5 MG/DL (ref 8.5–9.9)
CHLORIDE BLD-SCNC: 104 MEQ/L (ref 95–107)
CO2: 21 MEQ/L (ref 20–31)
CREAT SERPL-MCNC: 0.94 MG/DL (ref 0.5–0.9)
EOSINOPHILS ABSOLUTE: 0 K/UL (ref 0–0.7)
EOSINOPHILS RELATIVE PERCENT: 0.6 %
GFR AFRICAN AMERICAN: >60
GFR NON-AFRICAN AMERICAN: 58.1
GLOBULIN: 2.6 G/DL (ref 2.3–3.5)
GLUCOSE BLD-MCNC: 62 MG/DL (ref 70–99)
HCT VFR BLD CALC: 36.8 % (ref 37–47)
HEMOGLOBIN: 12.5 G/DL (ref 12–16)
LYMPHOCYTES ABSOLUTE: 0.3 K/UL (ref 1–4.8)
LYMPHOCYTES RELATIVE PERCENT: 4.7 %
MCH RBC QN AUTO: 31.2 PG (ref 27–31.3)
MCHC RBC AUTO-ENTMCNC: 34.1 % (ref 33–37)
MCV RBC AUTO: 91.6 FL (ref 82–100)
MONOCYTES ABSOLUTE: 0.7 K/UL (ref 0.2–0.8)
MONOCYTES RELATIVE PERCENT: 11.8 %
NEUTROPHILS ABSOLUTE: 4.9 K/UL (ref 1.4–6.5)
NEUTROPHILS RELATIVE PERCENT: 82.4 %
PDW BLD-RTO: 17.9 % (ref 11.5–14.5)
PLATELET # BLD: 145 K/UL (ref 130–400)
POTASSIUM REFLEX MAGNESIUM: 3.6 MEQ/L (ref 3.4–4.9)
RBC # BLD: 4.02 M/UL (ref 4.2–5.4)
SODIUM BLD-SCNC: 140 MEQ/L (ref 135–144)
TOTAL PROTEIN: 5.3 G/DL (ref 6.3–8)
WBC # BLD: 5.9 K/UL (ref 4.8–10.8)

## 2020-02-24 PROCEDURE — 94640 AIRWAY INHALATION TREATMENT: CPT

## 2020-02-24 PROCEDURE — 6360000002 HC RX W HCPCS: Performed by: INTERNAL MEDICINE

## 2020-02-24 PROCEDURE — 96372 THER/PROPH/DIAG INJ SC/IM: CPT

## 2020-02-24 PROCEDURE — 99219 PR INITIAL OBSERVATION CARE/DAY 50 MINUTES: CPT | Performed by: NURSE PRACTITIONER

## 2020-02-24 PROCEDURE — 94761 N-INVAS EAR/PLS OXIMETRY MLT: CPT

## 2020-02-24 PROCEDURE — 6370000000 HC RX 637 (ALT 250 FOR IP): Performed by: NURSE PRACTITIONER

## 2020-02-24 PROCEDURE — 93010 ELECTROCARDIOGRAM REPORT: CPT | Performed by: INTERNAL MEDICINE

## 2020-02-24 PROCEDURE — 2580000003 HC RX 258: Performed by: NURSE PRACTITIONER

## 2020-02-24 PROCEDURE — 85025 COMPLETE CBC W/AUTO DIFF WBC: CPT

## 2020-02-24 PROCEDURE — G0378 HOSPITAL OBSERVATION PER HR: HCPCS

## 2020-02-24 PROCEDURE — 97166 OT EVAL MOD COMPLEX 45 MIN: CPT

## 2020-02-24 PROCEDURE — 36415 COLL VENOUS BLD VENIPUNCTURE: CPT

## 2020-02-24 PROCEDURE — 80053 COMPREHEN METABOLIC PANEL: CPT

## 2020-02-24 PROCEDURE — 6360000002 HC RX W HCPCS: Performed by: NURSE PRACTITIONER

## 2020-02-24 PROCEDURE — 6370000000 HC RX 637 (ALT 250 FOR IP): Performed by: INTERNAL MEDICINE

## 2020-02-24 PROCEDURE — 2700000000 HC OXYGEN THERAPY PER DAY

## 2020-02-24 PROCEDURE — 97162 PT EVAL MOD COMPLEX 30 MIN: CPT

## 2020-02-24 PROCEDURE — 94664 DEMO&/EVAL PT USE INHALER: CPT

## 2020-02-24 RX ORDER — ALBUTEROL SULFATE 2.5 MG/3ML
2.5 SOLUTION RESPIRATORY (INHALATION) EVERY 6 HOURS PRN
Status: DISCONTINUED | OUTPATIENT
Start: 2020-02-24 | End: 2020-02-24

## 2020-02-24 RX ORDER — GABAPENTIN 300 MG/1
600 CAPSULE ORAL 3 TIMES DAILY
Status: DISCONTINUED | OUTPATIENT
Start: 2020-02-24 | End: 2020-02-25 | Stop reason: HOSPADM

## 2020-02-24 RX ORDER — ALBUTEROL SULFATE 2.5 MG/3ML
2.5 SOLUTION RESPIRATORY (INHALATION) 3 TIMES DAILY
Status: DISCONTINUED | OUTPATIENT
Start: 2020-02-24 | End: 2020-02-25 | Stop reason: HOSPADM

## 2020-02-24 RX ORDER — TIZANIDINE 4 MG/1
4 TABLET ORAL EVERY 8 HOURS PRN
Status: DISCONTINUED | OUTPATIENT
Start: 2020-02-24 | End: 2020-02-25 | Stop reason: HOSPADM

## 2020-02-24 RX ORDER — LISINOPRIL 2.5 MG/1
2.5 TABLET ORAL NIGHTLY
Status: DISCONTINUED | OUTPATIENT
Start: 2020-02-24 | End: 2020-02-25 | Stop reason: HOSPADM

## 2020-02-24 RX ORDER — ACETAMINOPHEN 80 MG
TABLET,CHEWABLE ORAL ONCE
Status: COMPLETED | OUTPATIENT
Start: 2020-02-24 | End: 2020-02-24

## 2020-02-24 RX ORDER — ISOSORBIDE MONONITRATE 30 MG/1
30 TABLET, EXTENDED RELEASE ORAL DAILY
Status: DISCONTINUED | OUTPATIENT
Start: 2020-02-24 | End: 2020-02-25 | Stop reason: HOSPADM

## 2020-02-24 RX ORDER — LEVETIRACETAM 500 MG/1
500 TABLET ORAL 2 TIMES DAILY
Status: DISCONTINUED | OUTPATIENT
Start: 2020-02-24 | End: 2020-02-25 | Stop reason: HOSPADM

## 2020-02-24 RX ORDER — ASPIRIN 81 MG/1
81 TABLET, CHEWABLE ORAL DAILY
Status: DISCONTINUED | OUTPATIENT
Start: 2020-02-24 | End: 2020-02-25 | Stop reason: HOSPADM

## 2020-02-24 RX ORDER — PRAVASTATIN SODIUM 40 MG
40 TABLET ORAL DAILY
Status: DISCONTINUED | OUTPATIENT
Start: 2020-02-24 | End: 2020-02-25 | Stop reason: HOSPADM

## 2020-02-24 RX ORDER — ALBUTEROL SULFATE 2.5 MG/3ML
2.5 SOLUTION RESPIRATORY (INHALATION)
Status: DISCONTINUED | OUTPATIENT
Start: 2020-02-24 | End: 2020-02-25 | Stop reason: HOSPADM

## 2020-02-24 RX ADMIN — ISOSORBIDE MONONITRATE 30 MG: 30 TABLET, EXTENDED RELEASE ORAL at 11:17

## 2020-02-24 RX ADMIN — LEVETIRACETAM 500 MG: 500 TABLET ORAL at 11:17

## 2020-02-24 RX ADMIN — GABAPENTIN 600 MG: 300 CAPSULE ORAL at 22:17

## 2020-02-24 RX ADMIN — SODIUM CHLORIDE, POTASSIUM CHLORIDE, SODIUM LACTATE AND CALCIUM CHLORIDE: 600; 310; 30; 20 INJECTION, SOLUTION INTRAVENOUS at 22:17

## 2020-02-24 RX ADMIN — Medication 10 ML: at 09:32

## 2020-02-24 RX ADMIN — ENOXAPARIN SODIUM 40 MG: 40 INJECTION SUBCUTANEOUS at 09:32

## 2020-02-24 RX ADMIN — METOPROLOL TARTRATE 12.5 MG: 25 TABLET, FILM COATED ORAL at 22:17

## 2020-02-24 RX ADMIN — LISINOPRIL 2.5 MG: 2.5 TABLET ORAL at 22:17

## 2020-02-24 RX ADMIN — PRAVASTATIN SODIUM 40 MG: 40 TABLET ORAL at 11:17

## 2020-02-24 RX ADMIN — GABAPENTIN 600 MG: 300 CAPSULE ORAL at 16:01

## 2020-02-24 RX ADMIN — LEVETIRACETAM 500 MG: 500 TABLET ORAL at 22:17

## 2020-02-24 RX ADMIN — ALBUTEROL SULFATE 2.5 MG: 2.5 SOLUTION RESPIRATORY (INHALATION) at 11:31

## 2020-02-24 RX ADMIN — Medication: at 11:20

## 2020-02-24 RX ADMIN — ASPIRIN 81 MG 81 MG: 81 TABLET ORAL at 11:17

## 2020-02-24 RX ADMIN — SODIUM CHLORIDE, POTASSIUM CHLORIDE, SODIUM LACTATE AND CALCIUM CHLORIDE: 600; 310; 30; 20 INJECTION, SOLUTION INTRAVENOUS at 11:10

## 2020-02-24 RX ADMIN — METOPROLOL TARTRATE 12.5 MG: 25 TABLET, FILM COATED ORAL at 11:17

## 2020-02-24 ASSESSMENT — ENCOUNTER SYMPTOMS
CHEST TIGHTNESS: 0
BACK PAIN: 0
ABDOMINAL PAIN: 0
WHEEZING: 0
VOMITING: 0
COUGH: 0
SHORTNESS OF BREATH: 0
SORE THROAT: 0
NAUSEA: 1
CONSTIPATION: 0
DIARRHEA: 0
TROUBLE SWALLOWING: 0
SINUS PAIN: 0

## 2020-02-24 ASSESSMENT — PAIN SCALES - GENERAL
PAINLEVEL_OUTOF10: 0
PAINLEVEL_OUTOF10: 0

## 2020-02-24 NOTE — PLAN OF CARE
See OT evaluation for all goals and OT POC.  Electronically signed by LIANA Esparza/L on 2/24/2020 at 11:24 AM

## 2020-02-24 NOTE — PROGRESS NOTES
Department of Internal Medicine  General Internal Medicine  Attending Progress Note      SUBJECTIVE:  Pt seen and examined. Nausea improved. Tolerating clear liquids and wants to try regular diet. OBJECTIVE      Medications    Current Facility-Administered Medications: albuterol (PROVENTIL) nebulizer solution 2.5 mg, 2.5 mg, Nebulization, Q6H PRN  aspirin chewable tablet 81 mg, 81 mg, Oral, Daily  fluticasone-umeclidin-vilant (TRELEGY ELLIPTA) 100-62.5-25 MCG/INH inhaler 1 puff, 1 puff, Inhalation, Daily  gabapentin (NEURONTIN) capsule 600 mg, 600 mg, Oral, TID  isosorbide mononitrate (IMDUR) extended release tablet 30 mg, 30 mg, Oral, Daily  levETIRAcetam (KEPPRA) tablet 500 mg, 500 mg, Oral, BID  lisinopril (PRINIVIL;ZESTRIL) tablet 2.5 mg, 2.5 mg, Oral, Nightly  metoprolol tartrate (LOPRESSOR) tablet 12.5 mg, 12.5 mg, Oral, BID  pravastatin (PRAVACHOL) tablet 40 mg, 40 mg, Oral, Daily  tiZANidine (ZANAFLEX) tablet 4 mg, 4 mg, Oral, Q8H PRN  sodium chloride flush 0.9 % injection 10 mL, 10 mL, Intravenous, 2 times per day  sodium chloride flush 0.9 % injection 10 mL, 10 mL, Intravenous, PRN  acetaminophen (TYLENOL) tablet 650 mg, 650 mg, Oral, Q6H PRN  acetaminophen (TYLENOL) suppository 650 mg, 650 mg, Rectal, Q6H PRN  polyethylene glycol (GLYCOLAX) packet 17 g, 17 g, Oral, Daily PRN  promethazine (PHENERGAN) tablet 12.5 mg, 12.5 mg, Oral, Q6H PRN  ondansetron (ZOFRAN) injection 4 mg, 4 mg, Intravenous, Q6H PRN  enoxaparin (LOVENOX) injection 40 mg, 40 mg, Subcutaneous, Daily  lactated ringers infusion, , Intravenous, Continuous  nicotine (NICODERM CQ) 21 MG/24HR 1 patch, 1 patch, Transdermal, Daily  ondansetron (ZOFRAN) injection 4 mg, 4 mg, Intravenous, Q6H PRN  Physical    VITALS:  /81   Pulse 104   Temp 98.2 °F (36.8 °C)   Resp 20   Ht 5' (1.524 m)   Wt 128 lb 8.5 oz (58.3 kg)   LMP  (LMP Unknown)   SpO2 99%   BMI 25.10 kg/m²   Constitutional: Awake and alert in no acute distress.  Lying in bed comfortably  Head: Normocephalic, atraumatic  Eyes: EOMI, PERRLA  ENT: moist mucous membranes  Neck: neck supple, trachea midline  Lungs: Good inspiratory effort, CTABL, no wheeze, no rhonchi, no rales  Heart: RRR, normal S1 and S2, no murmurs  GI: Soft, non-distended, non tender, no guarding, no rebound, +BS  MSK: Full ROM bilaterally, 5/5 strength bilaterally, no edema noted  Pulses: 2+ pulses bilaterally  Skin: warm, dry, good turgor, without lesions or rashes  Neuro: Intact motor and sensory, no focal deficits  Psych: appropriate affect     Data    CBC:   Lab Results   Component Value Date    WBC 5.9 02/24/2020    RBC 4.02 02/24/2020    HGB 12.5 02/24/2020    HCT 36.8 02/24/2020    MCV 91.6 02/24/2020    MCH 31.2 02/24/2020    MCHC 34.1 02/24/2020    RDW 17.9 02/24/2020     02/24/2020    MPV 9.1 05/07/2015     CMP:    Lab Results   Component Value Date     02/24/2020    K 3.6 02/24/2020     02/24/2020    CO2 21 02/24/2020    BUN 15 02/24/2020    CREATININE 0.94 02/24/2020    GFRAA >60.0 02/24/2020    LABGLOM 58.1 02/24/2020    GLUCOSE 62 02/24/2020    GLUCOSE 115 07/12/2019    PROT 5.3 02/24/2020    LABALBU 2.7 02/24/2020    LABALBU 23.6 07/12/2019    CALCIUM 8.5 02/24/2020    BILITOT 0.6 02/24/2020    ALKPHOS 95 02/24/2020    AST 19 02/24/2020    ALT 13 02/24/2020       ASSESSMENT AND PLAN      # Nausea/vomiting  - likely viral  - tolerating diet  - nausea control  - IVF    # Metastatic lung CA  - oncology consulted - no inpatient needs, will follow up outpatient    Disposition: Patient presented with n/v which is now improved. Likely discharge tomorrow with [facility].        Rl Jonas,   Internal Medicine

## 2020-02-24 NOTE — CONSULTS
81 mg Oral Daily [facility] DO   81 mg at 02/24/20 1117    fluticasone-umeclidin-vilant (TRELEGY ELLIPTA) 100-62.5-25 MCG/INH inhaler 1 puff  1 puff Inhalation Daily [facility] DO        gabapentin (NEURONTIN) capsule 600 mg  600 mg Oral TID [facility] DO        isosorbide mononitrate (IMDUR) extended release tablet 30 mg  30 mg Oral Daily [facility] DO   30 mg at 02/24/20 1117    levETIRAcetam (KEPPRA) tablet 500 mg  500 mg Oral BID [facility] DO   500 mg at 02/24/20 1117    lisinopril (PRINIVIL;ZESTRIL) tablet 2.5 mg  2.5 mg Oral Nightly [facility] DO        metoprolol tartrate (LOPRESSOR) tablet 12.5 mg  12.5 mg Oral BID [facility] DO   12.5 mg at 02/24/20 1117    pravastatin (PRAVACHOL) tablet 40 mg  40 mg Oral Daily [facility] DO   40 mg at 02/24/20 1117    tiZANidine (ZANAFLEX) tablet 4 mg  4 mg Oral Q8H PRN [facility] DO        sodium chloride flush 0.9 % injection 10 mL  10 mL Intravenous 2 times per day Maria Teresa Salisbury, APRN - CNP   10 mL at 02/24/20 0932    sodium chloride flush 0.9 % injection 10 mL  10 mL Intravenous PRN Maria Teresa Salisbury, APRN - CNP        acetaminophen (TYLENOL) tablet 650 mg  650 mg Oral Q6H PRN Maria Teresa Salisbury, APRN - CNP        acetaminophen (TYLENOL) suppository 650 mg  650 mg Rectal Q6H PRN Maria Teresa Salisbury, APRN - CNP        polyethylene glycol (GLYCOLAX) packet 17 g  17 g Oral Daily PRN Maria Teresa Salisbury, APRN - CNP        promethazine (PHENERGAN) tablet 12.5 mg  12.5 mg Oral Q6H PRN Maria Teresa Salisbury, APRN - CNP        ondansetron (ZOFRAN) injection 4 mg  4 mg Intravenous Q6H PRN Maria Teresa Salisbury, APRN - CNP        enoxaparin (LOVENOX) injection 40 mg  40 mg Subcutaneous Daily Maria Teresa Salisbury, APRN - CNP   40 mg at 02/24/20 0932    lactated ringers infusion   Intravenous Continuous Maria Teresa Salisbury, APRN - CNP 75 mL/hr at 02/24/20 1110      nicotine (NICODERM CQ) 21 MG/24HR 1 patch  1 patch Transdermal Daily Maria Teresa Salisbury, APRN - CNP   1 patch at 02/24/20 1187  ondansetron (ZOFRAN) injection 4 mg  4 mg Intravenous Q6H PRN Tsosie Gosselin, APRN - CNP         [unfilled]  No Known Allergies     Review of Systems  All other systems are normal other than ones mentioned in HPI. PHYSICAL EXAMINATION:   VITAL SIGNS: /81   Pulse 104   Temp 98.2 °F (36.8 °C)   Resp 20   Ht 5' (1.524 m)   Wt 128 lb 8.5 oz (58.3 kg)   LMP  (LMP Unknown)   SpO2 99%   BMI 25.10 kg/m²     GENERAL: In no acute distress, well- nourished, well- developed,alert and oriented to person place and time. SKIN: Warm and dry, withoutjaundice, ecchymoses, or petechiae. HEENT: Normocephalic, sclera anicteric, oral mucosa moist without lesion or exudate in the visible oral cavity or oropharynx, tongue mid-line with good mobility and no deviation with extension. NODES: No palpable adenopathy in the neck Levels I-V, bilateral   Supraclavicular fossae, axillary chains, or inguinal regions. LUNGS: Good inspiratory effort, no accessory muscle use, clear bilaterally, no focal wheeze, rales or rhonchi. CARDIAC: Regular rate and rhythm, without murmurs, rubs or gallops. ABDOMINAL: Normal bowel soundspresent, soft, non-tender, no mass or  organomegaly.   MUSKL:     LAB RESULTS:  Recent Results (from the past 24 hour(s))   Urinalysis    Collection Time: 02/23/20  3:15 PM   Result Value Ref Range    Color, UA Yellow Straw/Yellow    Clarity, UA TURBID (A) Clear    Glucose, Ur Negative Negative mg/dL    Bilirubin Urine Negative Negative    Ketones, Urine 40 (A) Negative mg/dL    Specific Gravity, UA 1.024 1.005 - 1.030    Blood, Urine Negative Negative    pH, UA 6.0 5.0 - 9.0    Protein, UA 30 (A) Negative mg/dL    Urobilinogen, Urine 1.0 <2.0 E.U./dL    Nitrite, Urine Negative Negative    Leukocyte Esterase, Urine Negative Negative   Microscopic Urinalysis    Collection Time: 02/23/20  3:15 PM   Result Value Ref Range    RBC, UA 0-2 0 - 2 /HPF    Bacteria, UA RARE (A) Negative /HPF    WBC, UA 10-20 (A) 0 - 5 Standard (2 Vw)    Result Date: 2020  Patient MRN: [id number] : 1945 Age:  76 years Gender: Female Order Date: 2020 3:15 PM. Exam: XR CHEST (2 VW) Number of Views: 2 Indication:  Nausea and vomiting and cough x3 days Comparison: 2019 Findings: Dual lead cardiac pacing device. Nonspecific opacification in the bilateral lung bases with small amounts of bilateral pleural fluid. No pneumothorax. Cardiac mediastinal silhouette within normal limits. Impression:  1. Nonspecific bibasilar airspace disease, findings can be seen in infiltrate/pneumonia and/or atelectasis. . 2. Small amounts of bilateral pleural effusion. Ct Head Wo Contrast    Result Date: 2020  CT HEAD WO CONTRAST : 2020 CLINICAL HISTORY:  Mets to brain with change in mental status and N/V. COMPARISON: Head MRI 12/10/2019. TECHNIQUE: Spiral unenhanced images were obtained of the head, with routine multiplanar reconstructions performed. All CT scans at this facility use dose modulation, iterative reconstruction, and/or weight based dosing when appropriate to reduce radiation dose to as low as reasonably achievable. FINDINGS: 3 metastatic lesions noted on the previous MRI (measuring up to 1.8 cm in the left frontal lobe) are not identified by CT. Postoperative changes from previous suboccipital decompression and a small dionicio hole in the right frontal bone are again noted. There is no intracranial hemorrhage, mass effect, midline shift, extra-axial collection, evidence of hydrocephalus, recent ischemic infarct, or skull fracture identified. Mild age-related atrophic changes are again noted. NO ACUTE INTRACRANIAL HEMORRHAGE OR COMPLICATION IDENTIFIED. METASTATIC LESIONS NOTED ON 12/10/2019 ARE NOT IDENTIFIED BY UNENHANCED CT. FOLLOW-UP MRI IS SUGGESTED, AS CLINICALLY WARRANTED.      Pet Ct Skull Base To Mid Thigh    Result Date: 2020  EXAMINATION: PET CT SKULL BASE TO MID THIGH DATE AND TIME:2020 9:01 AM CLINICAL

## 2020-02-24 NOTE — PROGRESS NOTES
11/08/2018    MGUS (monoclonal gammopathy of unknown significance) 11/08/2018    SOB (shortness of breath) on exertion     Lung nodule 05/14/2018    Hypotension     Factor V Leiden mutation ([address] 05/15/2015    Hypercoagulable state, primary ([address] 05/15/2015    Esophageal dysphagia 03/10/2015    Tobacco dependence 08/25/2014    DVT (deep venous thrombosis) ([address] 08/01/2014    Facet arthropathy, lumbar 07/20/2011    Lumbosacral spondylosis without myelopathy 07/20/2011        Past Medical History:   Diagnosis Date    Arthritis     Back pain, chronic     Pt  is with pain management    Cancer ([address]     lung w brain metastasis    Chronic back pain     degenerative disc disease    Chronic kidney disease     COPD (chronic obstructive pulmonary disease) ([address]     COPD (chronic obstructive pulmonary disease) (HCC)     Coronary artery disease     Coronary artery disease involving native coronary artery of native heart without angina pectoris 3/19/2017    Dyslipidemia 11/20/2016    Emphysema lung ([address]     Essential hypertension 11/20/2016    Factor V deficiency ([address]     H/O blood clots     Headache     Hypertension     Hypertension     Restless leg syndrome     Syncope 11/20/2016    Tobacco abuse 11/20/2016     Past Surgical History:   Procedure Laterality Date    BRAIN SURGERY      BREAST BIOPSY Right     CARDIAC PACEMAKER PLACEMENT      CORONARY ANGIOPLASTY WITH STENT PLACEMENT      CRANIOTOMY N/A 8/6/2019    SUBOCCIPITAL CRANIOTOMY FOR TUMOR AND VENTRICULOSTOMY performed by Dolly Vick MD at [facility]        Restrictions  Restrictions/Precautions: Fall Risk     Safety Devices: Safety Devices  Safety Devices in place: Yes  Type of devices:  All fall risk precautions in place    Subjective  Pre Treatment Pain Screening  Pain at present: 0  Scale Used: Numeric Score  Intervention List: Patient able to continue with treatment, Patient declined any intervention    Pain Reassessment:   Pain Assessment  Patient Currently in Pain: No  Pain Assessment: 0-10  Pain Level: 0       Prior Level of Function:  Social/Functional History  Lives With: Daughter  Type of Home: House  Home Layout: One level  Home Access: Stairs to enter with rails  Entrance Stairs - Number of Steps: 2  Bathroom Shower/Tub: Shower chair with back, Walk-in shower  Bathroom Equipment: Grab bars around toilet  Home Equipment: 4 wheeled walker  ADL Assistance: Independent  Homemaking Assistance: Independent  Ambulation Assistance: Independent(Rollator)  Transfer Assistance: Independent  Active : No  Additional Comments: Daughter home with her all the time    OBJECTIVE:     Orientation Status:  Orientation  Overall Orientation Status: Within Functional Limits    Observation:  Observation/Palpation  Posture: Fair  Observation: Soft spoken. Appears uncomfortable however denies pain. Alert and pleasant. Cognition Status:  Cognition  Overall Cognitive Status: Exceptions  Arousal/Alertness: Delayed responses to stimuli  Following Commands:  Follows one step commands with repetition  Attention Span: Appears intact  Memory: Decreased recall of precautions  Safety Judgement: Decreased awareness of need for safety, Decreased awareness of need for assistance  Problem Solving: Assistance required to implement solutions, Assistance required to identify errors made, Assistance required to generate solutions, Assistance required to correct errors made  Insights: Decreased awareness of deficits  Initiation: Requires cues for some  Sequencing: Requires cues for some  Cognition Comment: Increased processing time    Perception Status:  Perception  Overall Perceptual Status: WFL    Sensation Status:  Sensation  Overall Sensation Status: [facility]    Vision and Hearing Status:  Vision  Vision: Impaired  Vision Exceptions: Wears glasses for reading  Hearing  Hearing: Within functional limits     ROM:   LUE AROM (degrees)  LUE AROM : WFL  Left Hand AROM (degrees)  Left Hand AROM: WFL  RUE AROM (degrees)  RUE AROM : WFL  Right Hand AROM (degrees)  Right Hand AROM: WFL    Strength:  LUE Strength  Gross LUE Strength: Exceptions to WFL  L Hand General: 3/5  LUE Strength Comment: 3/5 all planes  RUE Strength  Gross RUE Strength: Exceptions to [facility]  R Hand General: 3/5  RUE Strength Comment: 3/5 all planes    Coordination, Tone, Quality of Movement: Tone RUE  RUE Tone: Normotonic  Tone LUE  LUE Tone: Normotonic  Coordination  Movements Are Fluid And Coordinated: No  Coordination and Movement description: Fine motor impairments, Decreased speed, Decreased accuracy, Right UE, Left UE    Hand Dominance:  Hand Dominance  Hand Dominance: Right    ADL Status:  ADL  Feeding: Independent  Grooming: Setup  UE Bathing: Setup  LE Bathing: Moderate assistance  UE Dressing: Setup  LE Dressing: Moderate assistance  Toileting: Minimal assistance  Additional Comments: Simulated ADLs as above.  Pt. limited by fatigue overall  Toilet Transfers  Toilet Transfer: Unable to assess  Toilet Transfers Comments: Anticipate min A based on other mobility       Therapy key for assistance levels -   Independent = Pt. is able to perform task with no assistance but may require a device   Stand by assistance = Pt. does not perform task at an independent level but does not need physical assistance, requires verbal cues  Minimal, Moderate, Maximal Assistance = Pt. requires physical assistance (25%, 50%, 75% assist from helper) for task but is able to actively participate in task   Dependent = Pt. requires total assistance with task and is not able to actively participate with task completion     Functional Mobility:  Functional Mobility  Functional - Mobility Device: Rolling Walker  Activity: Other  Assist Level: Minimal assistance  Functional Mobility Comments: Min A approximately 5 feet to chair  Transfers  Sit to stand: Contact guard assistance  Stand to sit: Contact guard assistance    Bed Daily Activity Raw Score: 16  AM-PAC Inpatient ADL T-Scale Score : 35.96  ADL Inpatient CMS 0-100% Score: 53.32    Plan:  Plan  Times per week: 1-3x  Plan weeks: Length of acute stay  Current Treatment Recommendations: Strengthening, Balance Training, Functional Mobility Training, Endurance Training, Patient/Caregiver Education & Training, Equipment Evaluation, Education, & procurement, Self-Care / ADL, Safety Education & Training, Home Management Training, Cognitive/Perceptual Training    Goals:   Patient will:    - Improve functional endurance to tolerate/complete 60 mins of ADL's  - Be Mod I in UB ADLs   - Be SBA in LB ADLs  - Be SBA in ADL transfers without LOB  - Be SBA in toileting tasks  - Improve B hand fine motor coordination to [employer] in order to manage clothing fasteners/self-care containers in a timely manner  - Improve B UE strength and endurance to 4/5 in order to participate in self-care activities as projected. - Access appropriate D/C site with as few architectural barriers as possible. - Sequence self-care tasks with no verbal cues for safety    Patient Goal: Patient goals : \"I want to move better\"     Discussed and agreed upon: Yes Comments:     Therapy Time:   OT Individual Minutes  Time In: 1868  Time Out: 0900  Minutes: 10    Eval: 10 minutes     Electronically signed by:     SHELL Sandoval  2/24/2020, 11:23 AM

## 2020-02-24 NOTE — CARE COORDINATION
[facility] EMERGENCY [facility] CENTER AT [place] Case Management Initial Discharge Assessment    Met with Patient/DTR LASHA to discuss discharge plan. PCP: JULIANNA White - BENJAMÍN                                Date of Last Visit: NOT LONG AGO    If no PCP, list provided? N/A    Discharge Planning    Living Arrangements: independently at home    Who do you live with? DTR KADY    Who helps you with your care:  self    If lives at home:     Do you have any barriers navigating in your home? no    Patient can perform ADL? Yes    Current Services (outpatient and in home) :  None    Dialysis: No    Is transportation available to get to your appointments? Yes    DME Equipment:  yes - WALKER AND  CANE    Respiratory equipment: Continuous Oxygen  2 Liters     Respiratory provider:  yes - ?? Pharmacy:  yes - [facility] [address] with Medication Assistance Program?  No      [facility]ined    Patient agreeable to SNF/Rehab? Declined    Other discharge needs identified? N/A    Freedom of choice list provided with basic dialogue that supports the patient's individualized plan of care/goals and shares the quality data associated with the providers. Yes    Does Patient Have a High-Risk for Readmission Diagnosis (CHF, PN, MI, COPD)? No      The plan for Transition of Care is related to the following treatment [address] FLUID    Initial Discharge Plan? (Note: please see concurrent daily documentation for any updates after initial note).     RETURN HOME WITH ORI HILLMAN    The Patient and/or patient representative:  was provided with choice of any post-acute providers for care and equipment and agrees with discharge plan  Yes    Electronically signed by Uma Campo RN on 2/24/2020 at 3:50 PM

## 2020-02-24 NOTE — PLAN OF CARE
Nutrition Problem: Inadequate oral intake  Intervention: Food and/or Nutrient Delivery: Continue current diet, Start ONS(Continue with Clear Liquid diet Add Clear oral supplement x 3 while on Clears.  Advance to General diet as tolerated and change oral supplemetn to High Calorie X2 until po > 75%)  Nutritional Goals: po diet tolerance > 50% of meals, diet progression, wt ~128#

## 2020-02-24 NOTE — CONSULTS
Palliative Care Consult Note  Patient: Lena Barrientos  Gender: female  YOB: 1945  Unit/Bed: A799/S861-01  CodeStatus: Full Code  Inpatient Treatment Team: Treatment Team: Attending Provider: Devora Tao DO; Consulting Physician: Kostas Mi MD; : Marques Carty, RN; : SAV Carr; Registered Nurse: Abraham Mcintyre, PADMINI; Patient Care Tech: Starr Rolle  Admit Date:  2/23/2020    Chief Complaint:     History of Presenting Illness:      Lena Barrientos is a 76 y.o. female on hospital day 2 with a history of CAD, COPD, CKD, HTN, HLN, and lung cancer with mets to the brain. Patient presented to the E.R with nausea and vomiting. Patient is on Yumiko Connolly and is scheduled for 3/4/2020. Had recent PET Scan done which showed favorable results as per oncology. Patients nausea has improved today and tolerating clear liquid diet. Remains on IVF. Plan is for patient to be possibly  discharged to home with [place] [id number] tomorrow. Review of Systems:       Review of Systems   Constitutional: Negative for chills, fatigue, fever and unexpected weight change. HENT: Negative for congestion, mouth sores, sinus pain, sore throat and trouble swallowing. Respiratory: Negative for cough, chest tightness, shortness of breath and wheezing. Cardiovascular: Negative for chest pain, palpitations and leg swelling. Gastrointestinal: Positive for nausea. Negative for abdominal pain, constipation, diarrhea and vomiting. Endocrine: Negative for polydipsia, polyphagia and polyuria. Genitourinary: Negative for dysuria, flank pain, frequency and urgency. Musculoskeletal: Negative for arthralgias, back pain, gait problem, joint swelling and myalgias. Skin: Negative. Neurological: Positive for weakness. Negative for tremors, seizures, speech difficulty, numbness and headaches. Psychiatric/Behavioral: Negative for agitation, confusion, sleep disturbance and suicidal ideas.  The patient is not nervous/anxious. Physical Examination:       /81   Pulse 104   Temp 98.2 °F (36.8 °C)   Resp 20   Ht 5' (1.524 m)   Wt 128 lb 8.5 oz (58.3 kg)   LMP  (LMP Unknown)   SpO2 99%   BMI 25.10 kg/m²    Physical Exam  Constitutional:       Appearance: She is well-developed. HENT:      Head: Normocephalic and atraumatic. Eyes:      Pupils: Pupils are equal, round, and reactive to light. Neck:      Musculoskeletal: Normal range of motion and neck supple. Cardiovascular:      Rate and Rhythm: Normal rate and regular rhythm. Heart sounds: No murmur. No friction rub. No gallop. Pulmonary:      Effort: Pulmonary effort is normal.      Breath sounds: Normal breath sounds. No wheezing or rales. Comments: Diminished lung sounds  Chest:      Chest wall: No tenderness. Abdominal:      General: Bowel sounds are normal. There is no distension. Palpations: Abdomen is soft. Tenderness: There is no abdominal tenderness. There is no guarding. Musculoskeletal: Normal range of motion. General: No tenderness or deformity. Skin:     General: Skin is warm and dry. Findings: No erythema or rash. Neurological:      Mental Status: She is alert and oriented to person, place, and time. Motor: Weakness present.          Allergies:      No Known Allergies    Medications:      Current Facility-Administered Medications   Medication Dose Route Frequency Provider Last Rate Last Dose    albuterol (PROVENTIL) nebulizer solution 2.5 mg  2.5 mg Nebulization Q6H PRN [facility] DO   2.5 mg at 02/24/20 1131    aspirin chewable tablet 81 mg  81 mg Oral Daily [facility] DO   81 mg at 02/24/20 1117    fluticasone-umeclidin-vilant (TRELEGY ELLIPTA) 100-62.5-25 MCG/INH inhaler 1 puff  1 puff Inhalation Daily [facility] DO        gabapentin (NEURONTIN) capsule 600 mg  600 mg Oral TID [facility] DO        isosorbide mononitrate (IMDUR) extended release tablet 30 mg  30 mg Oral Daily Brant Diaz, DO   30 mg at 02/24/20 1117    levETIRAcetam (KEPPRA) tablet 500 mg  500 mg Oral BID Brant Diaz, DO   500 mg at 02/24/20 1117    lisinopril (PRINIVIL;ZESTRIL) tablet 2.5 mg  2.5 mg Oral Nightly Brant Diaz, DO        metoprolol tartrate (LOPRESSOR) tablet 12.5 mg  12.5 mg Oral BID Brant Diaz, DO   12.5 mg at 02/24/20 1117    pravastatin (PRAVACHOL) tablet 40 mg  40 mg Oral Daily Brant Diaz, DO   40 mg at 02/24/20 1117    tiZANidine (ZANAFLEX) tablet 4 mg  4 mg Oral Q8H PRN Brant Perales, DO        sodium chloride flush 0.9 % injection 10 mL  10 mL Intravenous 2 times per day JULIANNA Mims - CNP   10 mL at 02/24/20 0932    sodium chloride flush 0.9 % injection 10 mL  10 mL Intravenous PRN Jani Valadez APRN - CNP        acetaminophen (TYLENOL) tablet 650 mg  650 mg Oral Q6H PRN Jani Valadez APRN - CNP        acetaminophen (TYLENOL) suppository 650 mg  650 mg Rectal Q6H PRN Jani Valadez APRN - CNP        polyethylene glycol (GLYCOLAX) packet 17 g  17 g Oral Daily PRN Jani Valadez APRN - CNP        promethazine (PHENERGAN) tablet 12.5 mg  12.5 mg Oral Q6H PRN Jani Valadez APRN - CNP        ondansetron (ZOFRAN) injection 4 mg  4 mg Intravenous Q6H PRN Jani Valadez APRN - CNP        enoxaparin (LOVENOX) injection 40 mg  40 mg Subcutaneous Daily JULIANNA Mims - CNP   40 mg at 02/24/20 0932    lactated ringers infusion   Intravenous Continuous JULIANNA Mims - CNP 75 mL/hr at 02/24/20 1110      nicotine (NICODERM CQ) 21 MG/24HR 1 patch  1 patch Transdermal Daily JULIANNA Mims - CNP   1 patch at 02/24/20 0932    ondansetron (ZOFRAN) injection 4 mg  4 mg Intravenous Q6H PRN Jani Valadez APRN - CNP           History:       PMHx:  Past Medical History:   Diagnosis Date    Arthritis     Back pain, chronic     Pt  is with pain management    Cancer ([facility]     lung w brain metastasis    Chronic back pain     degenerative disc disease    Chronic kidney disease     COPD (chronic obstructive pulmonary disease) (HCC)     COPD (chronic obstructive pulmonary disease) (HCC)     Coronary artery disease     Coronary artery disease involving native coronary artery of native heart without angina pectoris 3/19/2017    Dyslipidemia 2016    Emphysema lung ([facility]     Essential hypertension 2016    [facility] deficiency [facility]     H/O blood clots     Headache     Hypertension     Hypertension     Restless leg syndrome     Syncope 2016    Tobacco abuse 2016       PSHx:  Past Surgical History:   Procedure Laterality Date    BRAIN SURGERY      BREAST BIOPSY Right     CARDIAC PACEMAKER PLACEMENT      CORONARY ANGIOPLASTY WITH STENT PLACEMENT      CRANIOTOMY N/A 2019    SUBOCCIPITAL CRANIOTOMY FOR TUMOR AND VENTRICULOSTOMY performed by Gus Westbrook MD at [place] [id number] Hx:  Social History     Socioeconomic History    Marital status:      Spouse name: None    Number of children: 3    Years of education: None    Highest education level: None   Occupational History    Occupation:  Retired beautician    Occupation: Retired various factory jobs   Social Needs    Financial resource strain: Somewhat hard   [place] insecurity:     Worry: Never true     Inability: Never true    Transportation needs:     Medical: No     Non-medical: No   Tobacco Use    Smoking status: Current Some Day Smoker     Packs/day: 1.00     Years: 57.00     Pack years: 57.00     Types: Cigarettes     Start date: 1960     Last attempt to quit: 2019     Years since quittin.5    Smokeless tobacco: Never Used   Substance and Sexual Activity    Alcohol use: Yes     Comment: seldom    Drug use: No    Sexual activity: Never   Lifestyle    Physical activity:     Days per week: 0 days     Minutes per session: 0 min    Stress:  Only a little   Relationships    Social connections:     Talks on phone: More than three times a week Gets together: More than three times a week     Attends [employer] service: More than 4 times per year     Active member of club or organization: No     Attends meetings of clubs or organizations: Never     Relationship status:      Intimate partner violence:     Fear of current or ex partner: No     Emotionally abused: No     Physically abused: No     Forced sexual activity: No   Other Topics Concern    None   Social History Narrative         Lives With: Family  (2 dtrs-works full-time afternoon shift the other does not both are helpful)    Type of Home: House  One level    Home Access: Stairs to enter without rails - Number of Steps: 4    Bathroom Shower/Tub: Walk-in shower    Bathroom Equipment: Shower chair    Home Equipment: Rolling walker, 4 wheeled walker, Quad cane    Receives Help From: Family    ADL Assistance: Independent    Ambulation Assistance: Independent(quad cane most of the time)    Transfer Assistance: Independent    Active : No    Occupation: Retired    Her hobbies are reading classic literature especially works by the Nathaniel sisters       Family Hx:  Family History   Problem Relation Age of Onset    High Blood Pressure Mother     Cancer Mother     Arthritis Father     High Blood Pressure Father     Cancer Sister        LABS: Reviewed     CBC:  Lab Results   Component Value Date    WBC 5.9 02/24/2020    RBC 4.02 02/24/2020    HGB 12.5 02/24/2020    HCT 36.8 02/24/2020    MCV 91.6 02/24/2020    MCH 31.2 02/24/2020    MCHC 34.1 02/24/2020    RDW 17.9 02/24/2020     02/24/2020    MPV 9.1 05/07/2015     CBC with Differential:   Lab Results   Component Value Date    WBC 5.9 02/24/2020    RBC 4.02 02/24/2020    HGB 12.5 02/24/2020    HCT 36.8 02/24/2020     02/24/2020    MCV 91.6 02/24/2020    MCH 31.2 02/24/2020    MCHC 34.1 02/24/2020    RDW 17.9 02/24/2020    BANDSPCT 1 08/06/2019    METASPCT 1 05/14/2014    LYMPHOPCT 4.7 02/24/2020    MONOPCT 11.8 02/24/2020 for Keytruda on march 4th  -patient had inpatinet oncology consult   -had PET Scan  Done on 2/2020  -follow up with oncology as scheduled outpatient  3. Generalized weakness  -[facility]    -continue to use assistive device for ambulation- Rolling Walker  4. COPD/SOB  -patient to continue on oxygen 2L via N/C continuous. -follow up with pulmonology as outpatient  -discussed smoking cessation and resources available to quit smoking  -Advance Care Planning  Discussed goals of care with patient. Explained in extensive detail nuances between full code, DNR CCA and DNR CC. Patient has made the decision to be [facility].  -patient is in agreement with outpatient palliative care services  -patient would like POA and LW to be done by  -as outpatient  -plan is to follow up with patient as outpatient for symptom management and further GOC discussion      -Goals of Care Discussion:  Disease process and goals of treatment were discussed in basic terms. Patinet goal is to optimize available comfort care measures to decrease N/V, pain and and appetite. We discussed the palliative care philosophy in light of those goals. We discussed all care options contingent on treatment response and QOL. Much active listening, presence, and emotional support were given.          Electronically signed by JULIANNA Sarkar NP on 2/24/2020 at 1:47 PM

## 2020-02-24 NOTE — PROGRESS NOTES
Nutrition Assessment    Type and Reason for Visit: Initial, Consult(poor po)    Nutrition Recommendations:   Continue with Clear Liquid diet   Add Clear oral supplement x 3 while on Clears. Advance to General diet as tolerated and change oral supplemetn to High Calorie X2 until po > 75%    Nutrition Assessment: Moderate malnutrition related to metastatic lung ca with c/o N/V. Will add oral supplement, may need to consider parenteral suport of GI symptoms persist    Malnutrition Assessment:  · Malnutrition Status: Meets the criteria for moderate malnutrition  · Context: Chronic illness  · Findings of the 6 clinical characteristics of malnutrition (Minimum of 2 out of 6 clinical characteristics is required to make the diagnosis of moderate or severe Protein Calorie Malnutrition based on AND/ASPEN Guidelines):  1. Energy Intake-Less than or equal to 75% of estimated energy requirement, Greater than or equal to 1 month    2. Weight Loss-5% loss or greater, in 1 month  3. Fat Loss-No significant subcutaneous fat loss,    4. Muscle Loss-Mild muscle mass loss, Clavicles (pectoralis and deltoids), Temples (temporalis muscle)  5. Fluid Accumulation-No significant fluid accumulation,    6.  Strength-Not measured    Nutrition Risk Level: High    Nutrient Needs:  · Estimated Daily Total Kcal: 0640-8733 kcals ( 25-28 kcal/kg)  · Estimated Daily Protein (g): 70-75 g protein ( 1.2-1.3 g/kg )  · Estimated Daily Total Fluid (ml/day): ~1740 ml ( 30 ml/kg)    Nutrition Diagnosis:   · Problem: Inadequate oral intake  · Etiology: related to Alteration in GI function, Nausea, Vomiting, Pain     Signs and symptoms:  as evidenced by Diet history of poor intake, Weight loss greater than or equal to 5% in 1 month    Objective Information:  · Nutrition-Focused Physical Findings: last Bm PTA, N/V x 2 days PTA, stated no emesis since admitssion, nausea ' better' this am,IVF= LR @ 75 ml/hr. labs noted, meds reviewd, trace BLE edema. Note Hx : lung Ca with brain mets, COPD, CKD  · Wound Type: None  · Current Nutrition Therapies:  · Oral Diet Orders: Clear Liquid(2/23)   · Anthropometric Measures:  · Ht: 5' (152.4 cm)   · Current Body Wt: 128 lb (58.1 kg)  · Admission Body Wt: 145 lb (65.8 kg)(est)  · Usual Body Wt: 143 lb (64.9 kg)((7/19), 133# ( 9/19), 137 # (1/27/20), )  · % Weight Change:  ,  10% loss x 7 months; 6.5% x 1 month  · Ideal Body Wt: 100 lb (45.4 kg), % Ideal Body > 100%  · BMI Classification: BMI 25.0 - 29.9 Overweight    Nutrition Interventions:   Continue current diet, Start ONS(Continue with Clear Liquid diet Add Clear oral supplement x 3 while on Clears.  Advance to General diet as tolerated and change oral supplemetn to High Calorie X2 until po > 75%)  Continued Inpatient Monitoring, Education Initiated(supplement use, process of diet progression)    Nutrition Evaluation:   · Evaluation: Goals set   · Goals: po diet tolerance > 50% of meals, diet progression, wt ~128#    · Monitoring: Nutrition Progression, Meal Intake, Supplement Intake, Diet Tolerance, Weight, Nausea or Vomiting      Electronically signed by Live Dugan RD, LD on 2/24/20 at 11:41 AM

## 2020-02-24 NOTE — PROGRESS NOTES
Physical Therapy Med Surg Initial Assessment  Facility/Department: [facility]  Room: [place] [address]       NAME: Lakisha Garcia  : 1945 (49 y.o.)  MRN: [id number]  CODE STATUS: Full Code    Date of Service: 2020    Patient Diagnosis(es): Nausea & vomiting [R11.2]   Chief Complaint   Patient presents with    Emesis     Patient Active Problem List    Diagnosis Date Noted    Coronary artery disease involving native coronary artery of native heart without angina pectoris 2017     Priority: High    Syncope 2016     Priority: High    Essential hypertension 2016     Priority: High    Dyslipidemia 2016     Priority: High    Tobacco abuse 2016     Priority: High    Nausea & vomiting 2020    Malignant neoplasm of brain ([address] 2019    Factor V deficiency ([facility] 2019    Palliative care patient 2019    Osteoporosis 2019    Back pain, chronic 2019    Obesity (BMI 30-39.9) 2019    Abnormality of gait and mobility due to NTBI s/p Suboccipital Craniotomy for Metastatic Posterior Fossa Brain Mass with Impaired Mobility.   [facility] admit 19. 2019    Ataxy cerebellar ([address] 08/10/2019    Neoplasm of brain causing mass effect on adjacent structures ([facility] 2019    COPD (chronic obstructive pulmonary disease) ([facility] 2019    Brain mass 2019    Age-related osteoporosis without current pathological fracture 2019    Elevated alkaline phosphatase level 2019    Fracture of vertebra due to osteoporosis ([address] 2019    Presence of permanent cardiac pacemaker 2019    SSS (sick sinus syndrome) ([address] 2019    Hiatal hernia 2019    Idiopathic chronic pancreatitis ([address] 2019    Bilateral carotid artery stenosis 11/15/2018    PAD (peripheral artery disease) (HCC) 11/15/2018    Chronic anticoagulation 2018    CKD (chronic kidney disease), stage III (HCC) PT Education  PT Education: Goals;PT Role;Plan of Care;Precautions    ASSESSMENT:   Body structures, Functions, Activity limitations: Decreased functional mobility ; Decreased strength;Decreased endurance;Decreased safe awareness;Decreased balance  Decision Making: Medium Complexity  History: High  Exam: Med  Clinical Presentation: Med    Prognosis: Good  Barriers to Learning: none at this time    DISCHARGE RECOMMENDATIONS:  Discharge Recommendations: Continue to assess pending progress, Patient would benefit from continued therapy after discharge    Assessment: Continued PT indicated to progress mobility and facilitate DC at highest level of indep and safety. Concerns for pt returning home at [address] [place] [address],[address] - would benefit from therapy stay prior to DC home if functional goals unmet at DC.  REQUIRES PT FOLLOW UP: Yes      PLAN OF CARE:  Plan  Times per week: 3-6  Current Treatment Recommendations: Strengthening, ROM, Balance Training, ADL/Self-care Training, Transfer Training, Functional Mobility Training, Neuromuscular Re-education, Equipment Evaluation, Education, & procurement, Home Exercise Program, Safety Education & Training, Patient/Caregiver Education & Training, Stair training, Gait Training, Endurance Training  Safety Devices  Type of devices:  All fall risk precautions in place    Goals:  Short term goals  Short term goal 1: Pt to complete HEP with indep  Long term goals  Long term goal 1: Pt to complete all bed mobility with supervision  Long term goal 2: Pt to complete transfers with supervision  Long term goal 3: Pt to ambulate 50-100ft with LRD and supervision  Long term goal 4: Pt to manage 2 steps with HR and CGA    [facility] (6 CLICK) BASIC MOBILITY  AM-PAC Inpatient Mobility Raw Score : 17     Therapy Time:   Individual   Time In 0845   Time Out 0900   Minutes [address], [address] [facility], 02/24/20 at 9:36 AM         Definitions for assistance levels  Independent = pt does not require any physical supervision or assistance from another person for activity completion. Device may be needed.   Stand by assistance = pt requires verbal cues or instructions from another person, close to but not touching, to perform the activity  Minimal assistance= pt performs 75% or more of the activity; assistance is required to complete the activity  Moderate assistance= pt performs 50% of the activity; assistance is required to complete the activity  Maximal assistance = pt performs 25% of the activity; assistance is required to complete the activity  Dependent = pt requires total physical assistance to accomplish the task

## 2020-02-25 VITALS
RESPIRATION RATE: 18 BRPM | HEART RATE: 88 BPM | BODY MASS INDEX: 25.23 KG/M2 | OXYGEN SATURATION: 98 % | WEIGHT: 128.53 LBS | TEMPERATURE: 100.2 F | DIASTOLIC BLOOD PRESSURE: 63 MMHG | SYSTOLIC BLOOD PRESSURE: 129 MMHG | HEIGHT: 60 IN

## 2020-02-25 PROCEDURE — 94760 N-INVAS EAR/PLS OXIMETRY 1: CPT

## 2020-02-25 PROCEDURE — 94640 AIRWAY INHALATION TREATMENT: CPT

## 2020-02-25 PROCEDURE — 6360000002 HC RX W HCPCS: Performed by: NURSE PRACTITIONER

## 2020-02-25 PROCEDURE — 6360000002 HC RX W HCPCS: Performed by: INTERNAL MEDICINE

## 2020-02-25 PROCEDURE — 6370000000 HC RX 637 (ALT 250 FOR IP): Performed by: NURSE PRACTITIONER

## 2020-02-25 PROCEDURE — 97535 SELF CARE MNGMENT TRAINING: CPT

## 2020-02-25 PROCEDURE — 96372 THER/PROPH/DIAG INJ SC/IM: CPT

## 2020-02-25 PROCEDURE — 97116 GAIT TRAINING THERAPY: CPT

## 2020-02-25 PROCEDURE — G0378 HOSPITAL OBSERVATION PER HR: HCPCS

## 2020-02-25 PROCEDURE — 6370000000 HC RX 637 (ALT 250 FOR IP): Performed by: INTERNAL MEDICINE

## 2020-02-25 PROCEDURE — 99219 PR INITIAL OBSERVATION CARE/DAY 50 MINUTES: CPT | Performed by: NURSE PRACTITIONER

## 2020-02-25 RX ORDER — NICOTINE 21 MG/24HR
1 PATCH, TRANSDERMAL 24 HOURS TRANSDERMAL DAILY
Qty: 30 PATCH | Refills: 3 | Status: SHIPPED | OUTPATIENT
Start: 2020-02-26

## 2020-02-25 RX ADMIN — PRAVASTATIN SODIUM 40 MG: 40 TABLET ORAL at 08:24

## 2020-02-25 RX ADMIN — ALBUTEROL SULFATE 2.5 MG: 2.5 SOLUTION RESPIRATORY (INHALATION) at 12:55

## 2020-02-25 RX ADMIN — ISOSORBIDE MONONITRATE 30 MG: 30 TABLET, EXTENDED RELEASE ORAL at 08:24

## 2020-02-25 RX ADMIN — METOPROLOL TARTRATE 12.5 MG: 25 TABLET, FILM COATED ORAL at 08:24

## 2020-02-25 RX ADMIN — ASPIRIN 81 MG 81 MG: 81 TABLET ORAL at 08:24

## 2020-02-25 RX ADMIN — ENOXAPARIN SODIUM 40 MG: 40 INJECTION SUBCUTANEOUS at 08:24

## 2020-02-25 RX ADMIN — GABAPENTIN 600 MG: 300 CAPSULE ORAL at 08:23

## 2020-02-25 RX ADMIN — LEVETIRACETAM 500 MG: 500 TABLET ORAL at 08:24

## 2020-02-25 ASSESSMENT — PAIN SCALES - GENERAL: PAINLEVEL_OUTOF10: 0

## 2020-02-25 NOTE — PROGRESS NOTES
(chronic kidney disease), stage III ([address] 11/08/2018    VTE (venous thromboembolism) 11/08/2018    MGUS (monoclonal gammopathy of unknown significance) 11/08/2018    SOB (shortness of breath) on exertion     Lung nodule 05/14/2018    Hypotension     Factor V Leiden mutation ([address] 05/15/2015    Hypercoagulable state, primary ([address] 05/15/2015    Esophageal dysphagia 03/10/2015    Tobacco dependence 08/25/2014    DVT (deep venous thrombosis) ([address] 08/01/2014    Facet arthropathy, lumbar 07/20/2011    Lumbosacral spondylosis without myelopathy 07/20/2011        Past Medical History:   Diagnosis Date    Arthritis     Back pain, chronic     Pt  is with pain management    Cancer ([address]     lung w brain metastasis    Chronic back pain     degenerative disc disease    Chronic kidney disease     COPD (chronic obstructive pulmonary disease) ([address]     COPD (chronic obstructive pulmonary disease) (HCC)     Coronary artery disease     Coronary artery disease involving native coronary artery of native heart without angina pectoris 3/19/2017    Dyslipidemia 11/20/2016    Emphysema lung ([address]     Essential hypertension 11/20/2016    Factor V deficiency ([address]     H/O blood clots     Headache     Hypertension     Hypertension     Restless leg syndrome     Syncope 11/20/2016    Tobacco abuse 11/20/2016     Past Surgical History:   Procedure Laterality Date    BRAIN SURGERY      BREAST BIOPSY Right     CARDIAC PACEMAKER PLACEMENT      CORONARY ANGIOPLASTY WITH STENT PLACEMENT      CRANIOTOMY N/A 8/6/2019    SUBOCCIPITAL CRANIOTOMY FOR TUMOR AND VENTRICULOSTOMY performed by Gali Perez MD at [facility]         Restrictions  Restrictions/Precautions: Fall Risk    SUBJECTIVE   General  Chart Reviewed: Yes  Subjective  Subjective: I can try and get up and walk with you.      Pre-Session Pain Report     Pain Screening  Patient Currently in Pain: No       Post-Session Pain Report            OBJECTIVE        Bed

## 2020-02-25 NOTE — CARE COORDINATION
PATIENT HAS HOME 02, TRILOGY BUT NOT A NEBULIZER AT HOME, DR. Talon Mcbride NOTIFIED AND ORDER NEEDS SIGNED.  CALL TO SWAPNIL WITH MEDICAL SERVICES TO OBTAIN NEBULIZER FOR PATIENT    NOTIFIED WARD WITH [address] OF ORDER FOR PT/OT WHEN PATIENT DC'D TO HOME      NOTIFIED NURSE THAT DR. Talon Mcbride INTENDED TO SIGN ORDER FOR NEBULIZER

## 2020-02-25 NOTE — DISCHARGE SUMMARY
Physician Discharge Summary     Patient ID:  Justyna Mckeon  [id number]  22 y.o.  1945    Admit date: 2/23/2020    Discharge date : 02/25/20     Admitting Physician: Mai Martinez MD     Discharge Physician: Deepa Tirado DO     Admission Diagnoses: Nausea & vomiting [R11.2]    Discharge Diagnoses: Viral gastroenteritis    Admission Condition: fair    Discharged Condition: good    Hospital Course: Patient with history of metastatic lung cancer sp radiation and who is set to begin chemotherapy was admitted with intractable n/v and weakness. Oncology was consulted and did not believe the symptoms to be a result of chemo as patient has not had any treatments. Pt was hydrated with IVF and nausea was controlled. Patient diet was advanced and her symptoms improved. PT worked with the patient who was a minimal assist. HHC was set up and patient was discharged home in stable and improved condition. Consults: hematology/oncology      Discharge Exam:  Constitutional: Awake and alert in no acute distress.  Lying in bed comfortably  Head: Normocephalic, atraumatic  Eyes: EOMI, PERRLA  ENT: moist mucous membranes  Neck: neck supple, trachea midline  Lungs: Good inspiratory effort, CTABL, no wheeze, no rhonchi, no rales  Heart: RRR, normal S1 and S2, no murmurs  GI: Soft, non-distended, non tender, no guarding, no rebound, +BS  MSK: Full ROM bilaterally, 5/5 strength bilaterally, no edema noted  Pulses: 2+ pulses bilaterally  Skin: warm, dry, good turgor, without lesions or rashes  Neuro: Intact motor and sensory, no focal deficits  Psych: appropriate affect       Labs:   Recent Labs     02/23/20  1541 02/24/20  0554   WBC 6.1 5.9   HGB 13.6 12.5   HCT 40.5 36.8*    145     Recent Labs     02/23/20  1541 02/24/20  0554   * 140   K 3.6 3.6   CL 96 104   CO2 22 21   BUN 17 15   CREATININE 0.95* 0.94*   CALCIUM 9.2 8.5     Recent Labs     02/23/20  1541 02/24/20  0554   AST 22 19   ALT 15 13   BILITOT 0.6 0.6 ALKPHOS 113 95     No results for input(s): INR in the last 72 hours. No results for input(s): Eulene Sarks in the last 72 hours. Urinalysis:   Lab Results   Component Value Date    NITRU Negative 02/23/2020    WBCUA 10-20 02/23/2020    BACTERIA RARE 02/23/2020    RBCUA 0-2 02/23/2020    BLOODU Negative 02/23/2020    SPECGRAV 1.024 02/23/2020    GLUCOSEU Negative 02/23/2020       Radiology:   Most recent    Chest CT      WITH CONTRAST:  Results for orders placed during the hospital encounter of 12/10/19   CT CHEST W CONTRAST    Narrative CT of the Chest with intravenous contrast medium    History:  Right lower lobe lung malignancy with poorly differentiated non-small cell carcinoma metastasis to brain. Technical Factors:    CT imaging of the chest was obtained and formatted as 5 mm contiguous axial images from the thoracic inlet through the adrenal glands. Sagittal and coronal reconstruction obtained during postprocessing. Intravenous contrast medium:  Isovue-370, 75 mL    Comparison:  PET/CT, September 13, 2019, CT chest, August 12, 2019,, 2019, July 31, 2019, May 6, 2019    Findings:    Right lung shows diffuse emphysematous change. Fibrotic change right lung apex. No discrete nodules or masses. No consolidation or pleural effusion. 4.5 x 4.6 x 1.7 cm mass is again identified level of the right diaphragm. However, examination on coronal (series 601, image 43), and sagittal imaging (series 602, image 27)  with intravenous contrast medium, coupled with recent PET coronal imaging   (image 16), shows mass to be subdiaphragmatic in location. Further evaluation of coronal images, current study, utilizing hepatic windows, along with sagittal imaging (series 602, image 31), suggests mass is hepatic in etiology, extending into the right   subdiaphragmatic space. Left lung shows diffuse facet change with apical fibrotic change. No nodules, masses, consolidation, pleural effusion.     9 mm precarinal EVALUATION. THIS IS SUPERIMPOSED UPON MODERATE   EMPHYSEMATOUS DISEASE ONE PARENCHYMA AS DESCRIBED ABOVE. All CT scans at this facility use dose modulation, iterative reconstruction, and/or weight based dosing when appropriate to reduce radiation dose to as low as reasonably achievable. CXR      2-view:   Results for orders placed during the hospital encounter of 20   XR CHEST STANDARD (2 VW)    Narrative Patient MRN: [id number]    : 1945    Age:  76 years    Gender: Female    Order Date: 2020 3:15 PM.     Exam: XR CHEST (2 VW)    Number of Views: 2     Indication:  Nausea and vomiting and cough x3 days    Comparison: 2019    Findings: Dual lead cardiac pacing device. Nonspecific opacification in the bilateral lung bases with small amounts of bilateral pleural fluid. No pneumothorax. Cardiac mediastinal silhouette within normal limits. Impression Impression:    1. Nonspecific bibasilar airspace disease, findings can be seen in infiltrate/pneumonia and/or atelectasis. .  2. Small amounts of bilateral pleural effusion. Portable:   Results for orders placed during the hospital encounter of 19   XR CHEST PORTABLE    Narrative EXAMINATION: XR CHEST PORTABLE    CLINICAL HISTORY:  SOB .    COMPARISONS: 2019    FINDINGS: Single AP portable view the chest obtained on 2019 at 0946 hours. There is an unremarkable dual-chamber permanent pacemaker in place from the left. The mediastinal silhouette is normal. The heart is not enlarged. There is new flattening of the left diaphragm compared to 2019    Follow-up two-view chest x-ray would be helpful for confirmation. CONCLUSION: COPD. NEW SUBPULMONIC EFFUSION ON THE LEFT. COMPARISON STUDY IS ADVISED. Echo No results found for this or any previous visit.     Disposition: home    In process/preliminary results:  Outstanding Order Results     No orders found from 2020 to release tablet Take 1 tablet by mouth daily  Qty: 30 tablet, Refills: 3      pravastatin (PRAVACHOL) 40 MG tablet Take 40 mg by mouth daily           Activity: activity as tolerated  Diet: regular diet  Wound Care: none needed    Follow-up with JULIANNA Jarquin CNP  in 1 week.     DC time 35 minutes    Signed:  Electronically signed by [facility]SD[facility] Leopoldo on 2/25/2020 at 4:31 PM

## 2020-02-25 NOTE — PLAN OF CARE
Problem: Falls - Risk of:  Goal: Will remain free from falls  Description  Will remain free from falls  Outcome: Ongoing  Goal: Absence of physical injury  Description  Absence of physical injury  Outcome: Ongoing     Problem: Fluid Volume:  Goal: Ability to achieve a balanced intake and output will improve  Description  Ability to achieve a balanced intake and output will improve  Outcome: Ongoing  Goal: Signs and symptoms of dehydration will decrease  Description  Signs and symptoms of dehydration will decrease  Outcome: Ongoing  Goal: Diagnostic test results will improve  Description  Diagnostic test results will improve  Outcome: Ongoing  Goal: Will maintain adequate fluid volume  Description  Will maintain adequate fluid volume  Outcome: Ongoing     Problem: Physical Regulation:  Goal: Ability to maintain clinical measurements within normal limits will improve  Description  Ability to maintain clinical measurements within normal limits will improve  Outcome: Ongoing  Goal: Will show no signs and symptoms of electrolyte imbalance  Description  Will show no signs and symptoms of electrolyte imbalance  Outcome: Ongoing  Goal: Complications related to the disease process, condition or treatment will be avoided or minimized  Description  Complications related to the disease process, condition or treatment will be avoided or minimized  Outcome: Ongoing  Goal: Ability to maintain vital signs within normal range will improve  Description  Ability to maintain vital signs within normal range will improve  Outcome: Ongoing     Problem:  Activity:  Goal: Ability to implement measures to reduce episodes of fatigue will improve  Description  Ability to implement measures to reduce episodes of fatigue will improve  Outcome: Ongoing  Goal: Ability to tolerate increased activity will improve  Description  Ability to tolerate increased activity will improve  Outcome: Ongoing     Problem: Coping:  Goal: Ability to identify and develop effective coping behavior will improve  Description  Ability to identify and develop effective coping behavior will improve  Outcome: Ongoing     Problem: Medication:  Goal: Compliance with prescribed medication regimen will improve  Description  Compliance with prescribed medication regimen will improve  Outcome: Ongoing  Goal: Risk for medication side effects will decrease  Description  Risk for medication side effects will decrease  Outcome: Ongoing     Problem: Health Behavior:  Goal: Compliance with treatment plan for underlying cause of condition will improve  Description  Compliance with treatment plan for underlying cause of condition will improve  Outcome: Ongoing  Goal: Ability to manage health-related needs will improve  Description  Ability to manage health-related needs will improve  Outcome: Ongoing  Goal: Identification of resources available to assist in meeting health care needs will improve  Description  Identification of resources available to assist in meeting health care needs will improve  Outcome: Ongoing     Problem: Nutritional:  Goal: Maintenance of adequate nutrition will improve  Description  Maintenance of adequate nutrition will improve  Outcome: Ongoing     Problem: Respiratory:  Goal: Ability to maintain adequate ventilation will improve  Description  Ability to maintain adequate ventilation will improve  Outcome: Ongoing

## 2020-02-26 ENCOUNTER — HOSPITAL ENCOUNTER (OUTPATIENT)
Dept: INFUSION THERAPY | Age: 75
Setting detail: INFUSION SERIES
End: 2020-02-26
Payer: MEDICARE

## 2020-02-26 NOTE — PROGRESS NOTES
Palliative Care Progress Note  Patient: Delaney Aguillon  Gender: female  YOB: 1945  Unit/Bed: I103/U493-07  CodeStatus: Prior  Inpatient Treatment Team: Treatment Team: Consulting Physician: Catie Mata MD; : Saulo Munoz, RN; Registered Nurse: Norm Khan, RN; Patient Care Tech: Alveta Low; Registered Nurse: Giorgi Acosta, RN; Patient Care Tech: Rachele Phil. Ted Sahu; Registered Nurse: Mars Jones, PADMINI  Admit Date:  2/23/2020    Chief Complaint: nausea/vomiting    History of Presenting Illness:    Delaney Aguillon is a 76 y.o. female on hospital day 2 with a history of CAD, COPD, CKD, HTN, HLN, and lung cancer with mets to the brain. Patient presented to the E. with nausea and vomiting. Patient is on [place] and is scheduled for 3/4/2020. Had recent PET Scan done which showed favorable results as per oncology. Patients nausea has improved today and tolerating clear liquid diet. IVF discontinued. Plan is for patient to be   discharged to home [place] [address] today. Palliative care to follow patient as outpatient. Review of Systems:       Review of Systems   Constitutional: Negative for chills, fatigue, fever and unexpected weight change. HENT: Negative for congestion, mouth sores, sinus pain, sore throat and trouble swallowing. Respiratory: Negative for cough, chest tightness, shortness of breath and wheezing. Cardiovascular: Negative for chest pain, palpitations and leg swelling. Gastrointestinal: Positive for nausea. Negative for abdominal pain, constipation, diarrhea and vomiting. Endocrine: Negative for polydipsia, polyphagia and polyuria. Genitourinary: Negative for dysuria, flank pain, frequency and urgency. Musculoskeletal: Negative for arthralgias, back pain, gait problem, joint swelling and myalgias. Skin: Negative. Neurological: Positive for weakness. Negative for tremors, seizures, speech difficulty, numbness and headaches. to continue on oxygen 2L via N/C continuous. -follow up with pulmonology as outpatient  -discussed smoking cessation and resources available to quit smoking  5.  Palliative Care  -plan is to see patient at home after discharge for symptom management  -will task  to assist with POA/LW       Electronically signed by JULIANNA Pelayo NP on 2/26/2020 at 9:21 AM

## 2020-02-27 NOTE — PROGRESS NOTES
Physical Therapy  Facility/Department: [facility] S475/[id number]  Physical Therapy Discharge      NAME: Skylar Cronin    : 1945 (67 y.o.)  MRN: [id number]    Account: [de-identified]  Gender: female      Patient has been discharged from acute care hospital. DC patient from current PT program.      Electronically signed by Abida Saunders PT on 20 at 11:31 AM

## 2020-03-03 NOTE — FLOWSHEET NOTE
Spoke to John at [facility]. She stated that the patient has not followed up after her hospital admission and will not be getting treatment tomorrow. I notified scheduling.  Also let her know that we will need a new order, progress notes, and labs when she is seen by Dr. Torsten Martinez.

## 2020-03-04 ENCOUNTER — HOSPITAL ENCOUNTER (OUTPATIENT)
Dept: INFUSION THERAPY | Age: 75
Setting detail: INFUSION SERIES
End: 2020-03-04
Payer: MEDICARE

## 2020-03-19 ENCOUNTER — HOSPITAL ENCOUNTER (OUTPATIENT)
Dept: INFUSION THERAPY | Age: 75
Setting detail: INFUSION SERIES
Discharge: HOME | End: 2020-03-19
Payer: MEDICARE

## 2020-03-19 VITALS
HEART RATE: 61 BPM | SYSTOLIC BLOOD PRESSURE: 109 MMHG | TEMPERATURE: 97.6 F | RESPIRATION RATE: 18 BRPM | DIASTOLIC BLOOD PRESSURE: 63 MMHG

## 2020-03-19 PROCEDURE — 6360000002 HC RX W HCPCS: Performed by: INTERNAL MEDICINE

## 2020-03-19 PROCEDURE — 2580000003 HC RX 258: Performed by: INTERNAL MEDICINE

## 2020-03-19 PROCEDURE — 96413 CHEMO IV INFUSION 1 HR: CPT

## 2020-03-19 PROCEDURE — 2580000003 HC RX 258

## 2020-03-19 RX ORDER — SODIUM CHLORIDE 9 MG/ML
INJECTION, SOLUTION INTRAVENOUS
Status: COMPLETED
Start: 2020-03-19 | End: 2020-03-19

## 2020-03-19 RX ADMIN — SODIUM CHLORIDE 200 MG: 9 INJECTION, SOLUTION INTRAVENOUS at 14:03

## 2020-03-19 RX ADMIN — SODIUM CHLORIDE 250 ML: 9 INJECTION, SOLUTION INTRAVENOUS at 13:43

## 2020-03-19 NOTE — FLOWSHEET NOTE
Infusion complete. Tolerated well. AVS printed and given to patient. Left the unit via wheelchair. All equipment used in the care for this patient has been cleaned.

## 2020-03-19 NOTE — FLOWSHEET NOTE
Patient to the floor ambulatory for her keytruda infusion. Vital signs taken. Denies any discomfort. Call light within reach.

## 2020-03-23 ENCOUNTER — HOSPITAL ENCOUNTER (OUTPATIENT)
Dept: GENERAL RADIOLOGY | Age: 75
Discharge: HOME | End: 2020-03-25
Payer: MEDICARE

## 2020-03-23 PROCEDURE — 71046 X-RAY EXAM CHEST 2 VIEWS: CPT

## 2020-04-02 ENCOUNTER — HOSPITAL ENCOUNTER (OUTPATIENT)
Dept: INFUSION THERAPY | Age: 75
Setting detail: INFUSION SERIES
Discharge: HOME | End: 2020-04-02
Payer: MEDICARE

## 2020-04-02 VITALS
SYSTOLIC BLOOD PRESSURE: 110 MMHG | TEMPERATURE: 97.8 F | RESPIRATION RATE: 16 BRPM | DIASTOLIC BLOOD PRESSURE: 70 MMHG | HEART RATE: 100 BPM

## 2020-04-02 PROCEDURE — 96413 CHEMO IV INFUSION 1 HR: CPT

## 2020-04-02 PROCEDURE — 2580000003 HC RX 258

## 2020-04-02 PROCEDURE — 6360000002 HC RX W HCPCS: Performed by: INTERNAL MEDICINE

## 2020-04-02 PROCEDURE — 2580000003 HC RX 258: Performed by: INTERNAL MEDICINE

## 2020-04-02 RX ORDER — SODIUM CHLORIDE 9 MG/ML
INJECTION, SOLUTION INTRAVENOUS
Status: COMPLETED
Start: 2020-04-02 | End: 2020-04-02

## 2020-04-02 RX ADMIN — SODIUM CHLORIDE 250 ML: 9 INJECTION, SOLUTION INTRAVENOUS at 10:43

## 2020-04-02 RX ADMIN — SODIUM CHLORIDE 200 MG: 9 INJECTION, SOLUTION INTRAVENOUS at 11:20

## 2020-04-02 ASSESSMENT — PAIN SCALES - GENERAL: PAINLEVEL_OUTOF10: 7

## 2020-04-02 NOTE — FLOWSHEET NOTE
Infusion complete, patient tolerated well. Left unit via wheelchair with staff assistance.  All equipment disinfected after discharge

## 2020-04-03 ENCOUNTER — APPOINTMENT (OUTPATIENT)
Dept: GENERAL RADIOLOGY | Age: 75
End: 2020-04-03
Payer: MEDICARE

## 2020-04-03 ENCOUNTER — HOSPITAL ENCOUNTER (EMERGENCY)
Age: 75
Discharge: HOME | End: 2020-04-03
Attending: EMERGENCY MEDICINE
Payer: MEDICARE

## 2020-04-03 VITALS
HEART RATE: 93 BPM | DIASTOLIC BLOOD PRESSURE: 57 MMHG | RESPIRATION RATE: 20 BRPM | OXYGEN SATURATION: 98 % | TEMPERATURE: 98.3 F | WEIGHT: 128 LBS | SYSTOLIC BLOOD PRESSURE: 145 MMHG | HEIGHT: 59 IN | BODY MASS INDEX: 25.8 KG/M2

## 2020-04-03 LAB
ALBUMIN SERPL-MCNC: 4.1 G/DL (ref 3.5–4.6)
ALP BLD-CCNC: 130 U/L (ref 40–130)
ALT SERPL-CCNC: 19 U/L (ref 0–33)
ANION GAP SERPL CALCULATED.3IONS-SCNC: 19 MEQ/L (ref 9–15)
AST SERPL-CCNC: 32 U/L (ref 0–35)
BASOPHILS ABSOLUTE: 0.1 K/UL (ref 0–0.2)
BASOPHILS RELATIVE PERCENT: 0.9 %
BILIRUB SERPL-MCNC: 0.7 MG/DL (ref 0.2–0.7)
BUN BLDV-MCNC: 30 MG/DL (ref 8–23)
CALCIUM SERPL-MCNC: 10.5 MG/DL (ref 8.5–9.9)
CHLORIDE BLD-SCNC: 102 MEQ/L (ref 95–107)
CO2: 24 MEQ/L (ref 20–31)
CREAT SERPL-MCNC: 1.29 MG/DL (ref 0.5–0.9)
EOSINOPHILS ABSOLUTE: 0 K/UL (ref 0–0.7)
EOSINOPHILS RELATIVE PERCENT: 0.3 %
GFR AFRICAN AMERICAN: 48.7
GFR NON-AFRICAN AMERICAN: 40.3
GLOBULIN: 3 G/DL (ref 2.3–3.5)
GLUCOSE BLD-MCNC: 110 MG/DL (ref 70–99)
HCT VFR BLD CALC: 48 % (ref 37–47)
HEMOGLOBIN: 15.8 G/DL (ref 12–16)
LIPASE: 85 U/L (ref 12–95)
LYMPHOCYTES ABSOLUTE: 1.2 K/UL (ref 1–4.8)
LYMPHOCYTES RELATIVE PERCENT: 12.2 %
MCH RBC QN AUTO: 30.8 PG (ref 27–31.3)
MCHC RBC AUTO-ENTMCNC: 32.9 % (ref 33–37)
MCV RBC AUTO: 93.5 FL (ref 82–100)
MONOCYTES ABSOLUTE: 0.9 K/UL (ref 0.2–0.8)
MONOCYTES RELATIVE PERCENT: 9.8 %
NEUTROPHILS ABSOLUTE: 7.3 K/UL (ref 1.4–6.5)
NEUTROPHILS RELATIVE PERCENT: 76.8 %
PDW BLD-RTO: 17.4 % (ref 11.5–14.5)
PLATELET # BLD: 184 K/UL (ref 130–400)
POTASSIUM SERPL-SCNC: 3.6 MEQ/L (ref 3.4–4.9)
RBC # BLD: 5.13 M/UL (ref 4.2–5.4)
SODIUM BLD-SCNC: 145 MEQ/L (ref 135–144)
TOTAL PROTEIN: 7.1 G/DL (ref 6.3–8)
WBC # BLD: 9.6 K/UL (ref 4.8–10.8)

## 2020-04-03 PROCEDURE — 96375 TX/PRO/DX INJ NEW DRUG ADDON: CPT

## 2020-04-03 PROCEDURE — 80053 COMPREHEN METABOLIC PANEL: CPT

## 2020-04-03 PROCEDURE — 36415 COLL VENOUS BLD VENIPUNCTURE: CPT

## 2020-04-03 PROCEDURE — 83690 ASSAY OF LIPASE: CPT

## 2020-04-03 PROCEDURE — 99285 EMERGENCY DEPT VISIT HI MDM: CPT

## 2020-04-03 PROCEDURE — 96374 THER/PROPH/DIAG INJ IV PUSH: CPT

## 2020-04-03 PROCEDURE — 85025 COMPLETE CBC W/AUTO DIFF WBC: CPT

## 2020-04-03 PROCEDURE — 96372 THER/PROPH/DIAG INJ SC/IM: CPT

## 2020-04-03 PROCEDURE — 2580000003 HC RX 258: Performed by: EMERGENCY MEDICINE

## 2020-04-03 PROCEDURE — 96376 TX/PRO/DX INJ SAME DRUG ADON: CPT

## 2020-04-03 PROCEDURE — 96361 HYDRATE IV INFUSION ADD-ON: CPT

## 2020-04-03 PROCEDURE — 6360000002 HC RX W HCPCS: Performed by: EMERGENCY MEDICINE

## 2020-04-03 PROCEDURE — 71045 X-RAY EXAM CHEST 1 VIEW: CPT

## 2020-04-03 RX ORDER — 0.9 % SODIUM CHLORIDE 0.9 %
1000 INTRAVENOUS SOLUTION INTRAVENOUS ONCE
Status: COMPLETED | OUTPATIENT
Start: 2020-04-03 | End: 2020-04-03

## 2020-04-03 RX ORDER — ONDANSETRON 2 MG/ML
4 INJECTION INTRAMUSCULAR; INTRAVENOUS ONCE
Status: COMPLETED | OUTPATIENT
Start: 2020-04-03 | End: 2020-04-03

## 2020-04-03 RX ORDER — FENTANYL CITRATE 50 UG/ML
50 INJECTION, SOLUTION INTRAMUSCULAR; INTRAVENOUS ONCE
Status: COMPLETED | OUTPATIENT
Start: 2020-04-03 | End: 2020-04-03

## 2020-04-03 RX ORDER — PROMETHAZINE HYDROCHLORIDE 25 MG/1
25 TABLET ORAL 4 TIMES DAILY PRN
Qty: 20 TABLET | Refills: 0 | Status: SHIPPED | OUTPATIENT
Start: 2020-04-03 | End: 2020-04-10

## 2020-04-03 RX ORDER — ONDANSETRON 4 MG/1
4 TABLET, ORALLY DISINTEGRATING ORAL 3 TIMES DAILY PRN
Qty: 21 TABLET | Refills: 0 | Status: SHIPPED | OUTPATIENT
Start: 2020-04-03

## 2020-04-03 RX ORDER — PROMETHAZINE HYDROCHLORIDE 25 MG/ML
25 INJECTION, SOLUTION INTRAMUSCULAR; INTRAVENOUS ONCE
Status: COMPLETED | OUTPATIENT
Start: 2020-04-03 | End: 2020-04-03

## 2020-04-03 RX ADMIN — FENTANYL CITRATE 50 MCG: 50 INJECTION INTRAMUSCULAR; INTRAVENOUS at 16:32

## 2020-04-03 RX ADMIN — SODIUM CHLORIDE 1000 ML: 9 INJECTION, SOLUTION INTRAVENOUS at 16:28

## 2020-04-03 RX ADMIN — PROMETHAZINE HYDROCHLORIDE 25 MG: 25 INJECTION, SOLUTION INTRAMUSCULAR; INTRAVENOUS at 16:28

## 2020-04-03 RX ADMIN — ONDANSETRON 4 MG: 2 INJECTION INTRAMUSCULAR; INTRAVENOUS at 17:56

## 2020-04-03 RX ADMIN — ONDANSETRON 4 MG: 2 INJECTION INTRAMUSCULAR; INTRAVENOUS at 16:28

## 2020-04-03 ASSESSMENT — ENCOUNTER SYMPTOMS
SORE THROAT: 0
SHORTNESS OF BREATH: 0
ABDOMINAL PAIN: 0
VOMITING: 1
COUGH: 0
BACK PAIN: 0
DIARRHEA: 0
NAUSEA: 1

## 2020-04-03 ASSESSMENT — PAIN DESCRIPTION - LOCATION
LOCATION: RIB CAGE
LOCATION: RIB CAGE

## 2020-04-03 ASSESSMENT — PAIN DESCRIPTION - FREQUENCY: FREQUENCY: CONTINUOUS

## 2020-04-03 ASSESSMENT — PAIN DESCRIPTION - PAIN TYPE: TYPE: ACUTE PAIN

## 2020-04-03 ASSESSMENT — PAIN SCALES - GENERAL
PAINLEVEL_OUTOF10: 9
PAINLEVEL_OUTOF10: 3
PAINLEVEL_OUTOF10: 9

## 2020-04-03 ASSESSMENT — PAIN DESCRIPTION - DESCRIPTORS: DESCRIPTORS: PATIENT UNABLE TO DESCRIBE

## 2020-04-03 NOTE — ED PROVIDER NOTES
Hypertension     Hypertension     Restless leg syndrome     Syncope 11/20/2016    Tobacco abuse 11/20/2016         SURGICAL HISTORY       Past Surgical History:   Procedure Laterality Date    BRAIN SURGERY      BREAST BIOPSY Right     CARDIAC PACEMAKER PLACEMENT      CORONARY ANGIOPLASTY WITH STENT PLACEMENT      CRANIOTOMY N/A 8/6/2019    SUBOCCIPITAL CRANIOTOMY FOR TUMOR AND VENTRICULOSTOMY performed by Katelynn Soliz MD at [address] [place] [address]       Previous Medications    ALBUTEROL SULFATE  (90 BASE) MCG/ACT INHALER    Inhale 2 puffs into the lungs every 6 hours as needed for Wheezing    ASPIRIN 81 MG CHEWABLE TABLET    Take 1 tablet by mouth daily    FLUTICASONE-UMECLIDIN-VILANT (TRELEGY ELLIPTA) 100-62.5-25 MCG/INH AEPB    Inhale 1 puff into the lungs daily    GABAPENTIN (NEURONTIN) 600 MG TABLET    Take 600 mg by mouth 3 times daily. ISOSORBIDE MONONITRATE (IMDUR) 30 MG EXTENDED RELEASE TABLET    Take 1 tablet by mouth daily    LACTOBACILLUS ACIDOPHILUS (FLORANEX)    Take 2 tablets by mouth 3 times daily    LEVETIRACETAM (KEPPRA) 500 MG TABLET    Take 500 mg by mouth 2 times daily    LISINOPRIL (PRINIVIL;ZESTRIL) 2.5 MG TABLET    Take 1 tablet by mouth nightly    METOPROLOL TARTRATE (LOPRESSOR) 25 MG TABLET    Take 0.5 tablets by mouth 2 times daily    NICOTINE (NICODERM CQ) 21 MG/24HR    Place 1 patch onto the skin daily    NITROGLYCERIN (NITROSTAT) 0.4 MG SL TABLET    up to max of 3 total doses. If no relief after 1 dose, call 911. OMEPRAZOLE (PRILOSEC) 20 MG DELAYED RELEASE CAPSULE    Take 20 mg by mouth    OXYCODONE-ACETAMINOPHEN (PERCOCET) 5-325 MG PER TABLET    Take 1 tablet by mouth.     OXYGEN    Portable Oxygen on 2L and Nocturnal Oxygen on 2L    PRAVASTATIN (PRAVACHOL) 40 MG TABLET    Take 40 mg by mouth daily    TIZANIDINE (ZANAFLEX) 4 MG TABLET    Take 4 mg by mouth every 8 hours as needed    VITAMIN D (CHOLECALCIFEROL) 5000 UNITS CAPS CAPSULE    Take 5,000 Units by not both are helpful)    Type of Home: House  One level    Home Access: Stairs to enter without rails - Number of Steps: 4    Bathroom Shower/Tub: Walk-in shower    Bathroom Equipment: Shower chair    Home Equipment: Rolling walker, 4 wheeled walker, Quad cane    Receives Help From: Family    ADL Assistance: Independent    Ambulation Assistance: Independent(quad cane most of the time)    Transfer Assistance: Independent    Active : No    Occupation: Retired    Her hobbies are reading classic literature especially works by the [address] [facility]       ED Triage Vitals [04/03/20 1617]   BP Temp Temp Source Pulse Resp SpO2 Height Weight   (!) 154/98 98.3 °F (36.8 °C) Oral 98 19 95 % 4' 11\" (1.499 m) 128 lb (58.1 kg)       Physical Exam  Vitals signs and nursing note reviewed. Constitutional:       Appearance: She is well-developed. HENT:      Head: Normocephalic. Right Ear: External ear normal.      Left Ear: External ear normal.   Eyes:      Conjunctiva/sclera: Conjunctivae normal.      Pupils: Pupils are equal, round, and reactive to light. Neck:      Musculoskeletal: Normal range of motion and neck supple. Cardiovascular:      Rate and Rhythm: Normal rate and regular rhythm. Heart sounds: Normal heart sounds. Pulmonary:      Effort: Pulmonary effort is normal.      Breath sounds: Normal breath sounds. Abdominal:      General: Bowel sounds are normal. There is no distension. Palpations: Abdomen is soft. Tenderness: There is no abdominal tenderness. Musculoskeletal: Normal range of motion. Skin:     General: Skin is warm and dry. Neurological:      Mental Status: She is alert and oriented to person, place, and time. Psychiatric:         Mood and Affect: Mood normal.           MDM  75 yo female presents to the ED with n/v.  Pt is afebrile, hemodynamically stable. Pt given 1 L NS, IV zofran, IM phenergan, IV fentanyl with moderate relief.   Labs

## 2020-04-03 NOTE — ED NOTES
Bed: 10  Expected date: 4/3/20  Expected time: 4:13 PM  Means of arrival:   Comments:  parveen DELANEY RN  04/03/20 [id number]

## 2020-04-03 NOTE — ED NOTES
Spoke to pt daughter on phone and updated her on pt condition and discharge home     Ankita Butcher [facility]  04/03/20 [id number]

## 2020-05-21 ENCOUNTER — HOSPITAL ENCOUNTER (INPATIENT)
Age: 75
LOS: 5 days | Discharge: NURSING, REHAB OR LONG-TERM CARE | DRG: 101 | End: 2020-05-26
Attending: INTERNAL MEDICINE | Admitting: INTERNAL MEDICINE
Payer: MEDICARE

## 2020-05-21 ENCOUNTER — APPOINTMENT (OUTPATIENT)
Dept: CT IMAGING | Age: 75
DRG: 101 | End: 2020-05-21
Payer: MEDICARE

## 2020-05-21 PROBLEM — R56.9 SEIZURE (HCC): Status: ACTIVE | Noted: 2020-05-21

## 2020-05-21 LAB
ALBUMIN SERPL-MCNC: 3.2 G/DL (ref 3.5–4.6)
ALP BLD-CCNC: 140 U/L (ref 40–130)
ALT SERPL-CCNC: 23 U/L (ref 0–33)
ANION GAP SERPL CALCULATED.3IONS-SCNC: 14 MEQ/L (ref 9–15)
AST SERPL-CCNC: 25 U/L (ref 0–35)
BANDED NEUTROPHILS RELATIVE PERCENT: 7 % (ref 5–11)
BASOPHILS ABSOLUTE: 0 K/UL (ref 0–0.2)
BASOPHILS RELATIVE PERCENT: 1.2 %
BILIRUB SERPL-MCNC: 0.4 MG/DL (ref 0.2–0.7)
BUN BLDV-MCNC: 23 MG/DL (ref 8–23)
CALCIUM SERPL-MCNC: 9 MG/DL (ref 8.5–9.9)
CHLORIDE BLD-SCNC: 104 MEQ/L (ref 95–107)
CO2: 21 MEQ/L (ref 20–31)
CREAT SERPL-MCNC: 1.47 MG/DL (ref 0.5–0.9)
EOSINOPHILS ABSOLUTE: 0 K/UL (ref 0–0.7)
EOSINOPHILS RELATIVE PERCENT: 0.1 %
GFR AFRICAN AMERICAN: 41.9
GFR NON-AFRICAN AMERICAN: 34.6
GLOBULIN: 2.5 G/DL (ref 2.3–3.5)
GLUCOSE BLD-MCNC: 73 MG/DL (ref 70–99)
HCT VFR BLD CALC: 38.7 % (ref 37–47)
HEMOGLOBIN: 12.9 G/DL (ref 12–16)
LACTIC ACID: 1.5 MMOL/L (ref 0.5–2.2)
LYMPHOCYTES ABSOLUTE: 0.5 K/UL (ref 1–4.8)
LYMPHOCYTES RELATIVE PERCENT: 4 %
MAGNESIUM: 1.6 MG/DL (ref 1.7–2.4)
MCH RBC QN AUTO: 30.8 PG (ref 27–31.3)
MCHC RBC AUTO-ENTMCNC: 33.2 % (ref 33–37)
MCV RBC AUTO: 92.8 FL (ref 82–100)
MONOCYTES ABSOLUTE: 0.4 K/UL (ref 0.2–0.8)
MONOCYTES RELATIVE PERCENT: 2.8 %
NEUTROPHILS ABSOLUTE: 12.4 K/UL (ref 1.4–6.5)
NEUTROPHILS RELATIVE PERCENT: 87 %
PDW BLD-RTO: 15.2 % (ref 11.5–14.5)
PLATELET # BLD: 87 K/UL (ref 130–400)
PLATELET SLIDE REVIEW: ABNORMAL
POTASSIUM SERPL-SCNC: 3.5 MEQ/L (ref 3.4–4.9)
PRO-BNP: 3321 PG/ML
PROLACTIN: 53.5 NG/ML
RBC # BLD: 4.17 M/UL (ref 4.2–5.4)
SMUDGE CELLS: 3.8
SODIUM BLD-SCNC: 139 MEQ/L (ref 135–144)
TOTAL CK: 164 U/L (ref 0–170)
TOTAL PROTEIN: 5.7 G/DL (ref 6.3–8)
WBC # BLD: 13.2 K/UL (ref 4.8–10.8)

## 2020-05-21 PROCEDURE — 36415 COLL VENOUS BLD VENIPUNCTURE: CPT

## 2020-05-21 PROCEDURE — 84146 ASSAY OF PROLACTIN: CPT

## 2020-05-21 PROCEDURE — 70450 CT HEAD/BRAIN W/O DYE: CPT

## 2020-05-21 PROCEDURE — 96365 THER/PROPH/DIAG IV INF INIT: CPT

## 2020-05-21 PROCEDURE — 99285 EMERGENCY DEPT VISIT HI MDM: CPT

## 2020-05-21 PROCEDURE — 6360000002 HC RX W HCPCS: Performed by: PHYSICIAN ASSISTANT

## 2020-05-21 PROCEDURE — 83735 ASSAY OF MAGNESIUM: CPT

## 2020-05-21 PROCEDURE — 83880 ASSAY OF NATRIURETIC PEPTIDE: CPT

## 2020-05-21 PROCEDURE — 1210000000 HC MED SURG R&B

## 2020-05-21 PROCEDURE — 85025 COMPLETE CBC W/AUTO DIFF WBC: CPT

## 2020-05-21 PROCEDURE — 82550 ASSAY OF CK (CPK): CPT

## 2020-05-21 PROCEDURE — 83605 ASSAY OF LACTIC ACID: CPT

## 2020-05-21 PROCEDURE — 2580000003 HC RX 258: Performed by: PHYSICIAN ASSISTANT

## 2020-05-21 PROCEDURE — 80053 COMPREHEN METABOLIC PANEL: CPT

## 2020-05-21 PROCEDURE — 81001 URINALYSIS AUTO W/SCOPE: CPT

## 2020-05-21 RX ORDER — 0.9 % SODIUM CHLORIDE 0.9 %
1000 INTRAVENOUS SOLUTION INTRAVENOUS ONCE
Status: COMPLETED | OUTPATIENT
Start: 2020-05-21 | End: 2020-05-22

## 2020-05-21 RX ADMIN — LEVETIRACETAM 500 MG: 100 INJECTION, SOLUTION INTRAVENOUS at 22:54

## 2020-05-21 RX ADMIN — SODIUM CHLORIDE 1000 ML: 9 INJECTION, SOLUTION INTRAVENOUS at 22:28

## 2020-05-22 LAB
BACTERIA: NEGATIVE /HPF
BILIRUBIN URINE: NEGATIVE
BLOOD, URINE: NEGATIVE
CLARITY: CLEAR
COLOR: ABNORMAL
EPITHELIAL CELLS, UA: NORMAL /HPF (ref 0–5)
GLUCOSE URINE: NEGATIVE MG/DL
HYALINE CASTS: NORMAL /HPF (ref 0–5)
KETONES, URINE: ABNORMAL MG/DL
LEUKOCYTE ESTERASE, URINE: NEGATIVE
NITRITE, URINE: NEGATIVE
PH UA: 6 (ref 5–9)
PROTEIN UA: 30 MG/DL
RBC UA: NORMAL /HPF (ref 0–2)
SPECIFIC GRAVITY UA: 1.02 (ref 1–1.03)
URINE REFLEX TO CULTURE: ABNORMAL
UROBILINOGEN, URINE: 1 E.U./DL
WBC UA: NORMAL /HPF (ref 0–5)

## 2020-05-22 PROCEDURE — 99221 1ST HOSP IP/OBS SF/LOW 40: CPT | Performed by: PSYCHIATRY & NEUROLOGY

## 2020-05-22 PROCEDURE — 2580000003 HC RX 258: Performed by: HOSPITALIST

## 2020-05-22 PROCEDURE — 6370000000 HC RX 637 (ALT 250 FOR IP): Performed by: HOSPITALIST

## 2020-05-22 PROCEDURE — 6360000002 HC RX W HCPCS: Performed by: HOSPITALIST

## 2020-05-22 PROCEDURE — 1210000000 HC MED SURG R&B

## 2020-05-22 RX ORDER — ONDANSETRON 2 MG/ML
4 INJECTION INTRAMUSCULAR; INTRAVENOUS EVERY 6 HOURS PRN
Status: DISCONTINUED | OUTPATIENT
Start: 2020-05-22 | End: 2020-05-26 | Stop reason: HOSPADM

## 2020-05-22 RX ORDER — POLYETHYLENE GLYCOL 3350 17 G/17G
17 POWDER, FOR SOLUTION ORAL DAILY PRN
Status: DISCONTINUED | OUTPATIENT
Start: 2020-05-22 | End: 2020-05-26 | Stop reason: HOSPADM

## 2020-05-22 RX ORDER — LEVETIRACETAM 500 MG/1
500 TABLET ORAL EVERY 12 HOURS
Status: DISCONTINUED | OUTPATIENT
Start: 2020-05-22 | End: 2020-05-26 | Stop reason: HOSPADM

## 2020-05-22 RX ORDER — DEXAMETHASONE SODIUM PHOSPHATE 10 MG/ML
6 INJECTION INTRAMUSCULAR; INTRAVENOUS EVERY 6 HOURS
Status: DISCONTINUED | OUTPATIENT
Start: 2020-05-22 | End: 2020-05-24

## 2020-05-22 RX ORDER — PROMETHAZINE HYDROCHLORIDE 12.5 MG/1
12.5 TABLET ORAL EVERY 6 HOURS PRN
Status: DISCONTINUED | OUTPATIENT
Start: 2020-05-22 | End: 2020-05-26 | Stop reason: HOSPADM

## 2020-05-22 RX ORDER — SODIUM CHLORIDE 0.9 % (FLUSH) 0.9 %
10 SYRINGE (ML) INJECTION EVERY 12 HOURS SCHEDULED
Status: DISCONTINUED | OUTPATIENT
Start: 2020-05-22 | End: 2020-05-26 | Stop reason: HOSPADM

## 2020-05-22 RX ORDER — ACETAMINOPHEN 650 MG/1
650 SUPPOSITORY RECTAL EVERY 6 HOURS PRN
Status: DISCONTINUED | OUTPATIENT
Start: 2020-05-22 | End: 2020-05-26 | Stop reason: HOSPADM

## 2020-05-22 RX ORDER — SODIUM CHLORIDE 0.9 % (FLUSH) 0.9 %
10 SYRINGE (ML) INJECTION PRN
Status: DISCONTINUED | OUTPATIENT
Start: 2020-05-22 | End: 2020-05-26 | Stop reason: HOSPADM

## 2020-05-22 RX ORDER — HEPARIN SODIUM 5000 [USP'U]/ML
5000 INJECTION, SOLUTION INTRAVENOUS; SUBCUTANEOUS EVERY 8 HOURS SCHEDULED
Status: DISCONTINUED | OUTPATIENT
Start: 2020-05-22 | End: 2020-05-22

## 2020-05-22 RX ORDER — ASPIRIN 81 MG
5 TABLET, DELAYED RELEASE (ENTERIC COATED) ORAL 2 TIMES DAILY
COMMUNITY
Start: 2019-12-20 | End: 2020-05-27

## 2020-05-22 RX ORDER — LORAZEPAM 2 MG/ML
2 INJECTION INTRAMUSCULAR EVERY 4 HOURS PRN
Status: DISCONTINUED | OUTPATIENT
Start: 2020-05-22 | End: 2020-05-26 | Stop reason: HOSPADM

## 2020-05-22 RX ORDER — ACETAMINOPHEN 325 MG/1
650 TABLET ORAL EVERY 6 HOURS PRN
Status: DISCONTINUED | OUTPATIENT
Start: 2020-05-22 | End: 2020-05-26 | Stop reason: HOSPADM

## 2020-05-22 RX ORDER — DEXAMETHASONE 4 MG/1
4 TABLET ORAL DAILY
COMMUNITY
Start: 2020-03-09

## 2020-05-22 RX ORDER — PANTOPRAZOLE SODIUM 40 MG/1
40 TABLET, DELAYED RELEASE ORAL
Status: DISCONTINUED | OUTPATIENT
Start: 2020-05-22 | End: 2020-05-26 | Stop reason: HOSPADM

## 2020-05-22 RX ORDER — BUPROPION HYDROCHLORIDE 150 MG/1
150 TABLET ORAL DAILY
COMMUNITY
Start: 2019-12-20

## 2020-05-22 RX ADMIN — LEVETIRACETAM 500 MG: 500 TABLET ORAL at 09:27

## 2020-05-22 RX ADMIN — DEXAMETHASONE SODIUM PHOSPHATE 6 MG: 10 INJECTION INTRAMUSCULAR; INTRAVENOUS at 19:57

## 2020-05-22 RX ADMIN — DEXAMETHASONE SODIUM PHOSPHATE 6 MG: 10 INJECTION INTRAMUSCULAR; INTRAVENOUS at 02:13

## 2020-05-22 RX ADMIN — LEVETIRACETAM 500 MG: 500 TABLET ORAL at 22:07

## 2020-05-22 RX ADMIN — DEXAMETHASONE SODIUM PHOSPHATE 6 MG: 10 INJECTION INTRAMUSCULAR; INTRAVENOUS at 14:22

## 2020-05-22 RX ADMIN — Medication 10 ML: at 09:28

## 2020-05-22 RX ADMIN — DEXAMETHASONE SODIUM PHOSPHATE 6 MG: 10 INJECTION INTRAMUSCULAR; INTRAVENOUS at 09:27

## 2020-05-22 RX ADMIN — PANTOPRAZOLE SODIUM 40 MG: 40 TABLET, DELAYED RELEASE ORAL at 09:27

## 2020-05-22 RX ADMIN — Medication 10 ML: at 19:58

## 2020-05-22 ASSESSMENT — PAIN DESCRIPTION - ORIENTATION: ORIENTATION: OTHER (COMMENT)

## 2020-05-22 ASSESSMENT — PAIN DESCRIPTION - LOCATION: LOCATION: BACK;LEG;HEAD

## 2020-05-22 ASSESSMENT — ENCOUNTER SYMPTOMS
PHOTOPHOBIA: 0
COLOR CHANGE: 0
VOMITING: 0
SHORTNESS OF BREATH: 0
CHOKING: 0
TROUBLE SWALLOWING: 0
NAUSEA: 0
BACK PAIN: 0

## 2020-05-22 ASSESSMENT — PAIN SCALES - GENERAL
PAINLEVEL_OUTOF10: 2
PAINLEVEL_OUTOF10: 0

## 2020-05-22 ASSESSMENT — PAIN DESCRIPTION - PAIN TYPE: TYPE: CHRONIC PAIN

## 2020-05-22 ASSESSMENT — PAIN DESCRIPTION - ONSET: ONSET: ON-GOING

## 2020-05-22 ASSESSMENT — PAIN DESCRIPTION - PROGRESSION: CLINICAL_PROGRESSION: NOT CHANGED

## 2020-05-22 NOTE — CONSULTS
Subjective:      Patient ID: Larry Aviles is a 76 y.o. female who presents today for seizures      HPI notify right-handed female brought to the emergency room with seizures occurring twice prior to arrival seizures witnessed by the family states she each seizure lasted several minutes and was tonic-clonic in nature. This was preceded by 1 to 2-day history of decreased appetite and lethargy. She is not able to eat and drink much she is in palliative care for underlying lung cancer. She is actually hospice care. Patient has history of cerebral metastasis which are well noted in the MRI done in December. There was small and 3 in number. Mika CT scan of course was done without contrast and may not show this metastasis she was treated. She is quite tremulous and and lethargic and shivering. She answers some questions and not some. She also has discussed with her daughter. Is gone downhill in the last few months and has limited ambulation    Review of Systems   Constitutional: Negative for fever. HENT: Negative for ear pain, tinnitus and trouble swallowing. Eyes: Negative for photophobia and visual disturbance. Respiratory: Negative for choking and shortness of breath. Cardiovascular: Negative for chest pain and palpitations. Gastrointestinal: Negative for nausea and vomiting. Musculoskeletal: Negative for back pain, gait problem, joint swelling, myalgias, neck pain and neck stiffness. Skin: Negative for color change. Allergic/Immunologic: Negative for food allergies. Neurological: Negative for dizziness, tremors, seizures, syncope, facial asymmetry, speech difficulty, weakness, light-headedness, numbness and headaches. Psychiatric/Behavioral: Negative for behavioral problems, confusion, hallucinations and sleep disturbance.        Past Medical History:   Diagnosis Date    Arthritis     Back pain, chronic     Pt  is with pain management    Cancer ([facility] [address]     lung w brain metastasis    Cerebellar ataxia (HCC)     Chronic back pain     degenerative disc disease    Chronic respiratory failure with hypoxia, on home oxygen therapy (HCC)     CKD (chronic kidney disease) stage 3, GFR 30-59 ml/min (HCC)     [facility] obstructive [facility] disease) [facility] [address]     Coronary artery disease     Coronary artery disease involving native coronary artery of native heart without angina pectoris 3/19/2017    Dyslipidemia 2016    Emphysema lung ([facility] [address]     Essential hypertension 2016    Factor V deficiency ([facility] [address]     H/O blood clots     Headache     Hypertension     [facility] cancer of right lung [facility]     Restless leg syndrome     Secondary malignant neoplasm of brain ([facility] [address]     Sick sinus syndrome ([facility] [address]     Syncope 2016    Tobacco abuse 2016     Past Surgical History:   Procedure Laterality Date    BRAIN SURGERY      BREAST BIOPSY Right     CARDIAC PACEMAKER PLACEMENT      CORONARY ANGIOPLASTY WITH STENT PLACEMENT      CRANIOTOMY N/A 2019    SUBOCCIPITAL CRANIOTOMY FOR TUMOR AND VENTRICULOSTOMY performed by Alejandro Neumann MD at [address] [place] [address] History     Socioeconomic History    Marital status:       Spouse name: Not on file    Number of children: 3    Years of education: Not on file    Highest education level: Not on file   Occupational History    Occupation:  Retired beautician    Occupation: Retired various factory jobs   Social Needs    Financial resource strain: Somewhat hard   [place] insecurity     Worry: Never true     Inability: Never true    Transportation needs     Medical: No     Non-medical: No   Tobacco Use    Smoking status: Current Some Day Smoker     Packs/day: 1.00     Years: 57.00     Pack years: 57.00     Types: Cigarettes     Start date: 1960     Last attempt to quit: 2019     Years since quittin.8    Smokeless tobacco: Never Used   Substance and Sexual Activity    Alcohol use: Yes     Comment: seldom    Drug use: No    Sexual activity: Never   Lifestyle    Physical activity     Days per week: 0 days     Minutes per session: 0 min    Stress: Only a little   Relationships    Social connections     Talks on phone: More than three times a week     Gets together: More than three times a week     Attends Judaism service: More than 4 times per year     Active member of club or organization: No     Attends meetings of clubs or organizations: Never     Relationship status:      Intimate partner violence     Fear of current or ex partner: No     Emotionally abused: No     Physically abused: No     Forced sexual activity: No   Other Topics Concern    Not on file   Social History Narrative         Lives With: Family  (2 dtrs-works full-time afternoon shift the other does not both are helpful)    Type of Home: House  One level    Home Access: Stairs to enter without rails - Number of Steps: 4    Bathroom Shower/Tub: Walk-in shower    Bathroom Equipment: Shower chair    Home Equipment: Rolling walker, 4 wheeled walker, Quad cane    Receives Help From: Family    ADL Assistance: Independent    Ambulation Assistance: Independent(quad cane most of the time)    Transfer Assistance: Independent    Active : No    Occupation: Retired    Her hobbies are reading classic literature especially works by the [employer]     Family History   Problem Relation Age of Onset    High Blood Pressure Mother     Cancer Mother     Arthritis Father     High Blood Pressure Father     Cancer Sister      No Known Allergies  Current Facility-Administered Medications   Medication Dose Route Frequency Provider Last Rate Last Dose    sodium chloride flush 0.9 % injection 10 mL  10 mL Intravenous 2 times per day JULIANNA Casillas CNP   10 mL at 05/22/20 0928    sodium chloride flush 0.9 % injection 10 mL  10 mL Intravenous PRN JULIANNA Casillas CNP        acetaminophen (TYLENOL) tablet 650 mg  650 mg Oral Q6H PRN Foye Killer, APRN - CNP        Or    acetaminophen (TYLENOL) suppository 650 mg  650 mg Rectal Q6H PRN Foye Killer, APRN - CNP        polyethylene glycol (GLYCOLAX) packet 17 g  17 g Oral Daily PRN Foye Killer, APRN - CNP        promethazine (PHENERGAN) tablet 12.5 mg  12.5 mg Oral Q6H PRN Foye Killer, APRN - CNP        Or    ondansetron (ZOFRAN) injection 4 mg  4 mg Intravenous Q6H PRN Foye Killer, APRN - CNP        pantoprazole (PROTONIX) tablet 40 mg  40 mg Oral QAM AC Monica Pentito, APRN - CNP   40 mg at 05/22/20 0927    dexamethasone (DECADRON) injection 6 mg  6 mg Intravenous Q6H Foye Killer, APRN - CNP   6 mg at 05/22/20 1422    levETIRAcetam (KEPPRA) tablet 500 mg  500 mg Oral Q12H Foye Killer, APRN - CNP   500 mg at 05/22/20 0927    LORazepam (ATIVAN) injection 2 mg  2 mg Intravenous Q4H PRN Foye Killer, APRN - CNP            Objective:   BP (!) 145/92   Pulse 90   Temp 97.8 °F (36.6 °C)   Resp 18   Ht 4' 11\" (1.499 m)   Wt 128 lb (58.1 kg)   LMP  (LMP Unknown)   SpO2 100%   BMI 25.85 kg/m²     Physical Exam initially limited as we did not walk her. She is tremulous and then she when she feels cold. She has normal neck with no session of meningeal signs cardiovascular system S1 and S2 heard normal no murmurs appreciated no carotid bruits respiratory system is clear to auscultation. Overall strength is 4/ 5 she is areflexic in the lower extremity 11+ reflex in the upper extremity strength is 4/5 did not attempt a walker. Cardiovascular system S1 and S2 heard normal no murmurs option of care was respite system is clear to auscultation    Ct Head Wo Contrast    Result Date: 5/22/2020  COMPARISON: August 9, 2019; February 23, 2020 HISTORY:  seizure TECHNIQUE: The study was performed without contrast FINDINGS: No acute hemorrhage is seen. The ventricular system is midline with no evidence for hydrocephalus. No wide vessel territory stroke seen.  Mild periventricular and subcortical white matter changes are seen. There is evidence of suboccipital decompression. A dionicio hole is again seen in the right frontal region. The visualized paranasal sinuses show mild bilateral mastoid effusions. This is unchanged from previous study. Mucoperiosteal is again seen in the sphenoid sinus. No acute hemorrhage or extra-axial fluid collection seen All CT scans at this facility use dose modulation, iterative reconstruction, and/or weight based dosing when appropriate to reduce radiation dose to as low as reasonably achievable. Lab Results   Component Value Date    WBC 13.2 05/21/2020    RBC 4.17 05/21/2020    HGB 12.9 05/21/2020    HCT 38.7 05/21/2020    MCV 92.8 05/21/2020    MCH 30.8 05/21/2020    MCHC 33.2 05/21/2020    RDW 15.2 05/21/2020    PLT 87 05/21/2020    MPV 9.1 05/07/2015     Lab Results   Component Value Date     05/21/2020    K 3.5 05/21/2020    K 3.6 02/24/2020     05/21/2020    CO2 21 05/21/2020    BUN 23 05/21/2020    CREATININE 1.47 05/21/2020    GFRAA 41.9 05/21/2020    LABGLOM 34.6 05/21/2020    GLUCOSE 73 05/21/2020    GLUCOSE 115 07/12/2019    PROT 5.7 05/21/2020    LABALBU 3.2 05/21/2020    LABALBU 23.6 07/12/2019    CALCIUM 9.0 05/21/2020    BILITOT 0.4 05/21/2020    ALKPHOS 140 05/21/2020    AST 25 05/21/2020    ALT 23 05/21/2020     Lab Results   Component Value Date    PROTIME 14.3 08/06/2019    INR 1.1 08/06/2019     Lab Results   Component Value Date    TSH 2.860 01/17/2020     Lab Results   Component Value Date    TRIG 88 11/02/2013    HDL 57 11/02/2013    LDLCALC 124 11/02/2013     No results found for: Doroteo Pipes, LABBENZ, CANNAB, COCAINESCRN, LABMETH, OPIATESCREENURINE, PHENCYCLIDINESCREENURINE, PPXUR, ETOH  No results found for: LITHIUM, DILFRTOT, VALPROATE    Assessment:     Seizures secondary to cerebral metastases with a previous craniotomy scar. Patient had 3 metastases in December and likely she has scarring. We did not attempt to obtain further evaluation as we have elected to treat this for more more we are doing. We will start on Keppra 5 mm twice a day. Case was discussed with her daughter and patient will remain hospice care. Side effects of medication can occur if they occur we may consider changing her medication for now we will keep her on what she is. Gait ataxia generalized weakness and asthenia secondary to malignancy and treatments.   Please call us with any questions      Plan:

## 2020-05-22 NOTE — H&P
[facility]  MEDICINE    HISTORY AND PHYSICAL EXAM    PATIENT NAME:  Inocente Gilbert    MRN:  [id number]  SERVICE DATE:  5/22/2020   SERVICE TIME:  12:58 AM    Primary Care Physician: JULIANNA Lou CNP         SUBJECTIVE  CHIEF COMPLAINT: Persistent shaking    HPI:  This is a 76 y.o. female who presents with right lung metastatic, secondary malignant neoplasm of brain, factor V deficiency, these, COPD on home O2, CKD stage III, chronic back pain, sick sinus syndrome, tobacco abuse. Information of this H&P is from ER provider notes with input from family. Family currently not at bedside but they were here giving provider information. Patient story is unreliable could be secondary to the new onset seizure secondary to the brain tumor. Patient presented to [facility] via EMS with complaints of new onset seizures x2. Family states that patient had 2 witnessed tonic-clonic seizures which are new for patient, family states that each seizure lasted several minutes. Patient was prescribed Keppra in the past unsure if she is currently taking this medication. Patient receives hospice care outpatient but had to be discharged from the services to be admitted to [facility] for new onset seizures. The family feels that they can no longer provide proper care for her and would like for her to have inpatient hospice care. Patient denies any pain she is currently alert and oriented x2. Patient did express that she \"wants to lay her head back and go\".     PAST MEDICAL HISTORY:    Past Medical History:   Diagnosis Date    Arthritis     Back pain, chronic     Pt  is with pain management    Cancer ([facility]     lung w brain metastasis    Cerebellar ataxia ([facility]     Chronic back pain     degenerative disc disease    Chronic respiratory failure with hypoxia, on home oxygen therapy (HCC)     CKD (chronic kidney disease) stage 3, GFR 30-59 ml/min ([facility])     COPD [facility]) [facility]     Coronary artery disease     Coronary artery disease involving native coronary artery of native heart without angina pectoris 3/19/2017    Dyslipidemia 2016    [facility]     Essential hypertension 2016    Factor V deficiency ([facility]     H/O blood clots     Headache     Hypertension     Primary cancer of right lung metastatic to other site [facility])     Restless leg syndrome     Secondary malignant neoplasm of brain [facility]     Sick sinus syndrome ([facility]     Syncope 2016    Tobacco abuse 2016     PAST SURGICAL HISTORY:    Past Surgical History:   Procedure Laterality Date    BRAIN SURGERY      BREAST BIOPSY Right     CARDIAC PACEMAKER PLACEMENT      CORONARY ANGIOPLASTY WITH STENT PLACEMENT      CRANIOTOMY N/A 2019    SUBOCCIPITAL CRANIOTOMY FOR TUMOR AND VENTRICULOSTOMY performed by Ting Palacios MD at [address] [place] [address]:    Family History   Problem Relation Age of Onset    High Blood Pressure Mother     Cancer Mother     Arthritis Father     High Blood Pressure Father     Cancer Sister      SOCIAL HISTORY:    Social History     Socioeconomic History    Marital status:       Spouse name: Not on file    Number of children: 3    Years of education: Not on file    Highest education level: Not on file   Occupational History    Occupation:  Retired beautician    Occupation: Retired various factory jobs   Social Needs    Financial resource strain: Somewhat hard   [place] insecurity     Worry: Never true     Inability: Never true    Transportation needs     Medical: No     Non-medical: No   Tobacco Use    Smoking status: Current Some Day Smoker     Packs/day: 1.00     Years: 57.00     Pack years: 57.00     Types: Cigarettes     Start date: 1960     Last attempt to quit: 2019     Years since quittin.8    Smokeless tobacco: Never Used   Substance and Sexual Activity    Alcohol use: Yes     Comment: seldom    Drug use: No    Sexual activity: Never   Lifestyle    Physical activity     Days per week: 0 days     Minutes per session: 0 min    Stress: Only a little   Relationships    Social connections     Talks on phone: More than three times a week     Gets together: More than three times a week     Attends Yazdanism service: More than 4 times per year     Active member of club or organization: No     Attends meetings of clubs or organizations: Never     Relationship status:     Intimate partner violence     Fear of current or ex partner: No     Emotionally abused: No     Physically abused: No     Forced sexual activity: No   Other Topics Concern    Not on file   Social History Narrative         Lives With: Family  (2 dtrs-works full-time afternoon shift the other does not both are helpful)    Type of Home: House  One level    Home Access: Stairs to enter without rails - Number of Steps: 4    Bathroom Shower/Tub: Walk-in shower    Bathroom Equipment: Shower chair    Home Equipment: Rolling walker, 4 wheeled walker, Quad cane    Receives Help From: Family    ADL Assistance: Independent    Ambulation Assistance: Independent(quad cane most of the time)    Transfer Assistance: Independent    Active : No    Occupation: Retired    Her hobbies are reading classic literature especially works by the Nathaniel sisters     MEDICATIONS:   Prior to Admission medications    Medication Sig Start Date End Date Taking? Authorizing Provider   metoprolol tartrate (LOPRESSOR) 25 MG tablet take ONE-HALF tablet 2 (TWO) times a day 4/23/20  Yes Corbin Garcia MD   lisinopril (PRINIVIL;ZESTRIL) 2.5 MG tablet Take 1 tablet by mouth nightly 2/18/20  Yes Corbin Garcia MD   gabapentin (NEURONTIN) 600 MG tablet Take 600 mg by mouth 3 times daily.    Yes Historical Provider, MD   fluticasone-umeclidin-vilant (TRELEGY ELLIPTA) 100-62.5-25 MCG/INH AEPB Inhale 1 puff into the lungs daily 10/21/19  Yes Chrissie Aleman MD   levETIRAcetam (KEPPRA) 500 MG tablet Take 500 mg by mouth 2 times daily   Yes Sam Urena, DO   OXYGEN Portable Oxygen on 2L and Nocturnal Oxygen on 2L 9/18/19  Yes Day Mars MD   lactobacillus acidophilus [facility]) Take 2 tablets by mouth 3 times daily 8/21/19  Yes Comfort Leary, DO   vitamin D (CHOLECALCIFEROL) 5000 units CAPS capsule Take 5,000 Units by mouth 2/7/19  Yes Historical Provider, MD   omeprazole (PRILOSEC) 20 MG delayed release capsule Take 20 mg by mouth 2/7/19  Yes Historical Provider, MD   oxyCODONE-acetaminophen (PERCOCET) 5-325 MG per tablet Take 1 tablet by mouth. Yes Historical Provider, MD   albuterol sulfate  (90 Base) MCG/ACT inhaler Inhale 2 puffs into the lungs every 6 hours as needed for Wheezing   Yes Historical Provider, MD   aspirin 81 MG chewable tablet Take 1 tablet by mouth daily 11/22/16  Yes JULIANNA Barrera CNP   isosorbide mononitrate (IMDUR) 30 MG extended release tablet Take 1 tablet by mouth daily 11/22/16  Yes JULIANNA Barrera CNP   pravastatin (PRAVACHOL) 40 MG tablet Take 40 mg by mouth daily   Yes Historical Provider, MD   ondansetron (ZOFRAN-ODT) 4 MG disintegrating tablet Take 1 tablet by mouth 3 times daily as needed for Nausea or Vomiting 4/3/20   Brain MD Gregory   nicotine (NICODERM CQ) 21 MG/24HR Place 1 patch onto the skin daily 2/26/20   [facility] DO   nitroGLYCERIN (NITROSTAT) 0.4 MG SL tablet up to max of 3 total doses. If no relief after 1 dose, call 911. 8/21/19   Lilibeth Golden, DO   zoledronic acid (RECLAST) 5 MG/100ML SOLN Infuse 5 mg intravenously 11/15/18   Historical Provider, MD   tiZANidine (ZANAFLEX) 4 MG tablet Take 4 mg by mouth every 8 hours as needed    Historical Provider, MD       ALLERGIES: Patient has no known allergies.     REVIEW OF SYSTEM:   Unable to do, secondary to mental status     OBJECTIVE  PHYSICAL EXAM: /81   Pulse 90   Temp 98.3 °F (36.8 °C)   Resp 18   Ht 4' 11\" (1.499 m)   Wt 128 lb (58.1 kg)   LMP  (LMP Unknown)   SpO2 98%   BMI 25.85 kg/m²     CONSTITUTIONAL:  fatigued, somnolent, distracted and no apparent distress  EYES:  Lids and lashes normal, pupils equal, round and reactive to light, extra ocular muscles intact, sclera clear, conjunctiva normal  ENT:  normocepalic, without obvious abnormality  LUNGS:  Mild respiratory distress and crackles diffuse  CARDIOVASCULAR:  Normal apical impulse, regular rate and rhythm, normal S1 and S2, no S3 or S4, and no murmur noted  ABDOMEN:  No scars, normal bowel sounds, soft, non-distended, non-tender, no masses palpated, no hepatosplenomegally  GENITAL/URINARY:  Voiding freely  MUSCULOSKELETAL:  there is no redness, warmth, or swelling of the joints  motor strength is 2 out of 5 all extremities bilaterally  NEUROLOGIC:  Awake, alert, oriented to name and place only, garbled speech, able to follow commands but slow to respond, denies headaches and change in vision, cranial nerves II-XII are grossly intact. Sensory is intact. SKIN:  no bruising or bleeding and normal skin color, texture, turgor, alopecia    DATA:     Diagnostic tests reviewed for today's visit:    Most recent labs and imaging results reviewed.      LABS:    Recent Results (from the past 24 hour(s))   Comprehensive Metabolic Panel    Collection Time: 05/21/20 10:00 PM   Result Value Ref Range    Sodium 139 135 - 144 mEq/L    Potassium 3.5 3.4 - 4.9 mEq/L    Chloride 104 95 - 107 mEq/L    CO2 21 20 - 31 mEq/L    Anion Gap 14 9 - 15 mEq/L    Glucose 73 70 - 99 mg/dL    BUN 23 8 - 23 mg/dL    CREATININE 1.47 (H) 0.50 - 0.90 mg/dL    GFR Non-African American 34.6 (L) >60    GFR  41.9 (L) >60    Calcium 9.0 8.5 - 9.9 mg/dL    Total Protein 5.7 (L) 6.3 - 8.0 g/dL    Alb 3.2 (L) 3.5 - 4.6 g/dL    Total Bilirubin 0.4 0.2 - 0.7 mg/dL    Alkaline Phosphatase 140 (H) 40 - 130 U/L    ALT 23 0 - 33 U/L    AST 25 0 - 35 U/L    Globulin 2.5 2.3 - 3.5 g/dL   CBC Auto Differential Collection Time: 05/21/20 10:00 PM   Result Value Ref Range    WBC 13.2 (H) 4.8 - 10.8 K/uL    RBC 4.17 (L) 4.20 - 5.40 M/uL    Hemoglobin 12.9 12.0 - 16.0 g/dL    Hematocrit 38.7 37.0 - 47.0 %    MCV 92.8 82.0 - 100.0 fL    MCH 30.8 27.0 - 31.3 pg    MCHC 33.2 33.0 - 37.0 %    RDW 15.2 (H) 11.5 - 14.5 %    Platelets 87 (L) 257 - 400 K/uL    PLATELET SLIDE REVIEW Decreased     Neutrophils % 87.0 %    Lymphocytes % 4.0 %    Monocytes % 2.8 %    Eosinophils % 0.1 %    Basophils % 1.2 %    Neutrophils Absolute 12.4 (H) 1.4 - 6.5 K/uL    Lymphocytes Absolute 0.5 (L) 1.0 - 4.8 K/uL    Monocytes Absolute 0.4 0.2 - 0.8 K/uL    Eosinophils Absolute 0.0 0.0 - 0.7 K/uL    Basophils Absolute 0.0 0.0 - 0.2 K/uL    Bands Relative 7 5 - 11 %    Smudge Cells 3.8    Urine Reflex to Culture    Collection Time: 05/21/20 10:00 PM   Result Value Ref Range    Color, UA DARK YELLOW (A) Straw/Yellow    Clarity, UA Clear Clear    Glucose, Ur Negative Negative mg/dL    Bilirubin Urine Negative Negative    Ketones, Urine TRACE (A) Negative mg/dL    Specific Gravity, UA 1.018 1.005 - 1.030    Blood, Urine Negative Negative    pH, UA 6.0 5.0 - 9.0    Protein, UA 30 (A) Negative mg/dL    Urobilinogen, Urine 1.0 <2.0 E.U./dL    Nitrite, Urine Negative Negative    Leukocyte Esterase, Urine Negative Negative    Urine Reflex to Culture Not Indicated    Magnesium    Collection Time: 05/21/20 10:00 PM   Result Value Ref Range    Magnesium 1.6 (L) 1.7 - 2.4 mg/dL   Lactic Acid, Plasma    Collection Time: 05/21/20 10:00 PM   Result Value Ref Range    Lactic Acid 1.5 0.5 - 2.2 mmol/L   Prolactin    Collection Time: 05/21/20 10:00 PM   Result Value Ref Range    Prolactin 53.5 ng/mL   CK    Collection Time: 05/21/20 10:00 PM   Result Value Ref Range    Total  0 - 170 U/L   Brain Natriuretic Peptide    Collection Time: 05/21/20 10:00 PM   Result Value Ref Range    Pro-BNP 3,321 pg/mL   Microscopic Urinalysis    Collection Time: 05/21/20 10:00 PM Result Value Ref Range    RBC, UA 0-2 0 - 2 /HPF    Bacteria, UA Negative Negative /HPF    Hyaline Casts, UA 3-5 0 - 5 /HPF    WBC, UA 0-2 0 - 5 /HPF    Epithelial Cells, UA 0-2 0 - 5 /HPF       IMAGING:  No results found.     VTE Prophylaxis: heparin -  start    ASSESSMENT AND PLAN    Seizure  Assessment: Tonic-clonic seizure x2 tonight witnessed by family, no known history of seizures, patient was on Keppra at some point not sure if she still taking, secondary to metastatic brain cancer  Plan:   Admit  Seizure precautions  Consult to neurology  Keppra bid  Decadron 6 mg  q6h  Strict bedrest  Neuro checks every 4 hours while awake   Ativan for breakthrough seizures  Would not recommend any further scans as patient just had MRI in December and patient does not want any further treatment      Secondary malignant neoplasm of brain   Assessment: Primary lung CA with mets to the brain, back in August 2019 patient has cerebral edema brain mass which was resected with shunt placed, patient now has left frontal lobe mass shown on MRI in December, appears that patient had chemo and radiation in the past, patient was receiving hospice at home however family has expressed concerns regarding difficulty caring for her, was taken out of hospice to come to the hospital for these new seizures, hospice is still a request  Plan:   Consult hospice for inpatient admission if appropriate  Consult   DNR CC      Essential hypertension  Assessment: BP high on arrival, has improved  Plan:  Monitor vital signs  Continue home medication      Chronic respiratory failure with hypoxia, on home oxygen therapy   Assessment: Good oxygen saturation on normal 2 L nasal cannula, history of COPD with tobacco abuse in the past, emphysema, no evidence of acute exacerbation  Plan:   Continue home oxygen and maintain oxygen saturation greater than 90%        Plan of care discussed with: patient    SIGNATURE: Debbie Dos Santos, JULIANNA - CNP  DATE: May 22, 2020  TIME: 12:58 AM     Dr. Eze Veliz MD - supervising physician

## 2020-05-22 NOTE — PROGRESS NOTES
Pt on unit   oriented x4 at this time  Sleepy and lethargic pt unsure of some of newer medications she is taking at home  R heel has 3x2 unstageable wound  Not open  Foam dressing placed  Pt incont of urine  Wearing brief from home  Wishes to continue wearing brief during stay  Vital signs WNL

## 2020-05-22 NOTE — PROGRESS NOTES
Pt admitted after midnight for seizures. Pt is DNR-CC and was with hospice which was revoked by family to have pt further evaluated. Continuing keppra. Hospice and palliative consulted. Pt refuses MRI. Neuro consulted. Likely discharge back with hospice tomorrow.     Rizwan Lebron, DO  Internal Medicine

## 2020-05-22 NOTE — ED TRIAGE NOTES
Pt presents to ED via ems with cc of generalized weakness/ fatigue. Pt states that she walked to the kitchen and by the time she got back to her bedroom she was extremely fatigued. Pt states that she has brain cancer. Pt states that she is on hospice care. Pt AOx4. Skin pink warm and dry. Respirations even and unlabored.

## 2020-05-22 NOTE — ED NOTES
Patient resting in bed with call light in reach. Breathes are even and unlabored. Skin is warm and dry. Vital signs are stable. Patient denies any needs at this time.       Dolores Cruz RN  05/22/20 [id number]

## 2020-05-22 NOTE — CARE COORDINATION
[facility] Case Management Initial Discharge Assessment    Met with patient and spoke to daughter/KANDACE Hansen to discuss discharge plan. PCP: JULIANNA Boone - CNP                                Date of Last Visit: Following with hospice physician    If no PCP, list provided? N/A    Discharge Planning    Living Arrangements: at home dependent on family care and at home dependent on nursing care    Who do you live with? Daughter Jean Paul Womack    Who helps you with your care:  family or hospice    If lives at home:     Do you have any barriers navigating in your home? yes - Pt does not ambulate    Patient can perform ADL? No    Current Services (outpatient and in home) :  Hospice ([id number])    Dialysis: No    Is transportation available to get to your appointments? Yes    DME Equipment:  yes - Hospital bed, wheelchair, bedside commode    Respiratory equipment: Unknown    Respiratory provider:  no     Pharmacy:  yes - Through hospice    Consult with Medication Assistance Program?  No      [facility] agreeable to [facility]? N/A    Patient agreeable to SNF/Rehab? No    Other discharge needs identified? Hospice    [place] of choice list provided with basic dialogue that supports the patient's individualized plan of care/goals and shares the quality data associated with the providers. Yes    Does Patient Have a High-Risk for Readmission Diagnosis (CHF, PN, MI, COPD)? No     The plan for Transition of Care is related to the following treatment goals: TBD    Initial Discharge Plan? (Note: please see concurrent daily documentation for any updates after initial note). Pt [facility] with [facility] which was revoked when family decided to bring her to the hospital. They plan to resume hospice services upon DC.  Daughter states they may pursue a nursing home, but will have her return to home first and work with the [employer] this.    The Patient and/or patient representative: patient was provided with choice of any post-acute providers for care and equipment and agrees with discharge plan  Yes    Electronically signed by SAV Paredes on 5/22/2020 at 12:39 PM

## 2020-05-22 NOTE — ED PROVIDER NOTES
[facility]  4W MED SURG UNIT  eMERGENCY dEPARTMENTeNCOUnter      Pt Name: Caitie Wadsworth  MRN: [id number]  [place] 1945  Date ofevaluation: 5/21/2020  Provider: Carlitos Orr PA-C    CHIEF COMPLAINT       Chief Complaint   Patient presents with    Fatigue         HISTORY OF PRESENT ILLNESS   (Location/Symptom, Timing/Onset,Context/Setting, Quality, Duration, Modifying Factors, Severity)  Note limiting factors. Caitie Wadsworth is a 76 y.o. female who presents to the emergency department following reported seizure x2 prior to arrival.  Seizures were witnessed by family who state that each seizure lasted several minutes and was tonic-clonic in nature. Seizures were preceded by a 1 to 2-day history of decreased appetite, and family states that the patient has not eaten or drank anything in the past 2 days. Patient has a history of brain CA but has no history of seizures in the past.  Family states that the patient has been more confused than normal over the past 2 days as well. Patient is generally weak and is a hospice patient however they are aware that patient will have to come out of hospice for inpatient hospital care and they wanting to do so. Patient denies any pain or symptoms currently but is alert and oriented x2 which family states patient is typically alert and oriented x3    HPI    NursingNotes were reviewed. REVIEW OF SYSTEMS    (2-9 systems for level 4, 10 or more for level 5)     Review of Systems   Unable to perform ROS: Mental status change       Except as noted above the remainder of the review of systems was reviewed and negative.        PAST MEDICAL HISTORY     Past Medical History:   Diagnosis Date    Arthritis     Back pain, chronic     Pt  is with pain management    Cancer ([address]     lung w brain metastasis    Cerebellar ataxia ([address]     Chronic back pain     degenerative disc disease    Chronic respiratory failure with hypoxia, on home oxygen therapy (HCC)     CKD (chronic kidney daily      OXYGEN Portable Oxygen on 2L and Nocturnal Oxygen on 2L  Qty: 1 Units, Refills: 0    Associated Diagnoses: Chronic obstructive pulmonary disease, unspecified COPD type (HCC)      lactobacillus acidophilus (FLORANEX) Take 2 tablets by mouth 3 times daily  Qty: 120 tablet, Refills: 1      vitamin D (CHOLECALCIFEROL) 5000 units CAPS capsule Take 5,000 Units by mouth      omeprazole (PRILOSEC) 20 MG delayed release capsule Take 20 mg by mouth      oxyCODONE-acetaminophen (PERCOCET) 5-325 MG per tablet Take 1 tablet by mouth. albuterol sulfate  (90 Base) MCG/ACT inhaler Inhale 2 puffs into the lungs every 6 hours as needed for Wheezing      aspirin 81 MG chewable tablet Take 1 tablet by mouth daily  Qty: 30 tablet, Refills: 3      isosorbide mononitrate (IMDUR) 30 MG extended release tablet Take 1 tablet by mouth daily  Qty: 30 tablet, Refills: 3      pravastatin (PRAVACHOL) 40 MG tablet Take 40 mg by mouth daily      dexamethasone (DECADRON) 4 MG tablet Take 4 mg by mouth daily      ondansetron (ZOFRAN-ODT) 4 MG disintegrating tablet Take 1 tablet by mouth 3 times daily as needed for Nausea or Vomiting  Qty: 21 tablet, Refills: 0      nicotine (NICODERM CQ) 21 MG/24HR Place 1 patch onto the skin daily  Qty: 30 patch, Refills: 3      nitroGLYCERIN (NITROSTAT) 0.4 MG SL tablet up to max of 3 total doses. If no relief after 1 dose, call 911. Qty: 25 tablet, Refills: 3      zoledronic acid (RECLAST) 5 MG/100ML SOLN Infuse 5 mg intravenously      tiZANidine (ZANAFLEX) 4 MG tablet Take 4 mg by mouth every 8 hours as needed             ALLERGIES     Patient has no known allergies. FAMILY HISTORY       Family History   Problem Relation Age of Onset    High Blood Pressure Mother     Cancer Mother     Arthritis Father     High Blood Pressure Father     Cancer Sister           SOCIAL HISTORY       Social History     Socioeconomic History    Marital status:       Spouse name: None    Number of children: 3    Years of education: None    Highest education level: None   Occupational History    Occupation:  Retired beautician    Occupation: Retired various factory jobs   Social Needs    Financial resource strain: Somewhat hard   [place] insecurity     Worry: Never true     Inability: Never true    Transportation needs     Medical: No     Non-medical: No   Tobacco Use    Smoking status: Current Some Day Smoker     Packs/day: 1.00     Years: 57.00     Pack years: 57.00     Types: Cigarettes     Start date: 1960     Last attempt to quit: 2019     Years since quittin.8    Smokeless tobacco: Never Used   Substance and Sexual Activity    Alcohol use: Yes     Comment: seldom    Drug use: No    Sexual activity: Never   Lifestyle    Physical activity     Days per week: 0 days     Minutes per session: 0 min    Stress: Only a little   Relationships    Social connections     Talks on phone: More than three times a week     Gets together: More than three times a week     Attends Confucianist service: More than 4 times per year     Active member of club or organization: No     Attends meetings of clubs or organizations: Never     Relationship status:      Intimate partner violence     Fear of current or ex partner: No     Emotionally abused: No     Physically abused: No     Forced sexual activity: No   Other Topics Concern    None   Social History Narrative         Lives With: Family  (2 dtrs-works full-time afternoon shift the other does not both are helpful)    Type of Home: House  One level    Home Access: Stairs to enter without rails - Number of Steps: 4    Bathroom Shower/Tub: Walk-in shower    Bathroom Equipment: Shower chair    Home Equipment: Rolling walker, 4 wheeled walker, Quad cane    Receives Help From: Family    ADL Assistance: Independent    Ambulation Assistance: Independent(quad cane most of the time)    Transfer Assistance: Independent    Active : No    Occupation: Retired    Her hobbies are reading classic literature especially works by the Nathaniel sisters       SCREENINGS    [place] Coma Scale  Eye Opening: Spontaneous  Best Verbal Response: Oriented  Best Motor Response: Obeys commands  [place] Coma Scale Score: 15         PHYSICAL EXAM    (up to 7 for level 4, 8 or more for level 5)     ED Triage Vitals [05/21/20 2121]   BP Temp Temp src Pulse Resp SpO2 Height Weight   (!) 148/87 98.3 °F (36.8 °C) -- 108 20 99 % -- --       Physical Exam  Vitals signs and nursing note reviewed. Constitutional:       General: She is not in acute distress. Appearance: She is well-developed. She is not diaphoretic. HENT:      Head: Normocephalic and atraumatic. Mouth/Throat:      Pharynx: No oropharyngeal exudate. Eyes:      General: No scleral icterus. Conjunctiva/sclera: Conjunctivae normal.      Pupils: Pupils are equal, round, and reactive to light. Neck:      Musculoskeletal: Normal range of motion and neck supple. Trachea: No tracheal deviation. Cardiovascular:      Rate and Rhythm: Normal rate. Heart sounds: Normal heart sounds. Pulmonary:      Effort: Pulmonary effort is normal. No respiratory distress. Breath sounds: Normal breath sounds. Abdominal:      General: Bowel sounds are normal. There is no distension. Palpations: Abdomen is soft. Musculoskeletal: Normal range of motion. Skin:     General: Skin is warm and dry. Findings: No erythema or rash. Neurological:      Mental Status: She is alert. She is disoriented. Cranial Nerves: No cranial nerve deficit. Motor: No abnormal muscle tone. Psychiatric:         Behavior: Behavior normal.         Thought Content:  Thought content normal.         Judgment: Judgment normal.         DIAGNOSTIC RESULTS     EKG: All EKG's are interpreted by the Emergency Department Physician who either signs or Co-signsthis chart in the absence of a cardiologist.        RADIOLOGY: Non-plain filmimages such as CT, Ultrasound and MRI are read by the radiologist. Plain radiographic images are visualized and preliminarily interpreted by the emergency physician with the below findings:      Interpretation per the Radiologist below, if available at the time ofthis note:    RADIOLOGY REPORT   Final Result      CT HEAD WO CONTRAST   Final Result   No acute hemorrhage or extra-axial fluid collection seen          All CT scans at this facility use dose modulation, iterative reconstruction, and/or weight based dosing when appropriate to reduce radiation dose to as low as reasonably achievable. ED BEDSIDE ULTRASOUND:   Performed by ED Physician - none    LABS:  Labs Reviewed   COMPREHENSIVE METABOLIC PANEL - Abnormal; Notable for the following components:       Result Value    CREATININE 1.47 (*)     GFR Non- 34.6 (*)     GFR  41.9 (*)     Total Protein 5.7 (*)     Alb 3.2 (*)     Alkaline Phosphatase 140 (*)     All other components within normal limits   CBC WITH AUTO DIFFERENTIAL - Abnormal; Notable for the following components:    WBC 13.2 (*)     RBC 4.17 (*)     RDW 15.2 (*)     Platelets 87 (*)     Neutrophils Absolute 12.4 (*)     Lymphocytes Absolute 0.5 (*)     All other components within normal limits   URINE RT REFLEX TO CULTURE - Abnormal; Notable for the following components:    Color, UA DARK YELLOW (*)     Ketones, Urine TRACE (*)     Protein, UA 30 (*)     All other components within normal limits   MAGNESIUM - Abnormal; Notable for the following components:    Magnesium 1.6 (*)     All other components within normal limits   LACTIC ACID, PLASMA   PROLACTIN   CK   BRAIN NATRIURETIC PEPTIDE   MICROSCOPIC URINALYSIS       All other labs were within normal range or not returned as of this dictation.     EMERGENCY DEPARTMENT COURSE and DIFFERENTIAL DIAGNOSIS/MDM:   Vitals:    Vitals:    05/21/20 2258 05/22/20 0000 05/22/20 4051 05/22/20 8562 BP: 137/81 137/81 128/78 (!) 145/92   Pulse: 91 90 82 90   Resp: 18 18 20 18   Temp:   97.3 °F (36.3 °C) 97.8 °F (36.6 °C)   TempSrc:   Oral    SpO2: 96% 98% 94% 100%   Weight: 128 lb (58.1 kg)      Height: 4' 11\" (1.499 m)              MDM      REASSESSMENT      Patient has a known history of brain cancer and presented emergency department with new onset seizures. Patient is a hospice patient and hospice was contacted along with family who are asking to have the patient rescinded from hospice and want her to be admitted into the hospital for further evaluation and care. Patient was given IV Keppra and admitted    CONSULTS:  IP CONSULT TO NEUROLOGY  IP CONSULT TO HOSPICE  IP CONSULT TO SOCIAL WORK  IP CONSULT TO PALLIATIVE CARE    PROCEDURES:  Unless otherwise noted below, none     Procedures    FINAL IMPRESSION      1. New onset seizure ([address]    2. Dehydration    3. General weakness          DISPOSITION/PLAN   DISPOSITION Admitted 05/21/2020 11:42:42 PM      PATIENT REFERREDTO:  Troy Salguero, JULIANNA - CNP  [facility] [address] [place] [address]            DISCHARGEMEDICATIONS:  Current Discharge Medication List        Controlled Substances Monitoring:     RX Monitoring 8/21/2019   Acute Pain Prescriptions Prescription exceeds daily limit for a specific reason. See comments or note. ;Not required given exclusionary diagnoses. ..;Severe pain not adequately treated with lower dose. Periodic Controlled Substance Monitoring Possible medication side effects, risk of tolerance/dependence & alternative treatments discussed. ;No signs of potential drug abuse or diversion identified. ;Assessed functional status. ;Obtaining appropriate analgesic effect of treatment. Chronic Pain > 50 MEDD Re-evaluated the status of the patient's underlying condition causing pain. ;Considered consultation with a specialist.;Obtained or confirmed \"Consent for Opioid Use\" on file.        (Please note that portions of this note were completed with a voice recognition program.  Efforts were made to edit the dictations but occasionally words are mis-transcribed.)    Roselyn Abbott PA-C (electronically signed)  Attending Emergency Physician          Roselyn Abbott PA-C  05/22/20 4771

## 2020-05-22 NOTE — ED NOTES
Pt family states multiple seizures, family states no hx of seizures. Pt is in [address] [place] [address] hospice care. Family states weakness and inability to get around.             Maite Daniel RN  05/21/20 9320

## 2020-05-23 LAB
ANION GAP SERPL CALCULATED.3IONS-SCNC: 12 MEQ/L (ref 9–15)
BASOPHILS ABSOLUTE: 0 K/UL (ref 0–0.2)
BASOPHILS RELATIVE PERCENT: 0.1 %
BUN BLDV-MCNC: 26 MG/DL (ref 8–23)
CALCIUM SERPL-MCNC: 8.9 MG/DL (ref 8.5–9.9)
CHLORIDE BLD-SCNC: 112 MEQ/L (ref 95–107)
CO2: 20 MEQ/L (ref 20–31)
CREAT SERPL-MCNC: 1.37 MG/DL (ref 0.5–0.9)
EOSINOPHILS ABSOLUTE: 0 K/UL (ref 0–0.7)
EOSINOPHILS RELATIVE PERCENT: 0 %
GFR AFRICAN AMERICAN: 45.5
GFR NON-AFRICAN AMERICAN: 37.6
GLUCOSE BLD-MCNC: 163 MG/DL (ref 70–99)
HCT VFR BLD CALC: 38.6 % (ref 37–47)
HEMOGLOBIN: 12.5 G/DL (ref 12–16)
LYMPHOCYTES ABSOLUTE: 0.2 K/UL (ref 1–4.8)
LYMPHOCYTES RELATIVE PERCENT: 2.3 %
MCH RBC QN AUTO: 30.9 PG (ref 27–31.3)
MCHC RBC AUTO-ENTMCNC: 32.4 % (ref 33–37)
MCV RBC AUTO: 95.4 FL (ref 82–100)
MONOCYTES ABSOLUTE: 0.3 K/UL (ref 0.2–0.8)
MONOCYTES RELATIVE PERCENT: 2.8 %
NEUTROPHILS ABSOLUTE: 9.5 K/UL (ref 1.4–6.5)
NEUTROPHILS RELATIVE PERCENT: 94.8 %
PDW BLD-RTO: 15.6 % (ref 11.5–14.5)
PLATELET # BLD: 85 K/UL (ref 130–400)
POTASSIUM REFLEX MAGNESIUM: 3.7 MEQ/L (ref 3.4–4.9)
RBC # BLD: 4.05 M/UL (ref 4.2–5.4)
SODIUM BLD-SCNC: 144 MEQ/L (ref 135–144)
WBC # BLD: 10 K/UL (ref 4.8–10.8)

## 2020-05-23 PROCEDURE — 2700000000 HC OXYGEN THERAPY PER DAY

## 2020-05-23 PROCEDURE — 80048 BASIC METABOLIC PNL TOTAL CA: CPT

## 2020-05-23 PROCEDURE — 6360000002 HC RX W HCPCS: Performed by: HOSPITALIST

## 2020-05-23 PROCEDURE — 36415 COLL VENOUS BLD VENIPUNCTURE: CPT

## 2020-05-23 PROCEDURE — 2580000003 HC RX 258: Performed by: HOSPITALIST

## 2020-05-23 PROCEDURE — 1210000000 HC MED SURG R&B

## 2020-05-23 PROCEDURE — 6370000000 HC RX 637 (ALT 250 FOR IP): Performed by: HOSPITALIST

## 2020-05-23 PROCEDURE — 85025 COMPLETE CBC W/AUTO DIFF WBC: CPT

## 2020-05-23 RX ADMIN — Medication 10 ML: at 21:02

## 2020-05-23 RX ADMIN — DEXAMETHASONE SODIUM PHOSPHATE 6 MG: 10 INJECTION INTRAMUSCULAR; INTRAVENOUS at 02:48

## 2020-05-23 RX ADMIN — LEVETIRACETAM 500 MG: 500 TABLET ORAL at 09:22

## 2020-05-23 RX ADMIN — DEXAMETHASONE SODIUM PHOSPHATE 6 MG: 10 INJECTION INTRAMUSCULAR; INTRAVENOUS at 21:02

## 2020-05-23 RX ADMIN — LEVETIRACETAM 500 MG: 500 TABLET ORAL at 21:03

## 2020-05-23 RX ADMIN — Medication 10 ML: at 09:22

## 2020-05-23 RX ADMIN — DEXAMETHASONE SODIUM PHOSPHATE 6 MG: 10 INJECTION INTRAMUSCULAR; INTRAVENOUS at 09:22

## 2020-05-23 RX ADMIN — Medication 10 ML: at 02:48

## 2020-05-23 RX ADMIN — DEXAMETHASONE SODIUM PHOSPHATE 6 MG: 10 INJECTION INTRAMUSCULAR; INTRAVENOUS at 15:29

## 2020-05-23 NOTE — DISCHARGE SUMMARY
Adrenal glands without anomaly. No osteoblastic, no osteolytic lesions. 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 Finding is unchanged in size from August, 2019. Emphysema. Chronic pancreatitis. Moderate hiatal hernia. All CT scans at this facility use dose modulation, iterative reconstruction, and/or weight based dosing when appropriate to reduce radiation dose to as low as reasonably achievable. WITHOUT CONTRAST:   Results for orders placed during the hospital encounter of 07/30/19   CT CHEST WO CONTRAST    Narrative EXAMINATION:  CT SCAN CHEST    CLINICAL HISTORY:  Recent diagnosis brain mass. COMPARISON:  May 6, 2019. TECHNIQUE:  Multiple serial axial images from the base neck through the upper abdomen with both sagittal coronal reconstruction was performed without intravenous or oral administration of contrast. The lack of IV contrast limits full evaluation. FINDINGS:      There are biapical areas of scarring fibrosis. There is moderate emphysematous disease in the upper one third of the lung parenchyma. There is a pleural-based soft tissue density at the base medial aspect of the right lower lobe. It measures 2.7 x 2.9 x 1.0 cm in the transverse and AP and cephalocaudad dimension. Best seen on the coronal reconstructions. No other focal parenchymal   abnormalities. No pleural effusions. No pneumothoraces. No significant periarticular. There is pretracheal adenopathy. No significant perihilar or subcarinal adenopathy. Impression PLEURAL-BASED SOFT TISSUE MASS OVERLYING THE MEDIAL ASPECT OF THE RIGHT LOWER LOBE, RIGHT HEMIDIAPHRAGM. THIS IS NEW SINCE THE PRIOR STUDY OF MAY 6, 2019. THE LACK OF IV CONTRAST LIMITS FULL EVALUATION. THIS IS SUPERIMPOSED UPON MODERATE   EMPHYSEMATOUS DISEASE ONE PARENCHYMA AS DESCRIBED ABOVE.       All CT scans at this facility use dose modulation, iterative reconstruction, and/or weight based dosing when appropriate to reduce radiation dose to as low as reasonably achievable. CXR      2-view:   Results for orders placed during the hospital encounter of 03/23/20   XR CHEST STANDARD (2 VW)    Narrative EXAMINATION: XR CHEST (2 VW)    CLINICAL HISTORY: LOWER EXTREMITY EDEMA. COUGH    COMPARISONS: FEBRUARY 23 2020    FINDINGS: Pacemaker generator and wires unchanged. Osseous structures intact. Cardiac pericardial silhouette normal.      Impression NO ACUTE CARDIOPULMONARY DISEASE. Portable:   Results for orders placed during the hospital encounter of 04/03/20   XR CHEST PORTABLE    Narrative EXAMINATION: CHEST PORTABLE VIEW     CLINICAL HISTORY: Nausea vomiting    COMPARISONS: March 23, 2020     FINDINGS:    Single  views of the chest is submitted. There is a left-sided ICD device. Leads overlying the cardiac silhouette. Unchanged. The cardiac silhouette is of normal size configuration. Pulmonary vascular attenuated. Right sided trachea. No focal infiltrates. No Pneumothoraces. There is some eventration right hemidiaphragm. Impression NO ACUTE ACTIVE CARDIOPULMONARY PROCESS. Radiographic findings of COPD       Echo No results found for this or any previous visit. Disposition: home    In process/preliminary results:  Outstanding Order Results     No orders found from 4/22/2020 to 5/22/2020. Patient Instructions:   Current Discharge Medication List      CONTINUE these medications which have NOT CHANGED    Details   buPROPion (WELLBUTRIN XL) 150 MG extended release tablet Take 150 mg by mouth daily      carbamide peroxide (DEBROX) 6.5 % otic solution Place 5 drops in ear(s) 2 times daily      metoprolol tartrate (LOPRESSOR) 25 MG tablet take ONE-HALF tablet 2 (TWO) times a day  Qty: 60 tablet, Refills: 3    Comments: This prescription was filled on 4/23/2020.  Any refills authorized will be placed on file. lisinopril (PRINIVIL;ZESTRIL) 2.5 MG tablet Take 1 tablet by mouth nightly  Qty: 30 tablet, Refills: 11    Associated Diagnoses: Essential hypertension      gabapentin (NEURONTIN) 600 MG tablet Take 600 mg by mouth 3 times daily. fluticasone-umeclidin-vilant (TRELEGY ELLIPTA) 100-62.5-25 MCG/INH AEPB Inhale 1 puff into the lungs daily  Qty: 1 each, Refills: 3      levETIRAcetam (KEPPRA) 500 MG tablet Take 500 mg by mouth 2 times daily      OXYGEN Portable Oxygen on 2L and Nocturnal Oxygen on 2L  Qty: 1 Units, Refills: 0    Associated Diagnoses: Chronic obstructive pulmonary disease, unspecified COPD type (HCC)      lactobacillus acidophilus (FLORANEX) Take 2 tablets by mouth 3 times daily  Qty: 120 tablet, Refills: 1      vitamin D (CHOLECALCIFEROL) 5000 units CAPS capsule Take 5,000 Units by mouth      omeprazole (PRILOSEC) 20 MG delayed release capsule Take 20 mg by mouth      oxyCODONE-acetaminophen (PERCOCET) 5-325 MG per tablet Take 1 tablet by mouth. albuterol sulfate  (90 Base) MCG/ACT inhaler Inhale 2 puffs into the lungs every 6 hours as needed for Wheezing      aspirin 81 MG chewable tablet Take 1 tablet by mouth daily  Qty: 30 tablet, Refills: 3      isosorbide mononitrate (IMDUR) 30 MG extended release tablet Take 1 tablet by mouth daily  Qty: 30 tablet, Refills: 3      pravastatin (PRAVACHOL) 40 MG tablet Take 40 mg by mouth daily      dexamethasone (DECADRON) 4 MG tablet Take 4 mg by mouth daily      ondansetron (ZOFRAN-ODT) 4 MG disintegrating tablet Take 1 tablet by mouth 3 times daily as needed for Nausea or Vomiting  Qty: 21 tablet, Refills: 0      nicotine (NICODERM CQ) 21 MG/24HR Place 1 patch onto the skin daily  Qty: 30 patch, Refills: 3      nitroGLYCERIN (NITROSTAT) 0.4 MG SL tablet up to max of 3 total doses. If no relief after 1 dose, call 911.   Qty: 25 tablet, Refills: 3      zoledronic acid (RECLAST) 5 MG/100ML SOLN Infuse 5 mg

## 2020-05-23 NOTE — DISCHARGE INSTR - COC
Continuity of Care Form    Patient Name: Lizy Galdamez   :  1945  MRN:  [id number]    Admit date:  2020  Discharge date:  20      Code Status Order: [facility]   Advance Directives:   [facility] Documentation     Date/Time Healthcare Directive Type of Healthcare Directive Copy in [address] Agent's Name Healthcare Agent's Phone Number    [phone]  Yes, patient has an advance directive for healthcare treatment  Health care treatment directive  --  --  --  --          Admitting Physician:  Ileana Ireland MD  PCP: JULIANNA Aponte - CNP    Discharging Nurse: [facility] Unit/Room#: Y500/C559-68  Discharging Unit Phone Number: [phone]      Emergency Contact:   Extended Emergency Contact Information  Primary Emergency Contact: Ashtyn [address] Phone: [phone]  Work Phone: [phone]  Mobile Phone: [phone]  Relation: Child  Secondary Emergency Contact: Mercedes Stanton  Address: [address] [place] [address] Phone: [phone]  Mobile Phone: [phone]  Relation: Child    Past Surgical History:  Past Surgical History:   Procedure Laterality Date    BRAIN SURGERY      BREAST BIOPSY Right     CARDIAC PACEMAKER PLACEMENT      CORONARY ANGIOPLASTY WITH STENT PLACEMENT      CRANIOTOMY N/A 2019    SUBOCCIPITAL CRANIOTOMY FOR TUMOR AND VENTRICULOSTOMY performed by Wilber Olmos MD at [facility]       Immunization History: There is no immunization history on file for this patient.     Active Problems:  Patient Active Problem List   Diagnosis Code    Syncope R55    Essential hypertension I10    Dyslipidemia E78.5    Tobacco abuse Z72.0    Coronary artery disease involving native coronary artery of native heart without angina pectoris I25.10    Hypotension I95.9    Lung nodule R91.1    SOB (shortness of breath) on exertion R06.02

## 2020-05-23 NOTE — PROGRESS NOTES
Left a message with hospice of the [facility]. Spoke to the office and they said that they will call Jaed her POA and tell her to give them a call when she gets home. They do not have a contract with us so we don't do anything with the hospice discharge. All we need to do is discharge home and they will take over at home.

## 2020-05-23 NOTE — PLAN OF CARE
Problem: Pain:  Goal: Pain level will decrease  Description: Pain level will decrease  Outcome: Ongoing     Problem: Confusion - Acute:  Goal: Absence of continued neurological deterioration signs and symptoms  Description: Absence of continued neurological deterioration signs and symptoms  Outcome: Ongoing     Problem: Discharge Planning:  Goal: Ability to perform activities of daily living will improve  Description: Ability to perform activities of daily living will improve  Outcome: Ongoing

## 2020-05-23 NOTE — PROGRESS NOTES
Patient assessment and vitals complete and stable. Patient has been resting in bed, lethargic all shift. Patient will open her eyes and is a&ox3, however falls back asleep shortly. Patient has had no seizure activity, seizure precautions in place at this time. Will continue to monitor.

## 2020-05-23 NOTE — PROGRESS NOTES
Department of Internal Medicine  General Internal Medicine  Attending Progress Note      SUBJECTIVE:  Pt seen and examined. Plan was for discharge today and hospice to resume care at home, but daughter called and said caregiver \"took off\" and she is unable to care for her mother on her own and asking for placement.     OBJECTIVE      Medications    Current Facility-Administered Medications: sodium chloride flush 0.9 % injection 10 mL, 10 mL, Intravenous, 2 times per day  sodium chloride flush 0.9 % injection 10 mL, 10 mL, Intravenous, PRN  acetaminophen (TYLENOL) tablet 650 mg, 650 mg, Oral, Q6H PRN **OR** acetaminophen (TYLENOL) suppository 650 mg, 650 mg, Rectal, Q6H PRN  polyethylene glycol (GLYCOLAX) packet 17 g, 17 g, Oral, Daily PRN  promethazine (PHENERGAN) tablet 12.5 mg, 12.5 mg, Oral, Q6H PRN **OR** ondansetron (ZOFRAN) injection 4 mg, 4 mg, Intravenous, Q6H PRN  pantoprazole (PROTONIX) tablet 40 mg, 40 mg, Oral, QAM AC  dexamethasone (DECADRON) injection 6 mg, 6 mg, Intravenous, Q6H  levETIRAcetam (KEPPRA) tablet 500 mg, 500 mg, Oral, Q12H  LORazepam (ATIVAN) injection 2 mg, 2 mg, Intravenous, Q4H PRN  Physical    VITALS:  BP (!) 147/57   Pulse 64   Temp 97.7 °F (36.5 °C) (Oral)   Resp 20   Ht 4' 11\" (1.499 m)   Wt 128 lb (58.1 kg)   LMP  (LMP Unknown)   SpO2 99%   BMI 25.85 kg/m²   Constitutional: Awake and alert lying in bed comfortably  Head: Normocephalic, atraumatic  Eyes: EOMI, PERRLA  ENT: moist mucous membranes  Neck: neck supple, trachea midline  Lungs: Good inspiratory effort, CTABL, no wheeze, no rhonchi, no rales  Heart: RRR, normal S1 and S2, no murmurs  GI: Soft, non-distended, non tender, no guarding, no rebound, +BS  MSK: no edema noted  Skin: warm, dry  Psych: appropriate affect     Data    CBC:   Lab Results   Component Value Date    WBC 10.0 05/23/2020    RBC 4.05 05/23/2020    HGB 12.5 05/23/2020    HCT 38.6 05/23/2020    MCV 95.4 05/23/2020    MCH 30.9 05/23/2020    MCHC 32.4 05/23/2020    RDW 15.6 05/23/2020    PLT 85 05/23/2020    MPV 9.1 05/07/2015     CMP:    Lab Results   Component Value Date     05/23/2020    K 3.7 05/23/2020     05/23/2020    CO2 20 05/23/2020    BUN 26 05/23/2020    CREATININE 1.37 05/23/2020    GFRAA 45.5 05/23/2020    LABGLOM 37.6 05/23/2020    GLUCOSE 163 05/23/2020    GLUCOSE 115 07/12/2019    PROT 5.7 05/21/2020    LABALBU 3.2 05/21/2020    LABALBU 23.6 07/12/2019    CALCIUM 8.9 05/23/2020    BILITOT 0.4 05/21/2020    ALKPHOS 140 05/21/2020    AST 25 05/21/2020    ALT 23 05/21/2020       ASSESSMENT AND PLAN      # 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 Will need SNF.  - PT/OT  - neuro consulted  - DNR-CC    # HTN  -  Cont home medications    # Chronic hypoxic respiratory failure 2/2 COPD  - cont home O2    Disposition: Pt was set for discharge home with hospice, but caregiver left and daughter unable to care for her so will get PT/OT recs and work on SNF placement with hospice.       Pascual Erickson, DO  Internal Medicine

## 2020-05-24 PROCEDURE — 6370000000 HC RX 637 (ALT 250 FOR IP): Performed by: HOSPITALIST

## 2020-05-24 PROCEDURE — 6360000002 HC RX W HCPCS: Performed by: HOSPITALIST

## 2020-05-24 PROCEDURE — 2580000003 HC RX 258: Performed by: HOSPITALIST

## 2020-05-24 PROCEDURE — 2700000000 HC OXYGEN THERAPY PER DAY

## 2020-05-24 PROCEDURE — 1210000000 HC MED SURG R&B

## 2020-05-24 PROCEDURE — 99233 SBSQ HOSP IP/OBS HIGH 50: CPT | Performed by: PSYCHIATRY & NEUROLOGY

## 2020-05-24 RX ADMIN — LEVETIRACETAM 500 MG: 500 TABLET ORAL at 09:49

## 2020-05-24 RX ADMIN — DEXAMETHASONE SODIUM PHOSPHATE 6 MG: 10 INJECTION INTRAMUSCULAR; INTRAVENOUS at 03:30

## 2020-05-24 RX ADMIN — DEXAMETHASONE SODIUM PHOSPHATE 6 MG: 10 INJECTION INTRAMUSCULAR; INTRAVENOUS at 09:49

## 2020-05-24 RX ADMIN — PANTOPRAZOLE SODIUM 40 MG: 40 TABLET, DELAYED RELEASE ORAL at 05:51

## 2020-05-24 RX ADMIN — LEVETIRACETAM 500 MG: 500 TABLET ORAL at 21:06

## 2020-05-24 RX ADMIN — Medication 10 ML: at 21:06

## 2020-05-24 RX ADMIN — Medication 10 ML: at 09:49

## 2020-05-24 ASSESSMENT — PAIN SCALES - GENERAL: PAINLEVEL_OUTOF10: 0

## 2020-05-24 NOTE — PROGRESS NOTES
Shift assessment performed. Vitals stable. Pt was incontinent of urine today and upon changing patient appeared to be impacted of stool. Hard stool was digitally removed from rectum. Pt rolls well in the bed and can reposition self with encouragement to do so. Jade is POA and states to NOT tell her mother that the plan is for her to go to a SNF with hospice and also to NOT let the other sister Annell Spare know any information regarding the patient. Pt is A&O x2 with delayed responses.      Electronically signed by Getachew Gipson RN on 5/24/2020 at 6:53 PM

## 2020-05-24 NOTE — PROGRESS NOTES
Spoke with pt's daughter, Terrell Snyder, who states she is the pt's POA. Daughter states she and her other sister, Areli Burt, do not want their sister Any to receive any further updates on the pt at this time. Jade states she does intend to come up to the hospital to give paperwork concerning her POA status. She also intends to talk to her mother about planning on having her placed in a SNF facility as she and her sister's are unable to provide her care at home. Jade can be reached at [phone].

## 2020-05-24 NOTE — PROGRESS NOTES
Department of Internal Medicine  General Internal Medicine  Attending Progress Note      SUBJECTIVE:  Pt seen and examined. Plan was for discharge Saturday and hospice to resume care at home, but daughter called and said caregiver \"took off\" and she is unable to care for her mother on her own and asking for placement.  Social work and case management working with family for SNF with hospice    OBJECTIVE      Medications    Current Facility-Administered Medications: sodium chloride flush 0.9 % injection 10 mL, 10 mL, Intravenous, 2 times per day  sodium chloride flush 0.9 % injection 10 mL, 10 mL, Intravenous, PRN  acetaminophen (TYLENOL) tablet 650 mg, 650 mg, Oral, Q6H PRN **OR** acetaminophen (TYLENOL) suppository 650 mg, 650 mg, Rectal, Q6H PRN  polyethylene glycol (GLYCOLAX) packet 17 g, 17 g, Oral, Daily PRN  promethazine (PHENERGAN) tablet 12.5 mg, 12.5 mg, Oral, Q6H PRN **OR** ondansetron (ZOFRAN) injection 4 mg, 4 mg, Intravenous, Q6H PRN  pantoprazole (PROTONIX) tablet 40 mg, 40 mg, Oral, QAM AC  levETIRAcetam (KEPPRA) tablet 500 mg, 500 mg, Oral, Q12H  LORazepam (ATIVAN) injection 2 mg, 2 mg, Intravenous, Q4H PRN  Physical    VITALS:  BP (!) 159/82   Pulse 68   Temp 98.1 °F (36.7 °C) (Oral)   Resp 17   Ht 4' 11\" (1.499 m)   Wt 128 lb (58.1 kg)   LMP  (LMP Unknown)   SpO2 97%   BMI 25.85 kg/m²   Constitutional: Awake and alert lying in bed comfortably  Head: Normocephalic, atraumatic  Eyes: EOMI, PERRLA  ENT: moist mucous membranes  Neck: neck supple, trachea midline  Lungs: Good inspiratory effort, CTABL, no wheeze, no rhonchi, no rales  Heart: RRR, normal S1 and S2, no murmurs  GI: Soft, non-distended, non tender, no guarding, no rebound, +BS  MSK: no edema noted  Skin: warm, dry  Psych: appropriate affect     Data    CBC:   Lab Results   Component Value Date    WBC 10.0 05/23/2020    RBC 4.05 05/23/2020    HGB 12.5 05/23/2020    HCT 38.6 05/23/2020    MCV 95.4 05/23/2020    MCH 30.9 05/23/2020 MCHC 32.4 05/23/2020    RDW 15.6 05/23/2020    PLT 85 05/23/2020    MPV 9.1 05/07/2015     CMP:    Lab Results   Component Value Date     05/23/2020    K 3.7 05/23/2020     05/23/2020    CO2 20 05/23/2020    BUN 26 05/23/2020    CREATININE 1.37 05/23/2020    GFRAA 45.5 05/23/2020    LABGLOM 37.6 05/23/2020    GLUCOSE 163 05/23/2020    GLUCOSE 115 07/12/2019    PROT 5.7 05/21/2020    LABALBU 3.2 05/21/2020    LABALBU 23.6 07/12/2019    CALCIUM 8.9 05/23/2020    BILITOT 0.4 05/21/2020    ALKPHOS 140 05/21/2020    AST 25 05/21/2020    ALT 23 05/21/2020       ASSESSMENT AND PLAN      # 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 Will need SNF.  - PT/OT  - neuro consulted  - DNR-CC    # HTN  -  Cont home medications    # Chronic hypoxic respiratory failure 2/2 COPD  - cont home O2    Disposition: Pt was set for discharge home with hospice, but caregiver left and daughter unable to care for her so will get PT/OT recs and work on SNF placement with hospice.       Mickey Madsen, DO  Internal Medicine

## 2020-05-24 NOTE — PROGRESS NOTES
Neurology Follow up    SUBJECTIVE:  NO Headache, double vision,blurry vision,difficulty with speech,difficulty with swallowing,weakness,numbness,pain,nausea,vomitting,chocking,neck pain,dizziness,  And actually does awaken but I am not quite sure of the review of systems all correct are intact    Current Facility-Administered Medications   Medication Dose Route Frequency Provider Last Rate Last Dose    sodium chloride flush 0.9 % injection 10 mL  10 mL Intravenous 2 times per day Mohave Sinner, APRN - CNP   10 mL at 05/24/20 0949    sodium chloride flush 0.9 % injection 10 mL  10 mL Intravenous PRN Lizbeth Sinner, APRN - CNP   10 mL at 05/23/20 0248    acetaminophen (TYLENOL) tablet 650 mg  650 mg Oral Q6H PRN Lizbeth Sinner, APRN - CNP        Or    acetaminophen (TYLENOL) suppository 650 mg  650 mg Rectal Q6H PRN Mohave Sinner, APRN - CNP        polyethylene glycol (GLYCOLAX) packet 17 g  17 g Oral Daily PRN Lizbeth Sinner, APRN - CNP        promethazine (PHENERGAN) tablet 12.5 mg  12.5 mg Oral Q6H PRN Lizbeth Sinner, APRN - CNP        Or    ondansetron (ZOFRAN) injection 4 mg  4 mg Intravenous Q6H PRN Mohave Sinner, APRN - CNP        pantoprazole (PROTONIX) tablet 40 mg  40 mg Oral QAM AC Monica Pentito, APRN - CNP   40 mg at 05/24/20 0551    dexamethasone (DECADRON) injection 6 mg  6 mg Intravenous Q6H Lizbeth Sinner, APRN - CNP   6 mg at 05/24/20 0949    levETIRAcetam (KEPPRA) tablet 500 mg  500 mg Oral Q12H Monica Pentito, APRN - CNP   500 mg at 05/24/20 0949    LORazepam (ATIVAN) injection 2 mg  2 mg Intravenous Q4H PRN Lizbeth Sinner, APRN - CNP           PHYSICAL EXAM:    BP (!) 159/82   Pulse 68   Temp 98.1 °F (36.7 °C) (Oral)   Resp 17   Ht 4' 11\" (1.499 m)   Wt 128 lb (58.1 kg)   LMP  (LMP Unknown)   SpO2 97%   BMI 25.85 kg/m²    General Appearance:      Skin:  normal  CVS - Normal sounds, No murmurs , No carotid Bruits  RS -CTA  Abdomen Soft, BS present    Mental Status Exam: Level of Alertness:   awake            Orientation:   person,           Attention/Concentration: Much decreased          language:  normal      Funduscopic Exam:     Cranial Nerves          Cranial nerve III           Pupils:  equal, round, reactive to light      Cranial nerves III, IV, VI           Extraocular Movements: intact          Cranial nerve VII           Facial strength: intact        Cranial nerve IX           Palate:  intact      Cranial nerve XI         Shoulder shrug:  intact      Cranial nerve XII          Tongue movement:  normal    Motor:    Drift:  absent  Motor exam is symmetrical 4 out of 5 all extremities bilaterally  Tone:  normal  Abnormal Movements: Patient has generalized tremulousness which could be from her previous chemotherapy or Decadron. Sensory: Not quite reliable        Pinprick             Right Upper Extremity:  normal             Left Upper Extremity:  normal             Right Lower Extremity:  normal             Left Lower Extremity:  normal           Vibration                         Touch            Proprioception                 Coordination: Generalized weakness and therefore coordination does not appear to be intact. Finger/Nose   Right:  normal              Left:  normal          Heel-Knee-Shin                Right:  normal              Left:  normal          Rapid Alternating Movements              Right:  normal              Left:  normal          Gait:                       Casual: Deferred for now. Reflexes:             Deep Tendon Reflexes:             Reflexes are absent throughout           Plantar response:                Right:  downgoing               Left:  downgoing    Vascular:  Cardiac Exam:  normal         Ct Head Wo Contrast    Result Date: 5/22/2020  COMPARISON: August 9, 2019; February 23, 2020 HISTORY:  seizure TECHNIQUE: The study was performed without contrast FINDINGS: No acute hemorrhage is seen.   The ventricular system is midline with no evidence for hydrocephalus. No wide vessel territory stroke seen. Mild periventricular and subcortical white matter changes are seen. There is evidence of suboccipital decompression. A dionicio hole is again seen in the right frontal region. The visualized paranasal sinuses show mild bilateral mastoid effusions. This is unchanged from previous study. Mucoperiosteal is again seen in the sphenoid sinus. No acute hemorrhage or extra-axial fluid collection seen All CT scans at this facility use dose modulation, iterative reconstruction, and/or weight based dosing when appropriate to reduce radiation dose to as low as reasonably achievable. Recent Labs     05/21/20 2200 05/23/20  0659   WBC 13.2* 10.0   HGB 12.9 12.5   PLT 87* 85*     Recent Labs     05/21/20 2200 05/23/20  0659    144   K 3.5 3.7    112*   CO2 21 20   BUN 23 26*   CREATININE 1.47* 1.37*   GLUCOSE 73 163*     Recent Labs     05/21/20 2200   BILITOT 0.4   ALKPHOS 140*   AST 25   ALT 23     Lab Results   Component Value Date    PROTIME 14.3 08/06/2019    INR 1.1 08/06/2019     No results found for: LITHIUM, DILFRTOT, VALPROATE    ASSESSMENT AND PLAN   Generalized seizures secondary to underlying metastasis and its scar. Patient is on Keppra 500 mg twice a day. Since starting the [address] patient has not shown any major change in her lethargy she appears to be somewhat lethargic from before and does awaken takes her medications able to swallow and follows commands. She is just tired of all that is been going on regarding his malignancy and treatments. She has asthenia and generalized weakness. Would not attempt a walker. She actually does not require Decadron as she does not have any swelling notable or edema in the brain at least on the CT scan. We will discontinue this for now as this is likely to cause more other medical issues. Patient is due to be arrange for hospice.   We were consulted yesterday as well regarding her care and disorder recommendations. She may be discharged from neurological standpoint. Gabriele Webster MD, Cody Lopez, American Board of Psychiatry & Neurology  Board Certified in Vascular Neurology  Board Certified in Neuromuscular Medicine  Certified in . Mason Lozano

## 2020-05-25 PROCEDURE — 1210000000 HC MED SURG R&B

## 2020-05-25 PROCEDURE — 6370000000 HC RX 637 (ALT 250 FOR IP): Performed by: HOSPITALIST

## 2020-05-25 PROCEDURE — 2580000003 HC RX 258: Performed by: HOSPITALIST

## 2020-05-25 PROCEDURE — 2700000000 HC OXYGEN THERAPY PER DAY

## 2020-05-25 PROCEDURE — 99231 SBSQ HOSP IP/OBS SF/LOW 25: CPT | Performed by: PSYCHIATRY & NEUROLOGY

## 2020-05-25 RX ADMIN — Medication 10 ML: at 21:02

## 2020-05-25 RX ADMIN — LEVETIRACETAM 500 MG: 500 TABLET ORAL at 08:40

## 2020-05-25 RX ADMIN — PANTOPRAZOLE SODIUM 40 MG: 40 TABLET, DELAYED RELEASE ORAL at 06:02

## 2020-05-25 RX ADMIN — LEVETIRACETAM 500 MG: 500 TABLET ORAL at 21:02

## 2020-05-25 RX ADMIN — Medication 10 ML: at 08:40

## 2020-05-25 ASSESSMENT — PAIN SCALES - GENERAL: PAINLEVEL_OUTOF10: 0

## 2020-05-25 NOTE — PROGRESS NOTES
Neurology Follow up    SUBJECTIVE:  NO Headache, double vision,blurry vision,difficulty with speech,difficulty with swallowing,weakness,numbness,pain,nausea,vomitting,chocking,neck pain,dizziness,  And actually does awaken but I am not quite sure of the review of systems all correct are intact    Is much more awake. She reports that she was not allowed to get out of bed. He ate somewhat she has no appetite. Her shaking is somewhat better.     Current Facility-Administered Medications   Medication Dose Route Frequency Provider Last Rate Last Dose    sodium chloride flush 0.9 % injection 10 mL  10 mL Intravenous 2 times per day Payton Leigh APRN - CNP   10 mL at 05/24/20 0949    sodium chloride flush 0.9 % injection 10 mL  10 mL Intravenous PRN Payton Leigh, APRN - CNP   10 mL at 05/23/20 0248    acetaminophen (TYLENOL) tablet 650 mg  650 mg Oral Q6H PRN Payton Leigh, APRN - CNP        Or    acetaminophen (TYLENOL) suppository 650 mg  650 mg Rectal Q6H PRN Payton Leigh, APRN - CNP        polyethylene glycol (GLYCOLAX) packet 17 g  17 g Oral Daily PRN Payton Leigh, APRN - CNP        promethazine (PHENERGAN) tablet 12.5 mg  12.5 mg Oral Q6H PRN Payton Leigh, APRN - CNP        Or    ondansetron (ZOFRAN) injection 4 mg  4 mg Intravenous Q6H PRN Payton Lu, APRN - CNP        pantoprazole (PROTONIX) tablet 40 mg  40 mg Oral QAM AC Monicagutierrez Roach, APRN - CNP   40 mg at 05/24/20 0551    dexamethasone (DECADRON) injection 6 mg  6 mg Intravenous Q6H Payton Lu, APRN - CNP   6 mg at 05/24/20 0949    levETIRAcetam (KEPPRA) tablet 500 mg  500 mg Oral Q12H Payton Lu, APRN - CNP   500 mg at 05/24/20 0949    LORazepam (ATIVAN) injection 2 mg  2 mg Intravenous Q4H PRN Payton Leigh, APRN - CNP           PHYSICAL EXAM:    BP (!) 154/83   Pulse 61   Temp 97.5 °F (36.4 °C) (Oral)   Resp 18   Ht 4' 11\" (1.499 m)   Wt 128 lb (58.1 kg)   LMP  (LMP Unknown)   SpO2 100%   BMI 25.85 kg/m² discontinue this for now as this is likely to cause more other medical issues. Patient is due to be arrange for hospice. We were consulted yesterday as well regarding her care and disorder recommendations. Patient is much more awake and therefore Keppra is not affecting her much in terms of sedation. She is less tremulous and. She is still quite generalized weak and mostly nonambulatory. She may be discharged from neurological standpoint. Gabriele Whitfield MD, Valerie Bueno, American Board of Psychiatry & Neurology  Board Certified in Vascular Neurology  Board Certified in Neuromuscular Medicine  Certified in . Mason 38

## 2020-05-25 NOTE — PROGRESS NOTES
facility use dose modulation, iterative reconstruction, and/or weight based dosing when appropriate to reduce radiation dose to as low as reasonably achievable.                     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

## 2020-05-25 NOTE — CARE COORDINATION
LSW TO FOLLOW UP WITH SNF'S ON Tuesday ONCE ADMISSION OFFICES ARE REOPENED REGARDING PATIENT ADMISSION. PATIENT ORDERS FOR PT/OT OBTAINED.

## 2020-05-26 VITALS
RESPIRATION RATE: 17 BRPM | OXYGEN SATURATION: 100 % | HEART RATE: 79 BPM | HEIGHT: 59 IN | WEIGHT: 128 LBS | DIASTOLIC BLOOD PRESSURE: 73 MMHG | TEMPERATURE: 97.2 F | BODY MASS INDEX: 25.8 KG/M2 | SYSTOLIC BLOOD PRESSURE: 147 MMHG

## 2020-05-26 LAB
ALBUMIN SERPL-MCNC: 2.6 G/DL (ref 3.5–4.6)
ANION GAP SERPL CALCULATED.3IONS-SCNC: 9 MEQ/L (ref 9–15)
ANISOCYTOSIS: ABNORMAL
BASOPHILS ABSOLUTE: 0 K/UL (ref 0–0.2)
BASOPHILS RELATIVE PERCENT: 0.2 %
BUN BLDV-MCNC: 34 MG/DL (ref 8–23)
CALCIUM SERPL-MCNC: 8.6 MG/DL (ref 8.5–9.9)
CHLORIDE BLD-SCNC: 114 MEQ/L (ref 95–107)
CO2: 21 MEQ/L (ref 20–31)
CREAT SERPL-MCNC: 1.4 MG/DL (ref 0.5–0.9)
EOSINOPHILS ABSOLUTE: 0.2 K/UL (ref 0–0.7)
EOSINOPHILS RELATIVE PERCENT: 3 %
GFR AFRICAN AMERICAN: 44.3
GFR NON-AFRICAN AMERICAN: 36.6
GLUCOSE BLD-MCNC: 78 MG/DL (ref 70–99)
HCT VFR BLD CALC: 41.9 % (ref 37–47)
HEMOGLOBIN: 13.7 G/DL (ref 12–16)
LYMPHOCYTES ABSOLUTE: 0.4 K/UL (ref 1–4.8)
LYMPHOCYTES RELATIVE PERCENT: 5 %
MAGNESIUM: 1.7 MG/DL (ref 1.7–2.4)
MCH RBC QN AUTO: 30.6 PG (ref 27–31.3)
MCHC RBC AUTO-ENTMCNC: 32.6 % (ref 33–37)
MCV RBC AUTO: 93.8 FL (ref 82–100)
METAMYELOCYTES RELATIVE PERCENT: 3 %
MONOCYTES ABSOLUTE: 0.4 K/UL (ref 0.2–0.8)
MONOCYTES RELATIVE PERCENT: 4.9 %
MYELOCYTE PERCENT: 1 %
NEUTROPHILS ABSOLUTE: 6.7 K/UL (ref 1.4–6.5)
NEUTROPHILS RELATIVE PERCENT: 84 %
PDW BLD-RTO: 16.1 % (ref 11.5–14.5)
PHOSPHORUS: 2.5 MG/DL (ref 2.3–4.8)
PLATELET # BLD: 54 K/UL (ref 130–400)
PLATELET SLIDE REVIEW: ABNORMAL
POTASSIUM SERPL-SCNC: 3.5 MEQ/L (ref 3.4–4.9)
RBC # BLD: 4.46 M/UL (ref 4.2–5.4)
SODIUM BLD-SCNC: 144 MEQ/L (ref 135–144)
WBC # BLD: 7.6 K/UL (ref 4.8–10.8)

## 2020-05-26 PROCEDURE — 99231 SBSQ HOSP IP/OBS SF/LOW 25: CPT | Performed by: NURSE PRACTITIONER

## 2020-05-26 PROCEDURE — 83735 ASSAY OF MAGNESIUM: CPT

## 2020-05-26 PROCEDURE — 2700000000 HC OXYGEN THERAPY PER DAY

## 2020-05-26 PROCEDURE — 97162 PT EVAL MOD COMPLEX 30 MIN: CPT

## 2020-05-26 PROCEDURE — 6370000000 HC RX 637 (ALT 250 FOR IP): Performed by: HOSPITALIST

## 2020-05-26 PROCEDURE — 97166 OT EVAL MOD COMPLEX 45 MIN: CPT

## 2020-05-26 PROCEDURE — 36415 COLL VENOUS BLD VENIPUNCTURE: CPT

## 2020-05-26 PROCEDURE — 85025 COMPLETE CBC W/AUTO DIFF WBC: CPT

## 2020-05-26 PROCEDURE — 80069 RENAL FUNCTION PANEL: CPT

## 2020-05-26 RX ADMIN — PANTOPRAZOLE SODIUM 40 MG: 40 TABLET, DELAYED RELEASE ORAL at 05:07

## 2020-05-26 RX ADMIN — LEVETIRACETAM 500 MG: 500 TABLET ORAL at 09:34

## 2020-05-26 ASSESSMENT — ENCOUNTER SYMPTOMS
VOMITING: 0
BACK PAIN: 0
ABDOMINAL DISTENTION: 0
EYE DISCHARGE: 0
COUGH: 0
CHEST TIGHTNESS: 0
SORE THROAT: 0
VOICE CHANGE: 0
DIARRHEA: 0
COLOR CHANGE: 0
SHORTNESS OF BREATH: 0
CONSTIPATION: 0
WHEEZING: 0
NAUSEA: 0
STRIDOR: 0
ANAL BLEEDING: 0
TROUBLE SWALLOWING: 0
ABDOMINAL PAIN: 0
BLOOD IN STOOL: 0
APNEA: 0
RECTAL PAIN: 0
CHOKING: 0

## 2020-05-26 ASSESSMENT — PAIN SCALES - GENERAL: PAINLEVEL_OUTOF10: 0

## 2020-05-26 NOTE — PROGRESS NOTES
Discharge report called and given to [facility] RN at [facility] with understanding. SL removed from right arm. Tolerated well.

## 2020-05-26 NOTE — PROGRESS NOTES
Pt is assessed and charted on by this RN. Medications administered. Tolerated well. Pt denied pain or nausea. Pt was turned and pulled up. Fall precautions in place. Pt currently resting eyes closed. Call light in reach. Will continue to monitor. 0600 - Pt incontinent. Pt Q2 turned throughout night. Pt rested with eyes closed.

## 2020-05-26 NOTE — FLOWSHEET NOTE
Patient awake and resting in bed. Alert and oriented to person, place, year and month. Follow commands. Lungs clear and diminished. Trace edema. Mepelex maintained to bilateral heels. Petechiae like bruises noted on bilateral arms. Voiding and also had 2 small form brown stools in bedpan. Pericare given. Patient will be discharge to [facility] today.

## 2020-05-26 NOTE — PROGRESS NOTES
Hospitalist Progress Note      PCP: Bianca Shay, APRN - CNP    Date of Admission: 5/21/2020    Chief Complaint:  No acute events, afebrile, stable HD    Medications:  Reviewed    Infusion Medications   Scheduled Medications    sodium chloride flush  10 mL Intravenous 2 times per day    pantoprazole  40 mg Oral QAM AC    levETIRAcetam  500 mg Oral Q12H     PRN Meds: sodium chloride flush, acetaminophen **OR** acetaminophen, polyethylene glycol, promethazine **OR** ondansetron, LORazepam      Intake/Output Summary (Last 24 hours) at 5/26/2020 1318  Last data filed at 5/26/2020 0527  Gross per 24 hour   Intake 380 ml   Output 0 ml   Net 380 ml       Exam:    BP (!) 147/73   Pulse 79   Temp 97.2 °F (36.2 °C) (Oral)   Resp 17   Ht 4' 11\" (1.499 m)   Wt 128 lb (58.1 kg)   LMP  (LMP Unknown)   SpO2 100%   BMI 25.85 kg/m²     General appearance: No apparent distress, cooperative. Respiratory: clear to auscultation, bilaterally without Rales/Wheeze  Cardiovascular: Regular rate and rhythm with normal S1/S2. Abdomen: Soft, active bowel sounds. Musculoskeletal: No edema bilaterally. Labs:   Recent Labs     05/26/20  0644   WBC 7.6   HGB 13.7   HCT 41.9   PLT 54*     Recent Labs     05/26/20  0645      K 3.5   *   CO2 21   BUN 34*   CREATININE 1.40*   CALCIUM 8.6   PHOS 2.5     No results for input(s): AST, ALT, BILIDIR, BILITOT, ALKPHOS in the last 72 hours. No results for input(s): INR in the last 72 hours. No results for input(s): Charlynne Bicker in the last 72 hours.     Urinalysis:      Lab Results   Component Value Date    NITRU Negative 05/21/2020    WBCUA 0-2 05/21/2020    BACTERIA Negative 05/21/2020    RBCUA 0-2 05/21/2020    BLOODU Negative 05/21/2020    SPECGRAV 1.018 05/21/2020    GLUCOSEU Negative 05/21/2020       Radiology:  RADIOLOGY REPORT   Final Result      CT HEAD WO CONTRAST   Final Result   No acute hemorrhage or extra-axial fluid collection seen          All CT

## 2020-05-26 NOTE — DISCHARGE SUMMARY
Hospital Medicine Discharge Summary    Graciela Peraza  :  1945  MRN:  [id number]    Admit date:  2020  Discharge date:  2020    Admitting Physician:  No admitting provider for patient encounter. Primary Care Physician:  JULIANNA Obrien CNP      Discharge Diagnoses: Active Problems:    Essential hypertension    Seizure ([address]    Secondary malignant neoplasm of brain ([address]    Chronic respiratory failure with hypoxia, on home oxygen therapy ([facility]  Resolved Problems:    * No resolved hospital problems. *      Hospital Course:   Graciela Peraza is a 76 y.o. female that was admitted and treated at [facility] for the following medical issues:     New onset seizure likely related to metastatic lung CA to the brain  - CT head was negative for acute findings  - started on Keppra  - ok to d/c per neuro    HTN  - continue home medications     Chronic hypoxic respiratory failure due to COPD  - continue home O2     Diet: DIET GENERAL;    Code Status: DNR-CC       Disposition - SNF       Patient was seen by the following consultants while admitted to [facility]:   Consults:  Clinton Howell    Significant Diagnostic Studies:    Ct Head Wo Contrast    Result Date: 2020  COMPARISON: 2019; 2020 HISTORY:  seizure TECHNIQUE: The study was performed without contrast FINDINGS: No acute hemorrhage is seen. The ventricular system is midline with no evidence for hydrocephalus. No wide vessel territory stroke seen. Mild periventricular and subcortical white matter changes are seen. There is evidence of suboccipital decompression. A dionicio hole is again seen in the right frontal region. The visualized paranasal sinuses show mild bilateral mastoid effusions. This is unchanged from previous study. Mucoperiosteal is again seen in the sphenoid sinus.       No acute hemorrhage or extra-axial fluid collection seen All CT scans at this facility use dose modulation, iterative reconstruction, and/or weight based dosing when appropriate to reduce radiation dose to as low as reasonably achievable. Discharge Medications:       Michaelle Baumgarten   Home Medication Instructions [facility]:[id number]    Printed on:05/26/20 1320   Medication Information                      albuterol sulfate  (90 Base) MCG/ACT inhaler  Inhale 2 puffs into the lungs every 6 hours as needed for Wheezing             aspirin 81 MG chewable tablet  Take 1 tablet by mouth daily             buPROPion (WELLBUTRIN XL) 150 MG extended release tablet  Take 150 mg by mouth daily             carbamide peroxide (DEBROX) 6.5 % otic solution  Place 5 drops in ear(s) 2 times daily             dexamethasone (DECADRON) 4 MG tablet  Take 4 mg by mouth daily             fluticasone-umeclidin-vilant (TRELEGY ELLIPTA) 100-62.5-25 MCG/INH AEPB  Inhale 1 puff into the lungs daily             gabapentin (NEURONTIN) 600 MG tablet  Take 600 mg by mouth 3 times daily. isosorbide mononitrate (IMDUR) 30 MG extended release tablet  Take 1 tablet by mouth daily             lactobacillus acidophilus (FLORANEX)  Take 2 tablets by mouth 3 times daily             levETIRAcetam (KEPPRA) 500 MG tablet  Take 500 mg by mouth 2 times daily             lisinopril (PRINIVIL;ZESTRIL) 2.5 MG tablet  Take 1 tablet by mouth nightly             metoprolol tartrate (LOPRESSOR) 25 MG tablet  take ONE-HALF tablet 2 (TWO) times a day             nicotine (NICODERM CQ) 21 MG/24HR  Place 1 patch onto the skin daily             nitroGLYCERIN (NITROSTAT) 0.4 MG SL tablet  up to max of 3 total doses. If no relief after 1 dose, call 911.              omeprazole (PRILOSEC) 20 MG delayed release capsule  Take 20 mg by mouth             ondansetron (ZOFRAN-ODT) 4 MG disintegrating tablet  Take 1 tablet by mouth 3 times daily as needed for Nausea or Vomiting             oxyCODONE-acetaminophen (PERCOCET) 5-325 MG per tablet  Take 1 tablet by mouth. OXYGEN  Portable Oxygen on 2L and Nocturnal Oxygen on 2L             pravastatin (PRAVACHOL) 40 MG tablet  Take 40 mg by mouth daily             tiZANidine (ZANAFLEX) 4 MG tablet  Take 4 mg by mouth every 8 hours as needed             vitamin D (CHOLECALCIFEROL) 5000 units CAPS capsule  Take 5,000 Units by mouth             zoledronic acid (RECLAST) 5 MG/100ML SOLN  Infuse 5 mg intravenously                 Disposition:   Discharged to SNF. Any C needs that were indicated and/or required as been addressed and set up by Social Work. Condition at discharge: Pt was medically stable at the time of discharge. Activity: activity as tolerated, fall precautions. Total time taken for discharging this patient: 40 minutes. Greater than 70% of time was spent focused exclusively on this patient. Time was taken to review chart, discuss plans with consultants, reconciling medications, discussing plan answering questions with patient. Kenyon Be  5/26/2020, 1:20 PM  ----------------------------------------------------------------------------------------------------------------------    Valerie Hartman,     Please return to ER or call 911 if you develop any significant signs or symptoms.     I may not have addressed all of your medical illnesses or the abnormal blood work or imaging therefore please ask your PCP, JULIANNA Guaman CNP ,  to obtain [facility] record to follow up on all of the abnormal labs, imaging and findings that I have and have not addressed during your hospitalization.      Discharging you from the hospital does not mean that your medical care ends here and now.  You may still need additional work up, investigation, monitoring, and treatment to be handled from this point on by outside providers including your PCP, JULIANNA Guaman CNP , Specialists and other healthcare providers.      Please review your list of discharge medications prior to resuming medications you might still have at home, as the medications you need to be taking, dosages or how often you must take them may have changed. For medication questions, contact your retail pharmacy and your PCP, JULIANNA Putnam CNP .     ** I STRONGLY RECOMMEND that you follow up with JULIANNA Putnam CNP within 3 to 5 days for a post hospitalization evaluation. This specific office visit is covered by your insurance, and is not the same as your annual doctor visit/ check up. This office visit is important, as it may prevent need for repeat and/or future hospitalizations. **    Your medical team at [facility] ([facility]) appreciates the opportunity to work with you to get well!     Sincerely,  Guille Ramsay

## 2020-05-26 NOTE — CONSULTS
Palliative Care Consult Note  Patient: Ray Shane  Gender: female  YOB: 1945  Unit/Bed: M246/G423-31  Code Status: DNR-CC  Inpatient Treatment Team: Treatment Team: Attending Provider: Natalie Portillo MD; Consulting Physician: Ceci Gillespie MD; Utilization Reviewer: Rachelle Cannon RN; Registered Nurse: Sanket Cunningham RN; : Cherise Burnette, RN; : SAV Doyle; Patient Care Tech: Dejah Oliveros  Admit Date:  5/21/2020    Chief Complaint: Goals of Care    History of Presenting Illness:      Ray Shane is a 76 y.o. female on hospital day 5 with a history of right lung metastatic, secondary malignant neoplasm of brain, factor V deficiency, these, COPD on home O2, CKD stage III, chronic back pain, sick sinus syndrome, tobacco abuse presented after new onset seizure x2 at home which was witnessed by family. Pt has home hospice with [facility] but family revoked hospice to have patient evaluated in the hospital. Head CT was negative and was started on Keppra. At this time she is comfortable, negative pain, double vision,tremor, headaches, dizziness. Answers questions appropriately, follows requests, but is slow to answer. She was able to participate in PT. No complaints of pain. No GI or  complaints, no osiel blood in stool or urine. Poor appetite, no nausea or vomiting. No significant emotional, spiritual, or sleep disturbance. Review of Systems:       Review of Systems   Constitutional: Negative for activity change, appetite change, chills, diaphoresis, fatigue, fever and unexpected weight change. HENT: Negative for drooling, hearing loss, mouth sores, sore throat, trouble swallowing and voice change. Eyes: Negative for discharge and visual disturbance. Respiratory: Negative for apnea, cough, choking, chest tightness, shortness of breath, wheezing and stridor. Cardiovascular: Negative for chest pain, palpitations and leg swelling. Gastrointestinal: Negative for abdominal distention, abdominal pain, anal bleeding, blood in stool, constipation, diarrhea, nausea, rectal pain and vomiting. Genitourinary: Negative for difficulty urinating, dysuria, enuresis, frequency and hematuria. Musculoskeletal: Negative for arthralgias, back pain, gait problem, joint swelling and myalgias. Skin: Negative for color change, pallor, rash and wound. Allergic/Immunologic: Negative for food allergies and immunocompromised state. Neurological: Negative for dizziness, tremors, seizures, syncope, facial asymmetry, speech difficulty, weakness, light-headedness, numbness and headaches. Hematological: Negative for adenopathy. Does not bruise/bleed easily. Psychiatric/Behavioral: Positive for decreased concentration. Negative for agitation, behavioral problems, confusion, dysphoric mood, hallucinations, self-injury, sleep disturbance and suicidal ideas. The patient is not nervous/anxious and is not hyperactive. Physical Examination:       BP (!) 147/73   Pulse 79   Temp 97.2 °F (36.2 °C) (Oral)   Resp 17   Ht 4' 11\" (1.499 m)   Wt 128 lb (58.1 kg)   LMP  (LMP Unknown)   SpO2 100%   BMI 25.85 kg/m²    Physical Exam  Constitutional:       General: She is not in acute distress. Appearance: She is well-developed. She is ill-appearing. She is not diaphoretic. HENT:      Head: Normocephalic and atraumatic. Right Ear: External ear normal.      Left Ear: External ear normal.      Nose: Nose normal.      Mouth/Throat:      Pharynx: No oropharyngeal exudate. Eyes:      General: Lids are normal. No scleral icterus. Right eye: No discharge. Left eye: No discharge. Conjunctiva/sclera: Conjunctivae normal.      Pupils: Pupils are equal, round, and reactive to light. Neck:      Musculoskeletal: Normal range of motion and neck supple. Thyroid: No thyromegaly. Vascular: No JVD. Trachea: No tracheal deviation. Cardiovascular:      Rate and Rhythm: Normal rate and regular rhythm. Heart sounds: Normal heart sounds. Pulmonary:      Effort: Pulmonary effort is normal. No respiratory distress. Breath sounds: Normal breath sounds. No stridor. No wheezing or rales. Chest:      Chest wall: No tenderness. Abdominal:      General: Bowel sounds are normal. There is no distension. Palpations: Abdomen is soft. There is no mass. Tenderness: There is no abdominal tenderness. There is no guarding or rebound. Musculoskeletal: Normal range of motion. General: No tenderness or deformity. Lymphadenopathy:      Cervical: No cervical adenopathy. Skin:     General: Skin is warm and dry. Findings: No erythema or rash. Neurological:      Mental Status: She is alert and oriented to person, place, and time. Psychiatric:         Behavior: Behavior normal.         Thought Content:  Thought content normal.         Allergies:      No Known Allergies    Medications:      Current Facility-Administered Medications   Medication Dose Route Frequency Provider Last Rate Last Dose    sodium chloride flush 0.9 % injection 10 mL  10 mL Intravenous 2 times per day JULIANNA Jones CNP   10 mL at 05/25/20 2102    sodium chloride flush 0.9 % injection 10 mL  10 mL Intravenous PRN JULIANNA Jones CNP   10 mL at 05/23/20 0248    acetaminophen (TYLENOL) tablet 650 mg  650 mg Oral Q6H PRN JULIANNA Jones CNP        Or    acetaminophen (TYLENOL) suppository 650 mg  650 mg Rectal Q6H PRN JULIANNA Jones CNP        polyethylene glycol (GLYCOLAX) packet 17 g  17 g Oral Daily PRN JULIANNA Jones CNP        promethazine (PHENERGAN) tablet 12.5 mg  12.5 mg Oral Q6H PRN JULIANNA Jones CNP        Or    ondansetron (ZOFRAN) injection 4 mg  4 mg Intravenous Q6H PRN JULIANNA Jones CNP        pantoprazole (PROTONIX) tablet 40 mg  40 mg Oral QAM JULIANNA Marcus CNP 40 mg at 05/26/20 0507    levETIRAcetam (KEPPRA) tablet 500 mg  500 mg Oral Q12H FOLUP, APRN - CNP   500 mg at 05/26/20 0934    LORazepam (ATIVAN) injection 2 mg  2 mg Intravenous Q4H PRN FOLUP, APRN - CNP           History: PMHx:  Past Medical History:   Diagnosis Date    Arthritis     Back pain, chronic     Pt  is with pain management    Cancer ([facility] [address]     lung w brain metastasis    Cerebellar ataxia ([facility]     Chronic back pain     degenerative disc disease    Chronic respiratory failure with hypoxia, on home oxygen therapy ([facility])     CKD (chronic kidney disease) stage 3, GFR 30-59 ml/min ([facility])     COPD (chronic obstructive pulmonary disease) ([facility]     Coronary artery disease     Coronary artery disease involving native coronary artery of native heart without angina pectoris 3/19/2017    Dyslipidemia 11/20/2016    Emphysema lung ([facility]     Essential hypertension 11/20/2016    Factor V deficiency ([facility] [address] [facility]     H/O blood clots     Headache     Hypertension     Primary cancer of right lung metastatic to other site [facility])     Restless leg syndrome     Secondary malignant neoplasm of brain ([facility]     Sick sinus syndrome ([facility]     Syncope 11/20/2016    Tobacco abuse 11/20/2016       PSHx:  Past Surgical History:   Procedure Laterality Date    BRAIN SURGERY      BREAST BIOPSY Right     CARDIAC PACEMAKER PLACEMENT      CORONARY ANGIOPLASTY WITH STENT PLACEMENT      CRANIOTOMY N/A 8/6/2019    SUBOCCIPITAL CRANIOTOMY FOR TUMOR AND VENTRICULOSTOMY performed by Laura Snell MD at [place] [address] Hx:  Social History     Socioeconomic History    Marital status:       Spouse name: None    Number of children: 3    Years of education: None    Highest education level: None   Occupational History    Occupation:  Retired beautician    Occupation: Retired various factory jobs   Social Needs    Financial resource strain: Somewhat hard   [place] insecurity     Worry: Never true Inability: Never true    Transportation needs     Medical: No     Non-medical: No   Tobacco Use    Smoking status: Current Some Day Smoker     Packs/day: 1.00     Years: 57.00     Pack years: 57.00     Types: Cigarettes     Start date: 1960     Last attempt to quit: 2019     Years since quittin.8    Smokeless tobacco: Never Used   Substance and Sexual Activity    Alcohol use: Yes     Comment: seldom    Drug use: No    Sexual activity: Never   Lifestyle    Physical activity     Days per week: 0 days     Minutes per session: 0 min    Stress: Only a little   Relationships    Social connections     Talks on phone: More than three times a week     Gets together: More than three times a week     Attends [employer] service: More than 4 times per year     Active member of club or organization: No     Attends meetings of clubs or organizations: Never     Relationship status:      Intimate partner violence     Fear of current or ex partner: No     Emotionally abused: No     Physically abused: No     Forced sexual activity: No   Other Topics Concern    None   Social History Narrative         Lives With: Family  (2 dtrs-works full-time afternoon shift the other does not both are helpful)    Type of Home: House  One level    Home Access: Stairs to enter without rails - Number of Steps: 4    Bathroom Shower/Tub: Walk-in shower    Bathroom Equipment: Shower chair    Home Equipment: Rolling walker, 4 wheeled walker, Quad cane    Receives Help From: Family    ADL Assistance: Independent    Ambulation Assistance: Independent(quad cane most of the time)    Transfer Assistance: Independent    Active : No    Occupation: Retired    Her hobbies are reading classic literature especially works by the Nathaniel sisters       Family Hx:  Family History   Problem Relation Age of Onset    High Blood Pressure Mother     Cancer Mother     Arthritis Father     High Blood Pressure Father    Raza Martinez Sister LABS: Reviewed     CBC:  Lab Results   Component Value Date    WBC 7.6 05/26/2020    RBC 4.46 05/26/2020    HGB 13.7 05/26/2020    HCT 41.9 05/26/2020    MCV 93.8 05/26/2020    MCH 30.6 05/26/2020    MCHC 32.6 05/26/2020    RDW 16.1 05/26/2020    PLT 54 05/26/2020    MPV 9.1 05/07/2015     CBC with Differential:   Lab Results   Component Value Date    WBC 7.6 05/26/2020    RBC 4.46 05/26/2020    HGB 13.7 05/26/2020    HCT 41.9 05/26/2020    PLT 54 05/26/2020    MCV 93.8 05/26/2020    MCH 30.6 05/26/2020    MCHC 32.6 05/26/2020    RDW 16.1 05/26/2020    BANDSPCT 7 05/21/2020    METASPCT 3 05/26/2020    LYMPHOPCT 5.0 05/26/2020    MONOPCT 4.9 05/26/2020    MYELOPCT 1 05/26/2020    BASOPCT 0.2 05/26/2020    MONOSABS 0.4 05/26/2020    LYMPHSABS 0.4 05/26/2020    EOSABS 0.2 05/26/2020    BASOSABS 0.0 05/26/2020     CMP:    Lab Results   Component Value Date     05/26/2020    K 3.5 05/26/2020    K 3.7 05/23/2020     05/26/2020    CO2 21 05/26/2020    BUN 34 05/26/2020    CREATININE 1.40 05/26/2020    GFRAA 44.3 05/26/2020    LABGLOM 36.6 05/26/2020    GLUCOSE 78 05/26/2020    GLUCOSE 115 07/12/2019    PROT 5.7 05/21/2020    LABALBU 2.6 05/26/2020    LABALBU 23.6 07/12/2019    CALCIUM 8.6 05/26/2020    BILITOT 0.4 05/21/2020    ALKPHOS 140 05/21/2020    AST 25 05/21/2020    ALT 23 05/21/2020     BMP:    Lab Results   Component Value Date     05/26/2020    K 3.5 05/26/2020    K 3.7 05/23/2020     05/26/2020    CO2 21 05/26/2020    BUN 34 05/26/2020    LABALBU 2.6 05/26/2020    LABALBU 23.6 07/12/2019    CREATININE 1.40 05/26/2020    CALCIUM 8.6 05/26/2020    GFRAA 44.3 05/26/2020    LABGLOM 36.6 05/26/2020    GLUCOSE 78 05/26/2020    GLUCOSE 115 07/12/2019     TSH:   Lab Results   Component Value Date    TSH 2.860 01/17/2020     Vitamin B12 and Folate: No components found for: FOLIC,  P93  Urinalysis:   Lab Results   Component Value Date    NITRU Negative 05/21/2020    WBCUA 0-2 05/21/2020 BACTERIA Negative 05/21/2020    RBCUA 0-2 05/21/2020    BLOODU Negative 05/21/2020    SPECGRAV 1.018 05/21/2020    GLUCOSEU Negative 05/21/2020           FUNCTIONAL ADL´S:     Independent: [  ] Eating, [   ] Dressing, [   ] Transferring, [   ] Willeen Schilder, [   ] Tiara Sour, [   ] Continence  Dependent   : [  x] Eating, [   ] Dressing, [   ] Transferring, [   ] Willeen Schilder, [   ] Tiara Sour, [   ] Continence  W. assistant : [  ] Eating, [ x  ] Dressing, [  x ] Transferring, [x   ] Toileting, [x   ] Bathing, [ x  ] Continence    Radiology: Reviewed      No results found. Assessment and plan:    Seizures  Follow with neurology    Right Lung Neoplasm with secondary malignant neoplas of the brain  follow with oncology    COPD  Follow with pulmonology    Nausea  Continue IV phenergan and ondansetron while hospitalized. Consider Ondansetron 4 -8 mg po every 8 hours upon discharge. Risk for delirium  Monitor for signs and symptoms of delirium as due to advanced age, history of cognitive difficulty, acute illness, and multiple co morbidities patient is high risk. Offer continuous behavior redirection and orientation, avoid delirium inducing medication, expose to natural sunlight as much as possible, calm surroundings as much as possible. If these interventions are not effective consider Haldol 0.5 mg po every 8 hours prn anxiety or agitation    -Advance Care Planning  Discussed goals of care with patient. Explained in extensive detail nuances between full code, DNR CCA and DNR CC. Patient has made the decision to be DNR CC. MPOA and living will are in place.      -Goals of Care Discussion:  Disease process and goals of treatment were discussed in basic terms. Families goal is to optimize available comfort care measures to decrease symptoms and maximize function  We discussed the palliative care philosophy in light of those goals. We discussed all care options contingent on treatment response and QOL.  Much active listening, presence, and emotional support were given. Family would like skilled rehab placement with [facility] to follow for symptom management.           Electronically signed by JULIANNA Pop CNP on 5/26/2020 at 11:39 AM

## 2020-05-26 NOTE — PROGRESS NOTES
[facility] OCCUPATIONAL THERAPY EVALUATION - ACUTE     NAME: Gwendolyn Patel  : 1945 (76 y.o.)  MRN: [id number]  CODE STATUS: DNR-CC  Room: [facility]9/Z629-06    Date of Service: 2020    Patient Diagnosis(es): Seizure [facility]) [R56.9]   Chief Complaint   Patient presents with    Fatigue     Patient Active Problem List    Diagnosis Date Noted    Coronary artery disease involving native coronary artery of native heart without angina pectoris 2017     Priority: High    Syncope 2016     Priority: High    Essential hypertension 2016     Priority: High    Dyslipidemia 2016     Priority: High    Tobacco abuse 2016     Priority: High    Secondary malignant neoplasm of brain [facility])     Chronic respiratory failure with hypoxia, on home oxygen therapy ([address]     Seizure ([address] 2020    Moderate malnutrition ([address] 2020    Nausea & vomiting 2020    Factor V deficiency ([address] 2019    Palliative care patient 2019    Osteoporosis 2019    Back pain, chronic 2019    Obesity (BMI 30-39.9) 2019    Abnormality of gait and mobility due to NTBI s/p Suboccipital Craniotomy for Metastatic Posterior Fossa Brain Mass with Impaired Mobility.   [facility] admit 19. 2019    Ataxy cerebellar ([address] 08/10/2019    Neoplasm of brain causing mass effect on adjacent structures ([address] 2019    COPD (chronic obstructive pulmonary disease) ([address] 2019    Brain mass 2019    Age-related osteoporosis without current pathological fracture 2019    Elevated alkaline phosphatase level 2019    Fracture of vertebra due to osteoporosis ([address] 2019    Presence of permanent cardiac pacemaker 2019    SSS (sick sinus syndrome) ([address] 2019    Hiatal hernia 2019    Idiopathic chronic pancreatitis ([address] 2019    Bilateral carotid artery stenosis 11/15/2018    PAD (peripheral artery disease) ([address] 11/15/2018    Chronic anticoagulation 11/08/2018    CKD (chronic kidney disease), stage III ([address] 11/08/2018    VTE (venous thromboembolism) 11/08/2018    MGUS (monoclonal gammopathy of unknown significance) 11/08/2018    SOB (shortness of breath) on exertion     Lung nodule 05/14/2018    Hypotension     Factor V Leiden mutation ([address] 05/15/2015    Hypercoagulable state, primary ([address] 05/15/2015    Esophageal dysphagia 03/10/2015    Tobacco dependence 08/25/2014    DVT (deep venous thrombosis) ([address] 08/01/2014    Facet arthropathy, lumbar 07/20/2011    Lumbosacral spondylosis without myelopathy 07/20/2011        Past Medical History:   Diagnosis Date    Arthritis     Back pain, chronic     Pt  is with pain management    Cancer ([address]     lung w brain metastasis    Cerebellar ataxia ([address]     Chronic back pain     degenerative disc disease    Chronic respiratory failure with hypoxia, on home oxygen therapy ([facility])     CKD (chronic kidney disease) stage 3, GFR 30-59 ml/min ([facility])     COPD (chronic obstructive pulmonary disease) ([address]     Coronary artery disease     Coronary artery disease involving native coronary artery of native heart without angina pectoris 3/19/2017    Dyslipidemia 11/20/2016    Emphysema lung ([address]     Essential hypertension 11/20/2016    Factor V deficiency ([address]     H/O blood clots     Headache     Hypertension     Primary cancer of right lung metastatic to other site [facility])     Restless leg syndrome     Secondary malignant neoplasm of brain ([address]     Sick sinus syndrome ([address]     Syncope 11/20/2016    Tobacco abuse 11/20/2016     Past Surgical History:   Procedure Laterality Date    BRAIN SURGERY      BREAST BIOPSY Right     CARDIAC PACEMAKER PLACEMENT      CORONARY ANGIOPLASTY WITH STENT PLACEMENT      CRANIOTOMY N/A 8/6/2019    SUBOCCIPITAL CRANIOTOMY FOR TUMOR AND VENTRICULOSTOMY performed by Katt Parada MD at [address] [facility] on and taking off regular lower body clothing?: A Lot     How much help for Toileting?: A Little  How much help for putting on and taking off regular upper body clothing?: A Lot  How much help for taking care of personal grooming?: A Little  How much help for eating meals?: None             Plan:  Plan  Times per week: 1-4x/wk  Current Treatment Recommendations: Strengthening, Functional Mobility Training, Endurance Training, Balance Training, Safety Education & Training, Neuromuscular Re-education, Self-Care / ADL, Cognitive/Perceptual Training, Equipment Evaluation, Education, & procurement    Goals:   Patient will:    - Improve functional endurance to tolerate/complete 8-12 mins of ADL's  - Be MIn A in UB ADLs   - Be MIn A in LB ADLs  - Be SBA in ADL transfers without LOB  - Be SBA in toileting tasks  - Improve bilateral UE strength and endurance to Fair in order to participate in self-care activities as projected. - Access appropriate D/C site with as few architectural barriers as possible. Patient Goal: Patient goals :  To return to home      Discussed and agreed upon: Yes Comments:     Therapy Time:   OT Individual Minutes  Time In: 1045  Time Out: 1100  Minutes: 15    Electronically signed by:    SHELL Cordero  4/97/4945, 11:09 AM Electronically signed by SHELL Cordero on 4/72/91 at 11:06 AM MARKT

## 2020-05-26 NOTE — CARE COORDINATION
Phone call to [facility] and left message regarding referral.    Phone message back from [place] who will be reviewing the referral.  Page out to therapy. Pt has some confusion and unable to stay on task and discuss plan. Phone call made to daughter Connie Webster who confirmed first choice is DC to [facility] and 2nd choice is [address]. LSW explained DC skilled vs to nursing home on hospice. Jade said they would like her [address] skilled and she agreed to start the Medicaid application process so that pt may transition to LTC and resume hospice services when appropriate. She said pt does not currently smoke. Reviewed IMM. [facility] [place]. [id number] complete. Transport scheduled. LSW notified pt, daughter and SNF.

## 2020-05-27 NOTE — PROGRESS NOTES
Physical Therapy  Facility/Department: [facility] Z559/B403-30  Physical Therapy Discharge      NAME: Siobhan Rodrigues    : 1945 (61 y.o.)  MRN: [id number]    Account: [de-identified]  Gender: female      Patient has been discharged from acute care hospital. DC patient from current PT program.      Electronically signed by Virginie Donaldson PT on 20 at 10:30 AM EDT

## 2020-05-29 ENCOUNTER — VIRTUAL VISIT (OUTPATIENT)
Dept: GERIATRIC MEDICINE | Age: 75
End: 2020-05-29
Payer: MEDICARE

## 2020-05-29 PROCEDURE — 99308 SBSQ NF CARE LOW MDM 20: CPT | Performed by: NURSE PRACTITIONER

## 2020-05-29 PROCEDURE — 1123F ACP DISCUSS/DSCN MKR DOCD: CPT | Performed by: NURSE PRACTITIONER

## 2020-06-01 ENCOUNTER — VIRTUAL VISIT (OUTPATIENT)
Dept: GERIATRIC MEDICINE | Age: 75
End: 2020-06-01
Payer: MEDICARE

## 2020-06-01 PROCEDURE — 99308 SBSQ NF CARE LOW MDM 20: CPT | Performed by: NURSE PRACTITIONER

## 2020-06-01 PROCEDURE — 1123F ACP DISCUSS/DSCN MKR DOCD: CPT | Performed by: NURSE PRACTITIONER

## 2020-06-02 ENCOUNTER — OFFICE VISIT (OUTPATIENT)
Dept: GERIATRIC MEDICINE | Age: 75
End: 2020-06-02
Payer: MEDICARE

## 2020-06-02 PROCEDURE — 1123F ACP DISCUSS/DSCN MKR DOCD: CPT | Performed by: INTERNAL MEDICINE

## 2020-06-02 PROCEDURE — 99305 1ST NF CARE MODERATE MDM 35: CPT | Performed by: INTERNAL MEDICINE

## 2020-06-05 ENCOUNTER — VIRTUAL VISIT (OUTPATIENT)
Dept: GERIATRIC MEDICINE | Age: 75
End: 2020-06-05
Payer: MEDICARE

## 2020-06-05 VITALS — HEART RATE: 66 BPM | SYSTOLIC BLOOD PRESSURE: 114 MMHG | DIASTOLIC BLOOD PRESSURE: 70 MMHG | TEMPERATURE: 96.8 F

## 2020-06-05 PROCEDURE — 1123F ACP DISCUSS/DSCN MKR DOCD: CPT | Performed by: NURSE PRACTITIONER

## 2020-06-05 PROCEDURE — 99308 SBSQ NF CARE LOW MDM 20: CPT | Performed by: NURSE PRACTITIONER

## 2020-06-05 NOTE — PROGRESS NOTES
PATIENTMary Burdick : 1945 DOS: 2020     [facility]    Seen for an admission history and physical.     CHIEF COMPLAINT:  Seizure disorder, weakness, encephalopathy. HISTORY OF PRESENT ILLNESS:  This is a 77-year-old woman with a known history of metastatic lung cancer secondary to malignant neoplasm of the brain, factor V deficiency. Had evidence of new onset seizure activity witnessed by family. The patient has been on home hospice, but family had requested hospice staff, the patient was hospitalized and evaluated. The patient initially was requested for MRI, but the patient had refused. The patient was seen by neurology, was initiated on Keppra due to seizure activity. The patient was stabilized, family had resumed hospice care at that time. The patient subsequently transferred here for ongoing course of care and possible long-term care. The patient has been seizure-free at this time. PAST MEDICAL HISTORY:  Included lung with metastatic disease to brain, seizure activity, hypertension, depression, degenerative joint disease, adult failure to thrive, weakness, chronic pain, cerebral ataxia, unsteadiness with falls, chronic kidney disease stage III, COPD, hyperlipidemia, restless leg syndrome, factor V deficiency. FAMILY HISTORY:  Included coronary artery disease and cancer in first degree relatives. MEDICATIONS:  At this time included the following: The patient is on aspirin 81 mg daily, bupropion 150 mg daily, dexamethasone 4 mg daily, gabapentin 600 mg twice daily, Imdur ER daily, Keppra 500 mg twice daily, lisinopril 25 mg daily, metoprolol tartrate 12.5 mg daily, nicotine patch, omeprazole 20 mg daily, pravastatin 40 mg daily, ProAir HFA, probiotic daily, Zanaflex, Zofran 4 mg q.6 hours. REVIEW OF SYSTEMS:  The patient is pleasant. Denies chest pain, palpitations, nausea, vomiting, emesis. Denies change in her bowel or bladder habits.   No new

## 2020-06-08 ENCOUNTER — VIRTUAL VISIT (OUTPATIENT)
Dept: GERIATRIC MEDICINE | Age: 75
End: 2020-06-08
Payer: MEDICARE

## 2020-06-08 PROCEDURE — 1123F ACP DISCUSS/DSCN MKR DOCD: CPT | Performed by: NURSE PRACTITIONER

## 2020-06-08 PROCEDURE — 99308 SBSQ NF CARE LOW MDM 20: CPT | Performed by: NURSE PRACTITIONER

## 2020-06-09 ENCOUNTER — OFFICE VISIT (OUTPATIENT)
Dept: GERIATRIC MEDICINE | Age: 75
End: 2020-06-09
Payer: MEDICARE

## 2020-06-09 PROCEDURE — 99309 SBSQ NF CARE MODERATE MDM 30: CPT | Performed by: INTERNAL MEDICINE

## 2020-06-09 PROCEDURE — 1123F ACP DISCUSS/DSCN MKR DOCD: CPT | Performed by: INTERNAL MEDICINE

## 2020-06-10 ENCOUNTER — VIRTUAL VISIT (OUTPATIENT)
Dept: GERIATRIC MEDICINE | Age: 75
End: 2020-06-10
Payer: MEDICARE

## 2020-06-10 PROCEDURE — 1123F ACP DISCUSS/DSCN MKR DOCD: CPT | Performed by: NURSE PRACTITIONER

## 2020-06-10 PROCEDURE — 99308 SBSQ NF CARE LOW MDM 20: CPT | Performed by: NURSE PRACTITIONER

## 2020-06-15 RX ORDER — LORAZEPAM 0.5 MG/1
0.5 TABLET ORAL EVERY 4 HOURS PRN
COMMUNITY

## 2020-06-16 RX ORDER — LORAZEPAM 0.5 MG/1
0.5 TABLET ORAL EVERY 4 HOURS PRN
OUTPATIENT
Start: 2020-06-16

## 2020-06-21 PROBLEM — C34.90 MALIGNANT NEOPLASM OF LUNG (HCC): Status: ACTIVE | Noted: 2020-06-21

## 2020-06-22 RX ORDER — OXYCODONE HYDROCHLORIDE 5 MG/1
5 TABLET ORAL EVERY 4 HOURS PRN
COMMUNITY

## 2020-06-22 RX ORDER — OXYCODONE HYDROCHLORIDE 5 MG/1
2.5 TABLET ORAL EVERY 4 HOURS PRN
COMMUNITY

## 2020-06-23 ENCOUNTER — VIRTUAL VISIT (OUTPATIENT)
Dept: GERIATRIC MEDICINE | Age: 75
End: 2020-06-23
Payer: MEDICARE

## 2020-06-23 PROCEDURE — 1123F ACP DISCUSS/DSCN MKR DOCD: CPT | Performed by: NURSE PRACTITIONER

## 2020-06-23 PROCEDURE — 99308 SBSQ NF CARE LOW MDM 20: CPT | Performed by: NURSE PRACTITIONER

## 2020-06-24 NOTE — PROGRESS NOTES
female, in no apparent distress, pleasant and cooperative with exam. Integument: Pink, dry. HEENT: Normocephalic, atraumatic in appearance. Hard of hearing. Conjunctiva pink. Sclerae nonicteric. Mucous membranes pink, moist. Neck with no visible masses. Heart rate regular per nursing staff. Respiratory: Lung sounds with audible wheezes. She is on O2 at 2 L. Abdomen: Flat. PV: No edema. ASSESSMENT AND PLAN:   1. Seizure: Resident has not had any recent seizure activity since admission to this facility. 2.  Brain mass: Resident's pain medication is effective in meeting her pain needs. She does have occasional headache. However, her pain medication is effective. I reviewed this resident's medication and treatment plan as well as most recent lab work. No further changes will be made at this time. Return in about 1 week (around 6/8/2020), or if symptoms worsen or fail to improve. Pursuant to the emergency declaration under the [address] [place] [address], [facility] waiver authority and the [employer] and [employer] Act, this Virtual Visit was conducted, with patient's consent, to reduce the patient's risk of exposure to COVID-19 and provide continuity of care for an established patient. Services were provided through a video synchronous discussion virtually to substitute for in-person clinic visit they will be billed for this visit and they agree to continue with assistance of NF staff and via VIRTUAL platform     Patient identification was verified at the start of the visit : YES    Total time spent on this encounter: Not billed by time. Electronically Signed By: Anikt Martinez. Yaneth Galarza CNP on 06/21/2020 22:36:44  ______________________________  Ankit Martinez.  Yaneth Galarza CNP  /[id number]  D: 06/20/2020 10:21:37  T: 06/21/2020 03:43:53    cc: - [facility]

## 2020-06-26 NOTE — PROGRESS NOTES
Patient Name: Tori Fernandez    YOB: 1945  Medical Record Number: [id number]          History of Present Illness: This is a 72-year-old woman seen for a routine monthly visit for her thromboembolic state secondary to factor V Leiden disease hypertension seizure disorder. Patient is status post pacemaker placement. Patient has been clinically stable without evidence of new distal emboli embolic event patient is not in any pain crisis no evidence of acute active seizure activity. No recent chest pain or palpitation. Some COVID-19 precautions. Review of Systems   Constitutional: Negative for activity change and fatigue. HENT: Negative for congestion. Respiratory: Negative for chest tightness. Gastrointestinal: Positive for constipation. Genitourinary: Negative for dysuria. Musculoskeletal: Negative for back pain. Skin: Negative for rash. Neurological: Negative for seizures and headaches. Psychiatric/Behavioral: Negative for behavioral problems.        Review of Systems: All 14 review of systems negative other than as stated above    Social History     Tobacco Use    Smoking status: Current Some Day Smoker     Packs/day: 1.00     Years: 57.00     Pack years: 57.00     Types: Cigarettes     Start date: 1960     Last attempt to quit: 2019     Years since quittin.9    Smokeless tobacco: Never Used   Substance Use Topics    Alcohol use: Yes     Comment: seldom    Drug use: No         Past Medical History:   Diagnosis Date    Arthritis     Back pain, chronic     Pt  is with pain management    Cancer ([facility]     lung w brain metastasis    Cerebellar ataxia ([facility]     Chronic back pain     degenerative disc disease    Chronic respiratory failure with hypoxia, on home oxygen therapy (HCC)     CKD (chronic kidney disease) stage 3, GFR 30-59 ml/min ([facility])     COPD (chronic obstructive pulmonary disease) ([facility])     Coronary artery disease     Coron[facility] disease ([facility]    Nausea & vomiting    Moderate malnutrition (HCC)    Seizure (HCC)    Secondary malignant ne[facility] of [facility]    Chronic respiratory failure with hypoxia, on home oxygen therapy ([facility]    Malignant neoplasm of lung (HCC)         PLAN:   Diagnosis Orders   1. Essential hypertension     2. Factor V Leiden mutation ([facility]     3. Presence of permanent cardiac pacemaker     4. Seizure ([facility]       Continue antiepileptic agents. Monitor blood pressure monitor renal function. Monitor for new seizure activity.

## 2020-06-28 NOTE — PROGRESS NOTES
with exam.  INTEGUMENT:  Pink, dry. HEENT:  Normocephalic, atraumatic. Hard of hearing. Conjunctivae pink, sclerae nonicteric. Mucous membranes pink, moist.  NECK:  With no visible masses. CARDIOVASCULAR:  Heart rate regular per nursing staff. RESPIRATORY:  Lung sounds clear through all fields per nursing staff. Resident is on O2 at 3.5 L. Respirations are even and nonlabored. No distress noted. ABDOMEN:  Flat. PV:  No edema noted. ASSESSMENT AND PLAN:  1. Cerebral tumor. I will consult hospice of the [facility] per family request.  2.   Hypertension. I will change resident's metoprolol to 6.25 p.o. b.i.d. with parameters. 3.   Seizure disorder. Resident has not had any recent seizure activity. We will continue Keppra and dexamethasone as ordered. I have reviewed this resident's medication and treatment plan as well as most recent records. No further changes will be made at this time. Return in about 1 week (around 6/17/2020), or if symptoms worsen or fail to improve. Pursuant to the emergency declaration under the [facility] waiver authority and the [employer] and [employer] Act, this Virtual Visit was conducted, with patient's consent, to reduce the patient's risk of exposure to COVID-19 and provide continuity of care for an established patient. Services were provided through a video synchronous discussion virtually to substitute for in-person clinic visit they will be billed for this visit and they agree to continue with assistance of  staff and via VIRTUAL platform     Patient identification was verified at the start of the visit : YES    Total time spent on this encounter: Not billed by time. Electronically Signed By: Ashley Turcios CNP on 06/28/2020 11:52:31  ______________________________  Ashley Turcios CNP  [id number]  D: 06/28/2020 43:82:36  T: 06/28/2020 11:15:00    cc: - [facility]

## 2020-06-28 NOTE — PROGRESS NOTES
PATIENTPatrici Coil : 1945 DOS: 2020     [address] [facility]  Chief Complaint   Patient presents with    Other     midsternal chest discomfort       SUBJECTIVE: The patient is being seen today for acute visit for midsternal chest discomfort. The resident states that she has a headache and today she has a midsternal chest discomfort. She reports that the midsternal chest discomfort has been going on for at least a week. It was present while she was in the hospital. She does have metastatic cancer. She does report that her pain medication is effective in meeting her pain needs. Otherwise, no concerns. FAMILY AND SOCIAL HISTORY: Refer to H and P. Past Medical History:   Diagnosis Date    Arthritis     Back pain, chronic     Pt  is with pain management    Cancer ([address]     lung w brain metastasis    Cerebellar ataxia ([address]     Chronic back pain     degenerative disc disease    Chronic respiratory failure with hypoxia, on home oxygen therapy ([facility])     CKD (chronic kidney disease) stage 3, GFR 30-59 ml/min ([facility])     COPD (chronic obstructive pulmonary disease) ([address]     Coronary artery disease     Coronary artery disease involving native coronary artery of native heart without angina pectoris 3/19/2017    Dyslipidemia 2016    Emphysema lung ([address]     Essential hypertension 2016    Factor V deficiency ([address]     H/O blood clots     Headache     Hypertension     Primary cancer of right lung metastatic to other site [facility])     Restless leg syndrome     Secondary malignant neoplasm of brain ([address]     Sick sinus syndrome ([address]     Syncope 2016    Tobacco abuse 2016       MEDICATIONS AND ALLERGIES: Reviewed on chart in Epic. REVIEW OF SYSTEMS: The resident states she has a headache. She denies any dizziness or blurred vision.  She does report some midsternal chest discomfort that she has had for well over a week, also when she was in the hospital. She does report that her pain medication is effective in meeting her pain needs. She denies any increased shortness of breath, abdominal pain, nausea, vomiting, or changes in her bowel or bladder habits. She reports she is eating okay, sleeping okay. PHYSICAL EXAMINATION: Most recent vital signs, BP 92/54, temp 97.6, respirations 20, pulse oxing 96% on O2, and weight 124.6. CONSTITUTIONAL: The resident is an alert, elderly, frail female, no apparent distress; cooperative with exam. INTEGUMENT: Pink, dry. HEENT: Normocephalic, atraumatic in appearance. She is hard of hearing. Conjunctivae pink, sclerae nonicteric. Mucous membranes pink, moist. NECK: No visible masses. CARDIOVASCULAR: Heart rate is regular, per nursing staff. Respiratory: Lung sounds are clear throughout, per nursing staff. Respirations even and unlabored. No distress noted. ABDOMEN: Flat. Peripheral vascular: No edema noted. ASSESSMENT AND PLAN:   1. Midsternal chest discomfort. The resident reports this has been present for minimum of a week, maybe longer. She did have it in the hospital. She does have metastatic CA. I will check with nursing staff to see if the patient may have pain medication now, as she reports that this is effective in meeting her pain needs. We will continue to monitor her closely. No further changes will be made at this time. Return in about 1 week (around 6/15/2020), or if symptoms worsen or fail to improve. Pursuant to the emergency declaration under the [facility] waiver authority and the [employer] Act, this Virtual Visit was conducted, with patient's consent, to reduce the patient's risk of exposure to COVID-19 and provide continuity of care for an established patient.      Services were provided through a video synchronous discussion virtually to substitute for in-person clinic visit they will be billed for this

## 2020-06-29 ASSESSMENT — ENCOUNTER SYMPTOMS
CONSTIPATION: 1
BACK PAIN: 0
CHEST TIGHTNESS: 0

## 2020-07-13 ENCOUNTER — OFFICE VISIT (OUTPATIENT)
Dept: GERIATRIC MEDICINE | Age: 75
End: 2020-07-13
Payer: MEDICARE

## 2020-07-13 PROCEDURE — 99308 SBSQ NF CARE LOW MDM 20: CPT | Performed by: NURSE PRACTITIONER

## 2020-07-13 PROCEDURE — 1123F ACP DISCUSS/DSCN MKR DOCD: CPT | Performed by: NURSE PRACTITIONER

## 2020-07-19 NOTE — PROGRESS NOTES
[address]  Chief Complaint   Patient presents with    1 Month Follow-Up    COPD    Chest Pain    Seizures       The patient is being seen today for monthly visit for chest pain, seizure, COPD. SUBJECTIVE:  Resident states that she is having some chest discomfort, which has been chronic. She does report her pain medication is effective in meeting her pain needs. She does report that she is somewhat short of breath; however, she is noted not to be wearing her oxygen, otherwise no concerns. FAMILY AND SOCIAL HISTORY:  Refer to H&P. Past Medical History:   Diagnosis Date    Arthritis     Back pain, chronic     Pt  is with pain management    Cancer ([facility]     lung w brain metastasis    Cerebellar ataxia ([address]     Chronic back pain     degenerative disc disease    Chronic respiratory failure with hypoxia, on home oxygen therapy ([facility]     CKD (chronic kidney disease) stage 3, GFR 30-59 ml/min ([facility])     COPD (chronic obstructive pulmonary disease) ([facility]     Coronary artery disease     Coronary artery disease involving native coronary artery of native heart without angina pectoris 3/19/2017    Dyslipidemia 11/20/2016    Emphysema lung ([facility]     Essential hypertension 11/20/2016    Factor V deficiency ([facility]     H/O blood clots     Headache     Hypertension     Primary cancer of right lung metastatic to other site [facility])     Restless leg syndrome     Secondary malignant neoplasm of brain ([facility]     Sick sinus syndrome ([facility]     Syncope 11/20/2016    Tobacco abuse 11/20/2016       MEDICATIONS AND ALLERGIES:  Reviewed on chart at nursing facility. REVIEW OF SYSTEMS:  Resident reports chronic intermittent headache. No recent dizziness or blurred vision. She does report chest pain; however, she reports her pain medication is effective in meeting her pain needs. She reports shortness of breath; however she is not wearing her oxygen.   She denies any abdominal pain, nausea, vomiting, or changes in her bowel or bladder habits. PHYSICAL EXAMINATION:  Resident is sleeping, but arousable elderly female, frail in appearance. Integument:  Pink, dry. HEENT:  Normocephalic, atraumatic. Conjunctivae pink. Sclerae nonicteric. Mucous membrane pink, moist.  No oropharyngeal exudate. Neck:  Supple. No cervical or clavicular lymphadenopathy noted. No masses noted. CV:  Regular rate and rhythm. No murmurs, gallops, rubs. Respiratory:  Lung sounds diminished throughout. She is on O2 at 4 L. Abdomen:  Obese, soft, nontender, nondistended. Normoactive bowel sounds. PV:  She does have 2+ pitting edema to all extremities. She does have a DSD on her right foot. No drainage noted. ASSESSMENT AND PLAN:  1. Chest pain:  I will have nursing staff give the resident a dose of the morphine sulfate now. She will continue to follow with hospice. 2.   Seizure:  Resident has not had any recent seizure activity. 3.   COPD:  Resident's respirations are slightly labored, but she is not wearing her O2. I did reapply her O2 and we will have nurse monitor. She will continue to follow with hospice. No further changes will be made at this time. Return in about 1 month (around 8/13/2020), or if symptoms worsen or fail to improve, for chronic conditions. Electronically Signed By: Merlyn Alberts CNP on 07/18/2020 09:52:51  ______________________________  Merlyn Alberts CNP  [id number]  D: 07/17/2020 16:32:28  T: 07/18/2020 01:55:48    cc: - [facility]

## 2020-07-27 PROBLEM — D69.2 PURPURA (HCC): Status: ACTIVE | Noted: 2020-07-27

## 2020-07-27 PROBLEM — R53.81 DEBILITY: Status: ACTIVE | Noted: 2020-07-27

## 2020-07-28 NOTE — PROGRESS NOTES
Electronically Signed By: Apoorva Barriga. Jaya Jason CNP on 07/27/2020 10:11:13  ______________________________  Apoorva Barriga.  Jaya Jason CNP  [id number]  D: 07/26/2020 13:41:31  T: 07/27/2020 00:26:59    cc: [facility]

## 2020-09-03 RX ORDER — OXYCODONE HYDROCHLORIDE 5 MG/1
2.5 TABLET ORAL EVERY 4 HOURS PRN
OUTPATIENT
Start: 2020-09-03

## 2020-09-03 RX ORDER — OXYCODONE HYDROCHLORIDE 5 MG/1
5 TABLET ORAL EVERY 4 HOURS PRN
OUTPATIENT
Start: 2020-09-03

## 2021-05-04 NOTE — CARE COORDINATION
Call from RN that dtr Jade called stating that Edwin Hickmanoba off\" and she is unable to care for her mother and does not know what to do. No listed POA in [facility] or [facility]. Patient states able to contact either Jade or Lyudmila Menon to discuss needs and verified Lyudmila Menon is usually her primary caretaker. Called [facility]'s number and left a voicemail. Call to Siria Isaac. States she is the POA and that Hospice has a copy of the paperwork. Jdae states they are unable to care for the patient. Family aware that if they still wanted Hospice services they are generally required to pay room and board, also notified of 20 day coverage @ 100% under Medicare. Requested review of SNF's available in [facility] [place][note 2]. They would like [facility] as a first Choice, [address] as a second choice. Sent initial info to both facilities and attempted to call referral to [facility]. Nobody available to take referral at this time. Dr. ORDOÑEZ [facility] [employer] notified by perfect serve, requested PT/OT orders. Minocycline Counseling: Patient advised regarding possible photosensitivity and discoloration of the teeth, skin, lips, tongue and gums.  Patient instructed to avoid sunlight, if possible.  When exposed to sunlight, patients should wear protective clothing, sunglasses, and sunscreen.  The patient was instructed to call the office immediately if the following severe adverse effects occur:  hearing changes, easy bruising/bleeding, severe headache, or vision changes.  The patient verbalized understanding of the proper use and possible adverse effects of minocycline.  All of the patient's questions and concerns were addressed.

## (undated) DEVICE — ANTISEPTIC 16OZ H PEROX 1ST AID ORAL DEBRIDING AGNT

## (undated) DEVICE — CODMAN® SURGICAL PATTIES 1/2" X 3" (1.27CM X 7.62CM): Brand: CODMAN®

## (undated) DEVICE — INTENDED FOR TISSUE SEPARATION, AND OTHER PROCEDURES THAT REQUIRE A SHARP SURGICAL BLADE TO PUNCTURE OR CUT.: Brand: BARD-PARKER ® CARBON RIB-BACK BLADES

## (undated) DEVICE — DURAGEN® SUTURABLE DURAL REGENERATION MATRIX, 2 IN X 2 IN (5 CM X 5 CM)
Type: IMPLANTABLE DEVICE | Status: NON-FUNCTIONAL
Brand: DURAGEN® SUTURABLE

## (undated) DEVICE — HYPODERMIC SAFETY NEEDLE: Brand: MAGELLAN

## (undated) DEVICE — GUN CG8900 RANEY CLIP KIT 1PK: Brand: CLIP GUN

## (undated) DEVICE — ELECTRODE PT RET AD L9FT HI MOIST COND ADH HYDRGEL CORDED

## (undated) DEVICE — TUBING, SUCTION, 1/4" X 20', STRAIGHT: Brand: MEDLINE INDUSTRIES, INC.

## (undated) DEVICE — WAX SURG 2.5GM HEMSTAT BNE BEESWAX PARAFFIN ISO PALMITATE

## (undated) DEVICE — TUBING, SUCTION, 1/4" X 10', STRAIGHT: Brand: MEDLINE

## (undated) DEVICE — SPONGE,NEURO,1"X3",XR,STRL,LF,10/PK: Brand: MEDLINE

## (undated) DEVICE — PAD,NON-ADHERENT,3X8,STERILE,LF,1/PK: Brand: MEDLINE

## (undated) DEVICE — ELECTROSURGICAL PENCIL BUTTON SWITCH E-Z CLEAN COATED BLADE ELECTRODE 10 FT (3 M) CORD HOLSTER: Brand: MEGADYNE

## (undated) DEVICE — CODMAN® SURGICAL PATTIES 1/4" X 1/4" (0.64CM X 0.64CM): Brand: CODMAN®

## (undated) DEVICE — SUTURE VCRL SZ 3-0 L27IN ABSRB VLT CT-2 L26MM 1/2 CIR J332H

## (undated) DEVICE — Z DISCONTINUED PATTY SURG 1X1 IN COTTONOID

## (undated) DEVICE — PIN ADLT MAYFIELD RIGID MOLD FINGER

## (undated) DEVICE — SHEET,DRAPE,53X77,STERILE: Brand: MEDLINE

## (undated) DEVICE — GAUZE,SPONGE,4"X4",16PLY,XRAY,STRL,LF: Brand: MEDLINE

## (undated) DEVICE — CRANIOTOMY DRAPE, STERILE: Brand: MEDLINE

## (undated) DEVICE — CODMAN® SURGICAL PATTIES 1/2" X1 1/2" (1.27CM X 3.81CM): Brand: CODMAN®

## (undated) DEVICE — CODMAN® ISOCOOL® AHT® BIPOLAR FORCEPS TIPS 8 1/2" (22CM) TOTAL LENGTH 1MM TIP DIAMETER 3 1/2" (8.9CM) WORKING LENGTH: Brand: CODMAN® ISOCOOL® AHT®

## (undated) DEVICE — STOCKINETTE ORTH W6XL48IN OFF WHT SGL PLY UNBLEACHED COT RIB

## (undated) DEVICE — ACCUDRAIN® EXTERNAL CSF DRAINAGE SYSTEM WITH ANTI-REFLUX VALVE: Brand: ACCUDRAIN®

## (undated) DEVICE — Device

## (undated) DEVICE — GLOVE SURG SZ 8 L12IN FNGR THK94MIL TRNSLUC YEL LTX HYDRGEL

## (undated) DEVICE — SPONGE GZ W4XL4IN COT 12 PLY TYP VII WVN C FLD DSGN

## (undated) DEVICE — COTTON BALL ST

## (undated) DEVICE — PACK,SET UP,DRAPE: Brand: MEDLINE

## (undated) DEVICE — Z DISCONTINUED APPLICATOR SURG PREP 0.35OZ 2% CHG 70% ISO ALC W/ HI LT

## (undated) DEVICE — SYRINGE MED 10ML LUERLOCK TIP W/O SFTY DISP

## (undated) DEVICE — COUNTER NDL 40 COUNT HLD 70 FOAM BLK ADH W/ MAG

## (undated) DEVICE — DIFFUSER: Brand: CORE, MAESTRO

## (undated) DEVICE — CORD,CAUTERY,BIPOLAR,STERILE: Brand: MEDLINE

## (undated) DEVICE — 2.3MM TAPERED ROUTER

## (undated) DEVICE — CODMAN® CRANIAL ACCESS KIT: Brand: CODMAN®

## (undated) DEVICE — GOWN,AURORA,NONREINFORCED,LARGE: Brand: MEDLINE

## (undated) DEVICE — 9.0MM PRECISION ACORN

## (undated) DEVICE — CONTAINER,SPECIMEN,OR STERILE,4OZ: Brand: MEDLINE

## (undated) DEVICE — SUTURE VCRL SZ 0 L36IN ABSRB VLT L36MM CT-1 1/2 CIR J346H

## (undated) DEVICE — MARKER SURG SKIN GENTIAN VLT REG TIP W/ 6IN RUL

## (undated) DEVICE — PAD N ADH W3XL4IN POLY COT SFT PERF FLM EASILY CUT ABSRB

## (undated) DEVICE — LABEL MED MINI W/ MARKER

## (undated) DEVICE — SUTURE NRLN 2-0 L18IN NONABSORBABLE BLK TF L13MM 1/2 CIR N104T

## (undated) DEVICE — TRAUMACATH™ VENTRICULAR CATHETER SET: Brand: TRAUMACATH™

## (undated) DEVICE — OIL CARTRIDGE: Brand: CORE, MAESTRO

## (undated) DEVICE — GLOVE ORANGE PI 7 1/2   MSG9075

## (undated) DEVICE — KIT CATH 16FR 5ML URIN M INDWL INDWL STR TIP INF CTRL

## (undated) DEVICE — TOWEL,OR,DSP,ST,BLUE,STD,4/PK,20PK/CS: Brand: MEDLINE

## (undated) DEVICE — CODMAN® SURGICAL PATTIES 1/2" X 1/2" (1.27CM X 1.27CM): Brand: CODMAN®